# Patient Record
Sex: MALE | Race: BLACK OR AFRICAN AMERICAN | Employment: FULL TIME | URBAN - METROPOLITAN AREA
[De-identification: names, ages, dates, MRNs, and addresses within clinical notes are randomized per-mention and may not be internally consistent; named-entity substitution may affect disease eponyms.]

---

## 2018-06-22 ENCOUNTER — OFFICE VISIT (OUTPATIENT)
Dept: PRIMARY CARE CLINIC | Age: 63
End: 2018-06-22

## 2018-06-22 VITALS
SYSTOLIC BLOOD PRESSURE: 151 MMHG | HEIGHT: 69 IN | DIASTOLIC BLOOD PRESSURE: 79 MMHG | OXYGEN SATURATION: 99 % | RESPIRATION RATE: 18 BRPM | WEIGHT: 173 LBS | BODY MASS INDEX: 25.62 KG/M2 | TEMPERATURE: 98 F | HEART RATE: 65 BPM

## 2018-06-22 DIAGNOSIS — Z72.89 OTHER PROBLEMS RELATED TO LIFESTYLE: ICD-10-CM

## 2018-06-22 DIAGNOSIS — I10 ESSENTIAL HYPERTENSION: Primary | ICD-10-CM

## 2018-06-22 DIAGNOSIS — Z12.11 SCREENING FOR COLON CANCER: ICD-10-CM

## 2018-06-22 DIAGNOSIS — Z13.1 ENCOUNTER FOR SCREENING FOR DIABETES MELLITUS: ICD-10-CM

## 2018-06-22 DIAGNOSIS — F17.200 SMOKER: ICD-10-CM

## 2018-06-22 DIAGNOSIS — I10 ESSENTIAL HYPERTENSION: ICD-10-CM

## 2018-06-22 DIAGNOSIS — E78.49 OTHER HYPERLIPIDEMIA: ICD-10-CM

## 2018-06-22 LAB
A/G RATIO: 1.5 (ref 1.1–2.2)
ALBUMIN SERPL-MCNC: 4.3 G/DL (ref 3.4–5)
ALP BLD-CCNC: 85 U/L (ref 40–129)
ALT SERPL-CCNC: 21 U/L (ref 10–40)
ANION GAP SERPL CALCULATED.3IONS-SCNC: 11 MMOL/L (ref 3–16)
AST SERPL-CCNC: 21 U/L (ref 15–37)
BANDED NEUTROPHILS RELATIVE PERCENT: 6 % (ref 0–7)
BASOPHILS ABSOLUTE: 0.1 K/UL (ref 0–0.2)
BASOPHILS RELATIVE PERCENT: 1 %
BILIRUB SERPL-MCNC: <0.2 MG/DL (ref 0–1)
BILIRUBIN URINE: NEGATIVE
BLOOD, URINE: NEGATIVE
BUN BLDV-MCNC: 14 MG/DL (ref 7–20)
CALCIUM SERPL-MCNC: 9.8 MG/DL (ref 8.3–10.6)
CHLORIDE BLD-SCNC: 106 MMOL/L (ref 99–110)
CHOLESTEROL, TOTAL: 111 MG/DL (ref 0–199)
CLARITY: CLEAR
CO2: 26 MMOL/L (ref 21–32)
COLOR: ABNORMAL
CREAT SERPL-MCNC: 0.8 MG/DL (ref 0.8–1.3)
EOSINOPHILS ABSOLUTE: 0.1 K/UL (ref 0–0.6)
EOSINOPHILS RELATIVE PERCENT: 1 %
GFR AFRICAN AMERICAN: >60
GFR NON-AFRICAN AMERICAN: >60
GLOBULIN: 2.9 G/DL
GLUCOSE BLD-MCNC: 96 MG/DL (ref 70–99)
GLUCOSE URINE: NEGATIVE MG/DL
HCT VFR BLD CALC: 41.1 % (ref 40.5–52.5)
HDLC SERPL-MCNC: 45 MG/DL (ref 40–60)
HEMOGLOBIN: 14 G/DL (ref 13.5–17.5)
HEPATITIS C ANTIBODY INTERPRETATION: NORMAL
KETONES, URINE: ABNORMAL MG/DL
LDL CHOLESTEROL CALCULATED: 43 MG/DL
LEUKOCYTE ESTERASE, URINE: NEGATIVE
LYMPHOCYTES ABSOLUTE: 1.8 K/UL (ref 1–5.1)
LYMPHOCYTES RELATIVE PERCENT: 20 %
MCH RBC QN AUTO: 32.9 PG (ref 26–34)
MCHC RBC AUTO-ENTMCNC: 34 G/DL (ref 31–36)
MCV RBC AUTO: 96.7 FL (ref 80–100)
MICROSCOPIC EXAMINATION: ABNORMAL
MONOCYTES ABSOLUTE: 0.5 K/UL (ref 0–1.3)
MONOCYTES RELATIVE PERCENT: 6 %
NEUTROPHILS ABSOLUTE: 6.4 K/UL (ref 1.7–7.7)
NEUTROPHILS RELATIVE PERCENT: 66 %
NITRITE, URINE: NEGATIVE
PDW BLD-RTO: 14.2 % (ref 12.4–15.4)
PH UA: 6
PLATELET # BLD: 213 K/UL (ref 135–450)
PMV BLD AUTO: 8.8 FL (ref 5–10.5)
POTASSIUM SERPL-SCNC: 4.5 MMOL/L (ref 3.5–5.1)
PROTEIN UA: NEGATIVE MG/DL
RBC # BLD: 4.25 M/UL (ref 4.2–5.9)
RBC # BLD: NORMAL 10*6/UL
SODIUM BLD-SCNC: 143 MMOL/L (ref 136–145)
SPECIFIC GRAVITY UA: 1.03
TOTAL PROTEIN: 7.2 G/DL (ref 6.4–8.2)
TRIGL SERPL-MCNC: 117 MG/DL (ref 0–150)
TSH SERPL DL<=0.05 MIU/L-ACNC: 1.79 UIU/ML (ref 0.27–4.2)
URINE TYPE: ABNORMAL
UROBILINOGEN, URINE: 0.2 E.U./DL
VLDLC SERPL CALC-MCNC: 23 MG/DL
WBC # BLD: 8.9 K/UL (ref 4–11)

## 2018-06-22 PROCEDURE — 99203 OFFICE O/P NEW LOW 30 MIN: CPT | Performed by: INTERNAL MEDICINE

## 2018-06-22 RX ORDER — LISINOPRIL 20 MG/1
20 TABLET ORAL DAILY
COMMUNITY
End: 2018-06-22 | Stop reason: SDUPTHER

## 2018-06-22 RX ORDER — AMLODIPINE BESYLATE 10 MG/1
10 TABLET ORAL DAILY
COMMUNITY
End: 2018-06-22 | Stop reason: SDUPTHER

## 2018-06-22 RX ORDER — LISINOPRIL 20 MG/1
20 TABLET ORAL DAILY
Qty: 30 TABLET | Refills: 5 | Status: SHIPPED | OUTPATIENT
Start: 2018-06-22 | End: 2018-10-26 | Stop reason: SDUPTHER

## 2018-06-22 RX ORDER — AMLODIPINE BESYLATE 10 MG/1
10 TABLET ORAL DAILY
Qty: 30 TABLET | Refills: 5 | Status: SHIPPED | OUTPATIENT
Start: 2018-06-22 | End: 2018-10-26 | Stop reason: SDUPTHER

## 2018-06-22 RX ORDER — ATORVASTATIN CALCIUM 80 MG/1
80 TABLET, FILM COATED ORAL DAILY
COMMUNITY
End: 2018-10-26 | Stop reason: SDUPTHER

## 2018-06-22 ASSESSMENT — ENCOUNTER SYMPTOMS
BLURRED VISION: 0
SHORTNESS OF BREATH: 0
RESPIRATORY NEGATIVE: 1
EYE DISCHARGE: 0
EYES NEGATIVE: 1
ORTHOPNEA: 0

## 2018-06-22 ASSESSMENT — PATIENT HEALTH QUESTIONNAIRE - PHQ9
2. FEELING DOWN, DEPRESSED OR HOPELESS: 0
1. LITTLE INTEREST OR PLEASURE IN DOING THINGS: 0
SUM OF ALL RESPONSES TO PHQ9 QUESTIONS 1 & 2: 0
SUM OF ALL RESPONSES TO PHQ QUESTIONS 1-9: 0

## 2018-06-23 LAB
ESTIMATED AVERAGE GLUCOSE: 128.4 MG/DL
HBA1C MFR BLD: 6.1 %
HIV AG/AB: NORMAL
HIV ANTIGEN: NORMAL
HIV-1 ANTIBODY: NORMAL
HIV-2 AB: NORMAL

## 2018-07-18 ENCOUNTER — OFFICE VISIT (OUTPATIENT)
Dept: PRIMARY CARE CLINIC | Age: 63
End: 2018-07-18

## 2018-07-18 VITALS
SYSTOLIC BLOOD PRESSURE: 107 MMHG | HEIGHT: 69 IN | DIASTOLIC BLOOD PRESSURE: 63 MMHG | TEMPERATURE: 98.1 F | OXYGEN SATURATION: 97 % | BODY MASS INDEX: 25.62 KG/M2 | HEART RATE: 94 BPM | WEIGHT: 173 LBS | RESPIRATION RATE: 16 BRPM

## 2018-07-18 DIAGNOSIS — R21 RASH AND NONSPECIFIC SKIN ERUPTION: Primary | ICD-10-CM

## 2018-07-18 DIAGNOSIS — R21 RASH AND NONSPECIFIC SKIN ERUPTION: ICD-10-CM

## 2018-07-18 PROCEDURE — 4004F PT TOBACCO SCREEN RCVD TLK: CPT | Performed by: INTERNAL MEDICINE

## 2018-07-18 PROCEDURE — G8419 CALC BMI OUT NRM PARAM NOF/U: HCPCS | Performed by: INTERNAL MEDICINE

## 2018-07-18 PROCEDURE — 99213 OFFICE O/P EST LOW 20 MIN: CPT | Performed by: INTERNAL MEDICINE

## 2018-07-18 PROCEDURE — G8427 DOCREV CUR MEDS BY ELIG CLIN: HCPCS | Performed by: INTERNAL MEDICINE

## 2018-07-18 PROCEDURE — 3017F COLORECTAL CA SCREEN DOC REV: CPT | Performed by: INTERNAL MEDICINE

## 2018-07-18 RX ORDER — FLUOCINONIDE 0.5 MG/G
OINTMENT TOPICAL
Qty: 60 G | Refills: 1 | Status: SHIPPED | OUTPATIENT
Start: 2018-07-18 | End: 2018-07-25

## 2018-07-18 ASSESSMENT — ENCOUNTER SYMPTOMS
RESPIRATORY NEGATIVE: 1
EYE DISCHARGE: 0
NAIL CHANGES: 0
EYES NEGATIVE: 1

## 2018-07-18 NOTE — PROGRESS NOTES
nervous/anxious. All other systems reviewed and are negative. Vitals:    07/18/18 1351   BP: 107/63   Site: Left Arm   Position: Sitting   Pulse: 94   Resp: 16   Temp: 98.1 °F (36.7 °C)   TempSrc: Oral   SpO2: 97%   Weight: 173 lb (78.5 kg)   Height: 5' 9\" (1.753 m)     Body mass index is 25.55 kg/m². Wt Readings from Last 3 Encounters:   07/18/18 173 lb (78.5 kg)   06/22/18 173 lb (78.5 kg)     BP Readings from Last 3 Encounters:   07/18/18 107/63   06/22/18 (!) 151/79         Physical Exam   Constitutional: He is oriented to person, place, and time. He appears well-developed and well-nourished. HENT:   Head: Normocephalic and atraumatic. Eyes: Conjunctivae and EOM are normal. Pupils are equal, round, and reactive to light. Neck: Normal range of motion. Neck supple. Cardiovascular: Normal rate, regular rhythm and normal heart sounds. Pulmonary/Chest: Effort normal and breath sounds normal.   Musculoskeletal: Normal range of motion. Neurological: He is alert and oriented to person, place, and time. Skin: Skin is warm and dry. Rash noted. There is erythema. Dry  Rash on the antecubital fossa and elbows of both arms   Psychiatric: He has a normal mood and affect.  His behavior is normal. Thought content normal.       Lab Review   Orders Only on 06/22/2018   Component Date Value    Sodium 06/22/2018 143     Potassium 06/22/2018 4.5     Chloride 06/22/2018 106     CO2 06/22/2018 26     Anion Gap 06/22/2018 11     Glucose 06/22/2018 96     BUN 06/22/2018 14     CREATININE 06/22/2018 0.8     GFR Non- 06/22/2018 >60     GFR  06/22/2018 >60     Calcium 06/22/2018 9.8     Total Protein 06/22/2018 7.2     Alb 06/22/2018 4.3     Albumin/Globulin Ratio 06/22/2018 1.5     Total Bilirubin 06/22/2018 <0.2     Alkaline Phosphatase 06/22/2018 85     ALT 06/22/2018 21     AST 06/22/2018 21     Globulin 06/22/2018 2.9     WBC 06/22/2018 8.9     RBC 06/22/2018 4.25     Hemoglobin 06/22/2018 14.0     Hematocrit 06/22/2018 41.1     MCV 06/22/2018 96.7     MCH 06/22/2018 32.9     MCHC 06/22/2018 34.0     RDW 06/22/2018 14.2     Platelets 24/18/8254 213     MPV 06/22/2018 8.8     Neutrophils % 06/22/2018 66.0     Bands Relative 06/22/2018 6     Lymphocytes % 06/22/2018 20.0     Monocytes % 06/22/2018 6.0     Eosinophils % 06/22/2018 1.0     Basophils % 06/22/2018 1.0     RBC Morphology 06/22/2018 Normal     Neutrophils # 06/22/2018 6.4     Lymphocytes # 06/22/2018 1.8     Monocytes # 06/22/2018 0.5     Eosinophils # 06/22/2018 0.1     Basophils # 06/22/2018 0.1     Cholesterol, Total 06/22/2018 111     Triglycerides 06/22/2018 117     HDL 06/22/2018 45     LDL Calculated 06/22/2018 43     VLDL Cholesterol Calcula* 06/22/2018 23     TSH 06/22/2018 1.79     Color, UA 06/22/2018 DK YELLOW     Clarity, UA 06/22/2018 Clear     Glucose, Ur 06/22/2018 Negative     Bilirubin Urine 06/22/2018 Negative     Ketones, Urine 06/22/2018 TRACE*    Specific [place], UA 06/22/2018 1.028     Blood, Urine 06/22/2018 Negative     pH, UA 06/22/2018 6.0     Protein, UA 06/22/2018 Negative     Urobilinogen, Urine 06/22/2018 0.2     Nitrite, Urine 06/22/2018 Negative     Leukocyte Esterase, Urine 06/22/2018 Negative     Microscopic Examination 06/22/2018 Not Indicated     Urine Type 06/22/2018 Voided     HIV Ag/Ab 06/22/2018 Non-Reactive     HIV-1 Antibody 06/22/2018 Non-Reactive     HIV ANTIGEN 06/22/2018 Non-Reactive     HIV-2 Ab 06/22/2018 Non-Reactive     Hep C Ab Interp 06/22/2018 Non-reactive     Hemoglobin A1C 06/22/2018 6.1     eAG 06/22/2018 128.4          Health Maintenance   Topic Date Due    Pneumococcal med risk (1 of 1 - PPSV23) 10/13/1974    Colon cancer screen colonoscopy  10/13/2005    DTaP/Tdap/Td vaccine (1 - Tdap) 06/12/2019 (Originally 10/13/1974)    Shingles Vaccine (1 of 2 - 2 Dose Series) 06/13/2019 (Originally

## 2018-07-19 ENCOUNTER — TELEPHONE (OUTPATIENT)
Dept: INTERNAL MEDICINE CLINIC | Age: 63
End: 2018-07-19

## 2018-07-19 LAB
ANA INTERPRETATION: NORMAL
ANTI-NUCLEAR ANTIBODY (ANA): NEGATIVE

## 2018-07-19 NOTE — TELEPHONE ENCOUNTER
Referral from Dr Kris Lozano---pt wife called made NP appt for 8/1 at [facility] requested for rash and skin erruption---records should be in epic.

## 2018-08-01 ENCOUNTER — OFFICE VISIT (OUTPATIENT)
Dept: RHEUMATOLOGY | Age: 63
End: 2018-08-01

## 2018-08-01 VITALS
SYSTOLIC BLOOD PRESSURE: 118 MMHG | DIASTOLIC BLOOD PRESSURE: 58 MMHG | HEIGHT: 69 IN | WEIGHT: 174 LBS | HEART RATE: 68 BPM | BODY MASS INDEX: 25.77 KG/M2

## 2018-08-01 DIAGNOSIS — R21 RASH AND NONSPECIFIC SKIN ERUPTION: Primary | ICD-10-CM

## 2018-08-01 DIAGNOSIS — R21 RASH AND NONSPECIFIC SKIN ERUPTION: ICD-10-CM

## 2018-08-01 DIAGNOSIS — R76.8 POSITIVE ANA (ANTINUCLEAR ANTIBODY): Primary | ICD-10-CM

## 2018-08-01 LAB
C-REACTIVE PROTEIN: 1.9 MG/L (ref 0–5.1)
SEDIMENTATION RATE, ERYTHROCYTE: 11 MM/HR (ref 0–20)

## 2018-08-01 PROCEDURE — 3017F COLORECTAL CA SCREEN DOC REV: CPT | Performed by: INTERNAL MEDICINE

## 2018-08-01 PROCEDURE — 99244 OFF/OP CNSLTJ NEW/EST MOD 40: CPT | Performed by: INTERNAL MEDICINE

## 2018-08-01 PROCEDURE — G8427 DOCREV CUR MEDS BY ELIG CLIN: HCPCS | Performed by: INTERNAL MEDICINE

## 2018-08-01 PROCEDURE — G8419 CALC BMI OUT NRM PARAM NOF/U: HCPCS | Performed by: INTERNAL MEDICINE

## 2018-08-01 NOTE — PROGRESS NOTES
2018  Patient Name: Elizabeth Arteaga  : 1955  Medical Record: [id number]    MEDICATIONS  Current Outpatient Prescriptions   Medication Sig Dispense Refill    aspirin 81 MG tablet Take 81 mg by mouth daily      atorvastatin (LIPITOR) 80 MG tablet Take 80 mg by mouth daily      lisinopril (PRINIVIL;ZESTRIL) 20 MG tablet Take 1 tablet by mouth daily 30 tablet 5    amLODIPine (NORVASC) 10 MG tablet Take 1 tablet by mouth daily 30 tablet 5     No current facility-administered medications for this visit. ALLERGIES  No Known Allergies      Comments  No specialty comments available. REFERRING PHYSICIAN:Deep Morales MD    HISTORY OF PRESENT ILLNESS  Elizabeth Arteaga is a 58 y.o. male who is being seen for follow up evaluation of  Rash. Symptoms started several years ago. Rash is located in the forearms and chest and is present in the summer months only. It's associated with itching. He has tried several over-the-counter topicals without improvement. He was recently prescribed Lidex ointment by Dr. Chante Ho with improvement. He denies any family history of lupus. He denies oral or nasal ulcers, dry eyes or dry mouth, malar rash, alopecia, raynauds, joint symptoms, history of seizures, memory loss, blood clots or kidney diseases, pleurisy or pericarditis. HPI  ROS    REVIEW OF SYSTEMS:   Constitutional: No unanticipated weight loss or fevers. No fatigue and malaise. Integumentary: No  malar rash, livedo reticularis, alopecia and Raynaud's symptoms, sclerodactyly, skin tightening  Eyes: negative for dry eyes, visual disturbance and persistent redness, discharge from eyes   ENT: - No tinnitus, loss of hearing, vertigo, or recurrent ear infections.  - No history of nasal/oral ulcers. - No history of sicca symptoms. Cardiovascular: No history of pericarditis, chest pain or murmur or palpitations  Respiratory: No shortness of breath, cough or history of interstitial lung disease. No history of pleurisy. No history of tuberculosis or atypical infections. Gastrointestinal: No history of heart burn, dysphagia or esophageal dysmotility. No change in bowel habits or any inflammatory bowel disease. Genitourinary: No history renal disease, miscarriages. Hematologic/Lymphatic: No abnormal bruising or bleeding, blood clots or swollen lymph nodes. Musculoskeletal: Denies having any swollen joints, joint pains or stiffness   Neurological: No history seizure or focal weakness. No history of neuropathies, paresthesias or hyperesthesias, facial droop, diplopia  Psychiatric: No history of bipolar disease, anxiety, depression  Endocrine: Denies any thyroid / parathyroid disease and osteoporosis  Allergic/Immunologic: No nasal congestion or hives. I have reviewed patients Past medical History, Social History and Family History as mentioned in her chart and this remains unchanged from previous. Past Medical History:   Diagnosis Date    Hyperlipidemia     Hypertension      Past Surgical History:   Procedure Laterality Date    ARTERIAL CLOT SURGERY Right 2016     Social History     Social History    Marital status: Legally      Spouse name: N/A    Number of children: N/A    Years of education: N/A     Occupational History    Not on file. Social History Main Topics    Smoking status: Current Every Day Smoker     Packs/day: 1.00     Years: 20.00     Types: Cigarettes    Smokeless tobacco: Never Used    Alcohol use Yes    Drug use: No    Sexual activity: Yes     Partners: Female     Other Topics Concern    Not on file     Social History Narrative    No narrative on file     History reviewed. No pertinent family history.       PHYSICAL EXAM   Vitals:    08/01/18 1337   BP: (!) 118/58   Pulse: 68   Weight: 174 lb (78.9 kg)   Height: 5' 9\" (1.753 m)     Physical Exam  Constitutional:  Well developed, well nourished, no acute distress, non-toxic appearance   Musculoskeletal:    Ambulates without assistance, normal gait  Neck: Full ROM, no tenderness, supple   Upper Extremities        Shoulder: Full ROM, no crepitus        Elbow:  Full ROM, no synovitis, no deformity        Wrist: Full ROM, no synovitis, no deformity, no ulnar deviation        Fingers: No sclerodactly, no active raynaud's, no ulcers. MCPs: No synovitis, no deformity             PIPs:  No synovitis, no deformity             DIPs: No synovitis, no deformity             Nails: No pitting, no telangiectasias. Lower Extremities        Hip: Full ROM, no tenderness to palpation        Knee: Full ROM, no erythema/swelling/laxity/crepitus. Patella not ballotable. Ankle: Full ROM, no swelling or erythema        MTPs: No swelling or erythema  Back- no tenderness. Eyes:  PERRL, extra ocular movements intact, conjunctiva normal   HEENT:  Atraumatic, normocephalic, external ears normal, oropharynx moist, no pharyngeal exudates. Respiratory:  No respiratory distress  GI:  Soft, nondistended, normal bowel sounds, nontender, no organomegaly, no mass, no rebound, no guarding   :  No costovertebral angle tenderness   Integument:  Well hydrated,-hyperpigmented maculopapular rash on the chest, forearms  Lymphatic:  No lymphadenopathy noted   Neurologic:   Alert & oriented x 3, CN 2-12 normal, no focal deficits noted. Sensations Intact. Muscle strength 5/5 proximally and distally in upper and lower extremities.    Psychiatric:  Speech and behavior appropriate           LABS AND IMAGING  Outside data reviewed and in HPI    Lab Results   Component Value Date    WBC 8.9 06/22/2018    RBC 4.25 06/22/2018    HGB 14.0 06/22/2018    HCT 41.1 06/22/2018     06/22/2018    MCV 96.7 06/22/2018    MCH 32.9 06/22/2018    MCHC 34.0 06/22/2018    RDW 14.2 06/22/2018    BANDSPCT 6 06/22/2018    LYMPHOPCT 20.0 06/22/2018    MONOPCT 6.0 06/22/2018    BASOPCT 1.0 06/22/2018    MONOSABS 0.5 06/22/2018    LYMPHSABS 1.8 06/22/2018    EOSABS 0.1

## 2018-08-03 LAB — ANA BY IFA: ABNORMAL

## 2018-10-26 ENCOUNTER — OFFICE VISIT (OUTPATIENT)
Dept: PRIMARY CARE CLINIC | Age: 63
End: 2018-10-26
Payer: MEDICAID

## 2018-10-26 VITALS
HEART RATE: 70 BPM | DIASTOLIC BLOOD PRESSURE: 77 MMHG | WEIGHT: 176 LBS | BODY MASS INDEX: 26.07 KG/M2 | TEMPERATURE: 98.2 F | HEIGHT: 69 IN | OXYGEN SATURATION: 99 % | SYSTOLIC BLOOD PRESSURE: 140 MMHG

## 2018-10-26 DIAGNOSIS — E78.49 OTHER HYPERLIPIDEMIA: Primary | ICD-10-CM

## 2018-10-26 DIAGNOSIS — I10 ESSENTIAL HYPERTENSION: ICD-10-CM

## 2018-10-26 DIAGNOSIS — Z12.11 SCREENING FOR COLON CANCER: ICD-10-CM

## 2018-10-26 DIAGNOSIS — F17.200 SMOKER: ICD-10-CM

## 2018-10-26 PROCEDURE — 3017F COLORECTAL CA SCREEN DOC REV: CPT | Performed by: INTERNAL MEDICINE

## 2018-10-26 PROCEDURE — G8427 DOCREV CUR MEDS BY ELIG CLIN: HCPCS | Performed by: INTERNAL MEDICINE

## 2018-10-26 PROCEDURE — G8484 FLU IMMUNIZE NO ADMIN: HCPCS | Performed by: INTERNAL MEDICINE

## 2018-10-26 PROCEDURE — 4004F PT TOBACCO SCREEN RCVD TLK: CPT | Performed by: INTERNAL MEDICINE

## 2018-10-26 PROCEDURE — G8419 CALC BMI OUT NRM PARAM NOF/U: HCPCS | Performed by: INTERNAL MEDICINE

## 2018-10-26 PROCEDURE — 99214 OFFICE O/P EST MOD 30 MIN: CPT | Performed by: INTERNAL MEDICINE

## 2018-10-26 RX ORDER — LISINOPRIL 20 MG/1
20 TABLET ORAL DAILY
Qty: 30 TABLET | Refills: 5 | Status: SHIPPED | OUTPATIENT
Start: 2018-10-26 | End: 2019-07-16 | Stop reason: SDUPTHER

## 2018-10-26 RX ORDER — ATORVASTATIN CALCIUM 80 MG/1
80 TABLET, FILM COATED ORAL DAILY
Qty: 30 TABLET | Refills: 5 | Status: SHIPPED | OUTPATIENT
Start: 2018-10-26 | End: 2019-07-16 | Stop reason: SDUPTHER

## 2018-10-26 RX ORDER — AMLODIPINE BESYLATE 10 MG/1
10 TABLET ORAL DAILY
Qty: 30 TABLET | Refills: 5 | Status: SHIPPED | OUTPATIENT
Start: 2018-10-26 | End: 2019-08-06 | Stop reason: SDUPTHER

## 2018-10-26 ASSESSMENT — ENCOUNTER SYMPTOMS
EYES NEGATIVE: 1
EYE DISCHARGE: 0
RESPIRATORY NEGATIVE: 1

## 2018-10-26 NOTE — PATIENT INSTRUCTIONS
between the lower lip or cheek and the gum. · Chewing tobacco is sold as loose leaves, plugs, or twists. It is chewed or placed between the cheek and the gum or teeth. There are plenty of reasons to stop using smokeless tobacco. These products are harmful. They are not risk-free alternatives to smoking. Smokeless tobacco contains nicotine, which is addicting. Though using smokeless tobacco is less harmful than smoking cigarettes, it can cause serious health problems, such as:  · White patches or red sores in your mouth that can turn into mouth cancer involving the lip, tongue, or cheek. · Tooth loss and other dental problems. · Gum disease. Your gums may pull away from your teeth and not grow back. People who use smokeless tobacco crave the nicotine in it. Giving up smokeless tobacco is much harder than simply changing a habit. Your body has to stop craving the nicotine. It is hard to quit, but you can do it. Many tools are available for people who want to quit using smokeless tobacco. You may find that combining tools works best for you. There are several steps to quitting. First you get ready to quit. Then you get support to help you. After that, you learn new skills and behaviors to quit. For many people, a necessary step is getting and using medicine. Your doctor will help you set up the plan that best meets your needs. You may want to attend a tobacco cessation program. When you choose a program, look for one that has proven success. Ask your doctor for ideas. You will greatly increase your chances of success if you take medicine as well as get counseling or join a cessation program.  Some of the changes you feel when you first quit smokeless tobacco are uncomfortable. Your body will miss the nicotine at first, and you may feel short-tempered and grumpy. You may have trouble sleeping or concentrating. Medicine can help you deal with these symptoms. You may struggle with changing your habits and rituals. The last step is the tricky one: Be prepared for the urge to use smokeless tobacco to continue for a time. This is a lot to deal with, but keep at it. You will feel better. Follow-up care is a key part of your treatment and safety. Be sure to make and go to all appointments, and call your doctor if you are having problems. It's also a good idea to know your test results and keep a list of the medicines you take. How can you care for yourself at home? · Ask your family, friends, and coworkers for support. You have a better chance of quitting if you have help and support. · Join a support group for people who are trying to quit using smokeless tobacco.  · Set a quit date. Pick your date carefully so that it is not right in the middle of a big deadline or stressful time. After you quit, do not use smokeless tobacco even once. Get rid of all spit cups, cans, and pouches after your last use. Clean your house and your clothes so that they do not smell of tobacco.  · Learn how to be a non-user. Think about ways you can avoid those things that make you reach for tobacco.  ¨ Learn some ways to deal with cravings, like calling a friend or going for a walk. Cravings often pass. ¨ Avoid situations that put you at greatest risk for using smokeless tobacco. For some people, it is hard to spend time with friends without dipping or chewing. For others, they might skip a coffee break with coworkers who smoke or use smokeless tobacco.  ¨ Change your daily routine. Take a different route to work, or eat a meal in a different place. · Cut down on stress. Calm yourself or release tension by doing an activity you enjoy, such as reading a book, taking a hot bath, or gardening. · Talk to your doctor or pharmacist about nicotine replacement therapy. You still get nicotine, but you do not use tobacco. Nicotine replacement products help you slowly reduce the amount of nicotine you need.  Many of these products are available over the Medicine can help you deal with these symptoms. You may struggle with changing your smoking habits and rituals. The last step is the tricky one: Be prepared for the smoking urge to continue for a time. This is a lot to deal with, but keep at it. You will feel better. Follow-up care is a key part of your treatment and safety. Be sure to make and go to all appointments, and call your doctor if you are having problems. It's also a good idea to know your test results and keep a list of the medicines you take. How can you care for yourself at home? · Ask your family, friends, and coworkers for support. You have a better chance of quitting if you have help and support. · Join a support group, such as Nicotine Anonymous, for people who are trying to quit smoking. · Consider signing up for a smoking cessation program, such as the American Lung Association's Freedom from Smoking program.  · Get text messaging support. Go to the website at www.smokefree. gov to sign up for the [facility] program.  · Set a quit date. Pick your date carefully so that it is not right in the middle of a big deadline or stressful time. Once you quit, do not even take a puff. Get rid of all ashtrays and lighters after your last cigarette. Clean your house and your clothes so that they do not smell of smoke. · Learn how to be a nonsmoker. Think about ways you can avoid those things that make you reach for a cigarette. ¨ Avoid situations that put you at greatest risk for smoking. For some people, it is hard to have a drink with friends without smoking. For others, they might skip a coffee break with coworkers who smoke. ¨ Change your daily routine. Take a different route to work or eat a meal in a different place. · Cut down on stress. Calm yourself or release tension by doing an activity you enjoy, such as reading a book, taking a hot bath, or gardening.   · Talk to your doctor or pharmacist about nicotine replacement therapy, which

## 2018-10-26 NOTE — PROGRESS NOTES
Maximino Hurt  YOB: 1955    Date of Service:  10/26/2018    Chief Complaint   Patient presents with    Hypertension     Here for medication review and renewal. No new problems. No complaints. Hypertension   This is a chronic problem. The current episode started more than 1 year ago. The problem is unchanged. The problem is controlled. There are no associated agents to hypertension. Risk factors for coronary artery disease include male gender, obesity and sedentary lifestyle. Past treatments include calcium channel blockers and angiotensin blockers. The current treatment provides significant improvement. There are no compliance problems. Hyperlipidemia   This is a chronic problem. The problem is controlled. Recent lipid tests were reviewed and are normal. There are no known factors aggravating his hyperlipidemia. Current antihyperlipidemic treatment includes statins. The current treatment provides significant improvement of lipids. No Known Allergies  Outpatient Prescriptions Marked as Taking for the 10/26/18 encounter (Office Visit) with Lexie Henderson MD   Medication Sig Dispense Refill    lisinopril (PRINIVIL;ZESTRIL) 20 MG tablet Take 1 tablet by mouth daily 30 tablet 5    atorvastatin (LIPITOR) 80 MG tablet Take 1 tablet by mouth daily 30 tablet 5    aspirin 81 MG tablet Take 1 tablet by mouth daily 30 tablet 5    amLODIPine (NORVASC) 10 MG tablet Take 1 tablet by mouth daily 30 tablet 5         There is no immunization history on file for this patient. Past Medical History:   Diagnosis Date    Hyperlipidemia     Hypertension      Past Surgical History:   Procedure Laterality Date    ARTERIAL CLOT SURGERY Right 2016     No family history on file. Review of Systems:  Review of Systems   Constitutional: Negative for activity change and appetite change. HENT: Negative. Eyes: Negative. Negative for discharge. Respiratory: Negative.     Genitourinary: Non-Reactive     HIV-2 Ab 06/22/2018 Non-Reactive     Hep C Ab Interp 06/22/2018 Non-reactive     Hemoglobin A1C 06/22/2018 6.1     eAG 06/22/2018 557.4      notapplicable      Health Maintenance   Topic Date Due    Colon cancer screen colonoscopy  10/13/2005    Flu vaccine (1) 10/26/2018 (Originally 9/1/2018)    Pneumococcal med risk (1 of 1 - PPSV23) 10/26/2018 (Originally 10/13/1974)    DTaP/Tdap/Td vaccine (1 - Tdap) 06/12/2019 (Originally 10/13/1974)    Shingles Vaccine (1 of 2 - 2 Dose Series) 06/13/2019 (Originally 10/13/2005)    A1C test (Diabetic or Prediabetic)  06/22/2019    Potassium monitoring  06/22/2019    Creatinine monitoring  06/22/2019    Lipid screen  06/22/2023    Hepatitis C screen  Completed    HIV screen  Completed          Assessment/Plan:    Essential hypertension  Stable     Smoker  Still smokes . Not ready to quit yet. 1. Essential hypertension    - lisinopril (PRINIVIL;ZESTRIL) 20 MG tablet; Take 1 tablet by mouth daily  Dispense: 30 tablet; Refill: 5  - atorvastatin (LIPITOR) 80 MG tablet; Take 1 tablet by mouth daily  Dispense: 30 tablet; Refill: 5  - aspirin 81 MG tablet; Take 1 tablet by mouth daily  Dispense: 30 tablet; Refill: 5  - amLODIPine (NORVASC) 10 MG tablet; Take 1 tablet by mouth daily  Dispense: 30 tablet; Refill: 5    2. Other hyperlipidemia    - atorvastatin (LIPITOR) 80 MG tablet; Take 1 tablet by mouth daily  Dispense: 30 tablet; Refill: 5    3. Screening for colon cancer    - POCT FECAL IMMUNOCHEMICAL TEST (FIT); Future    4. Smoker         No Follow-up on file.

## 2019-01-24 ENCOUNTER — OFFICE VISIT (OUTPATIENT)
Dept: PRIMARY CARE CLINIC | Age: 64
End: 2019-01-24
Payer: MEDICAID

## 2019-01-24 VITALS
HEIGHT: 69 IN | HEART RATE: 73 BPM | DIASTOLIC BLOOD PRESSURE: 70 MMHG | BODY MASS INDEX: 26.66 KG/M2 | WEIGHT: 180 LBS | SYSTOLIC BLOOD PRESSURE: 129 MMHG

## 2019-01-24 DIAGNOSIS — Z12.11 SCREENING FOR COLON CANCER: Primary | ICD-10-CM

## 2019-01-24 PROCEDURE — G8427 DOCREV CUR MEDS BY ELIG CLIN: HCPCS | Performed by: INTERNAL MEDICINE

## 2019-01-24 PROCEDURE — 99213 OFFICE O/P EST LOW 20 MIN: CPT | Performed by: INTERNAL MEDICINE

## 2019-01-24 PROCEDURE — G8484 FLU IMMUNIZE NO ADMIN: HCPCS | Performed by: INTERNAL MEDICINE

## 2019-01-24 PROCEDURE — G8419 CALC BMI OUT NRM PARAM NOF/U: HCPCS | Performed by: INTERNAL MEDICINE

## 2019-01-24 PROCEDURE — 4004F PT TOBACCO SCREEN RCVD TLK: CPT | Performed by: INTERNAL MEDICINE

## 2019-01-24 PROCEDURE — 3017F COLORECTAL CA SCREEN DOC REV: CPT | Performed by: INTERNAL MEDICINE

## 2019-07-16 DIAGNOSIS — E78.49 OTHER HYPERLIPIDEMIA: ICD-10-CM

## 2019-07-16 DIAGNOSIS — I10 ESSENTIAL HYPERTENSION: ICD-10-CM

## 2019-07-16 RX ORDER — ATORVASTATIN CALCIUM 80 MG/1
80 TABLET, FILM COATED ORAL DAILY
Qty: 30 TABLET | Refills: 5 | Status: SHIPPED | OUTPATIENT
Start: 2019-07-16 | End: 2020-02-06 | Stop reason: SDUPTHER

## 2019-07-16 RX ORDER — LISINOPRIL 20 MG/1
20 TABLET ORAL DAILY
Qty: 30 TABLET | Refills: 5 | Status: SHIPPED | OUTPATIENT
Start: 2019-07-16 | End: 2020-02-06 | Stop reason: SDUPTHER

## 2019-07-24 ENCOUNTER — OFFICE VISIT (OUTPATIENT)
Dept: PRIMARY CARE CLINIC | Age: 64
End: 2019-07-24
Payer: MEDICAID

## 2019-07-24 VITALS
DIASTOLIC BLOOD PRESSURE: 81 MMHG | SYSTOLIC BLOOD PRESSURE: 131 MMHG | OXYGEN SATURATION: 98 % | TEMPERATURE: 97.9 F | BODY MASS INDEX: 25.77 KG/M2 | WEIGHT: 174 LBS | HEART RATE: 78 BPM | HEIGHT: 69 IN

## 2019-07-24 DIAGNOSIS — I10 ESSENTIAL HYPERTENSION: Primary | ICD-10-CM

## 2019-07-24 DIAGNOSIS — Z13.1 SCREENING FOR DIABETES MELLITUS (DM): ICD-10-CM

## 2019-07-24 DIAGNOSIS — E78.49 OTHER HYPERLIPIDEMIA: ICD-10-CM

## 2019-07-24 DIAGNOSIS — F17.200 SMOKER: ICD-10-CM

## 2019-07-24 DIAGNOSIS — I10 ESSENTIAL HYPERTENSION: ICD-10-CM

## 2019-07-24 PROCEDURE — 99213 OFFICE O/P EST LOW 20 MIN: CPT | Performed by: INTERNAL MEDICINE

## 2019-07-24 PROCEDURE — 4004F PT TOBACCO SCREEN RCVD TLK: CPT | Performed by: INTERNAL MEDICINE

## 2019-07-24 PROCEDURE — G8427 DOCREV CUR MEDS BY ELIG CLIN: HCPCS | Performed by: INTERNAL MEDICINE

## 2019-07-24 PROCEDURE — 3017F COLORECTAL CA SCREEN DOC REV: CPT | Performed by: INTERNAL MEDICINE

## 2019-07-24 PROCEDURE — G8419 CALC BMI OUT NRM PARAM NOF/U: HCPCS | Performed by: INTERNAL MEDICINE

## 2019-07-24 ASSESSMENT — ENCOUNTER SYMPTOMS
PHOTOPHOBIA: 0
GASTROINTESTINAL NEGATIVE: 1
RESPIRATORY NEGATIVE: 1
EYE PAIN: 0
EYE ITCHING: 0
EYE DISCHARGE: 0
EYE REDNESS: 0

## 2019-07-24 ASSESSMENT — PATIENT HEALTH QUESTIONNAIRE - PHQ9
2. FEELING DOWN, DEPRESSED OR HOPELESS: 0
SUM OF ALL RESPONSES TO PHQ9 QUESTIONS 1 & 2: 0
SUM OF ALL RESPONSES TO PHQ QUESTIONS 1-9: 0
1. LITTLE INTEREST OR PLEASURE IN DOING THINGS: 0
SUM OF ALL RESPONSES TO PHQ QUESTIONS 1-9: 0

## 2019-07-24 NOTE — PROGRESS NOTES
TempSrc: Oral   SpO2: 98%   Weight: 174 lb (78.9 kg)   Height: 5' 9\" (1.753 m)     Body mass index is 25.7 kg/m². Wt Readings from Last 3 Encounters:   07/24/19 174 lb (78.9 kg)   01/24/19 180 lb (81.6 kg)   10/26/18 176 lb (79.8 kg)     BP Readings from Last 3 Encounters:   07/24/19 131/81   01/24/19 129/70   10/26/18 (!) 140/77         Physical Exam   Constitutional: He is oriented to person, place, and time. He appears well-developed and well-nourished. HENT:   Head: Normocephalic and atraumatic. Eyes: Pupils are equal, round, and reactive to light. Conjunctivae and EOM are normal.   Neck: Normal range of motion. Neck supple. Cardiovascular: Normal rate, regular rhythm and normal heart sounds. Pulmonary/Chest: Effort normal and breath sounds normal.   Musculoskeletal: Normal range of motion. Neurological: He is alert and oriented to person, place, and time. Skin: Skin is warm and dry. Psychiatric: He has a normal mood and affect. His behavior is normal. Thought content normal.       Lab Review   No visits with results within 6 Month(s) from this visit.    Latest known visit with results is:   Orders Only on 08/01/2018   Component Date Value    YANELIS by IFA 08/01/2018 1:160*    Sed Rate 08/01/2018 11     CRP 48/84/1541 1.9      notapplicable      Health Maintenance   Topic Date Due    Pneumococcal 0-64 years Vaccine (1 of 1 - PPSV23) 10/13/1961    DTaP/Tdap/Td vaccine (1 - Tdap) 10/13/1974    Shingles Vaccine (1 of 2) 10/13/2005    Colon cancer screen colonoscopy  10/13/2005    A1C test (Diabetic or Prediabetic)  06/22/2019    Potassium monitoring  06/22/2019    Creatinine monitoring  06/22/2019    Flu vaccine (1) 01/15/2020 (Originally 9/1/2019)    Lipid screen  06/22/2023    Hepatitis C screen  Completed    HIV screen  Completed          Assessment/Plan:    Essential hypertension  Stable     Other hyperlipidemia  Lab review    Smoker  Encouraged to stop smoking but is not ready yet          There are no diagnoses linked to this encounter. No follow-ups on file.

## 2019-07-24 NOTE — PATIENT INSTRUCTIONS
Continue your current medications and see me again in 6 months. Please have your labs repeated before the next visit.

## 2019-07-25 LAB
A/G RATIO: 1.7 (ref 1.1–2.2)
ALBUMIN SERPL-MCNC: 4.3 G/DL (ref 3.4–5)
ALP BLD-CCNC: 71 U/L (ref 40–129)
ALT SERPL-CCNC: 20 U/L (ref 10–40)
ANION GAP SERPL CALCULATED.3IONS-SCNC: 13 MMOL/L (ref 3–16)
AST SERPL-CCNC: 22 U/L (ref 15–37)
BASOPHILS ABSOLUTE: 0 K/UL (ref 0–0.2)
BASOPHILS RELATIVE PERCENT: 0.6 %
BILIRUB SERPL-MCNC: <0.2 MG/DL (ref 0–1)
BUN BLDV-MCNC: 19 MG/DL (ref 7–20)
CALCIUM SERPL-MCNC: 9.6 MG/DL (ref 8.3–10.6)
CHLORIDE BLD-SCNC: 104 MMOL/L (ref 99–110)
CO2: 23 MMOL/L (ref 21–32)
CREAT SERPL-MCNC: 1 MG/DL (ref 0.8–1.3)
EOSINOPHILS ABSOLUTE: 0.3 K/UL (ref 0–0.6)
EOSINOPHILS RELATIVE PERCENT: 4.5 %
ESTIMATED AVERAGE GLUCOSE: 131.2 MG/DL
GFR AFRICAN AMERICAN: >60
GFR NON-AFRICAN AMERICAN: >60
GLOBULIN: 2.5 G/DL
GLUCOSE BLD-MCNC: 95 MG/DL (ref 70–99)
HBA1C MFR BLD: 6.2 %
HCT VFR BLD CALC: 40.5 % (ref 40.5–52.5)
HEMOGLOBIN: 13.4 G/DL (ref 13.5–17.5)
LYMPHOCYTES ABSOLUTE: 2.1 K/UL (ref 1–5.1)
LYMPHOCYTES RELATIVE PERCENT: 30.1 %
MCH RBC QN AUTO: 33.2 PG (ref 26–34)
MCHC RBC AUTO-ENTMCNC: 33.1 G/DL (ref 31–36)
MCV RBC AUTO: 100.2 FL (ref 80–100)
MONOCYTES ABSOLUTE: 0.6 K/UL (ref 0–1.3)
MONOCYTES RELATIVE PERCENT: 8.6 %
NEUTROPHILS ABSOLUTE: 4 K/UL (ref 1.7–7.7)
NEUTROPHILS RELATIVE PERCENT: 56.2 %
PDW BLD-RTO: 13.8 % (ref 12.4–15.4)
PLATELET # BLD: 204 K/UL (ref 135–450)
PMV BLD AUTO: 9.2 FL (ref 5–10.5)
POTASSIUM SERPL-SCNC: 4.1 MMOL/L (ref 3.5–5.1)
RBC # BLD: 4.04 M/UL (ref 4.2–5.9)
SODIUM BLD-SCNC: 140 MMOL/L (ref 136–145)
TOTAL PROTEIN: 6.8 G/DL (ref 6.4–8.2)
TSH SERPL DL<=0.05 MIU/L-ACNC: 2.96 UIU/ML (ref 0.27–4.2)
WBC # BLD: 7.1 K/UL (ref 4–11)

## 2019-08-06 DIAGNOSIS — I10 ESSENTIAL HYPERTENSION: ICD-10-CM

## 2019-08-06 RX ORDER — AMLODIPINE BESYLATE 10 MG/1
10 TABLET ORAL DAILY
Qty: 30 TABLET | Refills: 5 | Status: SHIPPED | OUTPATIENT
Start: 2019-08-06 | End: 2020-02-06 | Stop reason: SDUPTHER

## 2020-02-06 ENCOUNTER — OFFICE VISIT (OUTPATIENT)
Dept: PRIMARY CARE CLINIC | Age: 65
End: 2020-02-06
Payer: MEDICAID

## 2020-02-06 VITALS
WEIGHT: 175 LBS | HEIGHT: 69 IN | OXYGEN SATURATION: 98 % | SYSTOLIC BLOOD PRESSURE: 112 MMHG | TEMPERATURE: 97.8 F | HEART RATE: 70 BPM | BODY MASS INDEX: 25.92 KG/M2 | DIASTOLIC BLOOD PRESSURE: 74 MMHG

## 2020-02-06 PROCEDURE — 4004F PT TOBACCO SCREEN RCVD TLK: CPT | Performed by: INTERNAL MEDICINE

## 2020-02-06 PROCEDURE — G8427 DOCREV CUR MEDS BY ELIG CLIN: HCPCS | Performed by: INTERNAL MEDICINE

## 2020-02-06 PROCEDURE — 3017F COLORECTAL CA SCREEN DOC REV: CPT | Performed by: INTERNAL MEDICINE

## 2020-02-06 PROCEDURE — 99214 OFFICE O/P EST MOD 30 MIN: CPT | Performed by: INTERNAL MEDICINE

## 2020-02-06 PROCEDURE — G8484 FLU IMMUNIZE NO ADMIN: HCPCS | Performed by: INTERNAL MEDICINE

## 2020-02-06 PROCEDURE — G8419 CALC BMI OUT NRM PARAM NOF/U: HCPCS | Performed by: INTERNAL MEDICINE

## 2020-02-06 RX ORDER — ATORVASTATIN CALCIUM 80 MG/1
80 TABLET, FILM COATED ORAL DAILY
Qty: 90 TABLET | Refills: 2 | Status: SHIPPED | OUTPATIENT
Start: 2020-02-06 | End: 2021-02-17

## 2020-02-06 RX ORDER — AMLODIPINE BESYLATE 10 MG/1
10 TABLET ORAL DAILY
Qty: 90 TABLET | Refills: 2 | Status: SHIPPED | OUTPATIENT
Start: 2020-02-06 | End: 2021-02-17

## 2020-02-06 RX ORDER — LISINOPRIL 20 MG/1
20 TABLET ORAL DAILY
Qty: 90 TABLET | Refills: 2 | Status: SHIPPED | OUTPATIENT
Start: 2020-02-06 | End: 2021-02-17

## 2020-02-06 ASSESSMENT — ENCOUNTER SYMPTOMS
EYES NEGATIVE: 1
EYE DISCHARGE: 0
RESPIRATORY NEGATIVE: 1

## 2020-02-06 ASSESSMENT — PATIENT HEALTH QUESTIONNAIRE - PHQ9
SUM OF ALL RESPONSES TO PHQ9 QUESTIONS 1 & 2: 0
SUM OF ALL RESPONSES TO PHQ QUESTIONS 1-9: 0
1. LITTLE INTEREST OR PLEASURE IN DOING THINGS: 0
2. FEELING DOWN, DEPRESSED OR HOPELESS: 0
SUM OF ALL RESPONSES TO PHQ QUESTIONS 1-9: 0

## 2020-02-06 NOTE — PROGRESS NOTES
Kathy Lozada  YOB: 1955    Date of Service:  2/6/2020    Chief Complaint   Patient presents with    6 Month Follow-Up         Hypertension   This is a chronic problem. The current episode started more than 1 year ago. The problem is unchanged. The problem is controlled. There are no associated agents to hypertension. Risk factors for coronary artery disease include male gender and sedentary lifestyle. The current treatment provides significant improvement. There are no compliance problems. Hyperlipidemia   This is a chronic problem. The current episode started more than 1 year ago. The problem is controlled. Recent lipid tests were reviewed and are normal. There are no known factors aggravating his hyperlipidemia. Risk factors for coronary artery disease include hypertension and male sex. No Known Allergies  Outpatient Medications Marked as Taking for the 2/6/20 encounter (Office Visit) with Salma Cole MD   Medication Sig Dispense Refill    lisinopril (PRINIVIL;ZESTRIL) 20 MG tablet Take 1 tablet by mouth daily 90 tablet 2    atorvastatin (LIPITOR) 80 MG tablet Take 1 tablet by mouth daily 90 tablet 2    aspirin 81 MG tablet Take 1 tablet by mouth daily 90 tablet 2    amLODIPine (NORVASC) 10 MG tablet Take 1 tablet by mouth daily 90 tablet 2         There is no immunization history on file for this patient. Past Medical History:   Diagnosis Date    Hyperlipidemia     Hypertension      Past Surgical History:   Procedure Laterality Date    ARTERIAL CLOT SURGERY Right 2016     No family history on file. Review of Systems:  Review of Systems   Constitutional: Negative for activity change and appetite change. HENT: Negative. Eyes: Negative. Negative for discharge. Respiratory: Negative. Genitourinary: Negative for difficulty urinating. Musculoskeletal: Negative for arthralgias. Skin: Negative for rash. Neurological: Negative. 07/24/2019 56.2     Lymphocytes % 07/24/2019 30.1     Monocytes % 07/24/2019 8.6     Eosinophils % 07/24/2019 4.5     Basophils % 07/24/2019 0.6     Neutrophils Absolute 07/24/2019 4.0     Lymphocytes Absolute 07/24/2019 2.1     Monocytes Absolute 07/24/2019 0.6     Eosinophils Absolute 07/24/2019 0.3     Basophils Absolute 07/24/2019 0.0     Sodium 07/24/2019 140     Potassium 07/24/2019 4.1     Chloride 07/24/2019 104     CO2 07/24/2019 23     Anion Gap 07/24/2019 13     Glucose 07/24/2019 95     BUN 07/24/2019 19     CREATININE 07/24/2019 1.0     GFR Non- 07/24/2019 >60     GFR  07/24/2019 >60     Calcium 07/24/2019 9.6     Total Protein 07/24/2019 6.8     Alb 07/24/2019 4.3     Albumin/Globulin Ratio 07/24/2019 1.7     Total Bilirubin 07/24/2019 <0.2     Alkaline Phosphatase 07/24/2019 71     ALT 07/24/2019 20     AST 07/24/2019 22     Globulin 22/34/4946 2.5      notapplicable      Health Maintenance   Topic Date Due    Colon cancer screen colonoscopy  10/13/2005    Lipid screen  02/06/2020 (Originally 6/22/2019)    Pneumococcal 0-64 years Vaccine (1 of 1 - PPSV23) 07/23/2020 (Originally 10/13/1961)    DTaP/Tdap/Td vaccine (1 - Tdap) 07/24/2020 (Originally 10/13/1966)    Shingles Vaccine (1 of 2) 07/24/2020 (Originally 10/13/2005)    Flu vaccine (1) 02/06/2021 (Originally 9/1/2019)    A1C test (Diabetic or Prediabetic)  07/24/2020    Potassium monitoring  07/24/2020    Creatinine monitoring  07/24/2020    Hepatitis C screen  Completed    HIV screen  Completed    Hepatitis A vaccine  Aged Out    Hepatitis B vaccine  Aged Out    Hib vaccine  Aged Out    Meningococcal (ACWY) vaccine  Aged Out          Assessment/Plan:    Other hyperlipidemia  Stable     Essential hypertension  Stable     Smoker  Still not ready to quit      1. Essential hypertension    - lisinopril (PRINIVIL;ZESTRIL) 20 MG tablet;  Take 1 tablet by mouth daily  Dispense: 90 tablet; Refill: 2  - atorvastatin (LIPITOR) 80 MG tablet; Take 1 tablet by mouth daily  Dispense: 90 tablet; Refill: 2  - aspirin 81 MG tablet; Take 1 tablet by mouth daily  Dispense: 90 tablet; Refill: 2  - amLODIPine (NORVASC) 10 MG tablet; Take 1 tablet by mouth daily  Dispense: 90 tablet; Refill: 2  - CBC Auto Differential; Future  - Comprehensive Metabolic Panel; Future  - Lipid Panel; Future  - TSH without Reflex; Future  - Urinalysis; Future    2. Other hyperlipidemia    - atorvastatin (LIPITOR) 80 MG tablet; Take 1 tablet by mouth daily  Dispense: 90 tablet; Refill: 2    3. Screening for colon cancer    - Psa screening; Future    4. Smoker         Return in about 6 months (around 8/6/2020). c

## 2020-05-27 ENCOUNTER — VIRTUAL VISIT (OUTPATIENT)
Dept: PRIMARY CARE CLINIC | Age: 65
End: 2020-05-27
Payer: MEDICAID

## 2020-05-27 ENCOUNTER — TELEPHONE (OUTPATIENT)
Dept: PRIMARY CARE CLINIC | Age: 65
End: 2020-05-27

## 2020-05-27 ENCOUNTER — NURSE TRIAGE (OUTPATIENT)
Dept: OTHER | Facility: CLINIC | Age: 65
End: 2020-05-27

## 2020-05-27 PROBLEM — L03.032 CELLULITIS AND ABSCESS OF TOE OF LEFT FOOT: Status: ACTIVE | Noted: 2020-05-27

## 2020-05-27 PROBLEM — L02.612 CELLULITIS AND ABSCESS OF TOE OF LEFT FOOT: Status: ACTIVE | Noted: 2020-05-27

## 2020-05-27 PROCEDURE — 99442 PR PHYS/QHP TELEPHONE EVALUATION 11-20 MIN: CPT | Performed by: INTERNAL MEDICINE

## 2020-05-27 NOTE — PROGRESS NOTES
next 24 hours.     Patient identification was verified at the start of the visit: Yes    Total Time: minutes: 11-20 minutes    Note: not billable if this call serves to triage the patient into an appointment for the relevant concern      Juan F Castillo

## 2020-05-27 NOTE — TELEPHONE ENCOUNTER
Reason for Disposition   Swollen foot (EXCEPTIONs: localized bump from bunions, calluses, insect bite, sting)    Answer Assessment - Initial Assessment Questions  1. ONSET: \"When did the pain start? \"       2 weeks ago  2. LOCATION: \"Where is the pain located? \"       Left foot, started with 4th toe  3. PAIN: \"How bad is the pain? \"    (Scale 1-10; or mild, moderate, severe)    -  MILD (1-3): doesn't interfere with normal activities     -  MODERATE (4-7): interferes with normal activities (e.g., work or school) or awakens from sleep, limping     -  SEVERE (8-10): excruciating pain, unable to do any normal activities, unable to walk      8/10 throbbing  4. WORK OR EXERCISE: \"Has there been any recent work or exercise that involved this part of the body? \"       no  5. CAUSE: \"What do you think is causing the foot pain? \"      Infection, possibly reaction to antibiotic  6. OTHER SYMPTOMS: \"Do you have any other symptoms? \" (e.g., leg pain, rash, fever, numbness)      Swelling whole foot  7. PREGNANCY: \"Is there any chance you are pregnant? \" \"When was your last menstrual period? \"      n/a    Protocols used: FOOT PAIN-ADULT-[place]

## 2020-05-27 NOTE — PATIENT INSTRUCTIONS
Continue your current antibiotic. See the podiatrist as previously scheduled tomorrow. Stop smoking. Follow-up with me for referral to the wound care clinic if all else fails.

## 2020-08-06 ENCOUNTER — OFFICE VISIT (OUTPATIENT)
Dept: PRIMARY CARE CLINIC | Age: 65
End: 2020-08-06
Payer: MEDICAID

## 2020-08-06 VITALS
HEIGHT: 69 IN | WEIGHT: 170.6 LBS | OXYGEN SATURATION: 98 % | BODY MASS INDEX: 25.27 KG/M2 | TEMPERATURE: 97.4 F | DIASTOLIC BLOOD PRESSURE: 98 MMHG | SYSTOLIC BLOOD PRESSURE: 165 MMHG | HEART RATE: 79 BPM

## 2020-08-06 PROBLEM — I73.9 PERIPHERAL ARTERIAL DISEASE (HCC): Status: ACTIVE | Noted: 2020-08-06

## 2020-08-06 PROBLEM — I73.9 INTERMITTENT CLAUDICATION (HCC): Status: ACTIVE | Noted: 2020-08-06

## 2020-08-06 LAB — HBA1C MFR BLD: 5.9 %

## 2020-08-06 PROCEDURE — 3017F COLORECTAL CA SCREEN DOC REV: CPT | Performed by: INTERNAL MEDICINE

## 2020-08-06 PROCEDURE — 99214 OFFICE O/P EST MOD 30 MIN: CPT | Performed by: INTERNAL MEDICINE

## 2020-08-06 PROCEDURE — G8419 CALC BMI OUT NRM PARAM NOF/U: HCPCS | Performed by: INTERNAL MEDICINE

## 2020-08-06 PROCEDURE — 83036 HEMOGLOBIN GLYCOSYLATED A1C: CPT | Performed by: INTERNAL MEDICINE

## 2020-08-06 PROCEDURE — 4004F PT TOBACCO SCREEN RCVD TLK: CPT | Performed by: INTERNAL MEDICINE

## 2020-08-06 PROCEDURE — G8427 DOCREV CUR MEDS BY ELIG CLIN: HCPCS | Performed by: INTERNAL MEDICINE

## 2020-08-06 RX ORDER — VARENICLINE TARTRATE 1 MG/1
1 TABLET, FILM COATED ORAL 2 TIMES DAILY
Qty: 60 TABLET | Refills: 5 | Status: SHIPPED
Start: 2020-08-06 | End: 2020-09-09

## 2020-08-06 RX ORDER — VARENICLINE TARTRATE
KIT
Qty: 1 BOX | Refills: 0 | Status: SHIPPED
Start: 2020-08-06 | End: 2020-09-09

## 2020-08-06 ASSESSMENT — ENCOUNTER SYMPTOMS
EYE DISCHARGE: 0
EYES NEGATIVE: 1
RESPIRATORY NEGATIVE: 1

## 2020-08-06 NOTE — PROGRESS NOTES
Patricia Cox  YOB: 1955    Date of Service:  8/6/2020    Chief Complaint   Patient presents with    Leg Pain     History of leg and foot pain. Intermittent numbness in the left foot. I will wait on further work-up with arterial scans because the patient has refused to stop smoking in the past.  Started on Chantix to help him quit. Go back on current medications that were discontinued by the patient without asking me to 3 months ago. Leg Pain    The incident occurred more than 1 week ago. The incident occurred at home. There was no injury mechanism. The pain is present in the left leg and right leg. The pain is at a severity of 4/10. The pain is moderate. The pain has been intermittent since onset. Associated symptoms include numbness. The symptoms are aggravated by movement. He has tried nothing for the symptoms. The treatment provided no relief. Other   This is a chronic (Peripheral arterial disease exacerbated by smoking) problem. The current episode started more than 1 year ago. The problem occurs daily. The problem has been gradually worsening. Associated symptoms include numbness. Pertinent negatives include no arthralgias or rash. The symptoms are aggravated by smoking. He has tried nothing for the symptoms. The treatment provided no relief. No Known Allergies  Outpatient Medications Marked as Taking for the 8/6/20 encounter (Office Visit) with Jone Aldrich MD   Medication Sig Dispense Refill    varenicline (CHANTIX STARTING MONTH PAK) 0.5 MG X 11 & 1 MG X 42 tablet Take as directed on package. Begin the daily dose for at least 3 to 4 months after of a starter kit is completed.  1 box 0    varenicline (CHANTIX) 1 MG tablet Take 1 tablet by mouth 2 times daily 60 tablet 5    lisinopril (PRINIVIL;ZESTRIL) 20 MG tablet Take 1 tablet by mouth daily 90 tablet 2    atorvastatin (LIPITOR) 80 MG tablet Take 1 tablet by mouth daily 90 tablet 2    aspirin 81 MG tablet Take 1 tablet by mouth daily 90 tablet 2    amLODIPine (NORVASC) 10 MG tablet Take 1 tablet by mouth daily 90 tablet 2         There is no immunization history on file for this patient. Past Medical History:   Diagnosis Date    Hyperlipidemia     Hypertension      Past Surgical History:   Procedure Laterality Date    ARTERIAL CLOT SURGERY Right 2016     History reviewed. No pertinent family history. Review of Systems:  Review of Systems   Constitutional: Negative for activity change and appetite change. HENT: Negative. Eyes: Negative. Negative for discharge. Respiratory: Negative. Genitourinary: Negative for difficulty urinating. Musculoskeletal: Negative for arthralgias. Skin: Negative for rash. Neurological: Positive for numbness. Psychiatric/Behavioral: Negative. Negative for agitation and confusion. The patient is not nervous/anxious. All other systems reviewed and are negative. Vitals:    08/06/20 0942 08/06/20 0946   BP: (!) 185/105 (!) 165/98   Site: Left Lower Arm Left Upper Arm   Position: Sitting Sitting   Cuff Size: Large Adult Large Adult   Pulse: 79 79   Temp: 97.4 °F (36.3 °C)    TempSrc: Temporal    SpO2: 98%    Weight: 170 lb 9.6 oz (77.4 kg)    Height: 5' 9\" (1.753 m)      Body mass index is 25.19 kg/m². Wt Readings from Last 3 Encounters:   08/06/20 170 lb 9.6 oz (77.4 kg)   02/06/20 175 lb (79.4 kg)   07/24/19 174 lb (78.9 kg)     BP Readings from Last 3 Encounters:   08/06/20 (!) 165/98   02/06/20 112/74   07/24/19 131/81         Physical Exam  Constitutional:       Appearance: He is well-developed. HENT:      Head: Normocephalic and atraumatic. Eyes:      Conjunctiva/sclera: Conjunctivae normal.      Pupils: Pupils are equal, round, and reactive to light. Neck:      Musculoskeletal: Normal range of motion and neck supple. Cardiovascular:      Rate and Rhythm: Normal rate and regular rhythm. Heart sounds: Normal heart sounds.    Pulmonary: Effort: Pulmonary effort is normal.      Breath sounds: Normal breath sounds. Musculoskeletal: Normal range of motion. Skin:     General: Skin is warm and dry. Neurological:      Mental Status: He is alert and oriented to person, place, and time. Psychiatric:         Behavior: Behavior normal.         Thought Content: Thought content normal.         Lab Review   No visits with results within 6 Month(s) from this visit.    Latest known visit with results is:   Orders Only on 07/24/2019   Component Date Value    Hemoglobin A1C 07/24/2019 6.2     eAG 07/24/2019 131.2     TSH 07/24/2019 2.96     WBC 07/24/2019 7.1     RBC 07/24/2019 4.04*    Hemoglobin 07/24/2019 13.4*    Hematocrit 07/24/2019 40.5     MCV 07/24/2019 100.2*    MCH 07/24/2019 33.2     MCHC 07/24/2019 33.1     RDW 07/24/2019 13.8     Platelets 88/79/8571 204     MPV 07/24/2019 9.2     Neutrophils % 07/24/2019 56.2     Lymphocytes % 07/24/2019 30.1     Monocytes % 07/24/2019 8.6     Eosinophils % 07/24/2019 4.5     Basophils % 07/24/2019 0.6     Neutrophils Absolute 07/24/2019 4.0     Lymphocytes Absolute 07/24/2019 2.1     Monocytes Absolute 07/24/2019 0.6     Eosinophils Absolute 07/24/2019 0.3     Basophils Absolute 07/24/2019 0.0     Sodium 07/24/2019 140     Potassium 07/24/2019 4.1     Chloride 07/24/2019 104     CO2 07/24/2019 23     Anion Gap 07/24/2019 13     Glucose 07/24/2019 95     BUN 07/24/2019 19     CREATININE 07/24/2019 1.0     GFR Non- 07/24/2019 >60     GFR  07/24/2019 >60     Calcium 07/24/2019 9.6     Total Protein 07/24/2019 6.8     Alb 07/24/2019 4.3     Albumin/Globulin Ratio 07/24/2019 1.7     Total Bilirubin 07/24/2019 <0.2     Alkaline Phosphatase 07/24/2019 71     ALT 07/24/2019 20     AST 07/24/2019 22     Globulin 64/04/4556 2.5      notapplicable      Health Maintenance   Topic Date Due    Pneumococcal 0-64 years Vaccine (1 of 1 - PPSV23) 10/13/1961    DTaP/Tdap/Td vaccine (1 - Tdap) 10/13/1974    Shingles Vaccine (1 of 2) 10/13/2005    Colon cancer screen colonoscopy  10/13/2005    Lipid screen  06/22/2019    A1C test (Diabetic or Prediabetic)  07/24/2020    Potassium monitoring  07/24/2020    Creatinine monitoring  07/24/2020    Flu vaccine (1) 09/01/2020    Hepatitis C screen  Completed    HIV screen  Completed    Hepatitis A vaccine  Aged Out    Hepatitis B vaccine  Aged Out    Hib vaccine  Aged Out    Meningococcal (ACWY) vaccine  Aged Out          Assessment/Plan:    Smoker  Urged to quit and started on Chantix    Peripheral arterial disease ([facility] [address]  Quit smoking. Start back on current medications    Intermittent claudication (HCC)  Worsening. Start back on medication and see in one month. Stop smoking. Essential hypertension  Start back on medication    Cellulitis and abscess of toe of left foot  Healed but exacerbated PAD. 1. Prediabetes    - POCT glycosylated hemoglobin (Hb A1C)    2. Smoker    - varenicline (CHANTIX STARTING MONTH PAK) 0.5 MG X 11 & 1 MG X 42 tablet; Take as directed on package. Begin the daily dose for at least 3 to 4 months after of a starter kit is completed. Dispense: 1 box; Refill: 0  - varenicline (CHANTIX) 1 MG tablet; Take 1 tablet by mouth 2 times daily  Dispense: 60 tablet; Refill: 5  - XR CHEST (2 VW); Future    3. Peripheral arterial disease ([facility] [address]    - Lipid Panel; Future  - XR CHEST (2 VW); Future    4. Intermittent claudication (HCC)    - XR CHEST (2 VW); Future    5. Essential hypertension    - Comprehensive Metabolic Panel; Future  - CBC Auto Differential; Future  - TSH without Reflex; Future  - XR CHEST (2 VW); Future    6. Cellulitis and abscess of toe of left foot         Return in about 4 weeks (around 9/3/2020).

## 2020-08-06 NOTE — PATIENT INSTRUCTIONS
Started on Chantix as directed. You will get a starter pack which should be completed. Maintenance dosing has also been prescribed to start on after you finish the starter pack. Smoking cessation is crucial and must be done if you want to get better. He also need to cut back considerably on your marijuana smoking or discontinue it altogether. Chest x-ray, lab review as directed. Please go back on lisinopril, atorvastatin, aspirin and amlodipine to lower your blood pressure and control your cholesterol. See me in 1 month to monitor your progress.

## 2020-08-08 ENCOUNTER — HOSPITAL ENCOUNTER (OUTPATIENT)
Age: 65
Discharge: HOME | End: 2020-08-08
Payer: MEDICAID

## 2020-08-08 ENCOUNTER — HOSPITAL ENCOUNTER (OUTPATIENT)
Dept: GENERAL RADIOLOGY | Age: 65
Discharge: HOME | End: 2020-08-08
Payer: MEDICAID

## 2020-08-08 LAB
A/G RATIO: 1.2 (ref 1.1–2.2)
ALBUMIN SERPL-MCNC: 3.7 G/DL (ref 3.4–5)
ALP BLD-CCNC: 69 U/L (ref 40–129)
ALT SERPL-CCNC: 16 U/L (ref 10–40)
ANION GAP SERPL CALCULATED.3IONS-SCNC: 9 MMOL/L (ref 3–16)
AST SERPL-CCNC: 20 U/L (ref 15–37)
BASOPHILS ABSOLUTE: 0 K/UL (ref 0–0.2)
BASOPHILS RELATIVE PERCENT: 0.6 %
BILIRUB SERPL-MCNC: 0.3 MG/DL (ref 0–1)
BUN BLDV-MCNC: 16 MG/DL (ref 7–20)
CALCIUM SERPL-MCNC: 8.7 MG/DL (ref 8.3–10.6)
CHLORIDE BLD-SCNC: 107 MMOL/L (ref 99–110)
CHOLESTEROL, TOTAL: 126 MG/DL (ref 0–199)
CO2: 23 MMOL/L (ref 21–32)
CREAT SERPL-MCNC: 0.9 MG/DL (ref 0.8–1.3)
EOSINOPHILS ABSOLUTE: 0.4 K/UL (ref 0–0.6)
EOSINOPHILS RELATIVE PERCENT: 4.7 %
GFR AFRICAN AMERICAN: >60
GFR NON-AFRICAN AMERICAN: >60
GLOBULIN: 3 G/DL
GLUCOSE BLD-MCNC: 109 MG/DL (ref 70–99)
HCT VFR BLD CALC: 42.3 % (ref 40.5–52.5)
HDLC SERPL-MCNC: 43 MG/DL (ref 40–60)
HEMOGLOBIN: 13.9 G/DL (ref 13.5–17.5)
LDL CHOLESTEROL CALCULATED: 72 MG/DL
LYMPHOCYTES ABSOLUTE: 2 K/UL (ref 1–5.1)
LYMPHOCYTES RELATIVE PERCENT: 26.1 %
MCH RBC QN AUTO: 32.9 PG (ref 26–34)
MCHC RBC AUTO-ENTMCNC: 32.8 G/DL (ref 31–36)
MCV RBC AUTO: 100.3 FL (ref 80–100)
MONOCYTES ABSOLUTE: 0.7 K/UL (ref 0–1.3)
MONOCYTES RELATIVE PERCENT: 9.1 %
NEUTROPHILS ABSOLUTE: 4.5 K/UL (ref 1.7–7.7)
NEUTROPHILS RELATIVE PERCENT: 59.5 %
PDW BLD-RTO: 14.1 % (ref 12.4–15.4)
PLATELET # BLD: 179 K/UL (ref 135–450)
PMV BLD AUTO: 8.6 FL (ref 5–10.5)
POTASSIUM SERPL-SCNC: 4.9 MMOL/L (ref 3.5–5.1)
RBC # BLD: 4.22 M/UL (ref 4.2–5.9)
SODIUM BLD-SCNC: 139 MMOL/L (ref 136–145)
TOTAL PROTEIN: 6.7 G/DL (ref 6.4–8.2)
TRIGL SERPL-MCNC: 54 MG/DL (ref 0–150)
TSH SERPL DL<=0.05 MIU/L-ACNC: 0.37 UIU/ML (ref 0.27–4.2)
VLDLC SERPL CALC-MCNC: 11 MG/DL
WBC # BLD: 7.6 K/UL (ref 4–11)

## 2020-08-08 PROCEDURE — 36415 COLL VENOUS BLD VENIPUNCTURE: CPT

## 2020-08-08 PROCEDURE — 84443 ASSAY THYROID STIM HORMONE: CPT

## 2020-08-08 PROCEDURE — 85025 COMPLETE CBC W/AUTO DIFF WBC: CPT

## 2020-08-08 PROCEDURE — 71046 X-RAY EXAM CHEST 2 VIEWS: CPT

## 2020-08-08 PROCEDURE — 80061 LIPID PANEL: CPT

## 2020-08-08 PROCEDURE — 80053 COMPREHEN METABOLIC PANEL: CPT

## 2020-08-10 ENCOUNTER — TELEPHONE (OUTPATIENT)
Dept: PRIMARY CARE CLINIC | Age: 65
End: 2020-08-10

## 2020-08-10 NOTE — TELEPHONE ENCOUNTER
Pt is happy labs are ok and Xray was ok     But pt is still concerned with the leg pain in his calf and worried about his foot since it started there     Does he need a specialist to go to for this       Please advise

## 2020-08-11 ENCOUNTER — TELEPHONE (OUTPATIENT)
Dept: PRIMARY CARE CLINIC | Age: 65
End: 2020-08-11

## 2020-08-11 DIAGNOSIS — I73.9 INTERMITTENT CLAUDICATION (HCC): Primary | ICD-10-CM

## 2020-08-11 DIAGNOSIS — I73.9 PERIPHERAL ARTERIAL DISEASE (HCC): ICD-10-CM

## 2020-08-13 ENCOUNTER — TELEPHONE (OUTPATIENT)
Dept: PRIMARY CARE CLINIC | Age: 65
End: 2020-08-13

## 2020-08-13 NOTE — TELEPHONE ENCOUNTER
Referred to vascular surgery 2 days ago for PVD and intermittent claudication. Please call Dr. Jayesh Abraham office and get this patient seen ASAP. Thanks.

## 2020-08-13 NOTE — TELEPHONE ENCOUNTER
----- Message from Gabe Coleman sent at 8/13/2020 12:22 PM EDT -----  Subject: Message to Provider    QUESTIONS  Information for Provider? pt stated that he had was sent referral to be   seen for foot. He stated he needs to be seen for both his legs   calves. He is in pain when he walks. The problem is not his foot. He   states he doesn't need to see a foot Dr. Please call. Call either # for   pt.   ---------------------------------------------------------------------------  --------------  CALL BACK INFO  What is the best way for the office to contact you? OK to leave message on   voicemail  Preferred Call Back Phone Number? [phone]  ---------------------------------------------------------------------------  --------------  SCRIPT ANSWERS  Relationship to Patient?  Self

## 2020-09-09 ENCOUNTER — OFFICE VISIT (OUTPATIENT)
Dept: VASCULAR SURGERY | Age: 65
End: 2020-09-09
Payer: MEDICAID

## 2020-09-09 VITALS
BODY MASS INDEX: 25.33 KG/M2 | SYSTOLIC BLOOD PRESSURE: 110 MMHG | WEIGHT: 171 LBS | HEIGHT: 69 IN | DIASTOLIC BLOOD PRESSURE: 60 MMHG

## 2020-09-09 PROCEDURE — 3017F COLORECTAL CA SCREEN DOC REV: CPT | Performed by: SURGERY

## 2020-09-09 PROCEDURE — G8419 CALC BMI OUT NRM PARAM NOF/U: HCPCS | Performed by: SURGERY

## 2020-09-09 PROCEDURE — G8427 DOCREV CUR MEDS BY ELIG CLIN: HCPCS | Performed by: SURGERY

## 2020-09-09 PROCEDURE — 99205 OFFICE O/P NEW HI 60 MIN: CPT | Performed by: SURGERY

## 2020-09-09 PROCEDURE — 1036F TOBACCO NON-USER: CPT | Performed by: SURGERY

## 2020-09-09 NOTE — Clinical Note
Dr. Peña Late,    I saw Carmita Phillips in the office today for evaluation of his claudication and developing rest pain in the left foot. He clearly has significant ischemia and I have suggested that he undergo angiography and intervention as possible. If intervention is not possible then surgical revascularization would be the next step. He is agreeable to this and we will obtain a carotid duplex scan as well for a left carotid bruit and for surveillance of his previous endarterectomy. I will keep you appraised of our findings and plans. Thanks for asked me to see him and please let me know if I can help you with any of your other patients in the future.     Helena Huynh

## 2020-09-09 NOTE — PROGRESS NOTES
Packs/day: 1.00     Years: 20.00     Pack years: 20.00     Types: Cigarettes     Last attempt to quit: 8/10/2020     Years since quittin.0    Smokeless tobacco: Never Used   Substance and Sexual Activity    Alcohol use: Yes    Drug use: No    Sexual activity: Yes     Partners: Female   Lifestyle    Physical activity     Days per week: Not on file     Minutes per session: Not on file    Stress: Not on file   Relationships    Social connections     Talks on phone: Not on file     Gets together: Not on file     Attends Muslim service: Not on file     Active member of club or organization: Not on file     Attends meetings of clubs or organizations: Not on file     Relationship status: Not on file    Intimate partner violence     Fear of current or ex partner: Not on file     Emotionally abused: Not on file     Physically abused: Not on file     Forced sexual activity: Not on file   Other Topics Concern    Not on file   Social History Narrative    Not on file     History reviewed. No pertinent family history. Review of Systems   All other systems reviewed and are negative. 15 pt ROS confirmed personally by this MD.    Objective:   Physical Exam  Vitals signs and nursing note reviewed. Constitutional:       Appearance: Normal appearance. He is normal weight. HENT:      Head: Normocephalic and atraumatic. Right Ear: External ear normal.      Left Ear: External ear normal.      Nose: Nose normal.      Mouth/Throat:      Mouth: Mucous membranes are moist.      Pharynx: Oropharynx is clear. Eyes:      Extraocular Movements: Extraocular movements intact. Conjunctiva/sclera: Conjunctivae normal.   Neck:      Musculoskeletal: Normal range of motion and neck supple. Comments: Well healed R cervical surgical incision (CEA)  Cardiovascular:      Rate and Rhythm: Normal rate and regular rhythm. Heart sounds: Normal heart sounds.    Pulmonary:      Effort: Pulmonary effort is normal. Breath sounds: Normal breath sounds. Abdominal:      General: Abdomen is flat. Bowel sounds are normal. There is no distension. Palpations: Abdomen is soft. There is no mass. Musculoskeletal:      Right lower leg: No edema. Left lower leg: No edema. Skin:     General: Skin is dry. Capillary Refill: Capillary refill takes more than 3 seconds. Comments: Dependent rubor with elevation pallor L foot. No ulceration or gangrene B feet   Neurological:      General: No focal deficit present. Mental Status: He is alert and oriented to person, place, and time. Cranial Nerves: No cranial nerve deficit. Sensory: No sensory deficit. Motor: No weakness. Coordination: Coordination normal.      Gait: Gait normal.   Psychiatric:         Mood and Affect: Mood normal.         Behavior: Behavior normal.         Thought Content: Thought content normal.         Judgment: Judgment normal.       Pulses:   R bruit  L bruit   2   carotid 2 +   2   brachial 2    2   radial 2    1   femoral 1    0   popliteal 0    0   posterior tibial 0    1   dorsalis pedis 0    na   bypass graft na        R HEATHER L   1 DP 0.93   1 PT 0.82    JESS      Doppler monophasic L foot suggestive of noncompressible calcified vessels      Assessment:      1) Multilevel arterial occlusive L leg with claudication and limb threatening ischemic rest pain  2) Claudication R leg  3) S/P R CEA  4) L cervical bruit  5) HTN  6) Hyperlipidemia  7) H/O tobacco abuse      Plan:      Continue tobacco abstinance. Schedule for aortogram with runoff - possible intervention this coming week at [facility].  Carotid duplex for bruit and CEA surveillance on day of angiogram.  Pt agreeable to this approach for treatment.

## 2020-09-10 ENCOUNTER — OFFICE VISIT (OUTPATIENT)
Dept: PRIMARY CARE CLINIC | Age: 65
End: 2020-09-10
Payer: MEDICAID

## 2020-09-10 PROCEDURE — G8428 CUR MEDS NOT DOCUMENT: HCPCS | Performed by: NURSE PRACTITIONER

## 2020-09-10 PROCEDURE — G8419 CALC BMI OUT NRM PARAM NOF/U: HCPCS | Performed by: NURSE PRACTITIONER

## 2020-09-10 PROCEDURE — 99211 OFF/OP EST MAY X REQ PHY/QHP: CPT | Performed by: NURSE PRACTITIONER

## 2020-09-12 LAB — SARS-COV-2, NAA: NOT DETECTED

## 2020-09-15 ENCOUNTER — PROCEDURE VISIT (OUTPATIENT)
Dept: SURGERY | Age: 65
End: 2020-09-15
Payer: MEDICAID

## 2020-09-15 ENCOUNTER — HOSPITAL ENCOUNTER (OUTPATIENT)
Dept: GENERAL RADIOLOGY | Age: 65
Discharge: HOME | End: 2020-09-15
Payer: MEDICAID

## 2020-09-15 ENCOUNTER — HOSPITAL ENCOUNTER (OUTPATIENT)
Dept: INTERVENTIONAL RADIOLOGY/VASCULAR | Age: 65
Discharge: HOME | End: 2020-09-15
Payer: MEDICAID

## 2020-09-15 VITALS
SYSTOLIC BLOOD PRESSURE: 169 MMHG | DIASTOLIC BLOOD PRESSURE: 92 MMHG | RESPIRATION RATE: 18 BRPM | TEMPERATURE: 97.2 F | BODY MASS INDEX: 25.44 KG/M2 | OXYGEN SATURATION: 99 % | HEIGHT: 69 IN | HEART RATE: 69 BPM | WEIGHT: 171.74 LBS

## 2020-09-15 LAB
ANION GAP SERPL CALCULATED.3IONS-SCNC: 8 MMOL/L (ref 3–16)
APTT: 30.2 SEC (ref 24.2–36.2)
BUN BLDV-MCNC: 12 MG/DL (ref 7–20)
CALCIUM SERPL-MCNC: 9 MG/DL (ref 8.3–10.6)
CHLORIDE BLD-SCNC: 104 MMOL/L (ref 99–110)
CO2: 26 MMOL/L (ref 21–32)
CREAT SERPL-MCNC: 0.8 MG/DL (ref 0.8–1.3)
FIBRINOGEN: 266 MG/DL (ref 200–397)
GFR AFRICAN AMERICAN: >60
GFR NON-AFRICAN AMERICAN: >60
GLUCOSE BLD-MCNC: 105 MG/DL (ref 70–99)
HCT VFR BLD CALC: 41.6 % (ref 40.5–52.5)
HEMOGLOBIN: 14 G/DL (ref 13.5–17.5)
INR BLD: 1.06 (ref 0.86–1.14)
MCH RBC QN AUTO: 32.7 PG (ref 26–34)
MCHC RBC AUTO-ENTMCNC: 33.6 G/DL (ref 31–36)
MCV RBC AUTO: 97.2 FL (ref 80–100)
PDW BLD-RTO: 13.1 % (ref 12.4–15.4)
PLATELET # BLD: 173 K/UL (ref 135–450)
PMV BLD AUTO: 8.5 FL (ref 5–10.5)
POTASSIUM SERPL-SCNC: 3.6 MMOL/L (ref 3.5–5.1)
PROTHROMBIN TIME: 12.3 SEC (ref 10–13.2)
RBC # BLD: 4.28 M/UL (ref 4.2–5.9)
SODIUM BLD-SCNC: 138 MMOL/L (ref 136–145)
WBC # BLD: 7 K/UL (ref 4–11)

## 2020-09-15 PROCEDURE — 85027 COMPLETE CBC AUTOMATED: CPT

## 2020-09-15 PROCEDURE — 36246 INS CATH ABD/L-EXT ART 2ND: CPT | Performed by: SURGERY

## 2020-09-15 PROCEDURE — 36415 COLL VENOUS BLD VENIPUNCTURE: CPT

## 2020-09-15 PROCEDURE — 85384 FIBRINOGEN ACTIVITY: CPT

## 2020-09-15 PROCEDURE — 93005 ELECTROCARDIOGRAM TRACING: CPT | Performed by: SURGERY

## 2020-09-15 PROCEDURE — 85610 PROTHROMBIN TIME: CPT

## 2020-09-15 PROCEDURE — 2580000003 HC RX 258: Performed by: SURGERY

## 2020-09-15 PROCEDURE — 6360000002 HC RX W HCPCS: Performed by: SURGERY

## 2020-09-15 PROCEDURE — 71045 X-RAY EXAM CHEST 1 VIEW: CPT

## 2020-09-15 PROCEDURE — 6370000000 HC RX 637 (ALT 250 FOR IP): Performed by: SURGERY

## 2020-09-15 PROCEDURE — 75716 ARTERY X-RAYS ARMS/LEGS: CPT | Performed by: SURGERY

## 2020-09-15 PROCEDURE — 7100000010 HC PHASE II RECOVERY - FIRST 15 MIN

## 2020-09-15 PROCEDURE — 6360000004 HC RX CONTRAST MEDICATION: Performed by: SURGERY

## 2020-09-15 PROCEDURE — 93880 EXTRACRANIAL BILAT STUDY: CPT | Performed by: SURGERY

## 2020-09-15 PROCEDURE — 75625 CONTRAST EXAM ABDOMINL AORTA: CPT

## 2020-09-15 PROCEDURE — 99153 MOD SED SAME PHYS/QHP EA: CPT

## 2020-09-15 PROCEDURE — 80048 BASIC METABOLIC PNL TOTAL CA: CPT

## 2020-09-15 PROCEDURE — 75774 ARTERY X-RAY EACH VESSEL: CPT

## 2020-09-15 PROCEDURE — 75716 ARTERY X-RAYS ARMS/LEGS: CPT

## 2020-09-15 PROCEDURE — 99152 MOD SED SAME PHYS/QHP 5/>YRS: CPT

## 2020-09-15 PROCEDURE — 93971 EXTREMITY STUDY: CPT

## 2020-09-15 PROCEDURE — 75625 CONTRAST EXAM ABDOMINL AORTA: CPT | Performed by: SURGERY

## 2020-09-15 PROCEDURE — C1769 GUIDE WIRE: HCPCS

## 2020-09-15 PROCEDURE — 85730 THROMBOPLASTIN TIME PARTIAL: CPT

## 2020-09-15 PROCEDURE — 7100000011 HC PHASE II RECOVERY - ADDTL 15 MIN

## 2020-09-15 PROCEDURE — 75774 ARTERY X-RAY EACH VESSEL: CPT | Performed by: SURGERY

## 2020-09-15 PROCEDURE — 2500000003 HC RX 250 WO HCPCS: Performed by: SURGERY

## 2020-09-15 PROCEDURE — 36247 INS CATH ABD/L-EXT ART 3RD: CPT

## 2020-09-15 RX ORDER — ACETAMINOPHEN 325 MG/1
650 TABLET ORAL EVERY 4 HOURS PRN
Status: DISCONTINUED | OUTPATIENT
Start: 2020-09-15 | End: 2020-09-16 | Stop reason: HOSPADM

## 2020-09-15 RX ORDER — LABETALOL HYDROCHLORIDE 5 MG/ML
INJECTION, SOLUTION INTRAVENOUS DAILY PRN
Status: COMPLETED | OUTPATIENT
Start: 2020-09-15 | End: 2020-09-15

## 2020-09-15 RX ORDER — AMLODIPINE BESYLATE 10 MG/1
10 TABLET ORAL ONCE
Status: COMPLETED | OUTPATIENT
Start: 2020-09-15 | End: 2020-09-15

## 2020-09-15 RX ORDER — LISINOPRIL 20 MG/1
20 TABLET ORAL ONCE
Status: COMPLETED | OUTPATIENT
Start: 2020-09-15 | End: 2020-09-15

## 2020-09-15 RX ORDER — FENTANYL CITRATE 50 UG/ML
INJECTION, SOLUTION INTRAMUSCULAR; INTRAVENOUS DAILY PRN
Status: COMPLETED | OUTPATIENT
Start: 2020-09-15 | End: 2020-09-15

## 2020-09-15 RX ORDER — HYDROCODONE BITARTRATE AND ACETAMINOPHEN 5; 325 MG/1; MG/1
1 TABLET ORAL EVERY 4 HOURS PRN
Status: DISCONTINUED | OUTPATIENT
Start: 2020-09-15 | End: 2020-09-16 | Stop reason: HOSPADM

## 2020-09-15 RX ORDER — MIDAZOLAM HYDROCHLORIDE 1 MG/ML
INJECTION INTRAMUSCULAR; INTRAVENOUS DAILY PRN
Status: COMPLETED | OUTPATIENT
Start: 2020-09-15 | End: 2020-09-15

## 2020-09-15 RX ORDER — HYDROCODONE BITARTRATE AND ACETAMINOPHEN 5; 325 MG/1; MG/1
2 TABLET ORAL EVERY 4 HOURS PRN
Status: DISCONTINUED | OUTPATIENT
Start: 2020-09-15 | End: 2020-09-16 | Stop reason: HOSPADM

## 2020-09-15 RX ORDER — SODIUM CHLORIDE 9 MG/ML
INJECTION, SOLUTION INTRAVENOUS CONTINUOUS
Status: DISCONTINUED | OUTPATIENT
Start: 2020-09-15 | End: 2020-09-16 | Stop reason: HOSPADM

## 2020-09-15 RX ADMIN — IOPAMIDOL 235 ML: 612 INJECTION, SOLUTION INTRATHECAL at 15:01

## 2020-09-15 RX ADMIN — LISINOPRIL 20 MG: 20 TABLET ORAL at 16:39

## 2020-09-15 RX ADMIN — MIDAZOLAM 1 MG: 1 INJECTION INTRAMUSCULAR; INTRAVENOUS at 14:04

## 2020-09-15 RX ADMIN — LABETALOL HYDROCHLORIDE 10 MG: 5 INJECTION, SOLUTION INTRAVENOUS at 13:45

## 2020-09-15 RX ADMIN — MIDAZOLAM 1 MG: 1 INJECTION INTRAMUSCULAR; INTRAVENOUS at 13:45

## 2020-09-15 RX ADMIN — FENTANYL CITRATE 50 MCG: 50 INJECTION INTRAMUSCULAR; INTRAVENOUS at 13:45

## 2020-09-15 RX ADMIN — AMLODIPINE BESYLATE 10 MG: 10 TABLET ORAL at 16:39

## 2020-09-15 RX ADMIN — FENTANYL CITRATE 50 MCG: 50 INJECTION INTRAMUSCULAR; INTRAVENOUS at 14:04

## 2020-09-15 RX ADMIN — SODIUM CHLORIDE: 9 INJECTION, SOLUTION INTRAVENOUS at 12:46

## 2020-09-15 ASSESSMENT — PAIN SCALES - GENERAL
PAINLEVEL_OUTOF10: 0

## 2020-09-15 ASSESSMENT — PAIN - FUNCTIONAL ASSESSMENT: PAIN_FUNCTIONAL_ASSESSMENT: 0-10

## 2020-09-15 NOTE — H&P
Update History & Physical    The patient's History and Physical of September 9, 2020 was reviewed with the patient and I examined the patient. There was no change. The surgical site was confirmed by the patient and me. Plan: The risks, benefits, expected outcome, and alternative to the recommended procedure have been discussed with the patient. Patient understands and wants to proceed with the procedure.      Electronically signed by Helio Lezama MD on 9/15/2020 at 1:27 PM

## 2020-09-15 NOTE — PRE SEDATION
Complications:   none    ASA Classification:  Class 3 - A patient with severe systemic disease that limits activity but is not incapacitating    Sedation/ Anesthesia Plan:   intravenous sedation    Medications Planned:   midazolam (Versed) intravenously and fentanyl intravenously    Patient is an appropriate candidate for plan of sedation: yes    Electronically signed by Jazmyne Henning MD on 9/15/2020 at 1:30 PM

## 2020-09-15 NOTE — PROGRESS NOTES
Patient received from Interventional radiology awake, BP elevated-MD aware. Rt groin site with hematoma, IR  RN Terri Aguilar) held pressure, site soft with minimal bruising. Activity restrictions discussed with patient. Will continue to monitor.

## 2020-09-15 NOTE — PROGRESS NOTES
Received report from Travis Ramírez [facility]. Patient resting quietly. Respirations easy. Right groin site clear, soft with clear dressing intact. Pedal pulses positive with doppler. No complaints.

## 2020-09-15 NOTE — PROGRESS NOTES
Patient took his medication for Bp as prescribed. Right groin site WNL, no hematoma noted. Called Sonya Gilliland to  the patient at 1815, patient made aware of call. Will continue to monitor.

## 2020-09-15 NOTE — PROGRESS NOTES
Tolerating oral intake and sitting up in bed. Right groin site remains soft with dressing dry and intact. No bleeding, bruising or swelling noted. Vascular checks stable. Discharge instructions given to patient, verbal and written. Verbalize understanding. Stable for discharge.

## 2020-09-15 NOTE — BRIEF OP NOTE
Brief Postoperative Note      Patient: Valeriy Shah  YOB: 1955  MRN: [id number]    Date of Procedure: 9/15/2020    Pre-Op Diagnosis: L foot rest pain with claudication    Post-Op Diagnosis: Same       Procedure: Aortogram with B runoffd. Selective L leg angiogram.    Surgeon: Susu Heath    Anesthesia: Local with sedation    Estimated Blood Loss (mL): Minimal    Complications: None    Specimens:   * No specimens in log *    Implants:  * No implants in log *      Drains: * No LDAs found *    Findings: No significant renal or aortic disease. No ELISA stenoses but severe L IIA stenosis at origin of vessel. Severe L CFA stenosis with occlusion of SFA at origin for 10-15 cm. Distal SFA & popliteal reconsitution with 3 vessel runoff. Irregular R SFA with occlusion at aductor canal. Popliteal and tibial runoff intact.   Will plan L femoral endarterrectomy/replacement and L fem-pop    Electronically signed by Ladonna Diaz MD on 9/15/2020 at 2:44 PM

## 2020-09-16 ENCOUNTER — PREP FOR PROCEDURE (OUTPATIENT)
Dept: VASCULAR SURGERY | Age: 65
End: 2020-09-16

## 2020-09-16 LAB
EKG ATRIAL RATE: 58 BPM
EKG DIAGNOSIS: NORMAL
EKG P AXIS: 68 DEGREES
EKG P-R INTERVAL: 182 MS
EKG Q-T INTERVAL: 434 MS
EKG QRS DURATION: 94 MS
EKG QTC CALCULATION (BAZETT): 426 MS
EKG R AXIS: 21 DEGREES
EKG T AXIS: 26 DEGREES
EKG VENTRICULAR RATE: 58 BPM

## 2020-09-16 PROCEDURE — 93010 ELECTROCARDIOGRAM REPORT: CPT | Performed by: INTERNAL MEDICINE

## 2020-09-16 NOTE — PROGRESS NOTES
[address] [facility] time_____0600_______        Surgery time____________    Take the following medications with a sip of water: Follow your Doctor's pre procedure instructions regarding medications    Do not eat or drink anything after 12:00 midnight prior to your surgery. This includes water chewing gum, mints and ice chips. You may brush your teeth and gargle the morning of your surgery, but do not swallow the water     Please see your family doctor/pediatrician for a history and physical and/or concerning medications. Bring any test results/reports from your physicians office. If you are under the care of a heart doctor or specialist doctor, please be aware that you may be asked to them for clearance    You may be asked to stop blood thinners such as Coumadin, Plavix, Fragmin, Lovenox, etc., or any anti-inflammatories such as:  Aspirin, Ibuprofen, Advil, Naproxen prior to your surgery. We also ask that you stop any OTC medications such as fish oil, vitamin E, glucosamine, garlic, Multivitamins, COQ 10, etc.    We ask that you do not smoke 24 hours prior to surgery  We ask that you do not  drink any alcoholic beverages 24 hours prior to surgery     You must make arrangements for a responsible adult to take you home after your surgery. For your safety you will not be allowed to leave alone or drive yourself home. Your surgery will be cancelled if you do not have a ride home. Also for your safety, it is strongly suggested that someone stay with you the first 24 hours after your surgery. A parent or legal guardian must accompany a child scheduled for surgery and plan to stay at the hospital until the child is discharged. Please do not bring other children with you. For your comfort, please wear simple loose fitting clothing to the hospital.  Please do not bring valuables.     Do not wear any make-up or nail polish on your fingers or toes      For your safety, please do not wear any jewelry or body piercing's on the day of surgery. All jewelry must be removed. If you have dentures, they will be removed before going to operating room. For your convenience, we will provide you with a container. If you wear contact lenses or glasses, they will be removed, please bring a case for them. If you have a living will and a durable power of  for healthcare, please bring in a copy. As part of our patient safety program to minimize surgical site infections, we ask you to do the following:    · Please notify your surgeon if you develop any illness between         now and the  day of your surgery. · This includes a cough, cold, fever, sore throat, nausea,         or vomiting, and diarrhea, etc.  ·  Please notify your surgeon if you experience dizziness, shortness         of breath or blurred vision between now and the time of your surgery. Do not shave your operative site 96 hours prior to surgery. For face and neck surgery, men may use an electric razor 48 hours   prior to surgery. You may shower the night before surgery or the morning of   your surgery with an antibacterial soap. You will need to bring a photo ID and insurance card    [facility] has an onsite pharmacy, would you like to utilize our pharmacy     If you will be staying overnight and use a C-pap machine, please bring   your C-pap to hospital     Our goal is to provide you with excellent care, therefore, visitors will be limited to two(2) in the room at a time so that we may focus on providing this care for you. Please contact pre-admission testing if you have any further questions. [facility] phone number:  [phone] PAT fax number:  [phone]  Please note these are generalized instructions for all surgical cases, you may be provided with more specific instructions according to your surgery.   C-Difficile admission screening and

## 2020-09-16 NOTE — OP NOTE
[address] [place] [address] [facility]                                OPERATIVE REPORT    PATIENT NAME: Pedro Iqbal                     :        1955  MED REC NO:   [id number]                          ROOM:  ACCOUNT NO:   [de-identified]                           ADMIT DATE: 09/15/2020  PROVIDER:     Marcelo Sharma MD      DATE OF PROCEDURE:  09/15/2020    PREOPERATIVE DIAGNOSES:  Severe lifestyle-limiting left calf  claudication with left foot rest pain. POSTOPERATIVE DIAGNOSES:  Severe lifestyle-limiting left calf  claudication with left foot rest pain. OPERATION PERFORMED:  1. Right femoral catheterization. 2.  Aortogram via suprarenal catheter placement. 3.  Iliac angiogram via catheter positioned above the aortic bifurcation  with AP, right anterior oblique and left anterior oblique projections. 4.  Bilateral runoff series via catheter positioned above the aortic  bifurcation. 5.  Selective left leg angiogram via catheter positioned in the left  common femoral artery. SURGEON:  Marcelo Sharma MD    ANESTHESIA:  Lidocaine 1% with conscious sedation. ESTIMATED BLOOD LOSS:  Less than 50 mL. INDICATIONS:  The patient is a very functional 80-year-old gentleman who  presented to the office complaining of short distance claudication of  his left leg limiting his ability to perform his job effectively  associated with recent onset of nocturnal rest pain in the left foot. It was suggested he undergo angiography and intervention as possible,  and he agreed understanding the risks, benefits, and other options. OPERATIVE PROCEDURE:  The patient was brought to the Angiogram  Department and placed on the table in supine position. After adequate  induction of sedation, his right groin was prepped and draped in a  sterile fashion.   Lidocaine 1% was infiltrated over the right femoral  pulse and a needle was used to puncture the femoral artery on a single  pass. under fluoroscopic guidance, the Glidewire was advanced proximally  followed by 5-Bulgarian sheath. Over the wire, was then placed a pigtail  catheter, which was positioned in the suprarenal location. A flush  aortogram was performed. The catheter was then withdrawn to just above  the aortic bifurcation. An iliac angiogram was performed with AP, right  anterior oblique and left anterior oblique projections. Catheter was  left in this position and bilateral infrarenal runoff series was  obtained. In order to better visualize the left leg, it was decided to  perform a selective left leg angiogram.  The catheter was straightened  over a wire and removed. A RIM catheter was then inserted over the wire  and positioned in the distal aorta. It was used to engage the origin of  the left common iliac artery and stiff Glidewire was advanced distally. Over the wire was advanced the RIM catheter and it was positioned in the  left common femoral artery. A selective left leg angiogram was then performed. Following completion  of this, the catheter was straightened and wire removed. The sheath was  removed from the right groin and pressure was applied manually. The  patient tolerated the procedure well. FINDINGS:  1. No evidence of significant bilateral renal artery stenotic disease  with bilateral duplicate renal arteries. 2.  No evidence of aortic stenotic or aneurysmal disease. 3.  No evidence of significant bilateral common and external iliac  artery stenoses, however, the left internal carotid artery has a severe  nearly occlusive stenosis at its origin. 4.  Severe left common femoral artery stenotic disease with occlusion of  the left SFA at its origin for approximately 15 cm in length. The  distal left SFA and popliteal reconstitute with three vessel runoff to  the foot.   5.  Irregular right superficial femoral artery and common femoral artery  with occlusion of the superficial femoral artery at the adductor canal.   The right popliteal artery reconstitutes with three-vessel runoff to the  foot.         Otto Phelps MD    D: 09/15/2020 15:12:16       T: 09/15/2020 16:02:10     [id number]  Job#: [id number]     Doc#: [id number]    CC:

## 2020-09-17 ENCOUNTER — OFFICE VISIT (OUTPATIENT)
Dept: PRIMARY CARE CLINIC | Age: 65
End: 2020-09-17
Payer: MEDICAID

## 2020-09-17 PROCEDURE — G8428 CUR MEDS NOT DOCUMENT: HCPCS | Performed by: NURSE PRACTITIONER

## 2020-09-17 PROCEDURE — 99211 OFF/OP EST MAY X REQ PHY/QHP: CPT | Performed by: NURSE PRACTITIONER

## 2020-09-17 PROCEDURE — G8419 CALC BMI OUT NRM PARAM NOF/U: HCPCS | Performed by: NURSE PRACTITIONER

## 2020-09-17 RX ORDER — SODIUM CHLORIDE 0.9 % (FLUSH) 0.9 %
10 SYRINGE (ML) INJECTION PRN
Status: CANCELLED | OUTPATIENT
Start: 2020-09-17

## 2020-09-17 RX ORDER — SODIUM CHLORIDE 0.9 % (FLUSH) 0.9 %
10 SYRINGE (ML) INJECTION EVERY 12 HOURS SCHEDULED
Status: CANCELLED | OUTPATIENT
Start: 2020-09-17

## 2020-09-17 NOTE — PROGRESS NOTES
Patient presented to [facility] drive up clinic for preop testing. Patient was swabbed and given information advising them to remain isolated until procedure date.

## 2020-09-18 LAB — SARS-COV-2, NAA: NOT DETECTED

## 2020-09-21 ENCOUNTER — ANESTHESIA EVENT (OUTPATIENT)
Dept: OPERATING ROOM | Age: 65
DRG: 182 | End: 2020-09-21
Payer: MEDICAID

## 2020-09-22 ENCOUNTER — APPOINTMENT (OUTPATIENT)
Dept: GENERAL RADIOLOGY | Age: 65
DRG: 182 | End: 2020-09-22
Attending: SURGERY
Payer: MEDICAID

## 2020-09-22 ENCOUNTER — HOSPITAL ENCOUNTER (INPATIENT)
Age: 65
LOS: 2 days | Discharge: HOME | DRG: 182 | End: 2020-09-24
Attending: SURGERY | Admitting: SURGERY
Payer: MEDICAID

## 2020-09-22 ENCOUNTER — ANESTHESIA (OUTPATIENT)
Dept: OPERATING ROOM | Age: 65
DRG: 182 | End: 2020-09-22
Payer: MEDICAID

## 2020-09-22 VITALS
DIASTOLIC BLOOD PRESSURE: 67 MMHG | RESPIRATION RATE: 13 BRPM | SYSTOLIC BLOOD PRESSURE: 120 MMHG | OXYGEN SATURATION: 94 % | TEMPERATURE: 98.6 F

## 2020-09-22 PROBLEM — I73.9 PERIPHERAL VASCULAR DISEASE, UNSPECIFIED (HCC): Status: ACTIVE | Noted: 2020-09-22

## 2020-09-22 LAB
ABO/RH: NORMAL
ANTIBODY SCREEN: NORMAL

## 2020-09-22 PROCEDURE — 6360000002 HC RX W HCPCS: Performed by: SURGERY

## 2020-09-22 PROCEDURE — 6360000002 HC RX W HCPCS: Performed by: NURSE ANESTHETIST, CERTIFIED REGISTERED

## 2020-09-22 PROCEDURE — 3700000000 HC ANESTHESIA ATTENDED CARE: Performed by: SURGERY

## 2020-09-22 PROCEDURE — 3600000015 HC SURGERY LEVEL 5 ADDTL 15MIN: Performed by: SURGERY

## 2020-09-22 PROCEDURE — 2100000000 HC CCU R&B

## 2020-09-22 PROCEDURE — 3209999900 FLUORO FOR SURGICAL PROCEDURES

## 2020-09-22 PROCEDURE — 2500000003 HC RX 250 WO HCPCS: Performed by: NURSE ANESTHETIST, CERTIFIED REGISTERED

## 2020-09-22 PROCEDURE — 041L3JQ BYPASS LEFT FEMORAL ARTERY TO LOWER EXTREMITY ARTERY WITH SYNTHETIC SUBSTITUTE, PERCUTANEOUS APPROACH: ICD-10-PCS | Performed by: SURGERY

## 2020-09-22 PROCEDURE — 35665 BPG ILIOFEMORAL: CPT | Performed by: SURGERY

## 2020-09-22 PROCEDURE — 86901 BLOOD TYPING SEROLOGIC RH(D): CPT

## 2020-09-22 PROCEDURE — 3700000001 HC ADD 15 MINUTES (ANESTHESIA): Performed by: SURGERY

## 2020-09-22 PROCEDURE — 75716 ARTERY X-RAYS ARMS/LEGS: CPT

## 2020-09-22 PROCEDURE — 6360000004 HC RX CONTRAST MEDICATION: Performed by: SURGERY

## 2020-09-22 PROCEDURE — 86900 BLOOD TYPING SEROLOGIC ABO: CPT

## 2020-09-22 PROCEDURE — 35583 VEIN BYP GRFT FEM-POPLITEAL: CPT | Performed by: SURGERY

## 2020-09-22 PROCEDURE — 3600000005 HC SURGERY LEVEL 5 BASE: Performed by: SURGERY

## 2020-09-22 PROCEDURE — 6370000000 HC RX 637 (ALT 250 FOR IP): Performed by: SURGERY

## 2020-09-22 PROCEDURE — L8670 VASCULAR GRAFT, SYNTHETIC: HCPCS | Performed by: SURGERY

## 2020-09-22 PROCEDURE — 7100000000 HC PACU RECOVERY - FIRST 15 MIN: Performed by: SURGERY

## 2020-09-22 PROCEDURE — 2709999900 HC NON-CHARGEABLE SUPPLY: Performed by: SURGERY

## 2020-09-22 PROCEDURE — 2580000003 HC RX 258: Performed by: SURGERY

## 2020-09-22 PROCEDURE — C1726 CATH, BAL DIL, NON-VASCULAR: HCPCS | Performed by: SURGERY

## 2020-09-22 PROCEDURE — 04CL3ZZ EXTIRPATION OF MATTER FROM LEFT FEMORAL ARTERY, PERCUTANEOUS APPROACH: ICD-10-PCS | Performed by: SURGERY

## 2020-09-22 PROCEDURE — 2580000003 HC RX 258: Performed by: ANESTHESIOLOGY

## 2020-09-22 PROCEDURE — 86850 RBC ANTIBODY SCREEN: CPT

## 2020-09-22 PROCEDURE — 7100000001 HC PACU RECOVERY - ADDTL 15 MIN: Performed by: SURGERY

## 2020-09-22 PROCEDURE — 04UL3JZ SUPPLEMENT LEFT FEMORAL ARTERY WITH SYNTHETIC SUBSTITUTE, PERCUTANEOUS APPROACH: ICD-10-PCS | Performed by: SURGERY

## 2020-09-22 DEVICE — ALBOGRAFT POLYESTER VASCULAR GRAFT 40CMX8MM
Type: IMPLANTABLE DEVICE | Site: LEG | Status: FUNCTIONAL
Brand: ALBOGRAFT POLYESTER VASCULAR GRAFT

## 2020-09-22 RX ORDER — MIDAZOLAM HYDROCHLORIDE 1 MG/ML
INJECTION INTRAMUSCULAR; INTRAVENOUS PRN
Status: DISCONTINUED | OUTPATIENT
Start: 2020-09-22 | End: 2020-09-22 | Stop reason: SDUPTHER

## 2020-09-22 RX ORDER — SODIUM CHLORIDE 0.9 % (FLUSH) 0.9 %
10 SYRINGE (ML) INJECTION EVERY 12 HOURS SCHEDULED
Status: DISCONTINUED | OUTPATIENT
Start: 2020-09-22 | End: 2020-09-22

## 2020-09-22 RX ORDER — LABETALOL HYDROCHLORIDE 5 MG/ML
INJECTION, SOLUTION INTRAVENOUS PRN
Status: DISCONTINUED | OUTPATIENT
Start: 2020-09-22 | End: 2020-09-22 | Stop reason: SDUPTHER

## 2020-09-22 RX ORDER — FENTANYL CITRATE 50 UG/ML
INJECTION, SOLUTION INTRAMUSCULAR; INTRAVENOUS PRN
Status: DISCONTINUED | OUTPATIENT
Start: 2020-09-22 | End: 2020-09-22 | Stop reason: SDUPTHER

## 2020-09-22 RX ORDER — ONDANSETRON 2 MG/ML
4 INJECTION INTRAMUSCULAR; INTRAVENOUS
Status: DISCONTINUED | OUTPATIENT
Start: 2020-09-22 | End: 2020-09-22

## 2020-09-22 RX ORDER — ASPIRIN 81 MG/1
81 TABLET, CHEWABLE ORAL DAILY
Status: DISCONTINUED | OUTPATIENT
Start: 2020-09-22 | End: 2020-09-24 | Stop reason: HOSPADM

## 2020-09-22 RX ORDER — SODIUM CHLORIDE 0.9 % (FLUSH) 0.9 %
10 SYRINGE (ML) INJECTION PRN
Status: DISCONTINUED | OUTPATIENT
Start: 2020-09-22 | End: 2020-09-22 | Stop reason: SDUPTHER

## 2020-09-22 RX ORDER — PHENYLEPHRINE HCL IN 0.9% NACL 1 MG/10 ML
SYRINGE (ML) INTRAVENOUS PRN
Status: DISCONTINUED | OUTPATIENT
Start: 2020-09-22 | End: 2020-09-22 | Stop reason: SDUPTHER

## 2020-09-22 RX ORDER — PROMETHAZINE HYDROCHLORIDE 25 MG/1
12.5 TABLET ORAL EVERY 6 HOURS PRN
Status: DISCONTINUED | OUTPATIENT
Start: 2020-09-22 | End: 2020-09-24 | Stop reason: HOSPADM

## 2020-09-22 RX ORDER — OXYCODONE HYDROCHLORIDE AND ACETAMINOPHEN 5; 325 MG/1; MG/1
1 TABLET ORAL EVERY 4 HOURS PRN
Status: DISCONTINUED | OUTPATIENT
Start: 2020-09-22 | End: 2020-09-24 | Stop reason: HOSPADM

## 2020-09-22 RX ORDER — SUCCINYLCHOLINE/SOD CL,ISO/PF 200MG/10ML
SYRINGE (ML) INTRAVENOUS PRN
Status: DISCONTINUED | OUTPATIENT
Start: 2020-09-22 | End: 2020-09-22 | Stop reason: SDUPTHER

## 2020-09-22 RX ORDER — SODIUM CHLORIDE 0.9 % (FLUSH) 0.9 %
10 SYRINGE (ML) INJECTION PRN
Status: DISCONTINUED | OUTPATIENT
Start: 2020-09-22 | End: 2020-09-22

## 2020-09-22 RX ORDER — SODIUM CHLORIDE 9 MG/ML
INJECTION, SOLUTION INTRAVENOUS CONTINUOUS
Status: DISCONTINUED | OUTPATIENT
Start: 2020-09-22 | End: 2020-09-22

## 2020-09-22 RX ORDER — SODIUM CHLORIDE 9 MG/ML
INJECTION, SOLUTION INTRAVENOUS CONTINUOUS
Status: DISCONTINUED | OUTPATIENT
Start: 2020-09-22 | End: 2020-09-23

## 2020-09-22 RX ORDER — CEFAZOLIN SODIUM 1 G/3ML
INJECTION, POWDER, FOR SOLUTION INTRAMUSCULAR; INTRAVENOUS PRN
Status: DISCONTINUED | OUTPATIENT
Start: 2020-09-22 | End: 2020-09-22 | Stop reason: SDUPTHER

## 2020-09-22 RX ORDER — HEPARIN SODIUM 10000 [USP'U]/ML
INJECTION, SOLUTION INTRAVENOUS; SUBCUTANEOUS PRN
Status: DISCONTINUED | OUTPATIENT
Start: 2020-09-22 | End: 2020-09-22 | Stop reason: SDUPTHER

## 2020-09-22 RX ORDER — SODIUM CHLORIDE 0.9 % (FLUSH) 0.9 %
10 SYRINGE (ML) INJECTION PRN
Status: DISCONTINUED | OUTPATIENT
Start: 2020-09-22 | End: 2020-09-24 | Stop reason: HOSPADM

## 2020-09-22 RX ORDER — LISINOPRIL 10 MG/1
20 TABLET ORAL DAILY
Status: DISCONTINUED | OUTPATIENT
Start: 2020-09-23 | End: 2020-09-24 | Stop reason: HOSPADM

## 2020-09-22 RX ORDER — LIDOCAINE HYDROCHLORIDE 20 MG/ML
INJECTION, SOLUTION EPIDURAL; INFILTRATION; INTRACAUDAL; PERINEURAL PRN
Status: DISCONTINUED | OUTPATIENT
Start: 2020-09-22 | End: 2020-09-22 | Stop reason: SDUPTHER

## 2020-09-22 RX ORDER — MEPERIDINE HYDROCHLORIDE 25 MG/ML
12.5 INJECTION INTRAMUSCULAR; INTRAVENOUS; SUBCUTANEOUS
Status: DISCONTINUED | OUTPATIENT
Start: 2020-09-22 | End: 2020-09-22

## 2020-09-22 RX ORDER — ROCURONIUM BROMIDE 10 MG/ML
INJECTION, SOLUTION INTRAVENOUS PRN
Status: DISCONTINUED | OUTPATIENT
Start: 2020-09-22 | End: 2020-09-22 | Stop reason: SDUPTHER

## 2020-09-22 RX ORDER — HYDROCODONE BITARTRATE AND ACETAMINOPHEN 5; 325 MG/1; MG/1
2 TABLET ORAL PRN
Status: DISCONTINUED | OUTPATIENT
Start: 2020-09-22 | End: 2020-09-22

## 2020-09-22 RX ORDER — ACETAMINOPHEN 325 MG/1
650 TABLET ORAL EVERY 4 HOURS PRN
Status: DISCONTINUED | OUTPATIENT
Start: 2020-09-22 | End: 2020-09-24 | Stop reason: HOSPADM

## 2020-09-22 RX ORDER — MORPHINE SULFATE 4 MG/ML
4 INJECTION, SOLUTION INTRAMUSCULAR; INTRAVENOUS
Status: DISCONTINUED | OUTPATIENT
Start: 2020-09-22 | End: 2020-09-24 | Stop reason: HOSPADM

## 2020-09-22 RX ORDER — FENTANYL CITRATE 50 UG/ML
50 INJECTION, SOLUTION INTRAMUSCULAR; INTRAVENOUS EVERY 5 MIN PRN
Status: DISCONTINUED | OUTPATIENT
Start: 2020-09-22 | End: 2020-09-22

## 2020-09-22 RX ORDER — ATORVASTATIN CALCIUM 80 MG/1
80 TABLET, FILM COATED ORAL NIGHTLY
Status: DISCONTINUED | OUTPATIENT
Start: 2020-09-22 | End: 2020-09-24 | Stop reason: HOSPADM

## 2020-09-22 RX ORDER — FENTANYL CITRATE 50 UG/ML
25 INJECTION, SOLUTION INTRAMUSCULAR; INTRAVENOUS EVERY 5 MIN PRN
Status: DISCONTINUED | OUTPATIENT
Start: 2020-09-22 | End: 2020-09-22

## 2020-09-22 RX ORDER — SODIUM CHLORIDE 0.9 % (FLUSH) 0.9 %
10 SYRINGE (ML) INJECTION EVERY 12 HOURS SCHEDULED
Status: DISCONTINUED | OUTPATIENT
Start: 2020-09-22 | End: 2020-09-24 | Stop reason: HOSPADM

## 2020-09-22 RX ORDER — ONDANSETRON 2 MG/ML
4 INJECTION INTRAMUSCULAR; INTRAVENOUS EVERY 6 HOURS PRN
Status: DISCONTINUED | OUTPATIENT
Start: 2020-09-22 | End: 2020-09-24 | Stop reason: HOSPADM

## 2020-09-22 RX ORDER — AMLODIPINE BESYLATE 5 MG/1
10 TABLET ORAL DAILY
Status: DISCONTINUED | OUTPATIENT
Start: 2020-09-23 | End: 2020-09-24 | Stop reason: HOSPADM

## 2020-09-22 RX ORDER — PROPOFOL 10 MG/ML
INJECTION, EMULSION INTRAVENOUS PRN
Status: DISCONTINUED | OUTPATIENT
Start: 2020-09-22 | End: 2020-09-22 | Stop reason: SDUPTHER

## 2020-09-22 RX ORDER — HYDROCODONE BITARTRATE AND ACETAMINOPHEN 5; 325 MG/1; MG/1
1 TABLET ORAL PRN
Status: DISCONTINUED | OUTPATIENT
Start: 2020-09-22 | End: 2020-09-22

## 2020-09-22 RX ORDER — PROMETHAZINE HYDROCHLORIDE 25 MG/ML
6.25 INJECTION, SOLUTION INTRAMUSCULAR; INTRAVENOUS
Status: DISCONTINUED | OUTPATIENT
Start: 2020-09-22 | End: 2020-09-22

## 2020-09-22 RX ORDER — MORPHINE SULFATE 2 MG/ML
2 INJECTION, SOLUTION INTRAMUSCULAR; INTRAVENOUS
Status: DISCONTINUED | OUTPATIENT
Start: 2020-09-22 | End: 2020-09-24 | Stop reason: HOSPADM

## 2020-09-22 RX ORDER — KETAMINE HCL IN NACL, ISO-OSM 100MG/10ML
SYRINGE (ML) INJECTION PRN
Status: DISCONTINUED | OUTPATIENT
Start: 2020-09-22 | End: 2020-09-22 | Stop reason: SDUPTHER

## 2020-09-22 RX ORDER — OXYCODONE HYDROCHLORIDE AND ACETAMINOPHEN 5; 325 MG/1; MG/1
2 TABLET ORAL EVERY 4 HOURS PRN
Status: DISCONTINUED | OUTPATIENT
Start: 2020-09-22 | End: 2020-09-24 | Stop reason: HOSPADM

## 2020-09-22 RX ORDER — DEXAMETHASONE SODIUM PHOSPHATE 4 MG/ML
INJECTION, SOLUTION INTRA-ARTICULAR; INTRALESIONAL; INTRAMUSCULAR; INTRAVENOUS; SOFT TISSUE PRN
Status: DISCONTINUED | OUTPATIENT
Start: 2020-09-22 | End: 2020-09-22 | Stop reason: SDUPTHER

## 2020-09-22 RX ORDER — HYDRALAZINE HYDROCHLORIDE 20 MG/ML
5 INJECTION INTRAMUSCULAR; INTRAVENOUS EVERY 10 MIN PRN
Status: DISCONTINUED | OUTPATIENT
Start: 2020-09-22 | End: 2020-09-22

## 2020-09-22 RX ORDER — SODIUM CHLORIDE 0.9 % (FLUSH) 0.9 %
10 SYRINGE (ML) INJECTION EVERY 12 HOURS SCHEDULED
Status: DISCONTINUED | OUTPATIENT
Start: 2020-09-22 | End: 2020-09-22 | Stop reason: SDUPTHER

## 2020-09-22 RX ORDER — PAPAVERINE HYDROCHLORIDE 30 MG/ML
INJECTION INTRAMUSCULAR; INTRAVENOUS
Status: COMPLETED | OUTPATIENT
Start: 2020-09-22 | End: 2020-09-22

## 2020-09-22 RX ORDER — ONDANSETRON 2 MG/ML
INJECTION INTRAMUSCULAR; INTRAVENOUS PRN
Status: DISCONTINUED | OUTPATIENT
Start: 2020-09-22 | End: 2020-09-22 | Stop reason: SDUPTHER

## 2020-09-22 RX ADMIN — HYDROMORPHONE HYDROCHLORIDE 1 MG: 1 INJECTION, SOLUTION INTRAMUSCULAR; INTRAVENOUS; SUBCUTANEOUS at 12:34

## 2020-09-22 RX ADMIN — Medication 100 MCG: at 09:18

## 2020-09-22 RX ADMIN — HYDROMORPHONE HYDROCHLORIDE 1 MG: 1 INJECTION, SOLUTION INTRAMUSCULAR; INTRAVENOUS; SUBCUTANEOUS at 08:32

## 2020-09-22 RX ADMIN — Medication 10 MG: at 13:50

## 2020-09-22 RX ADMIN — SODIUM CHLORIDE: 9 INJECTION, SOLUTION INTRAVENOUS at 06:50

## 2020-09-22 RX ADMIN — Medication 200 MCG: at 13:35

## 2020-09-22 RX ADMIN — CEFAZOLIN SODIUM 2 G: 10 INJECTION, POWDER, FOR SOLUTION INTRAVENOUS at 23:24

## 2020-09-22 RX ADMIN — ONDANSETRON 4 MG: 2 INJECTION INTRAMUSCULAR; INTRAVENOUS at 17:20

## 2020-09-22 RX ADMIN — SODIUM CHLORIDE: 9 INJECTION, SOLUTION INTRAVENOUS at 15:56

## 2020-09-22 RX ADMIN — MIDAZOLAM 2 MG: 1 INJECTION INTRAMUSCULAR; INTRAVENOUS at 08:05

## 2020-09-22 RX ADMIN — CEFAZOLIN SODIUM 2 G: 10 INJECTION, POWDER, FOR SOLUTION INTRAVENOUS at 16:01

## 2020-09-22 RX ADMIN — FENTANYL CITRATE 50 MCG: 50 INJECTION INTRAMUSCULAR; INTRAVENOUS at 08:29

## 2020-09-22 RX ADMIN — OXYCODONE HYDROCHLORIDE AND ACETAMINOPHEN 1 TABLET: 5; 325 TABLET ORAL at 20:29

## 2020-09-22 RX ADMIN — Medication 100 MCG: at 12:39

## 2020-09-22 RX ADMIN — SODIUM CHLORIDE, PRESERVATIVE FREE 10 ML: 5 INJECTION INTRAVENOUS at 20:36

## 2020-09-22 RX ADMIN — ATORVASTATIN CALCIUM 80 MG: 80 TABLET, FILM COATED ORAL at 20:29

## 2020-09-22 RX ADMIN — ONDANSETRON 4 MG: 2 INJECTION INTRAMUSCULAR; INTRAVENOUS at 12:24

## 2020-09-22 RX ADMIN — LABETALOL HYDROCHLORIDE 5 MG: 5 INJECTION, SOLUTION INTRAVENOUS at 09:25

## 2020-09-22 RX ADMIN — LIDOCAINE HYDROCHLORIDE 100 MG: 20 INJECTION, SOLUTION EPIDURAL; INFILTRATION; INTRACAUDAL; PERINEURAL at 08:10

## 2020-09-22 RX ADMIN — HEPARIN SODIUM 1000 UNITS: 10000 INJECTION INTRAVENOUS; SUBCUTANEOUS at 10:46

## 2020-09-22 RX ADMIN — CEFAZOLIN SODIUM 2 G: 10 INJECTION, POWDER, FOR SOLUTION INTRAVENOUS at 08:05

## 2020-09-22 RX ADMIN — HEPARIN SODIUM 1000 UNITS: 10000 INJECTION INTRAVENOUS; SUBCUTANEOUS at 11:59

## 2020-09-22 RX ADMIN — ROCURONIUM BROMIDE 5 MG: 10 INJECTION INTRAVENOUS at 08:10

## 2020-09-22 RX ADMIN — Medication 200 MCG: at 12:32

## 2020-09-22 RX ADMIN — Medication 10 MG: at 13:43

## 2020-09-22 RX ADMIN — Medication 100 MG: at 08:10

## 2020-09-22 RX ADMIN — SODIUM CHLORIDE: 9 INJECTION, SOLUTION INTRAVENOUS at 11:52

## 2020-09-22 RX ADMIN — PROPOFOL 200 MG: 10 INJECTION, EMULSION INTRAVENOUS at 08:10

## 2020-09-22 RX ADMIN — ROCURONIUM BROMIDE 20 MG: 10 INJECTION INTRAVENOUS at 11:39

## 2020-09-22 RX ADMIN — ASPIRIN 81 MG: 81 TABLET, CHEWABLE ORAL at 17:13

## 2020-09-22 RX ADMIN — HEPARIN SODIUM 3000 UNITS: 10000 INJECTION INTRAVENOUS; SUBCUTANEOUS at 10:08

## 2020-09-22 RX ADMIN — HYDROMORPHONE HYDROCHLORIDE 1 MG: 1 INJECTION, SOLUTION INTRAMUSCULAR; INTRAVENOUS; SUBCUTANEOUS at 14:10

## 2020-09-22 RX ADMIN — LABETALOL HYDROCHLORIDE 5 MG: 5 INJECTION, SOLUTION INTRAVENOUS at 09:28

## 2020-09-22 RX ADMIN — ROCURONIUM BROMIDE 45 MG: 10 INJECTION INTRAVENOUS at 08:20

## 2020-09-22 RX ADMIN — CEFAZOLIN SODIUM 2000 MG: 1 INJECTION, POWDER, FOR SOLUTION INTRAMUSCULAR; INTRAVENOUS at 11:36

## 2020-09-22 RX ADMIN — Medication 10 MG: at 13:39

## 2020-09-22 RX ADMIN — FENTANYL CITRATE 50 MCG: 50 INJECTION INTRAMUSCULAR; INTRAVENOUS at 08:20

## 2020-09-22 RX ADMIN — SODIUM CHLORIDE: 9 INJECTION, SOLUTION INTRAVENOUS at 13:24

## 2020-09-22 RX ADMIN — HEPARIN SODIUM 1000 UNITS: 10000 INJECTION INTRAVENOUS; SUBCUTANEOUS at 12:49

## 2020-09-22 RX ADMIN — SUGAMMADEX 200 MG: 100 INJECTION, SOLUTION INTRAVENOUS at 13:57

## 2020-09-22 RX ADMIN — ROCURONIUM BROMIDE 20 MG: 10 INJECTION INTRAVENOUS at 09:16

## 2020-09-22 RX ADMIN — ROCURONIUM BROMIDE 10 MG: 10 INJECTION INTRAVENOUS at 10:42

## 2020-09-22 RX ADMIN — DEXAMETHASONE SODIUM PHOSPHATE 10 MG: 4 INJECTION, SOLUTION INTRAMUSCULAR; INTRAVENOUS at 08:10

## 2020-09-22 ASSESSMENT — PULMONARY FUNCTION TESTS
PIF_VALUE: 15
PIF_VALUE: 17
PIF_VALUE: 15
PIF_VALUE: 17
PIF_VALUE: 4
PIF_VALUE: 16
PIF_VALUE: 12
PIF_VALUE: 3
PIF_VALUE: 17
PIF_VALUE: 16
PIF_VALUE: 15
PIF_VALUE: 16
PIF_VALUE: 16
PIF_VALUE: 3
PIF_VALUE: 4
PIF_VALUE: 16
PIF_VALUE: 17
PIF_VALUE: 16
PIF_VALUE: 15
PIF_VALUE: 16
PIF_VALUE: 17
PIF_VALUE: 1
PIF_VALUE: 6
PIF_VALUE: 17
PIF_VALUE: 1
PIF_VALUE: 0
PIF_VALUE: 16
PIF_VALUE: 17
PIF_VALUE: 17
PIF_VALUE: 16
PIF_VALUE: 16
PIF_VALUE: 15
PIF_VALUE: 15
PIF_VALUE: 16
PIF_VALUE: 17
PIF_VALUE: 16
PIF_VALUE: 4
PIF_VALUE: 0
PIF_VALUE: 16
PIF_VALUE: 17
PIF_VALUE: 17
PIF_VALUE: 16
PIF_VALUE: 15
PIF_VALUE: 16
PIF_VALUE: 16
PIF_VALUE: 15
PIF_VALUE: 14
PIF_VALUE: 16
PIF_VALUE: 15
PIF_VALUE: 16
PIF_VALUE: 17
PIF_VALUE: 16
PIF_VALUE: 16
PIF_VALUE: 2
PIF_VALUE: 16
PIF_VALUE: 8
PIF_VALUE: 16
PIF_VALUE: 17
PIF_VALUE: 14
PIF_VALUE: 17
PIF_VALUE: 15
PIF_VALUE: 4
PIF_VALUE: 16
PIF_VALUE: 17
PIF_VALUE: 16
PIF_VALUE: 17
PIF_VALUE: 16
PIF_VALUE: 16
PIF_VALUE: 15
PIF_VALUE: 17
PIF_VALUE: 15
PIF_VALUE: 17
PIF_VALUE: 16
PIF_VALUE: 14
PIF_VALUE: 16
PIF_VALUE: 17
PIF_VALUE: 15
PIF_VALUE: 17
PIF_VALUE: 16
PIF_VALUE: 17
PIF_VALUE: 16
PIF_VALUE: 2
PIF_VALUE: 16
PIF_VALUE: 14
PIF_VALUE: 4
PIF_VALUE: 16
PIF_VALUE: 17
PIF_VALUE: 16
PIF_VALUE: 17
PIF_VALUE: 16
PIF_VALUE: 16
PIF_VALUE: 17
PIF_VALUE: 14
PIF_VALUE: 17
PIF_VALUE: 16
PIF_VALUE: 17
PIF_VALUE: 14
PIF_VALUE: 15
PIF_VALUE: 17
PIF_VALUE: 16
PIF_VALUE: 15
PIF_VALUE: 15
PIF_VALUE: 16
PIF_VALUE: 16
PIF_VALUE: 15
PIF_VALUE: 17
PIF_VALUE: 1
PIF_VALUE: 17
PIF_VALUE: 17
PIF_VALUE: 14
PIF_VALUE: 14
PIF_VALUE: 21
PIF_VALUE: 15
PIF_VALUE: 17
PIF_VALUE: 16
PIF_VALUE: 15
PIF_VALUE: 16
PIF_VALUE: 15
PIF_VALUE: 17
PIF_VALUE: 15
PIF_VALUE: 17
PIF_VALUE: 16
PIF_VALUE: 15
PIF_VALUE: 16
PIF_VALUE: 5
PIF_VALUE: 17
PIF_VALUE: 14
PIF_VALUE: 16
PIF_VALUE: 16
PIF_VALUE: 15
PIF_VALUE: 16
PIF_VALUE: 16
PIF_VALUE: 15
PIF_VALUE: 16
PIF_VALUE: 17
PIF_VALUE: 16
PIF_VALUE: 14
PIF_VALUE: 16
PIF_VALUE: 16
PIF_VALUE: 15
PIF_VALUE: 17
PIF_VALUE: 16
PIF_VALUE: 15
PIF_VALUE: 16
PIF_VALUE: 16
PIF_VALUE: 14
PIF_VALUE: 17
PIF_VALUE: 15
PIF_VALUE: 16
PIF_VALUE: 16
PIF_VALUE: 17
PIF_VALUE: 17
PIF_VALUE: 16
PIF_VALUE: 16
PIF_VALUE: 17
PIF_VALUE: 15
PIF_VALUE: 15
PIF_VALUE: 17
PIF_VALUE: 15
PIF_VALUE: 16
PIF_VALUE: 15
PIF_VALUE: 17
PIF_VALUE: 14
PIF_VALUE: 17
PIF_VALUE: 17
PIF_VALUE: 16
PIF_VALUE: 16
PIF_VALUE: 4
PIF_VALUE: 16
PIF_VALUE: 15
PIF_VALUE: 17
PIF_VALUE: 15
PIF_VALUE: 16
PIF_VALUE: 15
PIF_VALUE: 14
PIF_VALUE: 16
PIF_VALUE: 15
PIF_VALUE: 16
PIF_VALUE: 16
PIF_VALUE: 15
PIF_VALUE: 16
PIF_VALUE: 17
PIF_VALUE: 15
PIF_VALUE: 14
PIF_VALUE: 17
PIF_VALUE: 15
PIF_VALUE: 16
PIF_VALUE: 17
PIF_VALUE: 15
PIF_VALUE: 17
PIF_VALUE: 16
PIF_VALUE: 15
PIF_VALUE: 17
PIF_VALUE: 15
PIF_VALUE: 17
PIF_VALUE: 16
PIF_VALUE: 17
PIF_VALUE: 16
PIF_VALUE: 16
PIF_VALUE: 17
PIF_VALUE: 16
PIF_VALUE: 17
PIF_VALUE: 4
PIF_VALUE: 15
PIF_VALUE: 17
PIF_VALUE: 16
PIF_VALUE: 14
PIF_VALUE: 4
PIF_VALUE: 16
PIF_VALUE: 16
PIF_VALUE: 17
PIF_VALUE: 15
PIF_VALUE: 2
PIF_VALUE: 14
PIF_VALUE: 17
PIF_VALUE: 15
PIF_VALUE: 16
PIF_VALUE: 16
PIF_VALUE: 17
PIF_VALUE: 16
PIF_VALUE: 17
PIF_VALUE: 16
PIF_VALUE: 1
PIF_VALUE: 16
PIF_VALUE: 14
PIF_VALUE: 17
PIF_VALUE: 17
PIF_VALUE: 16
PIF_VALUE: 17
PIF_VALUE: 15
PIF_VALUE: 14
PIF_VALUE: 17
PIF_VALUE: 7
PIF_VALUE: 15
PIF_VALUE: 17
PIF_VALUE: 4
PIF_VALUE: 15
PIF_VALUE: 17
PIF_VALUE: 14
PIF_VALUE: 16
PIF_VALUE: 16
PIF_VALUE: 17
PIF_VALUE: 16
PIF_VALUE: 17
PIF_VALUE: 16
PIF_VALUE: 13
PIF_VALUE: 15
PIF_VALUE: 14
PIF_VALUE: 16
PIF_VALUE: 16
PIF_VALUE: 17
PIF_VALUE: 15
PIF_VALUE: 0
PIF_VALUE: 17
PIF_VALUE: 0
PIF_VALUE: 16
PIF_VALUE: 16
PIF_VALUE: 17
PIF_VALUE: 14
PIF_VALUE: 16
PIF_VALUE: 13
PIF_VALUE: 15
PIF_VALUE: 14
PIF_VALUE: 15
PIF_VALUE: 15
PIF_VALUE: 13
PIF_VALUE: 16
PIF_VALUE: 14
PIF_VALUE: 17
PIF_VALUE: 15
PIF_VALUE: 17
PIF_VALUE: 15
PIF_VALUE: 17
PIF_VALUE: 16
PIF_VALUE: 15
PIF_VALUE: 16
PIF_VALUE: 16
PIF_VALUE: 17
PIF_VALUE: 16
PIF_VALUE: 15
PIF_VALUE: 17
PIF_VALUE: 15
PIF_VALUE: 16
PIF_VALUE: 15
PIF_VALUE: 17
PIF_VALUE: 16
PIF_VALUE: 17
PIF_VALUE: 16
PIF_VALUE: 3
PIF_VALUE: 16
PIF_VALUE: 17
PIF_VALUE: 16
PIF_VALUE: 14
PIF_VALUE: 17
PIF_VALUE: 4
PIF_VALUE: 4
PIF_VALUE: 0
PIF_VALUE: 4
PIF_VALUE: 15
PIF_VALUE: 16
PIF_VALUE: 17
PIF_VALUE: 15
PIF_VALUE: 17
PIF_VALUE: 17
PIF_VALUE: 15
PIF_VALUE: 15
PIF_VALUE: 16
PIF_VALUE: 17
PIF_VALUE: 5
PIF_VALUE: 16
PIF_VALUE: 17
PIF_VALUE: 15
PIF_VALUE: 16
PIF_VALUE: 17
PIF_VALUE: 15
PIF_VALUE: 17
PIF_VALUE: 15
PIF_VALUE: 17
PIF_VALUE: 17
PIF_VALUE: 16
PIF_VALUE: 17
PIF_VALUE: 15
PIF_VALUE: 17
PIF_VALUE: 1
PIF_VALUE: 0
PIF_VALUE: 16
PIF_VALUE: 16
PIF_VALUE: 17
PIF_VALUE: 15
PIF_VALUE: 16
PIF_VALUE: 15
PIF_VALUE: 17
PIF_VALUE: 16
PIF_VALUE: 4
PIF_VALUE: 16
PIF_VALUE: 15
PIF_VALUE: 16
PIF_VALUE: 17
PIF_VALUE: 16
PIF_VALUE: 17
PIF_VALUE: 36
PIF_VALUE: 16
PIF_VALUE: 17
PIF_VALUE: 15

## 2020-09-22 ASSESSMENT — PAIN - FUNCTIONAL ASSESSMENT: PAIN_FUNCTIONAL_ASSESSMENT: 0-10

## 2020-09-22 ASSESSMENT — PAIN SCALES - GENERAL
PAINLEVEL_OUTOF10: 0
PAINLEVEL_OUTOF10: 3
PAINLEVEL_OUTOF10: 0
PAINLEVEL_OUTOF10: 5
PAINLEVEL_OUTOF10: 3

## 2020-09-22 ASSESSMENT — PAIN DESCRIPTION - LOCATION: LOCATION: LEG

## 2020-09-22 ASSESSMENT — PAIN DESCRIPTION - PAIN TYPE: TYPE: SURGICAL PAIN

## 2020-09-22 ASSESSMENT — PAIN DESCRIPTION - ORIENTATION: ORIENTATION: LEFT

## 2020-09-22 ASSESSMENT — LIFESTYLE VARIABLES: SMOKING_STATUS: 1

## 2020-09-22 NOTE — ANESTHESIA PRE PROCEDURE
[facility] Department of Anesthesiology  Pre-Anesthesia Evaluation/Consultation       Name:  Rosalio Santos  : 1955  Age:  59 y.o. MRN:  [id number]  Date: 2020           Surgeon: Surgeon(s):  Cookie Handy MD    Procedure: Procedure(s):  LEFT FEMORAL ENDARTERECTOMY, LEFT FEMORAL TO POPLITEAL BYPASS GRAFT     No Known Allergies  Patient Active Problem List   Diagnosis    Other hyperlipidemia    Essential hypertension    Smoker    Rash and nonspecific skin eruption    Cellulitis and abscess of toe of left foot    Peripheral arterial disease ([address]    Claudication ([address]    Atherosclerosis of native arteries of extremities with rest pain, left leg ([address]    Peripheral vascular disease, unspecified ([facility]     Past Medical History:   Diagnosis Date    Cerebral artery occlusion with cerebral infarction [facility])     Patient said he has no residual effects    Heart attack ([address]     3 yr ago    Hx of blood clots     right side of neck    Hyperlipidemia     Hypertension      Past Surgical History:   Procedure Laterality Date    ARTERIAL CLOT SURGERY Right      Social History     Tobacco Use    Smoking status: Former Smoker     Packs/day: 1.00     Years: 20.00     Pack years: 20.00     Types: Cigarettes     Last attempt to quit: 8/10/2020     Years since quittin.1    Smokeless tobacco: Never Used   Substance Use Topics    Alcohol use: Yes     Alcohol/week: 12.0 standard drinks     Types: 12 Cans of beer per week    Drug use: Yes     Types: Marijuana     Comment: yesterday     Medications  No current facility-administered medications on file prior to encounter.       Current Outpatient Medications on File Prior to Encounter   Medication Sig Dispense Refill    lisinopril (PRINIVIL;ZESTRIL) 20 MG tablet Take 1 tablet by mouth daily 90 tablet 2    atorvastatin (LIPITOR) 80 MG tablet Take 1 tablet by mouth daily 90 tablet 2    aspirin 81 MG tablet Take 1 tablet by mouth daily 90 tablet 2    amLODIPine (NORVASC) 10 MG tablet Take 1 tablet by mouth daily 90 tablet 2     Current Facility-Administered Medications   Medication Dose Route Frequency Provider Last Rate Last Dose    0.9 % sodium chloride infusion   Intravenous Continuous Ciara Bueno  mL/hr at 20 0650      sodium chloride flush 0.9 % injection 10 mL  10 mL Intravenous 2 times per day Ciara Bueno MD        sodium chloride flush 0.9 % injection 10 mL  10 mL Intravenous PRN Ciaar Bueno MD        ceFAZolin (ANCEF) 2 g in dextrose 5 % 100 mL IVPB  2 g Intravenous On Call to 838 Brigid Wong MD         Vital Signs (Current)   Vitals:    20 0615 20   BP:   (!) 169/69   Pulse:  68    Resp:  16    Temp:  97 °F (36.1 °C)    TempSrc:  Temporal    SpO2:  99%    Weight: 175 lb 3.2 oz (79.5 kg)     Height: 5' 9\" (1.753 m)                                            BP Readings from Last 3 Encounters:   20 (!) 169/69   09/15/20 (!) 169/92   20 110/60     Vital Signs Statistics (for past 48 hrs)     Temp  Av °F (36.1 °C)  Min: 97 °F (36.1 °C)   Min taken time: 20  Max: 97 °F (36.1 °C)   Max taken time: 20  Pulse  Av  Min: 76   Min taken time: 20  Max: 76   Max taken time: 20  Resp  Av  Min: 16   Min taken time: 20  Max: 12   Max taken time: 20  BP  Min: 169/69   Min taken time: 20  Max: 169/69   Max taken time: 20  SpO2  Av %  Min: 99 %   Min taken time: 20  Max: 99 %   Max taken time: 20  BP Readings from Last 3 Encounters:   20 (!) 169/69   09/15/20 (!) 169/92   20 110/60       BMI  Body mass index is 25.87 kg/m². Estimated body mass index is 25.87 kg/m² as calculated from the following:    Height as of this encounter: 5' 9\" (1.753 m). Weight as of this encounter: 175 lb 3.2 oz (79.5 kg).     CBC   Lab Results   Component Value Date    WBC 7.0 09/15/2020    RBC 4.28 09/15/2020    HGB 14.0 09/15/2020    HCT 41.6 09/15/2020    MCV 97.2 09/15/2020    RDW 13.1 09/15/2020     09/15/2020     CMP    Lab Results   Component Value Date     09/15/2020    K 3.6 09/15/2020     09/15/2020    CO2 26 09/15/2020    BUN 12 09/15/2020    CREATININE 0.8 09/15/2020    GFRAA >60 09/15/2020    AGRATIO 1.2 08/08/2020    LABGLOM >60 09/15/2020    GLUCOSE 105 09/15/2020    PROT 6.7 08/08/2020    CALCIUM 9.0 09/15/2020    BILITOT 0.3 08/08/2020    ALKPHOS 69 08/08/2020    AST 20 08/08/2020    ALT 16 08/08/2020     BMP    Lab Results   Component Value Date     09/15/2020    K 3.6 09/15/2020     09/15/2020    CO2 26 09/15/2020    BUN 12 09/15/2020    CREATININE 0.8 09/15/2020    CALCIUM 9.0 09/15/2020    GFRAA >60 09/15/2020    LABGLOM >60 09/15/2020    GLUCOSE 105 09/15/2020     POCGlucose  No results for input(s): GLUCOSE in the last 72 hours.    Coags    Lab Results   Component Value Date    PROTIME 12.3 09/15/2020    INR 1.06 09/15/2020    APTT 30.2 71/75/7070     HCG (If Applicable) No results found for: PREGTESTUR, PREGSERUM, HCG, HCGQUANT   ABGs No results found for: PHART, PO2ART, JAU7SNE, ALL0COO, BEART, Y7XTZKES   Type & Screen (If Applicable)  No results found for: LABABO, LABRH                         BMI: Wt Readings from Last 3 Encounters:       NPO Status:   Date of last liquid consumption: 09/21/20   Time of last liquid consumption: 2000   Date of last solid food consumption: 09/21/20      Time of last solid consumption: 2100       Anesthesia Evaluation  Patient summary reviewed  Airway: Mallampati: II  TM distance: >3 FB   Neck ROM: full  Mouth opening: > = 3 FB Dental: normal exam         Pulmonary: breath sounds clear to auscultation  (+) current smoker (THC)    (-) COPD and asthma                           Cardiovascular:  Exercise tolerance: good (>4 METS),   (+) hypertension:, past MI: > 6 months, hyperlipidemia    (-) CABG/stent and  angina      Rhythm: regular  Rate: normal                    Neuro/Psych:   (+) CVA: no interval change,    (-) seizures, psychiatric history and depression/anxiety            GI/Hepatic/Renal:        (-) GERD and no morbid obesity       Endo/Other:        (-) diabetes mellitus, hypothyroidism               Abdominal:           Vascular:   + PVD, aortic or cerebral, .  - DVT. Anesthesia Plan      general     ASA 3       Induction: intravenous. MIPS: Postoperative opioids intended and Prophylactic antiemetics administered. Anesthetic plan and risks discussed with patient and spouse. Use of blood products discussed with patient whom consented to blood products. Plan discussed with CRNA. This pre-anesthesia assessment may be used as a history and physical.    DOS STAFF ADDENDUM:    Pt seen and examined, chart reviewed (including anesthesia, drug and allergy history). No interval changes to history and physical examination. Anesthetic plan, risks, benefits, alternatives, and personnel involved discussed with patient. Patient verbalized an understanding and agrees to proceed.       Renzo Rodriguez MD  September 22, 2020  7:09 AM

## 2020-09-22 NOTE — ANESTHESIA POSTPROCEDURE EVALUATION
Department of Anesthesiology  Postprocedure Note    Patient: Pramod Tam  MRN: [id number]  [place]: 1955  Date of evaluation: 9/22/2020  Time:  4:15 PM     Procedure Summary     Date:  09/22/20 Room / Location:   [address]  / [facility]    Anesthesia Start:  0805 Anesthesia Stop:  3855    Procedure:  LEFT FEMORAL ENDARTERECTOMY, LEFT FEMORAL TO POPLITEAL BYPASS GRAFT (Left Leg Upper) Diagnosis:       Peripheral vascular disease ([address]      (PERIPHERAL VASCULAR DISEASE)    Surgeon:  Bea Alberto MD Responsible Provider:  Emerald Morales MD    Anesthesia Type:  general ASA Status:  3          Anesthesia Type: general    Ramon Phase I: Ramon Score: 9    Ramon Phase II:      Last vitals: Reviewed and per EMR flowsheets.        Anesthesia Post Evaluation    Patient location during evaluation: PACU  Level of consciousness: awake and alert  Airway patency: patent  Nausea & Vomiting: no nausea and no vomiting  Complications: no  Cardiovascular status: blood pressure returned to baseline  Respiratory status: acceptable  Hydration status: euvolemic  Comments: Postoperative Anesthesia Note    Name:    Pramod Tam  MRN:      [id number]    Patient Vitals in the past 12 hrs:  09/22/20 1505, BP:111/71, Pulse:75, Resp:16, SpO2:96 %  09/22/20 1501, Temp:98 °F (36.7 °C), Temp src:Oral  09/22/20 1500, BP:112/69, Pulse:76, Resp:18, SpO2:97 %  09/22/20 1455, BP:124/75, Pulse:79, Resp:20, SpO2:99 %  09/22/20 1450, BP:114/76, Pulse:78, Resp:18, SpO2:99 %  09/22/20 1445, BP:(!) 100/54, Pulse:78, Resp:20, SpO2:98 %  09/22/20 1443, BP:(!) 100/54, Temp:98.6 °F (37 °C), Temp src:Oral, Pulse:74, Resp:22, SpO2:100 %  09/22/20 0623, BP:(!) 169/69  09/22/20 0622, Temp:97 °F (36.1 °C), Temp src:Temporal, Pulse:68, Resp:16, SpO2:99 %  09/22/20 0615, Height:5' 9\" (1.753 m), Weight:175 lb 3.2 oz (79.5 kg)     LABS:    CBC  Lab Results       Component                Value               Date/Time                  WBC 7.0                 09/15/2020 04:03 PM        HGB                      14.0                09/15/2020 04:03 PM        HCT                      41.6                09/15/2020 04:03 PM        PLT                      173                 09/15/2020 04:03 PM   RENAL  Lab Results       Component                Value               Date/Time                  NA                       138                 09/15/2020 04:03 PM        K                        3.6                 09/15/2020 04:03 PM        CL                       104                 09/15/2020 04:03 PM        CO2                      26                  09/15/2020 04:03 PM        BUN                      12                  09/15/2020 04:03 PM        CREATININE               0.8                 09/15/2020 04:03 PM        GLUCOSE                  105 (H)             09/15/2020 04:03 PM   COAGS  Lab Results       Component                Value               Date/Time                  PROTIME                  12.3                09/15/2020 04:03 PM        INR                      1.06                09/15/2020 04:03 PM        APTT                     30.2                09/15/2020 04:03 PM     Intake & Output:  @13QKRG@    Nausea & Vomiting:  No    Level of Consciousness:  Awake    Pain Assessment:  Adequate analgesia    Anesthesia Complications:  No apparent anesthetic complications    SUMMARY      Vital signs stable  OK to discharge from Stage I post anesthesia care.   Care transferred from Anesthesiology department on discharge from perioperative area

## 2020-09-23 LAB
ANION GAP SERPL CALCULATED.3IONS-SCNC: 12 MMOL/L (ref 3–16)
BUN BLDV-MCNC: 13 MG/DL (ref 7–20)
CALCIUM SERPL-MCNC: 8.3 MG/DL (ref 8.3–10.6)
CHLORIDE BLD-SCNC: 109 MMOL/L (ref 99–110)
CO2: 17 MMOL/L (ref 21–32)
CREAT SERPL-MCNC: 0.7 MG/DL (ref 0.8–1.3)
GFR AFRICAN AMERICAN: >60
GFR NON-AFRICAN AMERICAN: >60
GLUCOSE BLD-MCNC: 128 MG/DL (ref 70–99)
HCT VFR BLD CALC: 33.8 % (ref 40.5–52.5)
HEMOGLOBIN: 11.2 G/DL (ref 13.5–17.5)
MCH RBC QN AUTO: 32.6 PG (ref 26–34)
MCHC RBC AUTO-ENTMCNC: 33.1 G/DL (ref 31–36)
MCV RBC AUTO: 98.4 FL (ref 80–100)
PDW BLD-RTO: 13.7 % (ref 12.4–15.4)
PLATELET # BLD: 169 K/UL (ref 135–450)
PMV BLD AUTO: 8.5 FL (ref 5–10.5)
POTASSIUM SERPL-SCNC: 4.1 MMOL/L (ref 3.5–5.1)
RBC # BLD: 3.43 M/UL (ref 4.2–5.9)
SODIUM BLD-SCNC: 138 MMOL/L (ref 136–145)
WBC # BLD: 12.6 K/UL (ref 4–11)

## 2020-09-23 PROCEDURE — 80048 BASIC METABOLIC PNL TOTAL CA: CPT

## 2020-09-23 PROCEDURE — 97116 GAIT TRAINING THERAPY: CPT

## 2020-09-23 PROCEDURE — 97166 OT EVAL MOD COMPLEX 45 MIN: CPT

## 2020-09-23 PROCEDURE — 97162 PT EVAL MOD COMPLEX 30 MIN: CPT

## 2020-09-23 PROCEDURE — 6370000000 HC RX 637 (ALT 250 FOR IP): Performed by: SURGERY

## 2020-09-23 PROCEDURE — 2580000003 HC RX 258: Performed by: SURGERY

## 2020-09-23 PROCEDURE — 99024 POSTOP FOLLOW-UP VISIT: CPT | Performed by: SURGERY

## 2020-09-23 PROCEDURE — 94761 N-INVAS EAR/PLS OXIMETRY MLT: CPT

## 2020-09-23 PROCEDURE — 36415 COLL VENOUS BLD VENIPUNCTURE: CPT

## 2020-09-23 PROCEDURE — 1200000000 HC SEMI PRIVATE

## 2020-09-23 PROCEDURE — 97535 SELF CARE MNGMENT TRAINING: CPT

## 2020-09-23 PROCEDURE — 97530 THERAPEUTIC ACTIVITIES: CPT

## 2020-09-23 PROCEDURE — 85027 COMPLETE CBC AUTOMATED: CPT

## 2020-09-23 RX ADMIN — ASPIRIN 81 MG: 81 TABLET, CHEWABLE ORAL at 08:21

## 2020-09-23 RX ADMIN — SODIUM CHLORIDE, PRESERVATIVE FREE 10 ML: 5 INJECTION INTRAVENOUS at 20:25

## 2020-09-23 RX ADMIN — OXYCODONE HYDROCHLORIDE AND ACETAMINOPHEN 1 TABLET: 5; 325 TABLET ORAL at 19:01

## 2020-09-23 RX ADMIN — OXYCODONE HYDROCHLORIDE AND ACETAMINOPHEN 1 TABLET: 5; 325 TABLET ORAL at 02:52

## 2020-09-23 RX ADMIN — SODIUM CHLORIDE: 9 INJECTION, SOLUTION INTRAVENOUS at 00:34

## 2020-09-23 RX ADMIN — LISINOPRIL 20 MG: 10 TABLET ORAL at 08:21

## 2020-09-23 RX ADMIN — AMLODIPINE BESYLATE 10 MG: 5 TABLET ORAL at 08:21

## 2020-09-23 RX ADMIN — SODIUM CHLORIDE, PRESERVATIVE FREE 10 ML: 5 INJECTION INTRAVENOUS at 08:22

## 2020-09-23 RX ADMIN — ATORVASTATIN CALCIUM 80 MG: 80 TABLET, FILM COATED ORAL at 20:22

## 2020-09-23 ASSESSMENT — PAIN DESCRIPTION - PAIN TYPE: TYPE: SURGICAL PAIN

## 2020-09-23 ASSESSMENT — PAIN SCALES - GENERAL
PAINLEVEL_OUTOF10: 3
PAINLEVEL_OUTOF10: 6
PAINLEVEL_OUTOF10: 0
PAINLEVEL_OUTOF10: 3
PAINLEVEL_OUTOF10: 6
PAINLEVEL_OUTOF10: 0

## 2020-09-23 ASSESSMENT — PAIN DESCRIPTION - PROGRESSION
CLINICAL_PROGRESSION: GRADUALLY IMPROVING

## 2020-09-23 ASSESSMENT — PAIN DESCRIPTION - FREQUENCY: FREQUENCY: CONTINUOUS

## 2020-09-23 ASSESSMENT — PAIN - FUNCTIONAL ASSESSMENT: PAIN_FUNCTIONAL_ASSESSMENT: PREVENTS OR INTERFERES SOME ACTIVE ACTIVITIES AND ADLS

## 2020-09-23 ASSESSMENT — PAIN DESCRIPTION - DESCRIPTORS: DESCRIPTORS: SHOOTING

## 2020-09-23 ASSESSMENT — PAIN DESCRIPTION - LOCATION: LOCATION: LEG

## 2020-09-23 ASSESSMENT — PAIN DESCRIPTION - ORIENTATION: ORIENTATION: LEFT

## 2020-09-23 ASSESSMENT — PAIN DESCRIPTION - ONSET: ONSET: ON-GOING

## 2020-09-23 NOTE — PROGRESS NOTES
VASCULAR  POD#1    Feels well. L foot greatly improved - warm without pain. VSS afeb  Excellent UO  Lungs clear  LLE wrapped - no palpable hemaomas  Palpable graft pulse with excellent L PT/DP doppler signals; faint L DP pulse palpable    Labs reviewed    A/P: S/P L iliofem + L fem-pop bypass. Patent with excellent reperfusion. DC king and IVFs. Up in chair and ambulate. PT/OT. May be ready for DC tomorrow.      Maricruz Ponce

## 2020-09-23 NOTE — OP NOTE
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 POSTOPERATIVE DIAGNOSES:  Multilevel arterial occlusive disease, left  leg with claudication and rest pain. OPERATION PERFORMED:  1. Left femoral replacement with iliofemoral graft (8-mm Dacron). 2.  Left femoral to above knee in-situ bypass graft. SURGEON:  Debi Sellers MD    ANESTHESIA:  General endotracheal.    ESTIMATED BLOOD LOSS:  200 mL. INDICATIONS:  The patient is a 69-year-old active gentleman who  presented to the office complaining of severe claudication of his left  leg, preventing him from working as well as nocturnal rest pain. It was  recommended that he undergo angiography, which demonstrated multilevel  occlusive disease beginning with near occlusion of the common femoral  and proximal deep femoral arteries in association with left SFA  occlusion and reconstitution of the above-knee popliteal with  three-vessel runoff. It was recommend that he undergo treatment of all  disease to facilitate resolution of his limb-threatening ischemia and  elimination of his claudication. He agreed understanding the risks,  benefits and other options. OPERATIVE PROCEDURE:  The patient was brought to the operating room and  placed on the operating table in supine position. After adequate  induction of anesthesia, his left groin and leg were prepped and draped  in a sterile fashion.     An oblique incision was made in the left groin crease overlying the  femoral triangle. Subcutaneous tissue was divided using electrocautery  and crossing veins were divided between ligatures and clips. The  femoral triangle was then opened longitudinally over the severely  diseased common femoral artery, which was hard. Dissection was  continued sharply proximally up to the inguinal ligament, which was  mobilized. Above the inguinal ligament, in the most distal external  iliac artery, there became a roughly soft artery with a good pulse. The  artery was carefully dissected for a distance of 3 cm and controlled  with a vessel loop. The inferior epigastric and lateral iliac circumflex arteries were  controlled with doubly passed vessel loops and eventually ligated and  divided to facilitate replacement of the artery. Dissection was  continued sharply distally onto the superficial femoral artery, which  was occluded. It was controlled with a vessel loop and retracted  medially. Deep to this was identified the deep femoral artery, which  was carefully dissected. A large lateral femoral circumflex branch was  controlled and approximately 1 cm deep to its origin, the artery became  soft. The main trunk was dissected and controlled with a vessel loop as  was a large posterior collateral.    The greater saphenous vein was then identified in this incision and  carefully dissected up to the saphenofemoral junction. The junction was  mobilized dividing side branches between 2-0 silks and clips and the  greater saphenous vein was mobilized as far as possible inferiorly  underneath the inferior flap dividing several branches between 2-0 silks  and clips. Attention was then directed distally to the above-knee popliteal artery. A longitudinal incision was made medial on the distal thigh in the  groove posterior to the anterior sartorius muscle. The greater  saphenous vein was identified in this wound and carefully protected.    Electrocautery was used to further deepen through the fascia and into  the popliteal fossa. The popliteal artery was then identified, as it  exited. The adductor canal was found to be very diseased and hard. It  was noted to be patent on angiography, but irregularly diseased. Further dissection distally into the distal portion of the popliteal  fossa identified the popliteal artery to be relatively soft with some  posterior plaquing. The artery was controlled at this point by  carefully dissecting proximally and distally. Attention was then directed to mobilizing the greater saphenous vein. The knee incision was mobilized throughout its course dividing several  side branches and it was further mobilized in the inferior flap. Further mobilization proximally underneath the superior flap was  performed, and a 3-cm counterincision was made between the two other  incisions to expose the greater saphenous vein and control multiple side  branches. The vein was left in that location. At this point, the patient was given a total of 5000 units of heparin. Clamps were applied to the external iliac artery and the deep femoral  artery with its branches as well. A longitudinal arteriotomy was made  in the common femoral artery and extended up into the external iliac  artery for approximately 1.5 cm. The artery was transected completely  as were the previously ligated inferior epigastric and lateral  circumflex arteries. The arteriotomy was then continued distally into  the deep femoral artery with some difficulty identifying the lumen. The  superficial femoral artery was amputated and suture ligated with a 2-0  silk. The common femoral artery was then amputated off the origin of  the deep femoral artery and limited endarterectomy was performed at the  origin of the deep femoral artery down to approximately the level of the  large femoral circumflex artery. There was good tapering endpoint  requiring no tacking sutures.     An 8-mm Dacron graft was brought on the field and spatulated. A  standard end-to-end spatulated anastomosis was created using two  separate 6-0 Prolenes without difficulty. There was good backbleeding  from the deep femoral artery and was flushed with heparinized saline and  then clamped. The graft was then stretched to appropriate length and  cut in a spatulated fashion. An end-to-end spatulated anastomosis was  then made between the external iliac artery and the graft using a single  running 5-0 Prolene. Prior to completion of this, the arteries were  forward and backbled and flushed with heparinized saline. Anastomosis  was completed and flow was restored. There was noted to be a good pulse  distally and hemostasis was obtained with Surgicel. Attention was then directed to creating the femoral popliteal bypass. The greater saphenous was harvested by dividing the saphenofemoral  junction and oversewing approximately with a running 5-0 Prolene. The  vein was mobilized adequately for to reach the distal portion of the  Dacron graft. At this point, additional 1000 units of heparin was  given. Clamps were applied to the outflow deep femoral artery and its  branches, and a track clamp was applied to the Dacron graft. An ellipse  of Dacron graft was then removed on the medial aspect of the graft. The greater saphenous vein was spatulated and a standard end-to-side  anastomosis was created using a single running 5-0 Prolene. After  completion of this, flow was restored. There was noted to be a good  pulse distally to first competent valve cusp. The greater saphenous  vein was then divided and ligated distally. It was distended with  heparin-papaverine solution and noted to measure at least 3 mm in size. The Alphonso-Incise valvulotome with 3-mm head was used to perform  valvulolysis by inserting it from distal and advancing up to the  anastomosis.   Under pulsatile flow, it was withdrawn several times with  initiation of excellent pulsatile bleeding. The graft was then  heparinized. Doppler was used to assess for the presence of fistulas and there did  not appear to be any fistulas. At this point, additional 1000 units of  heparin were given. The popliteal artery was exposed by placing  retractors. The artery was then clamped proximally and distally, and a  longitudinal arteriotomy measuring 1 cm length was made. This was made  carefully entering the lumen avoiding a large plaque. The graft was  then cut to appropriate length and spatulated, and a standard  end-to-side anastomosis was created using a single running 6-0 Prolene. Prior to completion of this, the graft and arteries were forward and  backbled and flushed with heparinized saline. Anastomosis was completed  and flow was restored. There was noted to be an excellent pulse  distally with good hemostasis. The proximal graft was then punctured with a 21-gauge butterfly and an  angiogram was performed, which demonstrated no fistulas and good runoff  with no technical abnormalities. The needle was removed and the site  was repaired with a 6-0 Prolene. All wounds were then closed in  multiple levels using 3-0 Vicryls and subcuticular stitch of 4-0 Vicryl  with Dermabond and a DRE suction device for the groin. The patient was  then extubated and transferred to the recovery room.         Peyman Santos MD    D: 09/22/2020 15:06:20       T: 09/22/2020 16:25:57     [id number]  Job#: [id number]     Doc#: [id number]    CC:

## 2020-09-23 NOTE — PROGRESS NOTES
Pertinent Hx: 58 y/o male admit 9/22/2020 with PVD.  9/22/2020 S/P L Femoral Endarterectomy, L Fem-Pop Bypass. PMH as noted including CVA, MI, PVD. Family / Caregiver Present: No  Referring Practitioner: Dr. Juwan Arroyo  Diagnosis: PVD S/P L Femoral Endarterectomy, L Fem-Pop Bypass. Follows Commands: Within Functional Limits  Subjective  Subjective: Pt agreeable to PT Eval/Rx. Pain Screening  Patient Currently in Pain: Yes          Orientation  Orientation  Overall Orientation Status: Within Functional Limits  Social/Functional History  Social/Functional History  Lives With: Significant other  Type of Home: House  Home Layout: Two level, Able to Live on Main level with bedroom/bathroom, Laundry in basement(Sign other takes care of laundry.)  Home Access: Stairs to enter with rails(3+2 steps to enter.)  Bathroom Shower/Tub: Tub/Shower unit  Bathroom Toilet: Standard  Bathroom Equipment: (No Bathroom DME.)  Bathroom Accessibility: Accessible  Home Equipment: (Has access to a cane.)  ADL Assistance: Independent  Homemaking Assistance: (Shared with sign other.)  Ambulation Assistance: Independent(Without assist device pta although becoming more painful (LE).)  Transfer Assistance: Independent  Active : Yes  Type of occupation: Works : Remodeling. Additional Comments: Pt reports sign other able to provide assist/support. Cognition   Cognition  Overall Cognitive Status: WFL    Objective     Observation/Palpation  Observation: DRE Drain intact L Groin. R Foot Cool to touch. L  Foot warm, toes slightly darkened. AROM RLE (degrees)  RLE AROM: WFL  AROM LLE (degrees)  LLE AROM : WFL  AROM RUE (degrees)  RUE AROM : WFL  AROM LUE (degrees)  LUE AROM : WFL  Strength RLE  Strength RLE: WFL  Strength LLE  Comment: At least 3+/5. Strength RUE  Strength RUE: WFL  Strength LUE  Strength LUE: WFL        Bed mobility  Supine to Sit: Stand by assistance  Transfers  Sit to Stand: Supervision(With Walker.   Initial cues for

## 2020-09-23 NOTE — PROGRESS NOTES
1900: report & handoff complete w/ Ame Like. Pt resting in bed, calm, A&O X4 & able to follow commands. NSR, unlabored breathing & on RA, SP02 98. Tilley in place w/ adequate yellow UO. Left fem puncture site assessed, no drainage noted & dressing intact. DRE pump /  negative pressure wound therapy is patent w/ green flashing light, will continue to assess. NS infusing @125 via PIV.   2000: shift assessment complete, see flow sheets. Pt denies any  numbness or tingling in extremities. Bilat pedal pulses audible w/ doppler. All extremities warm to touch. L leg is elevated on two pillows w/ ace wrap in place per orders. Pt coached on IS, able to achieve 2500.   2029: PRN percocet given, pt states pain 5/10.  2130: pt states pain is tolerable @ 3/10, will continue to monitor painlevel. 0000: assessment complete, see flow sheets. Scant amount of sanguinous  drainage noted on L fem dressing, no other changes at this time. CHG bath complete, pt able to assist.   0400: assessment complete, no changes. UO remains adequate. Bilat tibial and pedal pulses + via doppler, pt states no tingling or numbness, all extremities warm. 0709:Dre still flashing green light, and dressing status remains unchanged. Report and hand off complete w/ Ame Like.    Electronically signed by Tia Leon RN on 9/23/2020 at 7:10 AM

## 2020-09-23 NOTE — PROGRESS NOTES
Pt admitted to 4133 from CVU. Ambulatory to bed from stretcher with stand by assistance. Dressing on left femoral area clean, dry and intact. DRE pump in place. Alert and oriented X4. Bed alarm on. Pt agreeable to use call light.    Electronically signed by Merna Wilkins RN on 9/23/2020 at 4:09 PM

## 2020-09-23 NOTE — PROGRESS NOTES
Patient called the RN into the room to request social work to be contacted for him. He wanted to know the process and paperwork involved for his job and the time off he will need to recover. Message left for social work on spectra link # [id number].

## 2020-09-23 NOTE — PROGRESS NOTES
Occupational Therapy   Occupational Therapy Initial Assessment  Date: 2020   Patient Name: George Freed  MRN: [id number]     : 1955    Date of Service: 2020    Discharge Recommendations:  Home with assist PRN  OT Equipment Recommendations  Equipment Needed: No  George Freed scored a 21/24 on the AM-[facility] ADL Inpatient form. At this time, no further OT is recommended upon discharge due to pt nearing baseline function and has adequate support at home. Recommend patient returns to prior setting with prior services. Assessment   Performance deficits / Impairments: Decreased functional mobility ; Decreased ADL status  Assessment: Pt is a 58 yo M admitted with PVD, s/p L femoral endarterectomy, L femoral bypass. PMHx includes: CVA, MI, PVD. PTA, pt lives with multiple family members and S/O, reports he was IND with all self-care, fxl mobility without device, working FT. Pt is functioning slightly below his baseline secondary to the above deficits. Pt completed fxl transfers and mobility with supervision in the room, with min cues for safety. Pt completed toileting & grooming with supervision, UB dressing supervision, LB dressing min A. Pt reports he has adequate support from family at home and has no concerns for d/c. Will cont to see pt 1-2 more times on acute to ensure safe transition home. Do not anticipate need for continued skilled therapy at d/c. Prognosis: Good  Decision Making: Medium Complexity  History: see above  Exam: fxl transfers, bed mobility, fxl mobility, ADLs  Assistance / Modification: Supervision/SBA fxl transfers/mobility, supervision for most ADLs, min A for LB dressing  OT Education: OT Role;Plan of Care  REQUIRES OT FOLLOW UP: Yes  Activity Tolerance  Activity Tolerance: Patient Tolerated treatment well  Safety Devices  Safety Devices in place: Yes  Type of devices: Call light within reach; Chair alarm in place; Left in chair;Patient at risk for falls;Gait belt;Nurse Within functional limits    Orientation  Overall Orientation Status: Within Functional Limits  Observation/Palpation  Observation: DRE Drain intact L Groin. R Foot Cool to touch. L  Foot warm, toes slightly darkened. Balance  Sitting Balance: Independent  Standing Balance: Supervision  Functional Mobility  Functional - Mobility Device: No device  Activity: To/from bathroom  Assist Level: Supervision  Functional Mobility Comments: pt completed fxl mobility from bed > commode > sink > bed > recliner without device, min cues for safety  ADL  Grooming: Supervision(washed hands standing at sink)  UE Dressing: Other (Comment); Supervision(OT assisted to doff gown, pt donned tshirt)  LE Dressing: Minimal assistance(assist to doff pants off L LE d/t ace wrap, pt able to don boxers and pants with supervision for balance)  Toileting: Supervision(pt stood over commode to void)  Additional Comments: anticipate independent with feeding, supervision for bathing based on ROM, strength, endurance this session  Tone RUE  RUE Tone: Normotonic  Tone LUE  LUE Tone: Normotonic  Coordination  Movements Are Fluid And Coordinated: Yes     Bed mobility  Supine to Sit: Supervision  Transfers  Sit to stand: Supervision  Stand to sit: Supervision     Cognition  Overall Cognitive Status: WFL                 LUE AROM (degrees)  LUE AROM : WFL  Left Hand AROM (degrees)  Left Hand AROM: WFL  RUE AROM (degrees)  RUE AROM : WFL  Right Hand AROM (degrees)  Right Hand AROM: WFL  LUE Strength  Gross LUE Strength: WFL  RUE Strength  Gross RUE Strength: WFL                   Plan   Plan  Times per week: 3-5  Times per day: Daily  Current Treatment Recommendations: Self-Care / ADL, Balance Training, Functional Mobility Training, Safety Education & Training, Endurance Training, Strengthening, Patient/Caregiver Education & Training      AM-PAC Score        AM-[facility] Inpatient Daily Activity Raw Score: 21 (09/23/20 3184)  AM-PAC Inpatient ADL T-Scale Score Olena Moss  44.27 (09/23/20 1545)  ADL Inpatient CMS 0-100% Score: 32.79 (09/23/20 1545)  ADL Inpatient CMS G-Code Modifier : Orren Mohs (09/23/20 1545)    Goals  Short term goals  Time Frame for Short term goals: 1-2 more times  Short term goal 1: Pt will complete fxl transfers and mobility independently  Short term goal 2: Pt will toilet independently  Short term goal 3: Pt will groom in stance at the sink independently  Short term goal 4: Pt will bathe/dress independently  Long term goals  Time Frame for Long term goals : LTG=STG  Patient Goals   Patient goals : \"to go home\"       Therapy Time   Individual Concurrent Group Co-treatment   Time In 1501         Time Out 1535         Minutes [address], OTR/L [address]

## 2020-09-23 NOTE — PROGRESS NOTES
4 Eyes Skin Assessment     The patient is being assess for  Transfer to New Unit    I agree that 2 RN's have performed a thorough Head to Toe Skin Assessment on the patient. ALL assessment sites listed below have been assessed. Areas assessed by both nurses: yes  [x]   Head, Face, and Ears   [x]   Shoulders, Back, and Chest  [x]   Arms, Elbows, and Hands   [x]   Coccyx, Sacrum, and IschIum  [x]   Legs, Feet, and Heels        Does the Patient have Skin Breakdown?   No         Thomas Prevention initiated:  No   Wound Care Orders initiated:  No      [facility] nurse consulted for Pressure Injury (Stage 3,4, Unstageable, DTI, NWPT, and Complex wounds), New and Established Ostomies:  No      Nurse 1 eSignature: Electronically signed by Nate Clark RN on 9/23/20 at 4:04 PM EDT    **SHARE this note so that the co-signing nurse is able to place an eSignature**    Nurse 2 eSignature: Electronically signed by Ade Bowden RN on 9/23/20 at 4:05 PM EDT

## 2020-09-23 NOTE — PLAN OF CARE
Problem: Pain:  Goal: Pain level will decrease  Description: Pain level will decrease  Outcome: Ongoing     Problem: Gas Exchange - Impaired:  Goal: Levels of oxygenation will improve  Description: Levels of oxygenation will improve  Outcome: Ongoing  Instruct use of IS to prevent post op pulmonary complications and monitor sp02 levels.       Electronically signed by Annika Salazar RN on 9/23/2020 at 5:58 AM

## 2020-09-24 VITALS
SYSTOLIC BLOOD PRESSURE: 147 MMHG | RESPIRATION RATE: 16 BRPM | HEART RATE: 89 BPM | BODY MASS INDEX: 25.37 KG/M2 | DIASTOLIC BLOOD PRESSURE: 68 MMHG | WEIGHT: 171.3 LBS | OXYGEN SATURATION: 98 % | TEMPERATURE: 98.1 F | HEIGHT: 69 IN

## 2020-09-24 PROCEDURE — 99024 POSTOP FOLLOW-UP VISIT: CPT | Performed by: STUDENT IN AN ORGANIZED HEALTH CARE EDUCATION/TRAINING PROGRAM

## 2020-09-24 PROCEDURE — 6370000000 HC RX 637 (ALT 250 FOR IP): Performed by: SURGERY

## 2020-09-24 PROCEDURE — 2580000003 HC RX 258: Performed by: SURGERY

## 2020-09-24 RX ORDER — OXYCODONE HYDROCHLORIDE AND ACETAMINOPHEN 5; 325 MG/1; MG/1
1 TABLET ORAL EVERY 4 HOURS PRN
Qty: 20 TABLET | Refills: 0 | Status: SHIPPED | OUTPATIENT
Start: 2020-09-24 | End: 2020-09-30 | Stop reason: SDUPTHER

## 2020-09-24 RX ADMIN — LISINOPRIL 20 MG: 10 TABLET ORAL at 07:59

## 2020-09-24 RX ADMIN — OXYCODONE HYDROCHLORIDE AND ACETAMINOPHEN 2 TABLET: 5; 325 TABLET ORAL at 07:59

## 2020-09-24 RX ADMIN — ASPIRIN 81 MG: 81 TABLET, CHEWABLE ORAL at 07:59

## 2020-09-24 RX ADMIN — SODIUM CHLORIDE, PRESERVATIVE FREE 10 ML: 5 INJECTION INTRAVENOUS at 08:00

## 2020-09-24 RX ADMIN — AMLODIPINE BESYLATE 10 MG: 5 TABLET ORAL at 07:59

## 2020-09-24 ASSESSMENT — PAIN DESCRIPTION - FREQUENCY: FREQUENCY: CONTINUOUS

## 2020-09-24 ASSESSMENT — PAIN DESCRIPTION - PROGRESSION
CLINICAL_PROGRESSION: GRADUALLY IMPROVING
CLINICAL_PROGRESSION: GRADUALLY IMPROVING

## 2020-09-24 ASSESSMENT — ENCOUNTER SYMPTOMS
NAUSEA: 0
VOMITING: 0

## 2020-09-24 ASSESSMENT — PAIN DESCRIPTION - ORIENTATION: ORIENTATION: LEFT

## 2020-09-24 ASSESSMENT — PAIN DESCRIPTION - PAIN TYPE: TYPE: SURGICAL PAIN

## 2020-09-24 ASSESSMENT — PAIN SCALES - GENERAL
PAINLEVEL_OUTOF10: 7
PAINLEVEL_OUTOF10: 0

## 2020-09-24 ASSESSMENT — PAIN SCALES - WONG BAKER: WONGBAKER_NUMERICALRESPONSE: 0

## 2020-09-24 ASSESSMENT — PAIN DESCRIPTION - LOCATION: LOCATION: LEG

## 2020-09-24 ASSESSMENT — PAIN DESCRIPTION - DESCRIPTORS: DESCRIPTORS: ACHING

## 2020-09-24 NOTE — PROGRESS NOTES
[facility] ([facility]) Vascular Surgery Progress Note      2 Days Post-Op: left femoral replacement, fem-pop bypass with GSV, nonreversed    Bakarifrancisco Beebe is a 59 y.o. male patient. Subjective:   Diet: Adequate intake. Patient reports no nausea or vomiting. Activity level: Returning to normal.    Pain control: Well controlled. 1. Pre-op testing    2.  Peripheral vascular disease ([address]      Past Medical History:   Diagnosis Date    Cerebral artery occlusion with cerebral infarction [facility])     Patient said he has no residual effects    Heart attack ([address]     3 yr ago    Hx of blood clots     right side of neck    Hyperlipidemia     Hypertension      Past Surgical History Pertinent Negatives:   Procedure Date Noted    APPENDECTOMY 06/22/2018   Olinda Langton BRAIN SURGERY 06/22/2018    CARDIAC SURGERY 06/22/2018    CARDIAC VALVE REPLACEMENT 06/22/2018    CHOLECYSTECTOMY 06/22/2018    CORONARY ARTERY BYPASS GRAFT 06/22/2018    COSMETIC SURGERY 06/22/2018    EYE SURGERY 06/22/2018    FRACTURE SURGERY 06/22/2018    HERNIA REPAIR 06/22/2018    JOINT REPLACEMENT 06/22/2018    KNEE ARTHROPLASTY 06/22/2018    PROSTATE SURGERY 06/22/2018    PTCA 06/22/2018    SMALL INTESTINE SURGERY 06/22/2018    SUBTOTAL COLECTOMY 06/22/2018    TOTAL COLECTOMY 06/22/2018    TOTAL HIP ARTHROPLASTY 06/22/2018    UPPER GASTROINTESTINAL ENDOSCOPY 06/22/2018     Scheduled Meds:   amLODIPine  10 mg Oral Daily    aspirin  81 mg Oral Daily    atorvastatin  80 mg Oral Nightly    lisinopril  20 mg Oral Daily    sodium chloride flush  10 mL Intravenous 2 times per day     Continuous Infusions:  PRN Meds:sodium chloride flush, acetaminophen, promethazine **OR** ondansetron, oxyCODONE-acetaminophen **OR** oxyCODONE-acetaminophen, morphine **OR** morphine    Active Problems:    Peripheral vascular disease, unspecified (HCC)    Atherosclerosis of artery of extremity with rest pain ([address]  Resolved Problems:    * No resolved hospital problems. *    Blood pressure 128/73, pulse 82, temperature 98.4 °F (36.9 °C), temperature source Oral, resp. rate 18, height 5' 9\" (1.753 m), weight 171 lb 4.8 oz (77.7 kg), SpO2 98 %. Objective:  Vital signs (most recent): Blood pressure 128/73, pulse 82, temperature 98.4 °F (36.9 °C), temperature source Oral, resp. rate 18, height 5' 9\" (1.753 m), weight 171 lb 4.8 oz (77.7 kg), SpO2 98 %. General appearance: Comfortable and in no acute distress. Lungs:  Normal respiratory rate and normal effort. Heart: Normal rate. Abdomen: Abdomen is soft. Wound:  Clean. There is no drainage. Extremities: There is normal range of motion. Neurological: The patient is alert. Pupils are equal, round, and reactive to light.       Pulse exam: dopp DP/PT/graft    Assessment & Plan    Doing well  Has been able to ambulate without assistance  Cleared from therapy to go home  Will discharge later this morning and follow up with Dr. Marika Lockwood next week      Hazel Rodriguez DO, [address] [facility] Vascular and Endovascular Surgery

## 2020-09-24 NOTE — PROGRESS NOTES
Discharge instruction went over with pt, all questions answered. IVs flushed and discontinued, no complications. New medications and side effects went over with pt, stated understanding. Pt waiting on transportation. Friend to transport home. Pt agreeable to follow up with Dr Ham Lopez in one week.  Electronically signed by Clayton Obando RN on 9/24/2020 at 9:57 AM

## 2020-09-24 NOTE — DISCHARGE SUMMARY
VASCULAR DISCHARGE SUMMARY            Patient ID:  Sandra Arredondo  [id number] 59 y.o. 1955      Admission Date:  9/22/2020  5:40 AM  Discharge Date:      Principle Diagnosis:  <principal problem not specified>    Secondary Diagnosis:  Active Problems:    Peripheral vascular disease, unspecified ([facility]    Atherosclerosis of artery of extremity with rest pain ([facility]  Resolved Problems:    * No resolved hospital problems. *      Consults:  None    Procedure:  Procedure(s):  LEFT FEMORAL ENDARTERECTOMY, LEFT FEMORAL TO POPLITEAL BYPASS GRAFT [unfilled]    History:  The patient is a 59 y.o. male    Hospital Course: The patient underwent Procedure(s):  LEFT FEMORAL ENDARTERECTOMY, LEFT FEMORAL TO POPLITEAL BYPASS GRAFT. The procedure was uncomplicated. The patient has done remarkably well. He has ambulated independently and has worked with PT/OT who have deemed the patient safe to be discharged to home. He was instructed to keep the DRE dressing on until follow up. He was instructed to follow up with Dr. Marika Lockwood next week. He had no questions. Pain medication was sent to his pharmacy if needed. Disposition:  Home    Condition: good    Patient Instructions:   Lu[place] Limb   Home Medication Instructions KATI:[id number]    Printed on:09/24/20 0908   Medication Information                      amLODIPine (NORVASC) 10 MG tablet  Take 1 tablet by mouth daily             aspirin 81 MG tablet  Take 1 tablet by mouth daily             atorvastatin (LIPITOR) 80 MG tablet  Take 1 tablet by mouth daily             lisinopril (PRINIVIL;ZESTRIL) 20 MG tablet  Take 1 tablet by mouth daily             oxyCODONE-acetaminophen (PERCOCET) 5-325 MG per tablet  Take 1 tablet by mouth every 4 hours as needed for Pain for up to 5 days.                Activity:  activity as tolerated, no driving for 2 weeks and no heavy lifting for 1 weeks  Diet:  regular diet  Wound Care:  keep wound clean and dry, reinforce dressing PRN and keep dressing over left groin    Follow up with Dr. Corrine Serrano in 1 week. Please call the office to make an appointment.       Signed:  Patrick Levin DO, [facility], 9/24/2020, 9:08 AM

## 2020-09-28 ENCOUNTER — TELEPHONE (OUTPATIENT)
Dept: PRIMARY CARE CLINIC | Age: 65
End: 2020-09-28

## 2020-09-30 ENCOUNTER — OFFICE VISIT (OUTPATIENT)
Dept: VASCULAR SURGERY | Age: 65
End: 2020-09-30
Payer: MEDICAID

## 2020-09-30 VITALS
WEIGHT: 177 LBS | HEIGHT: 69 IN | SYSTOLIC BLOOD PRESSURE: 100 MMHG | DIASTOLIC BLOOD PRESSURE: 58 MMHG | BODY MASS INDEX: 26.22 KG/M2

## 2020-09-30 PROCEDURE — 29580 STRAPPING UNNA BOOT: CPT | Performed by: SURGERY

## 2020-09-30 PROCEDURE — 99024 POSTOP FOLLOW-UP VISIT: CPT | Performed by: SURGERY

## 2020-09-30 RX ORDER — DOXYCYCLINE HYCLATE 100 MG/1
100 CAPSULE ORAL 2 TIMES DAILY
Qty: 20 CAPSULE | Refills: 0 | Status: SHIPPED | OUTPATIENT
Start: 2020-09-30 | End: 2020-10-10

## 2020-09-30 RX ORDER — OXYCODONE HYDROCHLORIDE AND ACETAMINOPHEN 5; 325 MG/1; MG/1
2 TABLET ORAL EVERY 6 HOURS PRN
Qty: 30 TABLET | Refills: 0 | Status: SHIPPED | OUTPATIENT
Start: 2020-09-30 | End: 2020-10-14

## 2020-09-30 NOTE — PROGRESS NOTES
First OV S/P L femoral replacement with L fem-AKpop bypass for rest pain and claudication on 9/22/2020. Foot feels better but over last 2 days has developed severe swelling as he has increased his activity. No fever or chills. No wound drainage. Remains off work. EXAM  L leg with pitting edema from groin to toes    Incisions intact with groin firmness only    Palpable graft pulse - unable to feel pedal pulse but excellent doppler signals   L HEATHER 1+    A/P: S/P L fem replacement + fem-pop. Patent. Severe postop reperfusion edema   Will prophylaxis for possible cellulitis - doxycycline 100 BID   Unna boot and ace wrap. F/U one week. Call for any incisional problems.

## 2020-10-07 ENCOUNTER — OFFICE VISIT (OUTPATIENT)
Dept: VASCULAR SURGERY | Age: 65
End: 2020-10-07

## 2020-10-07 VITALS — TEMPERATURE: 97.2 F | WEIGHT: 177 LBS | BODY MASS INDEX: 26.22 KG/M2 | HEIGHT: 69 IN

## 2020-10-07 PROCEDURE — 99024 POSTOP FOLLOW-UP VISIT: CPT | Performed by: SURGERY

## 2020-10-07 NOTE — PROGRESS NOTES
Second OV S/P L femoral replacement with L fem-AKpop bypass for rest pain and claudication on 9/22/2020. Foot feels better but profound swelling 10 days after surgery. Tolerated Unna boot this past week but failed to ace wrap onto thigh. Overall leg feels better and foot feels great. No fever or chills. No wound drainage. Remains off work. EXAM  L leg with pitting edema from groin to toes - calf 1+, thigh 2-3+ and doughy    Incisions intact with groin firmness only    Palpable graft pulse - unable to feel pedal pulse but excellent doppler signals    A/P: S/P L fem replacement + fem-pop. Patent. Severe postop reperfusion edema - likely related to extensive groin and popliteal dissections. Complete doxycycline 100 BID   Stocking today 20/30 [address] with thigh ace wrap. F/U one week. Will check venous duplex to r/o DVT however unlikely.

## 2020-10-14 ENCOUNTER — PROCEDURE VISIT (OUTPATIENT)
Dept: SURGERY | Age: 65
End: 2020-10-14
Payer: MEDICARE

## 2020-10-14 ENCOUNTER — OFFICE VISIT (OUTPATIENT)
Dept: VASCULAR SURGERY | Age: 65
End: 2020-10-14

## 2020-10-14 VITALS
BODY MASS INDEX: 26.22 KG/M2 | WEIGHT: 177 LBS | SYSTOLIC BLOOD PRESSURE: 110 MMHG | DIASTOLIC BLOOD PRESSURE: 62 MMHG | HEIGHT: 69 IN

## 2020-10-14 DIAGNOSIS — R60.0 LEG EDEMA, LEFT: Primary | ICD-10-CM

## 2020-10-14 PROCEDURE — 93971 EXTREMITY STUDY: CPT | Performed by: SURGERY

## 2020-10-14 PROCEDURE — 99024 POSTOP FOLLOW-UP VISIT: CPT | Performed by: SURGERY

## 2020-10-14 RX ORDER — AMOXICILLIN AND CLAVULANATE POTASSIUM 875; 125 MG/1; MG/1
1 TABLET, FILM COATED ORAL 2 TIMES DAILY
Qty: 20 TABLET | Refills: 0 | Status: SHIPPED | OUTPATIENT
Start: 2020-10-14 | End: 2020-10-24

## 2020-10-14 NOTE — PROGRESS NOTES
Third OV S/P L femoral replacement with L fem-AKpop bypass for rest pain and claudication on 9/22/2020. Foot feels better - no rest pain but feels colder than R foot. Wearing KH stocking with thigh ace wrap overlapping. Reports tender red area on upper thigh without groin involvement at GSV harvest site. No fever or chills. No wound drainage. Remains off work. EXAM  L leg with decreased pitting edema from groin to toes - calf/foot 1+; thigh minimal    Incisions intact with groin firmness only    Red and tender mid thigh without expressed drainage of pus. Palpable graft pulse - unable to feel pedal pulse but excellent doppler signals     Negative DVT. Graft patent    A/P: S/P L fem replacement + fem-pop. Patent. Severe postop reperfusion edema - resolving   Change to Augmentin po. Continue stocking 20/30 KH with thigh ace wrap. F/U one week.

## 2020-10-21 ENCOUNTER — OFFICE VISIT (OUTPATIENT)
Dept: VASCULAR SURGERY | Age: 65
End: 2020-10-21

## 2020-10-21 VITALS
HEIGHT: 69 IN | SYSTOLIC BLOOD PRESSURE: 122 MMHG | BODY MASS INDEX: 26.36 KG/M2 | DIASTOLIC BLOOD PRESSURE: 62 MMHG | WEIGHT: 178 LBS | TEMPERATURE: 96.6 F

## 2020-10-21 PROCEDURE — 99024 POSTOP FOLLOW-UP VISIT: CPT | Performed by: SURGERY

## 2020-10-21 NOTE — PROGRESS NOTES
Fourth OV S/P L femoral replacement with L fem-AKpop bypass for rest pain and claudication on 9/22/2020. Foot feels better this week than last.  Wearing KH stocking with thigh ace wrap overlapping. Reports that tender red area on upper thigh drained on its own - clear liquid: changing dressing 1-2 times a day. No fever or chills. Remains off work. EXAM  L leg with decreased pitting edema from groin to toes - calf/foot 1+; thigh minimal. Stocking in place    Incisions intact with groin firmness only    Pinpoint opening L inner thigh - probed to minimal depth without pus - some serous drainage    Palpable graft pulse - unable to feel pedal pulse but excellent doppler signals    A/P: S/P L fem replacement + fem-pop. Patent. Severe postop reperfusion edema - resolving   Finish Augmentin po. Continue stocking 20/30 KH with thigh ace wrap. Dry gauze over draining wound - change daily and prn - expect it to heal.   May shower - no tub bathes. F/U one week.

## 2020-11-04 ENCOUNTER — OFFICE VISIT (OUTPATIENT)
Dept: VASCULAR SURGERY | Age: 65
End: 2020-11-04

## 2020-11-04 VITALS — HEIGHT: 69 IN | BODY MASS INDEX: 27.11 KG/M2 | TEMPERATURE: 97.7 F | WEIGHT: 183 LBS

## 2020-11-04 PROCEDURE — 99024 POSTOP FOLLOW-UP VISIT: CPT | Performed by: SURGERY

## 2020-11-04 NOTE — PROGRESS NOTES
[facility])   Vascular Surgery Followup    Referring Provider:  Krishna Perez MD     Chief Complaint   Patient presents with    Post-Op Check        History of Present Illness:  49-year-old male here today for a postoperative visit following left femoral replacement. He has struggled with postoperative swelling. He states that he is compliant with compression stockings. He has been back to work and states that he is wearing his knee-high support stocking and then Ace wrapping his leg above the knee to his mid thigh. He states when he gets home. He removes the stocking. Lizbet Foot states the discomfort in his leg. It is completely resolved. He is able to work without pain. Past Medical History:   has a past medical history of Cerebral artery occlusion with cerebral infarction ([address] Heart attack ([address] Hx of blood clots, Hyperlipidemia, and Hypertension. Surgical History:   has a past surgical history that includes Arterial clot surgery (Right, 2016) and femoral bypass (Left, 9/22/2020). Social History:   reports that he quit smoking about 2 months ago. His smoking use included cigarettes. He has a 20.00 pack-year smoking history. He has never used smokeless tobacco. He reports current alcohol use of about 12.0 standard drinks of alcohol per week. He reports current drug use. Drug: Marijuana. Family History:  family history includes Diabetes in his mother; Heart Attack in his mother; Heart Disease in his mother. Home Medications:  Current Outpatient Medications   Medication Sig Dispense Refill    lisinopril (PRINIVIL;ZESTRIL) 20 MG tablet Take 1 tablet by mouth daily 90 tablet 2    atorvastatin (LIPITOR) 80 MG tablet Take 1 tablet by mouth daily 90 tablet 2    aspirin 81 MG tablet Take 1 tablet by mouth daily 90 tablet 2    amLODIPine (NORVASC) 10 MG tablet Take 1 tablet by mouth daily 90 tablet 2     No current facility-administered medications for this visit. Allergies:  Patient has no known allergies. Review of Systems:   · Constitutional: there has been no unanticipated weight loss. There's been no change in energy level, sleep pattern, or activity level. · Eyes: No visual changes or diplopia. No scleral icterus. · ENT: No Headaches, hearing loss or vertigo. No mouth sores or sore throat. · Cardiovascular: Reviewed in HPI  · Respiratory: No cough or wheezing, no sputum production. No hematemesis. · Gastrointestinal: No abdominal pain, appetite loss, blood in stools. No change in bowel or bladder habits. · Genitourinary: No dysuria, trouble voiding, or hematuria. · Musculoskeletal:  No gait disturbance, weakness or joint complaints. · Integumentary: No rash or pruritis. · Neurological: No headache, diplopia, change in muscle strength, numbness or tingling. No change in gait, balance, coordination, mood, affect, memory, mentation, behavior. · Psychiatric: No anxiety, no depression. · Endocrine: No malaise, fatigue or temperature intolerance. No excessive thirst, fluid intake, or urination. No tremor. · Hematologic/Lymphatic: No abnormal bruising or bleeding, blood clots or swollen lymph nodes. · Allergic/Immunologic: No nasal congestion or hives. Physical Examination:    Vitals:    11/04/20 0828   Temp: 97.7 °F (36.5 °C)          General appearance: alert, appears stated age, cooperative and no distress  Is a very small superficial opening on the left inner thigh. No drainage noted. 2+ edema is noted. Left groin incision well approximated. Palpable distal pulse.     Assessment:     Patient Active Problem List   Diagnosis    Other hyperlipidemia    Essential hypertension    Smoker    Rash and nonspecific skin eruption    Cellulitis and abscess of toe of left foot    Peripheral arterial disease ([address]    Claudication (HCC)    Atherosclerosis of native arteries of extremities with rest pain, left leg ([address]    Peripheral vascular disease, unspecified ([facility]    Atherosclerosis of artery of extremity with rest pain ([facility]       Plan:  Stressed importance of compliance with compression. Gave him a prescription for thigh-high compression stocking today. Stressed that he should wear this all waking hours. Leg elevation as able. Follow-up in 2 weeks    Thank you for allowing me to participate in the care of this individual.  Please do not hesitate to contact me with any questions. Rudi Gray M.D., FACS.   11/4/2020  8:31 AM

## 2020-11-18 ENCOUNTER — OFFICE VISIT (OUTPATIENT)
Dept: VASCULAR SURGERY | Age: 65
End: 2020-11-18

## 2020-11-18 VITALS — WEIGHT: 183 LBS | BODY MASS INDEX: 27.11 KG/M2 | HEIGHT: 69 IN

## 2020-11-18 PROCEDURE — 99024 POSTOP FOLLOW-UP VISIT: CPT | Performed by: SURGERY

## 2020-11-18 NOTE — PROGRESS NOTES
OV S/P L femoral replacement with L fem-AKpop bypass for rest pain and claudication on 9/22/2020. Foot feels good. Working full time again. Edema reduced in AM when he wakes up. Wearing KH stocking with thigh ace wrap overlapping. EXAM  L leg with decreased pitting edema from groin to toes - calf/foot 1+; thigh minimal.     Incisions intact with groin firmness only    Thigh healed    Palpable graft pulse - unable to feel pedal pulse but excellent doppler signals    Foot well perfused    A/P: S/P L fem replacement + fem-pop. Patent. Severe postop reperfusion edema - resolving   Begin TH 20/30 compression stocking daily. Fitting as early in the AM as possible. .    F/U 4 weeks with first GSS.

## 2020-12-22 DIAGNOSIS — I70.229 ATHEROSCLEROSIS OF ARTERY OF EXTREMITY WITH REST PAIN (HCC): Primary | ICD-10-CM

## 2021-01-13 ENCOUNTER — OFFICE VISIT (OUTPATIENT)
Dept: VASCULAR SURGERY | Age: 66
End: 2021-01-13
Payer: MEDICARE

## 2021-01-13 ENCOUNTER — PROCEDURE VISIT (OUTPATIENT)
Dept: SURGERY | Age: 66
End: 2021-01-13
Payer: MEDICARE

## 2021-01-13 VITALS
SYSTOLIC BLOOD PRESSURE: 142 MMHG | DIASTOLIC BLOOD PRESSURE: 60 MMHG | BODY MASS INDEX: 26.48 KG/M2 | HEIGHT: 69 IN | WEIGHT: 178.8 LBS

## 2021-01-13 DIAGNOSIS — I73.9 PVD (PERIPHERAL VASCULAR DISEASE) WITH CLAUDICATION (HCC): Primary | ICD-10-CM

## 2021-01-13 DIAGNOSIS — Z48.812 ENCOUNTER FOR POSTOPERATIVE SURVEILLANCE OF VASCULAR BYPASS SURGERY: Primary | ICD-10-CM

## 2021-01-13 PROCEDURE — G8484 FLU IMMUNIZE NO ADMIN: HCPCS | Performed by: SURGERY

## 2021-01-13 PROCEDURE — G8427 DOCREV CUR MEDS BY ELIG CLIN: HCPCS | Performed by: SURGERY

## 2021-01-13 PROCEDURE — 4040F PNEUMOC VAC/ADMIN/RCVD: CPT | Performed by: SURGERY

## 2021-01-13 PROCEDURE — 99212 OFFICE O/P EST SF 10 MIN: CPT | Performed by: SURGERY

## 2021-01-13 PROCEDURE — 1036F TOBACCO NON-USER: CPT | Performed by: SURGERY

## 2021-01-13 PROCEDURE — 93926 LOWER EXTREMITY STUDY: CPT | Performed by: SURGERY

## 2021-01-13 PROCEDURE — 1123F ACP DISCUSS/DSCN MKR DOCD: CPT | Performed by: SURGERY

## 2021-01-13 PROCEDURE — 3017F COLORECTAL CA SCREEN DOC REV: CPT | Performed by: SURGERY

## 2021-01-13 PROCEDURE — G8417 CALC BMI ABV UP PARAM F/U: HCPCS | Performed by: SURGERY

## 2021-01-13 NOTE — Clinical Note
Jamilah Ríos saw Mr. Annabella Arias again the office today 4 months since he underwent left leg bypass for revascularization and salvage. Is doing quite well with a widely patent bypass clinically and on surveillance scan. We will continue to see him long-term as part of our surveillance program and I will keep you appraised of our long-term results. If you have any questions please feel free to contact me. Also me nothing help with any other patients in the future.     Kirk Hart

## 2021-02-11 ENCOUNTER — OFFICE VISIT (OUTPATIENT)
Dept: PRIMARY CARE CLINIC | Age: 66
End: 2021-02-11
Payer: MEDICARE

## 2021-02-11 ENCOUNTER — NURSE TRIAGE (OUTPATIENT)
Dept: OTHER | Facility: CLINIC | Age: 66
End: 2021-02-11

## 2021-02-11 VITALS
DIASTOLIC BLOOD PRESSURE: 69 MMHG | SYSTOLIC BLOOD PRESSURE: 119 MMHG | BODY MASS INDEX: 27.11 KG/M2 | OXYGEN SATURATION: 100 % | TEMPERATURE: 98.4 F | HEART RATE: 88 BPM | HEIGHT: 69 IN | WEIGHT: 183 LBS

## 2021-02-11 DIAGNOSIS — L30.2 ID REACTION: ICD-10-CM

## 2021-02-11 DIAGNOSIS — T78.3XXD ANGIOEDEMA, SUBSEQUENT ENCOUNTER: ICD-10-CM

## 2021-02-11 DIAGNOSIS — L28.0 LICHEN SIMPLEX CHRONICUS: Primary | ICD-10-CM

## 2021-02-11 PROCEDURE — 99214 OFFICE O/P EST MOD 30 MIN: CPT | Performed by: INTERNAL MEDICINE

## 2021-02-11 PROCEDURE — G8427 DOCREV CUR MEDS BY ELIG CLIN: HCPCS | Performed by: INTERNAL MEDICINE

## 2021-02-11 PROCEDURE — G8484 FLU IMMUNIZE NO ADMIN: HCPCS | Performed by: INTERNAL MEDICINE

## 2021-02-11 PROCEDURE — 1036F TOBACCO NON-USER: CPT | Performed by: INTERNAL MEDICINE

## 2021-02-11 PROCEDURE — 3017F COLORECTAL CA SCREEN DOC REV: CPT | Performed by: INTERNAL MEDICINE

## 2021-02-11 PROCEDURE — 4040F PNEUMOC VAC/ADMIN/RCVD: CPT | Performed by: INTERNAL MEDICINE

## 2021-02-11 PROCEDURE — 1123F ACP DISCUSS/DSCN MKR DOCD: CPT | Performed by: INTERNAL MEDICINE

## 2021-02-11 PROCEDURE — G8417 CALC BMI ABV UP PARAM F/U: HCPCS | Performed by: INTERNAL MEDICINE

## 2021-02-11 RX ORDER — HYDROXYZINE 50 MG/1
TABLET, FILM COATED ORAL
Qty: 30 TABLET | Refills: 1 | Status: SHIPPED
Start: 2021-02-11 | End: 2021-06-21 | Stop reason: DRUGHIGH

## 2021-02-11 RX ORDER — FLUOCINONIDE 0.5 MG/G
OINTMENT TOPICAL
Qty: 60 G | Refills: 1 | Status: SHIPPED | OUTPATIENT
Start: 2021-02-11 | End: 2021-02-18

## 2021-02-11 RX ORDER — METHYLPREDNISOLONE 4 MG/1
4 TABLET ORAL SEE ADMIN INSTRUCTIONS
COMMUNITY
End: 2021-06-21

## 2021-02-11 RX ORDER — FAMOTIDINE 20 MG/1
20 TABLET, FILM COATED ORAL 2 TIMES DAILY
COMMUNITY
End: 2021-06-21

## 2021-02-11 RX ORDER — DIPHENHYDRAMINE HCL 25 MG
25 TABLET ORAL EVERY 6 HOURS PRN
COMMUNITY
End: 2021-03-01

## 2021-02-11 ASSESSMENT — PATIENT HEALTH QUESTIONNAIRE - PHQ9: SUM OF ALL RESPONSES TO PHQ QUESTIONS 1-9: 0

## 2021-02-11 NOTE — PATIENT INSTRUCTIONS
HOLD lisinopril  Referral dermatology  Dove unscented, showers not too hot, dry gently. Cerave moisturizing cream applied to chest arms not face, mixed w lidex cream. Cover bordering normal skin as well. Switch benadryl to hydroxyzine at night to help sleep  BRING home bp cuff in so we can show you how to use it.  Nurse only visit, BP check too at that time

## 2021-02-11 NOTE — TELEPHONE ENCOUNTER
Patient called [facility])  with red flag complaint. Brief description of triage: No triage - pt was seen in ER yesterday d/t facial swelling/rash. States has improved and needs ER follow-up appt. Care advice provided, patient verbalizes understanding; denies any other questions or concerns; instructed to call back for any new or worsening symptoms. Writer provided warm transfer to [facility] for appointment scheduling. Attention Provider: Thank you for allowing me to participate in the care of your patient. The patient was connected to triage in response to information provided to the ECC. Please do not respond through this encounter as the response is not directed to a shared pool. Reason for Disposition  Ld Re Caller has already spoken with another triager or PCP (or office), and has further questions and triager able to answer questions.     Protocols used: NO CONTACT OR DUPLICATE CONTACT CALL-ADULT-[place]

## 2021-02-11 NOTE — PROGRESS NOTES
2021     Arlen Leong (:  1955) is a 72 y.o. male, here for evaluation of the following medical concerns:    Chief Complaint   Patient presents with    Follow-Up from [address] [place] [address]   ED follow-up    HPI  80-year-old male withGERD, peripheral arterial disease status post left fem AKA pop bypass 2020, stroke status post right CEA , ongoing nicotine and marijuana, but not diabetes presented to the ER on February 10 for stated 2-day history of right arm hives followed by bilateral periorbital edema, refractory to outpatient Benadryl, found have bilateral periorbital edema without erythema, otherwise unremarkable exam.  The rash on the right arm had resolved according to the ED note (but see below), other than some secondary excoriation at the flexor crease. He was given 125 mg Solu-Medrol, 50 mg diphenhydramine, 20 mg famotidine and dismissed home on 50 mg diphenhydramine every 6 hours for 3 days, Pepcid 20 mg twice daily, Medrol Dosepak. Says that the right greater than left periorbital swelling has improved considerably, no stridor difficulty with speech or swallowing or dyspnea. Historically the patient is prescribed lisinopril atorvastatin aspirin and amlodipine. He has been on the lisinopril, atorvastatin, aspirin and amlodipine since at least . He appears to have been on Pletal, but is not currently on this medication. The left lower extremity incisions and reperfusion edema have been improving since surgery in September according to the vascular surgeon notes. Seen by rheumatology in 2018 for itchy rash in the forearms for several months, noted to have a hyperpigmented maculopapular rash on the forearms. Though YANELIS mildly elevated at 160, which was felt to be unlikely and patient referred to dermatology (Dr. Liban Cazares), though patient never went.     Review of Systems hydrOXYzine (ATARAX) 50 MG tablet Take instead of benadryl at bedtime. Yes Albin Acevedo MD   lisinopril (PRINIVIL;ZESTRIL) 20 MG tablet Take 1 tablet by mouth daily Yes Octavia Gregorio MD   atorvastatin (LIPITOR) 80 MG tablet Take 1 tablet by mouth daily Yes Octavia Gregorio MD   aspirin 81 MG tablet Take 1 tablet by mouth daily Yes Octavia Gregorio MD   amLODIPine (NORVASC) 10 MG tablet Take 1 tablet by mouth daily  Octavia Gregorio MD        Social History     Tobacco Use    Smoking status: Former Smoker     Packs/day: 1.00     Years: 20.00     Pack years: 20.00     Types: Cigarettes     Quit date: 8/10/2020     Years since quittin.5    Smokeless tobacco: Never Used   Substance Use Topics    Alcohol use: Yes     Alcohol/week: 12.0 standard drinks     Types: 12 Cans of beer per week        Vitals:    21 1443   BP: 119/69   Pulse: 88   Temp: 98.4 °F (36.9 °C)   TempSrc: Oral   SpO2: 100%   Weight: 183 lb (83 kg)   Height: 5' 9\" (1.753 m)     Estimated body mass index is 27.02 kg/m² as calculated from the following:    Height as of this encounter: 5' 9\" (1.753 m). Weight as of this encounter: 183 lb (83 kg). PHYSICAL EXAM  GENERAL:  Pleasant  male who looks his stated age, awake alert and oriented x3, no acute distress. HEENT:Right > Left periorbital edema. Normocephalic atraumatic. Pupils equal round reactive light and accommodation, extra ocular muscles are intact. Oropharynx is clear moist without injection or exudate. Tongue and palate move normally. Turbinates appear normal.  Tympanic membranes appear normal.  NECK:  Supple nontender. No carotid bruits. Brisk carotid upstrokes, no JVD. No thyromegaly. LYMPH:  No supraclavicular cervical axillary or  inguinal lymphadenopathy. LUNGS:  Clear to auscultation bilaterally. Good air entry. No inspiratory crackles or expiratory wheezes. HEART:  Regular rate and rhythm without pathologic murmur rub gallop S3 or S4. ABDOMEN:  Soft, nontender. Normal bowel sounds. No guarding. No masses. SKIN: Diffuse erythematous to violaceous papules, occasionally confluent, on the chest sparing the back and lower extremities measuring 3 to 6 mm in greatest dimension. Chronic right upper extremity flexural  violaceous plaque with some mild skin breakdown without fluctuance or erythema to. PSYCH:  No psychomotor retardation or agitation. Good eye contact. Unrestricted affect range. Mood congruent with affect. Linear thought. ASSESSMENT/PLAN:  1. Lichen simplex chronicus  Differential includes atopic dermatitis with id reaction, urticarial versus chronic dermatitis versus prurigo variant bullous pemphigoid, hematologic malignancy. HCV HIV -2016. No intra-abdominal imaging other than vascular  Liver and kidney disease CBC unremarkable within the past few months. No findings for scabies. We will restart Lidex, have him switch to Anthony Schwab and mix the Lidex with CeraVe moisturizing cream twice daily avoiding the flexural creases, face, hands, groin. Would ideally like to do wet wraps, but that is a limited availability service. We will provide hydroxyzine for itch at night, EKG without QTC prolongation few months ago. - Hina Teixeira MD, Dermatology, [place]  - fluocinonide (LIDEX) 0.05 % ointment; Apply topically 2 times daily for 2 weeks, then taper as directed. DO NOT APPLY to face, neck, groin folds  Dispense: 60 g; Refill: 1  - hydrOXYzine (ATARAX) 50 MG tablet; Take instead of benadryl at bedtime. Dispense: 30 tablet; Refill: 1    2. Angioedema, subsequent encounter  Medication effect possible, amlodipine versus lisinopril culprits. We will have him hold his lisinopril, bring his blood pressure cuff in so he can show him how to use it measure his blood pressure off of lisinopril in a week or so. - C1 ESTERASE INHIBITOR, FUNCTIONAL; Future  - TRYPTASE; Future  - C4 COMPLEMENT; Future  - CBC Auto Differential; Future    3. Id reaction    - Rebecca Qureshi MD, Dermatology, [facility]      It was a pleasure to visit with Mr. Jenn Daley today. Answered all questions as best I could. No follow-ups on file.     Albin Acevedo MD   Time 31 minutes

## 2021-02-12 ENCOUNTER — TELEPHONE (OUTPATIENT)
Dept: PRIMARY CARE CLINIC | Age: 66
End: 2021-02-12

## 2021-02-12 DIAGNOSIS — L28.0 LICHEN SIMPLEX CHRONICUS: Primary | ICD-10-CM

## 2021-02-12 LAB
BASOPHILS ABSOLUTE: 0 K/UL (ref 0–0.2)
BASOPHILS RELATIVE PERCENT: 0.3 %
C4 COMPLEMENT: 27.9 MG/DL (ref 10–40)
EOSINOPHILS ABSOLUTE: 0.5 K/UL (ref 0–0.6)
EOSINOPHILS RELATIVE PERCENT: 3.7 %
HCT VFR BLD CALC: 40.3 % (ref 40.5–52.5)
HEMOGLOBIN: 13.3 G/DL (ref 13.5–17.5)
LYMPHOCYTES ABSOLUTE: 0.7 K/UL (ref 1–5.1)
LYMPHOCYTES RELATIVE PERCENT: 5.1 %
MCH RBC QN AUTO: 31.9 PG (ref 26–34)
MCHC RBC AUTO-ENTMCNC: 33 G/DL (ref 31–36)
MCV RBC AUTO: 96.5 FL (ref 80–100)
MONOCYTES ABSOLUTE: 0.6 K/UL (ref 0–1.3)
MONOCYTES RELATIVE PERCENT: 4.3 %
NEUTROPHILS ABSOLUTE: 11.8 K/UL (ref 1.7–7.7)
NEUTROPHILS RELATIVE PERCENT: 86.6 %
PDW BLD-RTO: 13.5 % (ref 12.4–15.4)
PLATELET # BLD: 211 K/UL (ref 135–450)
PMV BLD AUTO: 10 FL (ref 5–10.5)
RBC # BLD: 4.18 M/UL (ref 4.2–5.9)
WBC # BLD: 13.6 K/UL (ref 4–11)

## 2021-02-12 NOTE — TELEPHONE ENCOUNTER
The dermatologist can't see him till July. Who else can he see.  Please call [facility]win [phone]

## 2021-02-17 DIAGNOSIS — E78.49 OTHER HYPERLIPIDEMIA: ICD-10-CM

## 2021-02-17 DIAGNOSIS — I10 ESSENTIAL HYPERTENSION: ICD-10-CM

## 2021-02-17 LAB — C1 ESTERASE INHIBITOR FUNCTIONAL ASSAY: 103 %

## 2021-02-17 RX ORDER — AMLODIPINE BESYLATE 10 MG/1
TABLET ORAL
Qty: 30 TABLET | Refills: 1 | Status: SHIPPED | OUTPATIENT
Start: 2021-02-17 | End: 2021-04-08

## 2021-02-17 RX ORDER — ATORVASTATIN CALCIUM 80 MG/1
TABLET, FILM COATED ORAL
Qty: 30 TABLET | Refills: 1 | Status: SHIPPED | OUTPATIENT
Start: 2021-02-17 | End: 2021-05-07

## 2021-02-17 RX ORDER — LISINOPRIL 20 MG/1
TABLET ORAL
Qty: 30 TABLET | Refills: 1 | Status: SHIPPED | OUTPATIENT
Start: 2021-02-17 | End: 2021-05-07

## 2021-02-17 NOTE — TELEPHONE ENCOUNTER
Medication:   Requested Prescriptions     Pending Prescriptions Disp Refills    atorvastatin (LIPITOR) 80 MG tablet [Pharmacy Med Name: ATORVASTATIN 80 MG TABLET] 30 tablet 1     Sig: TAKE ONE TABLET BY MOUTH DAILY    amLODIPine (NORVASC) 10 MG tablet [Pharmacy Med Name: amLODIPine BESYLATE 10 MG TAB] 30 tablet 1     Sig: TAKE ONE TABLET BY MOUTH DAILY    lisinopril (PRINIVIL;ZESTRIL) 20 MG tablet [Pharmacy Med Name: LISINOPRIL 20 MG TABLET] 30 tablet 1     Sig: TAKE ONE TABLET BY MOUTH DAILY        Last Filled:      Patient Phone Number: [phone] (home)     Last appt: 2/11/2021   Next appt: Visit date not found    Last OARRS: No flowsheet data found.

## 2021-02-18 LAB — TRYPTASE: 5.5 UG/L

## 2021-03-01 ENCOUNTER — HOSPITAL ENCOUNTER (EMERGENCY)
Age: 66
Discharge: HOME | End: 2021-03-01
Attending: EMERGENCY MEDICINE
Payer: MEDICARE

## 2021-03-01 VITALS
BODY MASS INDEX: 28.77 KG/M2 | WEIGHT: 194.22 LBS | HEART RATE: 79 BPM | RESPIRATION RATE: 17 BRPM | DIASTOLIC BLOOD PRESSURE: 63 MMHG | SYSTOLIC BLOOD PRESSURE: 110 MMHG | TEMPERATURE: 98 F | HEIGHT: 69 IN | OXYGEN SATURATION: 99 %

## 2021-03-01 DIAGNOSIS — L30.9 DERMATITIS: ICD-10-CM

## 2021-03-01 DIAGNOSIS — L03.116 CELLULITIS OF LEFT LEG: Primary | ICD-10-CM

## 2021-03-01 DIAGNOSIS — L30.4 INTERTRIGO: ICD-10-CM

## 2021-03-01 LAB
A/G RATIO: 0.8 (ref 1.1–2.2)
ALBUMIN SERPL-MCNC: 2.9 G/DL (ref 3.4–5)
ALP BLD-CCNC: 70 U/L (ref 40–129)
ALT SERPL-CCNC: 75 U/L (ref 10–40)
ANION GAP SERPL CALCULATED.3IONS-SCNC: 10 MMOL/L (ref 3–16)
AST SERPL-CCNC: 85 U/L (ref 15–37)
BASOPHILS ABSOLUTE: 0 K/UL (ref 0–0.2)
BASOPHILS RELATIVE PERCENT: 0.1 %
BILIRUB SERPL-MCNC: 0.4 MG/DL (ref 0–1)
BUN BLDV-MCNC: 14 MG/DL (ref 7–20)
CALCIUM SERPL-MCNC: 8.6 MG/DL (ref 8.3–10.6)
CHLORIDE BLD-SCNC: 95 MMOL/L (ref 99–110)
CO2: 25 MMOL/L (ref 21–32)
CREAT SERPL-MCNC: 1 MG/DL (ref 0.8–1.3)
EOSINOPHILS ABSOLUTE: 0.5 K/UL (ref 0–0.6)
EOSINOPHILS RELATIVE PERCENT: 5.1 %
GFR AFRICAN AMERICAN: >60
GFR NON-AFRICAN AMERICAN: >60
GLOBULIN: 3.6 G/DL
GLUCOSE BLD-MCNC: 126 MG/DL (ref 70–99)
HCT VFR BLD CALC: 35 % (ref 40.5–52.5)
HEMOGLOBIN: 11.7 G/DL (ref 13.5–17.5)
LACTIC ACID, SEPSIS: 1.6 MMOL/L (ref 0.4–1.9)
LYMPHOCYTES ABSOLUTE: 0.4 K/UL (ref 1–5.1)
LYMPHOCYTES RELATIVE PERCENT: 3.8 %
MCH RBC QN AUTO: 32.3 PG (ref 26–34)
MCHC RBC AUTO-ENTMCNC: 33.5 G/DL (ref 31–36)
MCV RBC AUTO: 96.4 FL (ref 80–100)
MONOCYTES ABSOLUTE: 0.8 K/UL (ref 0–1.3)
MONOCYTES RELATIVE PERCENT: 8.4 %
NEUTROPHILS ABSOLUTE: 8.1 K/UL (ref 1.7–7.7)
NEUTROPHILS RELATIVE PERCENT: 82.6 %
PDW BLD-RTO: 13.5 % (ref 12.4–15.4)
PLATELET # BLD: 181 K/UL (ref 135–450)
PMV BLD AUTO: 9 FL (ref 5–10.5)
POTASSIUM REFLEX MAGNESIUM: 3.6 MMOL/L (ref 3.5–5.1)
PROCALCITONIN: 3.38 NG/ML (ref 0–0.15)
RBC # BLD: 3.64 M/UL (ref 4.2–5.9)
SODIUM BLD-SCNC: 130 MMOL/L (ref 136–145)
TOTAL PROTEIN: 6.5 G/DL (ref 6.4–8.2)
WBC # BLD: 9.8 K/UL (ref 4–11)

## 2021-03-01 PROCEDURE — 6360000002 HC RX W HCPCS: Performed by: PHYSICIAN ASSISTANT

## 2021-03-01 PROCEDURE — 80053 COMPREHEN METABOLIC PANEL: CPT

## 2021-03-01 PROCEDURE — 6360000002 HC RX W HCPCS: Performed by: EMERGENCY MEDICINE

## 2021-03-01 PROCEDURE — 84145 PROCALCITONIN (PCT): CPT

## 2021-03-01 PROCEDURE — 85025 COMPLETE CBC W/AUTO DIFF WBC: CPT

## 2021-03-01 PROCEDURE — 96374 THER/PROPH/DIAG INJ IV PUSH: CPT

## 2021-03-01 PROCEDURE — 99285 EMERGENCY DEPT VISIT HI MDM: CPT

## 2021-03-01 PROCEDURE — 83605 ASSAY OF LACTIC ACID: CPT

## 2021-03-01 PROCEDURE — 36415 COLL VENOUS BLD VENIPUNCTURE: CPT

## 2021-03-01 PROCEDURE — 96375 TX/PRO/DX INJ NEW DRUG ADDON: CPT

## 2021-03-01 RX ORDER — SULFAMETHOXAZOLE AND TRIMETHOPRIM 400; 80 MG/1; MG/1
2 TABLET ORAL 2 TIMES DAILY
Qty: 40 TABLET | Refills: 0 | Status: SHIPPED | OUTPATIENT
Start: 2021-03-01 | End: 2021-03-11

## 2021-03-01 RX ORDER — CEPHALEXIN 500 MG/1
500 CAPSULE ORAL 4 TIMES DAILY
Qty: 40 CAPSULE | Refills: 0 | Status: SHIPPED | OUTPATIENT
Start: 2021-03-01 | End: 2021-06-21

## 2021-03-01 RX ORDER — DIPHENHYDRAMINE HYDROCHLORIDE 50 MG/ML
12.5 INJECTION INTRAMUSCULAR; INTRAVENOUS ONCE
Status: COMPLETED | OUTPATIENT
Start: 2021-03-01 | End: 2021-03-01

## 2021-03-01 RX ORDER — DIPHENHYDRAMINE HCL 25 MG
25 CAPSULE ORAL EVERY 6 HOURS
Qty: 20 CAPSULE | Refills: 0 | Status: SHIPPED | OUTPATIENT
Start: 2021-03-01 | End: 2021-03-06

## 2021-03-01 RX ORDER — METHYLPREDNISOLONE SODIUM SUCCINATE 125 MG/2ML
125 INJECTION, POWDER, LYOPHILIZED, FOR SOLUTION INTRAMUSCULAR; INTRAVENOUS ONCE
Status: COMPLETED | OUTPATIENT
Start: 2021-03-01 | End: 2021-03-01

## 2021-03-01 RX ORDER — PREDNISONE 10 MG/1
40 TABLET ORAL DAILY
Qty: 20 TABLET | Refills: 0 | Status: SHIPPED | OUTPATIENT
Start: 2021-03-01 | End: 2021-03-06

## 2021-03-01 RX ADMIN — METHYLPREDNISOLONE SODIUM SUCCINATE 125 MG: 125 INJECTION, POWDER, FOR SOLUTION INTRAMUSCULAR; INTRAVENOUS at 06:58

## 2021-03-01 RX ADMIN — DIPHENHYDRAMINE HYDROCHLORIDE 12.5 MG: 50 INJECTION, SOLUTION INTRAMUSCULAR; INTRAVENOUS at 06:25

## 2021-03-01 ASSESSMENT — ENCOUNTER SYMPTOMS
SORE THROAT: 0
ABDOMINAL PAIN: 0
NAUSEA: 0
SHORTNESS OF BREATH: 0
VOMITING: 0
BACK PAIN: 0
COLOR CHANGE: 1

## 2021-03-01 NOTE — ED PROVIDER NOTES
[facility] [place] [address] [place], [address] ~ [phone]                Discharge Summary   4/8/2017    Jessica Sanford           Admission Information        Provider Department    4/8/2017 Verta Bumpers, MD  Pre-Op / A Bring a paper prescription for each of these medications    - HYDROcodone-acetaminophen 5-325 MG Tabs              Patient Instructions       Gaitan to gravity  Bacitracin to incision BID  Fluffs  He has a penrose drain-change dressing prn  [place] systems was reviewed and negative. PAST MEDICALHISTORY         Diagnosis Date    Cerebral artery occlusion with cerebral infarction [facility])     Patient said he has no residual effects    Heart attack ([address]     3 yr ago    Hx of blood clots     right side of neck    Hyperlipidemia     Hypertension        SURGICAL HISTORY           Procedure Laterality Date    ARTERIAL CLOT SURGERY Right 2016    FEMORAL BYPASS Left 2020    LEFT FEMORAL ENDARTERECTOMY, LEFT FEMORAL TO POPLITEAL BYPASS GRAFT performed by Emir Yin MD at [address] [facility]       Discharge Medication List as of 3/1/2021  9:21 AM      CONTINUE these medications which have NOT CHANGED    Details   atorvastatin (LIPITOR) 80 MG tablet TAKE ONE TABLET BY MOUTH DAILY, Disp-30 tablet, R-1Normal      amLODIPine (NORVASC) 10 MG tablet TAKE ONE TABLET BY MOUTH DAILY, Disp-30 tablet, R-1Normal      lisinopril (PRINIVIL;ZESTRIL) 20 MG tablet TAKE ONE TABLET BY MOUTH DAILY, Disp-30 tablet, R-1Normal      famotidine (PEPCID) 20 MG tablet Take 20 mg by mouth 2 times dailyHistorical Med      hydrOXYzine (ATARAX) 50 MG tablet Take instead of benadryl at bedtime. , Disp-30 tablet, R-1Normal      aspirin 81 MG tablet Take 1 tablet by mouth daily, Disp-90 tablet, R-2Normal      methylPREDNISolone (MEDROL DOSEPACK) 4 MG tablet Take 4 mg by mouth See Admin Instructions Take by mouth. Historical Med             ALLERGIES     Patient has no known allergies. FAMILY HISTORY           Problem Relation Age of Onset    Diabetes Mother     Heart Attack Mother     Heart Disease Mother      Family Status   Relation Name Status    Mother      Father          SOCIAL HISTORY    reports that he quit smoking about 6 months ago. His smoking use included cigarettes. He has a 20.00 pack-year smoking history. He has never used smokeless tobacco. He reports current alcohol use of about 12.0 standard drinks of alcohol per week.  He reports Cleaning the drainage bag: Rinse bags with warm water and soap every day or two, depending on how dirty and how much odor is present. One teaspoon of vinegar may be used in the rinse water to reduce the odor.     Emptying bags: Hold any bag over the toilet current drug use. Drug: Marijuana. PHYSICAL EXAM    (up to 7 for level 4, 8 or more for level 5)     ED Triage Vitals [03/01/21 0517]   BP Temp Temp Source Pulse Resp SpO2 Height Weight   125/69 98.7 °F (37.1 °C) Oral 98 18 98 % 5' 9\" (1.753 m) 177 lb 4 oz (80.4 kg)       Physical Exam  Vitals signs and nursing note reviewed. Constitutional:       General: He is not in acute distress. Appearance: He is well-developed. HENT:      Head: Normocephalic and atraumatic. Neck:      Musculoskeletal: Neck supple. Cardiovascular:      Rate and Rhythm: Normal rate and regular rhythm. Pulses: Normal pulses. Pulmonary:      Effort: Pulmonary effort is normal. No respiratory distress. Breath sounds: Normal breath sounds. Genitourinary:     Comments: Intertriginous erythema  Musculoskeletal: Normal range of motion. Left lower leg: Edema (2+ with shiny erythema starting at the ankle and involving the lower leg, tenderness of the medial ankle no calf tenderness) present. Skin:     General: Skin is warm and dry. Findings: Rash (diffuse excoriated erythematous maculopapular starting at the flexural surfaces of the elbows bilaterally but also noted diffusely to the trunk) present. Neurological:      Mental Status: He is alert and oriented to person, place, and time. Psychiatric:         Behavior: Behavior normal.            DIAGNOSTIC RESULTS     RADIOLOGY:   Non-plain film images such as CT, Ultrasound and MRI are read by the radiologist.Plain radiographic images are visualized and preliminarily interpreted by Swati Burton PA-C with the below findings:        Interpretation per the Radiologist below, if available at the time of this note:    VL Extremity Venous Left           Tech Comments       Left   No evidence of deep vein or superficial vein thrombosis involving the left   lower extremity and the right common femoral vein.    The left greater saphenous vein was used for arterial bypass. Calf and ankle edema noted. Incidental finding on the left: multiple enlarged lymph nodes with the largest   measuring 2.3 cm by 1.2 cm. LABS:  Labs Reviewed   CBC WITH AUTO DIFFERENTIAL - Abnormal; Notable for the following components:       Result Value    RBC 3.64 (*)     Hemoglobin 11.7 (*)     Hematocrit 35.0 (*)     Neutrophils Absolute 8.1 (*)     Lymphocytes Absolute 0.4 (*)     All other components within normal limits    Narrative:     Performed at:  [address] [facility] [place] [address]   Phone [phone]   COMPREHENSIVE METABOLIC PANEL W/ REFLEX TO MG FOR LOW K - Abnormal; Notable for the following components:    Sodium 130 (*)     Chloride 95 (*)     Glucose 126 (*)     Albumin 2.9 (*)     Albumin/Globulin Ratio 0.8 (*)     ALT 75 (*)     AST 85 (*)     All other components within normal limits    Narrative:     Performed at:  [address] [facility] [place] [address]   Phone [phone]   PROCALCITONIN - Abnormal; Notable for the following components:    Procalcitonin 3.38 (*)     All other components within normal limits    Narrative:     Performed at:  [address] [place] [address]   Phone [phone]   LACTATE, SEPSIS    Narrative:     Performed at:  [address] [place] [address]   Phone [phone]   LACTATE, SEPSIS       All other labs were within normal range or not returned as of this dictation.     EMERGENCY DEPARTMENT COURSE and DIFFERENTIAL DIAGNOSIS/MDM:   Vitals:    Vitals:    03/01/21 0637 03/01/21 0700 03/01/21 0800 03/01/21 0905   BP: 123/64 (!) 112/58 109/60 110/63   Pulse: 78 91 84 79   Resp: 16 15 19 17   Temp: 97.9 °F (36.6 °C) 98 °F (36.7 °C)     TempSrc: Oral      SpO2: 97% 100% 91% 99%   Weight: 194 lb 3.6 oz (88.1 kg)      Height: · If you don't drink water; you could get dehydrated    Alcoholic beverages should be avoided for:  · 24 hours after Anesthesia/Sedation    Call the doctor for:  · Elevated Temperature  · Bleeding  · Nausea/Vomiting  · Pain not relieved with pain medicatio HgbA1C Glucose BUN Creatinine Calcium Alkaline Phosph AST    -- (03/08/17)  121 (H) (03/08/17)  8 (03/08/17)  0.86 (03/08/17)  9.5 -- --      Metabolic Lab Results  (Last result in the past 90 days)    ALT Bilirubin,Total Total Protein Albumin Sodium Pota I saw this patient with Dr. Erica Bautista who spent face-to-face time with the patient and agreed with my assessment and plan. The patient was stable and nontoxic appearing. He is afebrile. Vitals remained stable. His diffuse rash is consistent with eczema exacerbation, he says he has had similar exacerbations over the years. I have recommended petroleum jelly frequently and will prescribe Benadryl and steroids. He got a dose of IV Solumedrol and Benadryl while here. Left lower extremity has swelling, erythema warmth. Intact distal pulses. Differential includes cellulitis vs DVT. Lactic was normal.  Procal is elevated at 3.38 making cellulitis more likely. DVT study was negative for DVT, did show lymphadenopathy, again suggesting cellulitis. He has some mild electrolyte abnormalities. Normal renal function. Normal WBC count. Given reassuring work-up, we feel he is stable for discharge home with further outpatient management. I recommended close follow-up with his PCP within 48 hours. He was prescribed Keflex and Bactrim for cellulitis and then the Benadryl and prednisone for the eczema. Discussed results, diagnosis and plan with patient and/or family. Questions addressed. Dispositionand follow-up agreed upon. Specific discharge instructions explained. The patient and/or family and I have discussed the diagnosis and risks, and we agree with discharging home to follow-up with their primary care,specialist or referral doctor. We also discussed returning to the Emergency Department immediately if new or worsening symptoms occur. We have discussed the symptoms which are most concerning that necessitate immediatereturn. PROCEDURES:  None    FINAL IMPRESSION      1. Cellulitis of left leg    2. Intertrigo    3.  Dermatitis          DISPOSITION/PLAN   DISPOSITION Decision To Discharge 03/01/2021 09:18:25 AM      PATIENT REFERRED TO:  Darrian Min MD  [address] [address]  [phone]    Schedule an appointment as soon as possible for a visit in 2 days      [facility] Emergency Department  [address] [facility]  [phone]    If symptoms worsen      MEDICATIONS:  Discharge Medication List as of 3/1/2021  9:21 AM      START taking these medications    Details   predniSONE (DELTASONE) 10 MG tablet Take 4 tablets by mouth daily for 5 days, Disp-20 tablet, R-0Normal      diphenhydrAMINE (BENADRYL) 25 MG capsule Take 1 capsule by mouth every 6 hours for 20 doses, Disp-20 capsule, R-0Normal      cephALEXin (KEFLEX) 500 MG capsule Take 1 capsule by mouth 4 times daily, Disp-40 capsule, R-0Normal      sulfamethoxazole-trimethoprim (BACTRIM;SEPTRA) 400-80 MG per tablet Take 2 tablets by mouth 2 times daily for 10 days, Disp-40 tablet, R-0Normal             (Please note that portions of this note were completed with a voice recognition program.  Efforts were made toedit the dictations but occasionally words are mis-transcribed.)    ARAVIND Mullins PA-C  03/01/21 1038

## 2021-03-01 NOTE — ED PROVIDER NOTES
[address] [facility]      Pt Name: Scarlet Hutchins  MRN: [id number]  [place] 1955  Date of evaluation: 3/1/2021  Provider: Rigoberto Castro MD    CHIEF COMPLAINT       Chief Complaint   Patient presents with    Allergic Reaction     pt. c/o allergic reaction to unknown substance x 1 week. states skin is itchy anf flakey. HISTORY OF PRESENT ILLNESS   (Location/Symptom, Timing/Onset,Context/Setting, Quality, Duration, Modifying Factors, Severity)  Note limiting factors. Scarlet Hutchins is a 72 y.o. male who presents to the emergency department for evaluation of rash. Patient reports he is had a similar rash in the past, but describes itchy patches to the bilateral upper extremities, which is now spread to his chest and abdomen. He does report groin itching irritation as well. He states that some of the patches on his trunk have become more tender to palpation. He was seen by his primary care provider for this on 2/11/2021 and prescribed hydroxyzine as well as some topical ointments, and was referred to dermatology. PCP thought rash was likely either lichen simplex versus atopic dermatitis. Patient is also noted to have an erythematous, swollen, and tender left lower extremity. He underwent a left femoral-popliteal bypass in September 2020, and states he has had some swelling since then, but the erythema and tenderness is new. He denies any fevers. Denies chest pain or shortness of breath at any point. NursingNotes were reviewed. REVIEW OF SYSTEMS    (2-9 systems for level 4, 10 or more for level 5)       Constitutional: No fever or chills. Respiratory: No cough, No shortness of breath, No sputum production. Cardiovascular: No chest pain. No palpitations. Gastrointestinal: No abdominal pain. No nausea or vomiting  Hematology/Lymphatics: No bleeding or bruising tendency. Immunologic: No malaise.  No swollen glands. Musculoskeletal: No back pain. No joint pain. Left lower extremity pain and swelling. Integumentary: Patchy and itching rash to bilateral upper extremities, chest and abdomen, inguinal area involved as well. . No abrasions. Neurologic: No headache. No focal numbness or weakness. PAST MEDICAL HISTORY     Past Medical History:   Diagnosis Date    Cerebral artery occlusion with cerebral infarction [facility])     Patient said he has no residual effects    Heart attack ([address]     3 yr ago    Hx of blood clots     right side of neck    Hyperlipidemia     Hypertension          SURGICALHISTORY       Past Surgical History:   Procedure Laterality Date    ARTERIAL CLOT SURGERY Right 2016    FEMORAL BYPASS Left 9/22/2020    LEFT FEMORAL ENDARTERECTOMY, LEFT FEMORAL TO POPLITEAL BYPASS GRAFT performed by Jonah Reece MD at . [address]       Previous Medications    AMLODIPINE (NORVASC) 10 MG TABLET    TAKE ONE TABLET BY MOUTH DAILY    ASPIRIN 81 MG TABLET    Take 1 tablet by mouth daily    ATORVASTATIN (LIPITOR) 80 MG TABLET    TAKE ONE TABLET BY MOUTH DAILY    DIPHENHYDRAMINE (BANOPHEN) 25 MG TABLET    Take 25 mg by mouth every 6 hours as needed for Itching    FAMOTIDINE (PEPCID) 20 MG TABLET    Take 20 mg by mouth 2 times daily    HYDROXYZINE (ATARAX) 50 MG TABLET    Take instead of benadryl at bedtime. LISINOPRIL (PRINIVIL;ZESTRIL) 20 MG TABLET    TAKE ONE TABLET BY MOUTH DAILY    METHYLPREDNISOLONE (MEDROL DOSEPACK) 4 MG TABLET    Take 4 mg by mouth See Admin Instructions Take by mouth. ALLERGIES     Patient has no known allergies.     FAMILY HISTORY       Family History   Problem Relation Age of Onset    Diabetes Mother     Heart Attack Mother     Heart Disease Mother           SOCIAL HISTORY       Social History     Socioeconomic History    Marital status: Legally      Spouse name: None    Number of children: None    Years of education: None    Highest education level: None   Occupational History    None   Social Needs    Financial resource strain: None    Food insecurity     Worry: None     Inability: None    Transportation needs     Medical: None     Non-medical: None   Tobacco Use    Smoking status: Former Smoker     Packs/day: 1.00     Years: 20.00     Pack years: 20.00     Types: Cigarettes     Quit date: 8/10/2020     Years since quittin.5    Smokeless tobacco: Never Used   Substance and Sexual Activity    Alcohol use: Yes     Alcohol/week: 12.0 standard drinks     Types: 12 Cans of beer per week    Drug use: Yes     Types: Marijuana     Comment: yesterday    Sexual activity: Yes     Partners: Female   Lifestyle    Physical activity     Days per week: None     Minutes per session: None    Stress: None   Relationships    Social connections     Talks on phone: None     Gets together: None     Attends Sikh service: None     Active member of club or organization: None     Attends meetings of clubs or organizations: None     Relationship status: None    Intimate partner violence     Fear of current or ex partner: None     Emotionally abused: None     Physically abused: None     Forced sexual activity: None   Other Topics Concern    None   Social History Narrative    None       SCREENINGS             PHYSICAL EXAM    (up to 7 for level 4, 8 or more for level 5)     ED Triage Vitals [21 0517]   BP Temp Temp Source Pulse Resp SpO2 Height Weight   125/69 98.7 °F (37.1 °C) Oral 98 18 98 % 5' 9\" (1.753 m) 177 lb 4 oz (80.4 kg)       General: Alert and oriented, No acute distress. Eye: Normal conjunctiva. Pupils equal and reactive. HENT: Oral mucosa is moist.  Respiratory: Lungs are clear to auscultation, Respirations are non-labored. Cardiovascular: Normal rate, Regular rhythm. Gastrointestinal: Soft, Non-tender, Non-distended.   Musculoskeletal: The left lower extremity is erythematous, warm to the touch, diffusely tender from about the knee to the ankle. The foot appears normal, DP pulses normal.  Sensation and motor function intact in the foot. Integumentary: Warm, Dry. Patchy scaling rash to the bilateral upper extremities, several patches on the chest and abdomen as well. There are some areas on the chest with surrounding erythema beyond these patches. Inguinal region appears to have inflammation with some overlying white discoloration on erythematous base. Neurologic: Alert, Oriented, No focal defects. Psychiatric: Cooperative.     DIAGNOSTIC RESULTS     EKG: All EKG's are interpreted by the Emergency Department Physician who either signs or Co-signsthis chart in the absence of a cardiologist.      RADIOLOGY:   Gilberto Iraqi such as CT, Ultrasound and MRI are read by the radiologist. Avani Gonzales radiographic images are visualized and preliminarily interpreted by the emergency physician with the below findings:      Interpretation per the Radiologist below, if available at the time ofthis note:    VL Extremity Venous Left    (Results Pending)         ED BEDSIDE ULTRASOUND:   Performed by ED Physician - none    LABS:  Labs Reviewed   CBC WITH AUTO DIFFERENTIAL - Abnormal; Notable for the following components:       Result Value    RBC 3.64 (*)     Hemoglobin 11.7 (*)     Hematocrit 35.0 (*)     Neutrophils Absolute 8.1 (*)     Lymphocytes Absolute 0.4 (*)     All other components within normal limits    Narrative:     Performed at:  [place] [address]   Phone [phone]   COMPREHENSIVE METABOLIC PANEL W/ REFLEX TO MG FOR LOW K - Abnormal; Notable for the following components:    Sodium 130 (*)     Chloride 95 (*)     Glucose 126 (*)     Albumin 2.9 (*)     Albumin/Globulin Ratio 0.8 (*)     ALT 75 (*)     AST 85 (*)     All other components within normal limits    Narrative:     Performed at:  [facility]  [address] [place] [place] [address]   Phone [phone]   PROCALCITONIN - Abnormal; Notable for the following components:    Procalcitonin 3.38 (*)     All other components within normal limits    Narrative:     Performed at:  [facility]  [address] [place] [address]   Phone [phone]   LACTATE, SEPSIS    Narrative:     Performed at:  [facility]  [address] [place] [address]   Phone [phone]   LACTATE, SEPSIS       All other labs were within normal range or not returned as of this dictation. EMERGENCY DEPARTMENT COURSE and DIFFERENTIAL DIAGNOSIS/MDM:   Vitals:    Vitals:    03/01/21 0620 03/01/21 0634 03/01/21 0637 03/01/21 0700   BP: (!) 83/51 123/64 123/64 (!) 112/58   Pulse:   78 91   Resp:   16 15   Temp:   97.9 °F (36.6 °C)    TempSrc:   Oral    SpO2:  98% 97% 100%   Weight:   194 lb 3.6 oz (88.1 kg)    Height:             Medical decision making: This is a 71-year-old male who presents for evaluation of diffuse itching rash for about the past month to bilateral upper extremities, chest and abdomen, with some inguinal involvement as well. On exam, patient is noted to have a significantly swollen and tender left lower extremity with overlying erythema and warmth. Otherwise he is afebrile, well-appearing, with normal vital signs. Given concern for possible cellulitis surrounding the patchy rash to his trunk, as well as to the left lower extremity, laboratory studies were obtained. CBC shows no evidence of leukocytosis. CMP shows mild hyponatremia, and transaminitis. Lactate is normal.  Procalcitonin was elevated at 3.38. Given lower extremity edema and warmth, will obtain Doppler ultrasound as well. Patient was seen in conjunction with the [facility] who will follow up on Doppler results.   Pending Doppler ultrasound results, I am feel the patient will likely be able to be discharged with prescriptions for steroids, antibiotics, and close outpatient follow-up with both primary care, vascular surgery, and dermatology. Please refer to [facility] documentation for ultrasound results and final disposition. CRITICAL CARE TIME   Total Critical Care time was 0 minutes, excluding separately reportable procedures. There was a high probability of clinically significant/life threatening deterioration in the patient's condition which required my urgent intervention. CONSULTS:  None    PROCEDURES:  Unless otherwise noted below, none         FINAL IMPRESSION      1. Intertrigo    2. Dermatitis    3. Localized swelling of left lower extremity          DISPOSITION/PLAN   DISPOSITION        PATIENT REFERRED TO:  No follow-up provider specified.     DISCHARGE MEDICATIONS:  New Prescriptions    No medications on file          (Please note that portions of this note were completed with a voice recognition program.Efforts were made to edit the dictations but occasionally words are mis-transcribed.)    Sharon Sosa MD (electronically signed)  Attending Emergency Physician        Sharon Sosa MD  03/01/21 0818

## 2021-03-01 NOTE — ED NOTES
Pt discharged at this time. Discharge instructions and medications reviewed,  Questions were answered. PT verbalized understanding. VSS, Afebrile. Follow up appointments were discussed.          Dash Roth RN  03/01/21 4520

## 2021-03-01 NOTE — ED NOTES
Patient returned from [address]. Resting comfortably. Call light with in reach. No further questions at this time. Will continue to monitor.      Darío Tapia RN  03/01/21 7537

## 2021-03-01 NOTE — ED NOTES
[facility]  03/01/21 0777

## 2021-03-15 ENCOUNTER — NURSE TRIAGE (OUTPATIENT)
Dept: OTHER | Facility: CLINIC | Age: 66
End: 2021-03-15

## 2021-03-15 NOTE — TELEPHONE ENCOUNTER
No triage, Caller not with patient. Caller instructed to call back when with patient, have patient call PCP office, or go to [facility] for treatment. Answer Assessment - Initial Assessment Questions  1. REASON FOR CALL or QUESTION: \"What is your reason for calling today? \" or \"How can I best  help you? \" or \"What question do you have that I can help answer? \"      No triage caller not with patient    2. CALLER: Document the source of call. (e.g., laboratory, patient).       spouse    Protocols used: PCP CALL - NO TRIAGE-ADULT-

## 2021-04-14 ENCOUNTER — OFFICE VISIT (OUTPATIENT)
Dept: VASCULAR SURGERY | Age: 66
End: 2021-04-14
Payer: MEDICARE

## 2021-04-14 ENCOUNTER — PROCEDURE VISIT (OUTPATIENT)
Dept: SURGERY | Age: 66
End: 2021-04-14
Payer: MEDICARE

## 2021-04-14 VITALS — BODY MASS INDEX: 26.36 KG/M2 | HEIGHT: 69 IN | WEIGHT: 178 LBS

## 2021-04-14 DIAGNOSIS — T82.858A BYPASS GRAFT STENOSIS, INITIAL ENCOUNTER (HCC): Primary | ICD-10-CM

## 2021-04-14 DIAGNOSIS — I70.229 ATHEROSCLEROSIS OF ARTERY OF EXTREMITY WITH REST PAIN (HCC): ICD-10-CM

## 2021-04-14 PROCEDURE — G8417 CALC BMI ABV UP PARAM F/U: HCPCS | Performed by: SURGERY

## 2021-04-14 PROCEDURE — 4040F PNEUMOC VAC/ADMIN/RCVD: CPT | Performed by: SURGERY

## 2021-04-14 PROCEDURE — 3017F COLORECTAL CA SCREEN DOC REV: CPT | Performed by: SURGERY

## 2021-04-14 PROCEDURE — 1123F ACP DISCUSS/DSCN MKR DOCD: CPT | Performed by: SURGERY

## 2021-04-14 PROCEDURE — 93926 LOWER EXTREMITY STUDY: CPT | Performed by: SURGERY

## 2021-04-14 PROCEDURE — G8427 DOCREV CUR MEDS BY ELIG CLIN: HCPCS | Performed by: SURGERY

## 2021-04-14 PROCEDURE — 1036F TOBACCO NON-USER: CPT | Performed by: SURGERY

## 2021-04-14 PROCEDURE — 99213 OFFICE O/P EST LOW 20 MIN: CPT | Performed by: SURGERY

## 2021-04-16 DIAGNOSIS — I73.9 PERIPHERAL VASCULAR DISEASE (HCC): Primary | ICD-10-CM

## 2021-04-16 DIAGNOSIS — T82.392A OTHER MECHANICAL COMPLICATION OF FEMORAL ARTERIAL GRAFT (BYPASS), INITIAL ENCOUNTER (HCC): ICD-10-CM

## 2021-05-07 DIAGNOSIS — E78.49 OTHER HYPERLIPIDEMIA: ICD-10-CM

## 2021-05-07 DIAGNOSIS — I10 ESSENTIAL HYPERTENSION: ICD-10-CM

## 2021-05-07 RX ORDER — LISINOPRIL 20 MG/1
TABLET ORAL
Qty: 30 TABLET | Refills: 0 | Status: SHIPPED | OUTPATIENT
Start: 2021-05-07 | Stop reason: SDUPTHER

## 2021-05-07 RX ORDER — ATORVASTATIN CALCIUM 80 MG/1
TABLET, FILM COATED ORAL
Qty: 30 TABLET | Refills: 0 | Status: SHIPPED | OUTPATIENT
Start: 2021-05-07 | Stop reason: SDUPTHER

## 2021-05-07 NOTE — TELEPHONE ENCOUNTER
Medication:   Requested Prescriptions     Pending Prescriptions Disp Refills    atorvastatin (LIPITOR) 80 MG tablet [Pharmacy Med Name: ATORVASTATIN 80 MG TABLET] 30 tablet 0     Sig: TAKE ONE TABLET BY MOUTH DAILY    lisinopril (PRINIVIL;ZESTRIL) 20 MG tablet [Pharmacy Med Name: LISINOPRIL 20 MG TABLET] 30 tablet 0     Sig: TAKE ONE TABLET BY MOUTH DAILY        Last Filled:      Patient Phone Number: [phone] (home)     Last appt: 2/11/2021   Next appt: Visit date not found    Last OARRS: No flowsheet data found.

## 2021-06-07 DIAGNOSIS — E78.49 OTHER HYPERLIPIDEMIA: ICD-10-CM

## 2021-06-07 DIAGNOSIS — I10 ESSENTIAL HYPERTENSION: ICD-10-CM

## 2021-06-07 RX ORDER — ATORVASTATIN CALCIUM 80 MG/1
TABLET, FILM COATED ORAL
Qty: 30 TABLET | Refills: 0 | Status: SHIPPED | OUTPATIENT
Start: 2021-06-07 | Stop reason: SDUPTHER

## 2021-06-07 RX ORDER — LISINOPRIL 20 MG/1
TABLET ORAL
Qty: 30 TABLET | Refills: 0 | Status: SHIPPED | OUTPATIENT
Start: 2021-06-07 | Stop reason: SDUPTHER

## 2021-06-07 NOTE — TELEPHONE ENCOUNTER
Medication:   Requested Prescriptions     Pending Prescriptions Disp Refills    atorvastatin (LIPITOR) 80 MG tablet [Pharmacy Med Name: ATORVASTATIN 80 MG TABLET] 30 tablet 0     Sig: TAKE ONE TABLET BY MOUTH DAILY    lisinopril (PRINIVIL;ZESTRIL) 20 MG tablet [Pharmacy Med Name: LISINOPRIL 20 MG TABLET] 30 tablet 0     Sig: TAKE ONE TABLET BY MOUTH DAILY        Last Filled:      Patient Phone Number: [phone] (home)     Last appt: 2/11/2021   Next appt: Visit date not found    Last OARRS: No flowsheet data found.

## 2021-06-21 ENCOUNTER — OFFICE VISIT (OUTPATIENT)
Dept: PRIMARY CARE CLINIC | Age: 66
End: 2021-06-21
Payer: MEDICARE

## 2021-06-21 VITALS
SYSTOLIC BLOOD PRESSURE: 116 MMHG | HEART RATE: 82 BPM | TEMPERATURE: 97 F | WEIGHT: 174 LBS | OXYGEN SATURATION: 95 % | DIASTOLIC BLOOD PRESSURE: 67 MMHG | HEIGHT: 69 IN | BODY MASS INDEX: 25.77 KG/M2

## 2021-06-21 DIAGNOSIS — L28.0 LICHEN SIMPLEX CHRONICUS: ICD-10-CM

## 2021-06-21 PROCEDURE — 1036F TOBACCO NON-USER: CPT | Performed by: NURSE PRACTITIONER

## 2021-06-21 PROCEDURE — G8417 CALC BMI ABV UP PARAM F/U: HCPCS | Performed by: NURSE PRACTITIONER

## 2021-06-21 PROCEDURE — G8427 DOCREV CUR MEDS BY ELIG CLIN: HCPCS | Performed by: NURSE PRACTITIONER

## 2021-06-21 PROCEDURE — 1123F ACP DISCUSS/DSCN MKR DOCD: CPT | Performed by: NURSE PRACTITIONER

## 2021-06-21 PROCEDURE — 3017F COLORECTAL CA SCREEN DOC REV: CPT | Performed by: NURSE PRACTITIONER

## 2021-06-21 PROCEDURE — 4040F PNEUMOC VAC/ADMIN/RCVD: CPT | Performed by: NURSE PRACTITIONER

## 2021-06-21 PROCEDURE — 99213 OFFICE O/P EST LOW 20 MIN: CPT | Performed by: NURSE PRACTITIONER

## 2021-06-21 RX ORDER — HYDROXYZINE HYDROCHLORIDE 25 MG/1
25 TABLET, FILM COATED ORAL EVERY 8 HOURS PRN
Qty: 30 TABLET | Refills: 0 | Status: SHIPPED | OUTPATIENT
Start: 2021-06-21 | End: 2021-07-21

## 2021-06-21 RX ORDER — BETAMETHASONE DIPROPIONATE 0.5 MG/G
OINTMENT TOPICAL
Qty: 50 G | Refills: 1 | Status: SHIPPED | OUTPATIENT
Start: 2021-06-21 | Stop reason: SDUPTHER

## 2021-06-21 RX ORDER — PREDNISONE 10 MG/1
TABLET ORAL
Qty: 26 TABLET | Refills: 0 | Status: SHIPPED | OUTPATIENT
Start: 2021-06-21 | End: 2022-01-18

## 2021-06-21 SDOH — ECONOMIC STABILITY: FOOD INSECURITY: WITHIN THE PAST 12 MONTHS, YOU WORRIED THAT YOUR FOOD WOULD RUN OUT BEFORE YOU GOT MONEY TO BUY MORE.: NEVER TRUE

## 2021-06-21 SDOH — ECONOMIC STABILITY: FOOD INSECURITY: WITHIN THE PAST 12 MONTHS, THE FOOD YOU BOUGHT JUST DIDN'T LAST AND YOU DIDN'T HAVE MONEY TO GET MORE.: NEVER TRUE

## 2021-06-21 ASSESSMENT — ENCOUNTER SYMPTOMS
NAIL CHANGES: 0
SORE THROAT: 0
RHINORRHEA: 0
COUGH: 0

## 2021-06-21 ASSESSMENT — SOCIAL DETERMINANTS OF HEALTH (SDOH): HOW HARD IS IT FOR YOU TO PAY FOR THE VERY BASICS LIKE FOOD, HOUSING, MEDICAL CARE, AND HEATING?: NOT VERY HARD

## 2021-06-21 NOTE — PATIENT INSTRUCTIONS
Take 25 mg of hydroxyzine for breakfast and lunch  Take 50 mg of Hydroxyzine for bed time  Make appointment with Dr. Rosalina Tamayo   [facility]   [address] [place] [address] [address]  Hours:   Phone: [phone]

## 2021-06-21 NOTE — PROGRESS NOTES
[address] [facility] PRIMARY CARE  [address] [id number]  Dept: [phone]    2021     Marco Hannon (:  1955)is a 72 y.o. male, here for evaluation of the following medical concerns:    Rash  This is a new problem. The current episode started more than 1 month ago. The rash is diffuse. The rash is characterized by blistering, dryness, itchiness, pain and redness. He was exposed to nothing. Pertinent negatives include no cough, fatigue, fever, joint pain, nail changes, rhinorrhea or sore throat. Past treatments include topical steroids and anti-itch cream. The treatment provided mild relief. His past medical history is significant for eczema. history of eczema and thinks he may have come in contact with poison ivy. He is constantly scratching areas until bleeding. He has been to an Urgent Care twice, provided cream, unsure of name. He has bee given an injection and medrol dose pack, with some mild relief. 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 He covers arms and legs while working. He scratches throughout the night and has difficulty sleeping. Has tried calamine lotion, mild relief.     Lab Results   Component Value Date    CHOL 126 2020    CHOL 111 2018     Lab Results   Component Value Date    TRIG 54 2020    TRIG 117 2018     Lab Results   Component Value Date    HDL 43 2020    HDL 45 2018     Lab Results   Component Value Date    LDLCALC 72 2020    LDLCALC 43 2018     Lab Results   Component Value Date    LABVLDL 11 2020    LABVLDL 23 2018     No results found for: [facility]  Lab Results   Component Value Date    WBC 9.8 2021    HGB 11.7 (L) 2021    HCT 35.0 (L) 2021    MCV 96.4 2021     2021     Lab Results   Component Value Date     (L) 2021    K 3.6 2021    CL 95 (L) 2021    CO2 25 2021    BUN 14 2021    CREATININE 1.0 2021    GLUCOSE 126 (H) 2021    CALCIUM 8.6 2021    PROT 6.5 2021    LABALBU 2.9 (L) 2021    BILITOT 0.4 2021    ALKPHOS 70 2021    AST 85 (H) 2021    ALT 75 (H) 2021    LABGLOM >60 2021    GFRAA >60 2021    AGRATIO 0.8 (L) 2021    GLOB 3.6 2021       Review of Systems   Constitutional: Negative for fatigue and fever. HENT: Negative for rhinorrhea and sore throat. Respiratory: Negative for cough. Musculoskeletal: Negative for joint pain. Skin: Positive for rash. Negative for nail changes. Prior to Visit Medications    Medication Sig Taking? Authorizing Provider   predniSONE (DELTASONE) 10 MG tablet 6 tabs daily x 3 day, 5 tabs daily x 3 day, 4 tabs daily x 3 day, 3 tabs 3 X daily 2 tabs X 3 days 1 tab X 3 days Yes IRVING Lo - CNP   augmented betamethasone dipropionate (DIPROLENE-AF) 0.05 % ointment Apply topically 2 times daily.  Yes IRVING Lo CNP   hydrOXYzine (ATARAX) 25 MG tablet Take 1 tablet by mouth every 8 hours as needed for Itching Yes IRVING Lo - CNP   atorvastatin (LIPITOR) 80 MG tablet TAKE ONE TABLET BY MOUTH DAILY Yes Elian Nova MD   lisinopril (PRINIVIL;ZESTRIL) 20 MG tablet TAKE ONE TABLET BY MOUTH DAILY Yes Elian Nova MD   amLODIPine (NORVASC) 10 MG tablet TAKE ONE TABLET BY MOUTH DAILY Yes Elian Nova MD   aspirin 81 MG tablet Take 1 tablet by mouth daily Yes Elian Nova MD   atorvastatin (LIPITOR) 80 MG tablet TAKE ONE TABLET BY MOUTH DAILY  Elian Nova MD   lisinopril (PRINIVIL;ZESTRIL) 20 MG tablet TAKE ONE TABLET BY MOUTH DAILY  Elian Nova MD        Social History     Tobacco Use    Smoking status: Former Smoker     Packs/day: 1.00     Years: 20.00     Pack years: 20.00     Types: Cigarettes     Quit date: 8/10/2020     Years since quittin.8    Smokeless tobacco: Never Used   Substance

## 2021-06-23 ENCOUNTER — TELEPHONE (OUTPATIENT)
Dept: PRIMARY CARE CLINIC | Age: 66
End: 2021-06-23

## 2021-06-23 DIAGNOSIS — L28.0 LICHEN SIMPLEX CHRONICUS: ICD-10-CM

## 2021-06-23 NOTE — TELEPHONE ENCOUNTER
Spoke to patient to make sure he has enough prednisone. He is unsure. Will send additional prescription to the pharmacy if needed, continue to regimen discussed in the office. Do not start all over with the new prescription.  Left a message for spouse

## 2021-06-24 ENCOUNTER — TELEPHONE (OUTPATIENT)
Dept: PRIMARY CARE CLINIC | Age: 66
End: 2021-06-24

## 2021-06-24 NOTE — TELEPHONE ENCOUNTER
----- Message from Ahmet Lomeli sent at 6/23/2021 11:20 AM EDT -----  Subject: Medication Problem    QUESTIONS  Name of Medication? predniSONE (DELTASONE) 10 MG tablet  Patient-reported dosage and instructions? 10 mg tab. What question or problem do you have with the medication? ow often is the   pt supposed to take medication   Preferred Pharmacy? [id number] [place] [address] [phone]  Pharmacy phone number (if available)? [phone]  Additional Information for Provider? got a call from  and wants to   confirm the dosage and amount pt needs to be taking per day. Pt took 6   pills this morning 06/23. Pt requesting a call back. ---------------------------------------------------------------------------  --------------  Honey Tanyas Jewelrye INFO  What is the best way for the office to contact you? OK to leave message on   voicemail  Preferred Call Back Phone Number? [phone]  ---------------------------------------------------------------------------  --------------  SCRIPT ANSWERS  Relationship to Patient? Third Party  Representative Name?  Grisel Witt

## 2021-07-12 DIAGNOSIS — E78.49 OTHER HYPERLIPIDEMIA: ICD-10-CM

## 2021-07-12 DIAGNOSIS — I10 ESSENTIAL HYPERTENSION: ICD-10-CM

## 2021-07-12 RX ORDER — LISINOPRIL 20 MG/1
TABLET ORAL
Qty: 90 TABLET | Refills: 3 | Status: SHIPPED | OUTPATIENT
Start: 2021-07-12 | End: 2022-01-18

## 2021-07-12 RX ORDER — ATORVASTATIN CALCIUM 80 MG/1
TABLET, FILM COATED ORAL
Qty: 90 TABLET | Refills: 3 | Status: SHIPPED | OUTPATIENT
Start: 2021-07-12 | End: 2022-01-18

## 2021-07-12 NOTE — TELEPHONE ENCOUNTER
Medication:   Requested Prescriptions     Pending Prescriptions Disp Refills    atorvastatin (LIPITOR) 80 MG tablet [Pharmacy Med Name: ATORVASTATIN 80 MG TABLET] 30 tablet 0     Sig: TAKE ONE TABLET BY MOUTH DAILY    lisinopril (PRINIVIL;ZESTRIL) 20 MG tablet [Pharmacy Med Name: LISINOPRIL 20 MG TABLET] 30 tablet 0     Sig: TAKE ONE TABLET BY MOUTH DAILY        Last Filled:      Patient Phone Number: [phone] (home)     Last appt: 6/21/2021   Next appt: 7/20/2021    Last OARRS: No flowsheet data found.

## 2021-08-20 DIAGNOSIS — L28.0 LICHEN SIMPLEX CHRONICUS: ICD-10-CM

## 2021-08-25 RX ORDER — BETAMETHASONE DIPROPIONATE 0.5 MG/G
OINTMENT TOPICAL
Qty: 60 G | Refills: 1 | Status: SHIPPED | OUTPATIENT
Start: 2021-08-25

## 2022-01-18 ENCOUNTER — OFFICE VISIT (OUTPATIENT)
Dept: PRIMARY CARE CLINIC | Age: 67
End: 2022-01-18
Payer: MEDICARE

## 2022-01-18 VITALS
DIASTOLIC BLOOD PRESSURE: 68 MMHG | SYSTOLIC BLOOD PRESSURE: 137 MMHG | HEIGHT: 69 IN | HEART RATE: 69 BPM | TEMPERATURE: 97.7 F | BODY MASS INDEX: 25.92 KG/M2 | WEIGHT: 175 LBS

## 2022-01-18 DIAGNOSIS — I70.229 ATHEROSCLEROSIS OF ARTERY OF EXTREMITY WITH REST PAIN (HCC): ICD-10-CM

## 2022-01-18 DIAGNOSIS — R21 RASH AND NONSPECIFIC SKIN ERUPTION: Primary | ICD-10-CM

## 2022-01-18 PROCEDURE — 1123F ACP DISCUSS/DSCN MKR DOCD: CPT | Performed by: INTERNAL MEDICINE

## 2022-01-18 PROCEDURE — G8417 CALC BMI ABV UP PARAM F/U: HCPCS | Performed by: INTERNAL MEDICINE

## 2022-01-18 PROCEDURE — 3017F COLORECTAL CA SCREEN DOC REV: CPT | Performed by: INTERNAL MEDICINE

## 2022-01-18 PROCEDURE — 4040F PNEUMOC VAC/ADMIN/RCVD: CPT | Performed by: INTERNAL MEDICINE

## 2022-01-18 PROCEDURE — G8484 FLU IMMUNIZE NO ADMIN: HCPCS | Performed by: INTERNAL MEDICINE

## 2022-01-18 PROCEDURE — 1036F TOBACCO NON-USER: CPT | Performed by: INTERNAL MEDICINE

## 2022-01-18 PROCEDURE — G8427 DOCREV CUR MEDS BY ELIG CLIN: HCPCS | Performed by: INTERNAL MEDICINE

## 2022-01-18 PROCEDURE — 99214 OFFICE O/P EST MOD 30 MIN: CPT | Performed by: INTERNAL MEDICINE

## 2022-01-18 RX ORDER — PREDNISONE 10 MG/1
TABLET ORAL
Qty: 53 TABLET | Refills: 0 | Status: SHIPPED | OUTPATIENT
Start: 2022-01-18 | End: 2022-01-28

## 2022-01-18 RX ORDER — HYDROXYZINE HYDROCHLORIDE 25 MG/1
25 TABLET, FILM COATED ORAL NIGHTLY
Qty: 30 TABLET | Refills: 1 | Status: SHIPPED | OUTPATIENT
Start: 2022-01-18 | End: 2022-02-17

## 2022-01-18 RX ORDER — HYDROCORTISONE 25 MG/ML
LOTION TOPICAL
Qty: 118 ML | Refills: 1 | Status: SHIPPED | OUTPATIENT
Start: 2022-01-18 | End: 2022-03-10 | Stop reason: SDUPTHER

## 2022-01-19 NOTE — PROGRESS NOTES
Silva Toledo (:  1955) is a 77 y.o. male,Established patient, here for evaluation of the following chief complaint(s):  Rash (possible eczema on arms,neck chest and shoulders)    Recurrence of generalized eczema affecting the chest, neck, arms and legs. Repeat previous therapy which helped before. ASSESSMENT/PLAN:  1. Rash and nonspecific skin eruption  -     predniSONE (DELTASONE) 10 MG tablet; Take 6 tablets by mouth daily x3 days, 5 tablets x3 days, 4 tablets x2 days, 3 tablets x2 days, 2 tablets x2 days, then 1 tablet x2 days. , Disp-53 tablet, R-0Normal  -     hydrocortisone (HYTONE) 2.5 % lotion; Apply topically 2 times daily. , Disp-118 mL, R-1, Normal  -     hydrOXYzine (ATARAX) 25 MG tablet; Take 1 tablet by mouth nightly, Disp-30 tablet, R-1Normal  2. Atherosclerosis of artery of extremity with rest pain ([address]  Assessment & Plan:   Monitored by specialist- no acute findings meriting change in the plan  Orders:  -     Hemoglobin A1C; Future  -     Lipid Panel; Future      Return in about 6 weeks (around 3/1/2022). Subjective   SUBJECTIVE/OBJECTIVE:  HPI    Review of Systems       Objective   Physical Exam       Atherosclerosis of artery of extremity with rest pain ([address]   Monitored by specialist- no acute findings meriting change in the plan       On this date 2022 I have spent 35 minutes reviewing previous notes, test results and face to face with the patient discussing the diagnosis and importance of compliance with the treatment plan as well as documenting on the day of the visit. An electronic signature was used to authenticate this note.     --Ruth Marley MD

## 2022-03-10 DIAGNOSIS — R21 RASH AND NONSPECIFIC SKIN ERUPTION: ICD-10-CM

## 2022-03-10 RX ORDER — HYDROCORTISONE 25 MG/ML
LOTION TOPICAL
Qty: 118 ML | Refills: 1 | Status: SHIPPED | OUTPATIENT
Start: 2022-03-10

## 2022-03-10 NOTE — TELEPHONE ENCOUNTER
Medication:   Requested Prescriptions      No prescriptions requested or ordered in this encounter        Last Filled:      Patient Phone Number: [phone] (home)     Last appt: 1/18/2022   Next appt: Visit date not found    Last OARRS: No flowsheet data found.

## 2022-03-10 NOTE — TELEPHONE ENCOUNTER
----- Message from Jaclyn Zayas sent at 3/10/2022 12:57 PM EST -----  Subject: Refill Request    QUESTIONS  Name of Medication? hydrocortisone (HYTONE) 2.5 % lotion  Patient-reported dosage and instructions? as directed  How many days do you have left? 0  Preferred Pharmacy? [facility] phone number (if available)? [phone]  ---------------------------------------------------------------------------  --------------  Analisa MENA  What is the best way for the office to contact you? OK to leave message on   voicemail  Preferred Call Back Phone Number?  [phone]

## 2022-04-26 DIAGNOSIS — I10 ESSENTIAL HYPERTENSION: ICD-10-CM

## 2022-04-26 RX ORDER — AMLODIPINE BESYLATE 10 MG/1
TABLET ORAL
Qty: 30 TABLET | Refills: 5 | Status: SHIPPED | OUTPATIENT
Start: 2022-04-26

## 2022-04-26 NOTE — TELEPHONE ENCOUNTER
Medication:   Requested Prescriptions     Pending Prescriptions Disp Refills    amLODIPine (NORVASC) 10 MG tablet [Pharmacy Med Name: amLODIPine BESYLATE 10 MG TAB] 30 tablet      Sig: TAKE ONE TABLET BY MOUTH DAILY        Last Filled:      Patient Phone Number: [phone] (home)     Last appt: 1/18/2022   Next appt: Visit date not found    Last OARRS: No flowsheet data found.

## 2022-11-18 ENCOUNTER — TELEPHONE (OUTPATIENT)
Dept: PRIMARY CARE CLINIC | Age: 67
End: 2022-11-18

## 2023-11-09 ENCOUNTER — OFFICE VISIT (OUTPATIENT)
Dept: PRIMARY CARE CLINIC | Age: 68
End: 2023-11-09

## 2023-11-09 VITALS
BODY MASS INDEX: 23.33 KG/M2 | HEART RATE: 60 BPM | OXYGEN SATURATION: 99 % | WEIGHT: 158 LBS | SYSTOLIC BLOOD PRESSURE: 90 MMHG | DIASTOLIC BLOOD PRESSURE: 60 MMHG

## 2023-11-09 DIAGNOSIS — R79.89 OTHER SPECIFIED ABNORMAL FINDINGS OF BLOOD CHEMISTRY: ICD-10-CM

## 2023-11-09 DIAGNOSIS — I70.229 ATHEROSCLEROSIS OF ARTERY OF EXTREMITY WITH REST PAIN (HCC): ICD-10-CM

## 2023-11-09 DIAGNOSIS — E74.89 OTHER SPECIFIED DISORDERS OF CARBOHYDRATE METABOLISM (HCC): ICD-10-CM

## 2023-11-09 DIAGNOSIS — K29.90 GASTRITIS AND GASTRODUODENITIS: ICD-10-CM

## 2023-11-09 DIAGNOSIS — I10 ESSENTIAL HYPERTENSION: ICD-10-CM

## 2023-11-09 DIAGNOSIS — R63.4 WEIGHT LOSS: ICD-10-CM

## 2023-11-09 DIAGNOSIS — K29.70 GASTRITIS AND GASTRODUODENITIS: ICD-10-CM

## 2023-11-09 DIAGNOSIS — E43 UNSPECIFIED SEVERE PROTEIN-CALORIE MALNUTRITION (HCC): ICD-10-CM

## 2023-11-09 DIAGNOSIS — E78.49 OTHER HYPERLIPIDEMIA: ICD-10-CM

## 2023-11-09 DIAGNOSIS — Z12.11 SCREENING FOR COLON CANCER: ICD-10-CM

## 2023-11-09 DIAGNOSIS — F17.200 SMOKER: Primary | ICD-10-CM

## 2023-11-09 DIAGNOSIS — Z87.891 PERSONAL HISTORY OF TOBACCO USE: ICD-10-CM

## 2023-11-09 LAB
25(OH)D3 SERPL-MCNC: 14 NG/ML
ALBUMIN SERPL-MCNC: 4.2 G/DL (ref 3.4–5)
ALBUMIN/GLOB SERPL: 1.7 {RATIO} (ref 1.1–2.2)
ALP SERPL-CCNC: 86 U/L (ref 40–129)
ALT SERPL-CCNC: 8 U/L (ref 10–40)
ANION GAP SERPL CALCULATED.3IONS-SCNC: 8 MMOL/L (ref 3–16)
AST SERPL-CCNC: 13 U/L (ref 15–37)
BASOPHILS # BLD: 0 K/UL (ref 0–0.2)
BASOPHILS NFR BLD: 0.7 %
BILIRUB SERPL-MCNC: 0.3 MG/DL (ref 0–1)
BILIRUB UR QL STRIP.AUTO: NEGATIVE
BUN SERPL-MCNC: 6 MG/DL (ref 7–20)
CALCIUM SERPL-MCNC: 9.7 MG/DL (ref 8.3–10.6)
CHLORIDE SERPL-SCNC: 105 MMOL/L (ref 99–110)
CHOLEST SERPL-MCNC: 136 MG/DL (ref 0–199)
CLARITY UR: CLEAR
CO2 SERPL-SCNC: 27 MMOL/L (ref 21–32)
COLOR UR: ABNORMAL
CREAT SERPL-MCNC: 0.8 MG/DL (ref 0.8–1.3)
DEPRECATED RDW RBC AUTO: 12.8 % (ref 12.4–15.4)
EOSINOPHIL # BLD: 0.6 K/UL (ref 0–0.6)
EOSINOPHIL NFR BLD: 9.1 %
GFR SERPLBLD CREATININE-BSD FMLA CKD-EPI: >60 ML/MIN/{1.73_M2}
GLUCOSE SERPL-MCNC: 97 MG/DL (ref 70–99)
GLUCOSE UR STRIP.AUTO-MCNC: NEGATIVE MG/DL
HCT VFR BLD AUTO: 43.2 % (ref 40.5–52.5)
HDLC SERPL-MCNC: 36 MG/DL (ref 40–60)
HGB BLD-MCNC: 14.6 G/DL (ref 13.5–17.5)
HGB UR QL STRIP.AUTO: NEGATIVE
KETONES UR STRIP.AUTO-MCNC: ABNORMAL MG/DL
LDL CHOLESTEROL CALCULATED: 78 MG/DL
LEUKOCYTE ESTERASE UR QL STRIP.AUTO: NEGATIVE
LYMPHOCYTES # BLD: 1.9 K/UL (ref 1–5.1)
LYMPHOCYTES NFR BLD: 29.4 %
MCH RBC QN AUTO: 32.9 PG (ref 26–34)
MCHC RBC AUTO-ENTMCNC: 33.7 G/DL (ref 31–36)
MCV RBC AUTO: 97.6 FL (ref 80–100)
MONOCYTES # BLD: 0.6 K/UL (ref 0–1.3)
MONOCYTES NFR BLD: 9.2 %
NEUTROPHILS # BLD: 3.4 K/UL (ref 1.7–7.7)
NEUTROPHILS NFR BLD: 51.6 %
NITRITE UR QL STRIP.AUTO: NEGATIVE
PH UR STRIP.AUTO: 5.5 [PH] (ref 5–8)
PLATELET # BLD AUTO: 179 K/UL (ref 135–450)
PMV BLD AUTO: 9.5 FL (ref 5–10.5)
POTASSIUM SERPL-SCNC: 4.3 MMOL/L (ref 3.5–5.1)
PROT SERPL-MCNC: 6.7 G/DL (ref 6.4–8.2)
PROT UR STRIP.AUTO-MCNC: NEGATIVE MG/DL
RBC # BLD AUTO: 4.43 M/UL (ref 4.2–5.9)
SODIUM SERPL-SCNC: 140 MMOL/L (ref 136–145)
SP GR UR STRIP.AUTO: 1.02 (ref 1–1.03)
TRIGL SERPL-MCNC: 111 MG/DL (ref 0–150)
TSH SERPL DL<=0.005 MIU/L-ACNC: 1.29 UIU/ML (ref 0.27–4.2)
UA DIPSTICK W REFLEX MICRO PNL UR: ABNORMAL
URN SPEC COLLECT METH UR: ABNORMAL
UROBILINOGEN UR STRIP-ACNC: 1 E.U./DL
VLDLC SERPL CALC-MCNC: 22 MG/DL
WBC # BLD AUTO: 6.6 K/UL (ref 4–11)

## 2023-11-09 RX ORDER — PANTOPRAZOLE SODIUM 20 MG/1
20 TABLET, DELAYED RELEASE ORAL
Qty: 30 TABLET | Refills: 5 | Status: SHIPPED | OUTPATIENT
Start: 2023-11-09

## 2023-11-09 ASSESSMENT — PATIENT HEALTH QUESTIONNAIRE - PHQ9
SUM OF ALL RESPONSES TO PHQ QUESTIONS 1-9: 2
2. FEELING DOWN, DEPRESSED OR HOPELESS: 1
SUM OF ALL RESPONSES TO PHQ9 QUESTIONS 1 & 2: 2
SUM OF ALL RESPONSES TO PHQ QUESTIONS 1-9: 2
SUM OF ALL RESPONSES TO PHQ QUESTIONS 1-9: 2
1. LITTLE INTEREST OR PLEASURE IN DOING THINGS: 1
SUM OF ALL RESPONSES TO PHQ QUESTIONS 1-9: 2

## 2023-11-09 ASSESSMENT — ENCOUNTER SYMPTOMS
ABDOMINAL PAIN: 0
PHOTOPHOBIA: 0
RESPIRATORY NEGATIVE: 1
EYE DISCHARGE: 0
SWOLLEN GLANDS: 0
EYE ITCHING: 0
EYE PAIN: 0
NAUSEA: 1
EYE REDNESS: 0

## 2023-11-09 NOTE — PROGRESS NOTES
Luella Eisenmenger (:  1955) is a 76 y.o. male,Established patient, here for evaluation of the following chief complaint(s):  Weight Loss    The patient describes frequent regurgitation of his food after the food is subtle in his stomach. This is unpredictable and he denies any abdominal pain. See the lab orders for the investigation of his weight loss and poor appetite. He has no trouble getting his food down so a stricture is less likely than something causing irritation of the stomach. I will investigate for possible H. pylori ulcer with a breath test and send him to the gastroenterologist.  Lab review as directed. Chest x-ray because of his weight loss as well as the annual CT scan of his lungs because of his smoking history. ASSESSMENT/PLAN:  1. Smoker  -     CT Lung Screen (Initial/Annual/Baseline); Future  2. Atherosclerosis of artery of extremity with rest pain [facility])  Assessment & Plan:   Monitored by specialist- no acute findings meriting change in the plan  3. Personal history of tobacco use  -     CT Lung Screen (Initial/Annual/Baseline); Future  -     OK VISIT TO DISCUSS LUNG CA SCREEN W LDCT  4. Screening for colon cancer  -     Ambulatory referral to Gastroenterology  5. Weight loss  -     Hemoglobin A1C; Future  -     XR CHEST (2 VW); Future  -     Vitamin D 25 Hydroxy; Future  -     Hemoglobin A1C; Future  6. Other specified abnormal findings of blood chemistry  -     Hemoglobin A1C; Future  7. Gastritis and gastroduodenitis  -     H. Pylori Breath Test; Future  -     pantoprazole (PROTONIX) 20 MG tablet; Take 1 tablet by mouth every morning (before breakfast), Disp-30 tablet, R-5Normal  8. Essential hypertension  -     CBC with Auto Differential; Future  -     Comprehensive Metabolic Panel; Future  -     Urinalysis; Future  -     TSH with Reflex to FT4; Future  9. Other hyperlipidemia  -     Lipid, Fasting; Future  10.  Other specified disorders of carbohydrate metabolism ([address])  -

## 2023-11-09 NOTE — PATIENT INSTRUCTIONS
Please follow-up with the assigned gastroenterologist for an upper endoscopy. And colonoscopy to try to find out why you are losing weight and having difficulty keeping food in your stomach. As I said in the interview today you may have a stomach ulcer and that will also be investigated with testing. Lab review as indicated. See me again in 2 weeks to review your results and progress. Continue any medications that you are currently taking. Learning About Lung Cancer Screening  What is screening for lung cancer? Lung cancer screening is a way to find some lung cancers early, before a person has any symptoms of the cancer. Lung cancer screening may help those who have the highest risk for lung cancer--people age 48 and older who are or were heavy smokers. For most people, who aren't at increased risk, screening for lung cancer probably isn't helpful. Screening won't prevent cancer. And it may not find all lung cancers. Lung cancer screening may lower the risk of dying from lung cancer in a small number of people. How is it done? Lung cancer screening is done with a low-dose CT (computed tomography) scan. A CT scan uses X-rays, or radiation, to make detailed pictures of your body. Experts recommend that screening be done in medical centers that focus on finding and treating lung cancer. Who is screening recommended for? Lung cancer screening is recommended for people age 48 and older who are or were heavy smokers. That means people with a smoking history of at least 20 pack years. A pack year is a way to measure how heavy a smoker you are or were. To figure out your pack years, multiply how many packs a day on average (assuming 20 cigarettes per pack) you have smoked by how many years you have smoked. For example: If you smoked 1 pack a day for 20 years, that's 1 times 20. So you have a smoking history of 20 pack years. If you smoked 2 packs a day for 10 years, that's 2 times 10.  So you have a

## 2023-11-10 LAB
EST. AVERAGE GLUCOSE BLD GHB EST-MCNC: 116.9 MG/DL
HBA1C MFR BLD: 5.7 %

## 2023-11-28 ENCOUNTER — HOSPITAL ENCOUNTER (OUTPATIENT)
Dept: CT IMAGING | Age: 68
Discharge: HOME | End: 2023-11-28
Attending: INTERNAL MEDICINE

## 2023-11-28 ENCOUNTER — TELEPHONE (OUTPATIENT)
Dept: PRIMARY CARE CLINIC | Age: 68
End: 2023-11-28

## 2023-11-28 DIAGNOSIS — Z87.891 PERSONAL HISTORY OF TOBACCO USE: ICD-10-CM

## 2023-11-28 DIAGNOSIS — G56.92 NEUROPATHY OF LEFT HAND: Primary | ICD-10-CM

## 2023-11-28 DIAGNOSIS — F17.200 SMOKER: ICD-10-CM

## 2023-11-28 NOTE — CONSULTS
Pt arrived at [facility] for a CT lung screen exam. Pt was under the impression he was being seen for his complaints of weight loss and not being able to eat. He did not want the CT of his lungs today.

## 2023-12-12 ENCOUNTER — HOSPITAL ENCOUNTER (OUTPATIENT)
Age: 68
Discharge: HOME | End: 2023-12-12
Attending: INTERNAL MEDICINE
Payer: MEDICARE

## 2023-12-12 ENCOUNTER — HOSPITAL ENCOUNTER (OUTPATIENT)
Dept: CT IMAGING | Age: 68
Discharge: HOME | End: 2023-12-12
Attending: INTERNAL MEDICINE
Payer: MEDICARE

## 2023-12-12 ENCOUNTER — HOSPITAL ENCOUNTER (OUTPATIENT)
Dept: GENERAL RADIOLOGY | Age: 68
Discharge: HOME | End: 2023-12-12
Attending: INTERNAL MEDICINE
Payer: MEDICARE

## 2023-12-12 DIAGNOSIS — K29.90 GASTRITIS AND GASTRODUODENITIS: ICD-10-CM

## 2023-12-12 DIAGNOSIS — K29.70 GASTRITIS AND GASTRODUODENITIS: ICD-10-CM

## 2023-12-12 DIAGNOSIS — R63.4 WEIGHT LOSS: ICD-10-CM

## 2023-12-12 PROCEDURE — 71046 X-RAY EXAM CHEST 2 VIEWS: CPT

## 2023-12-12 PROCEDURE — 71271 CT THORAX LUNG CANCER SCR C-: CPT

## 2023-12-14 LAB — H PYLORI BREATH TEST: POSITIVE

## 2023-12-22 ENCOUNTER — TELEPHONE (OUTPATIENT)
Dept: PRIMARY CARE CLINIC | Age: 68
End: 2023-12-22

## 2023-12-26 ENCOUNTER — HOSPITAL ENCOUNTER (OUTPATIENT)
Dept: PET IMAGING | Age: 68
Discharge: HOME | End: 2023-12-26
Attending: INTERNAL MEDICINE
Payer: MEDICARE

## 2023-12-26 ENCOUNTER — TELEPHONE (OUTPATIENT)
Dept: PRIMARY CARE CLINIC | Age: 68
End: 2023-12-26

## 2023-12-26 DIAGNOSIS — R91.1 INCIDENTAL LUNG NODULE, GREATER THAN OR EQUAL TO 8MM: ICD-10-CM

## 2023-12-26 DIAGNOSIS — R63.4 WEIGHT LOSS: ICD-10-CM

## 2023-12-26 DIAGNOSIS — F17.200 SMOKER: ICD-10-CM

## 2023-12-26 PROCEDURE — A9552 F18 FDG: HCPCS | Performed by: INTERNAL MEDICINE

## 2023-12-26 PROCEDURE — 3430000000 HC RX DIAGNOSTIC RADIOPHARMACEUTICAL: Performed by: INTERNAL MEDICINE

## 2023-12-26 PROCEDURE — 78815 PET IMAGE W/CT SKULL-THIGH: CPT

## 2023-12-26 RX ORDER — FLUDEOXYGLUCOSE F 18 200 MCI/ML
16.53 INJECTION, SOLUTION INTRAVENOUS
Status: COMPLETED | OUTPATIENT
Start: 2023-12-26 | End: 2023-12-26

## 2023-12-26 RX ADMIN — FLUDEOXYGLUCOSE F 18 16.53 MILLICURIE: 200 INJECTION, SOLUTION INTRAVENOUS at 09:45

## 2023-12-26 NOTE — TELEPHONE ENCOUNTER
The patient was called and given the results of the PET CT scan of his skull to his thighs and urged to follow-up with the pulmonologist, Dr. David Gillette in January. The referral was made on December 16 when I saw him in the office. He was urged to finish the prescription for the positive H. pylori which she says has helped improve his appetite. He is not eating like he used to. The 2-week follow-up that was scheduled when he was here 9 days ago can be canceled unless he needs to talk to me. Follow-up with Dr. Karlos Knight was urged to be completed for possible bronchoscopy.

## 2024-01-03 ENCOUNTER — TELEPHONE (OUTPATIENT)
Dept: CASE MANAGEMENT | Age: 69
End: 2024-01-03

## 2024-01-19 ENCOUNTER — OFFICE VISIT (OUTPATIENT)
Dept: ORTHOPEDIC SURGERY | Age: 69
End: 2024-01-19

## 2024-01-19 VITALS — HEIGHT: 69 IN | RESPIRATION RATE: 16 BRPM | BODY MASS INDEX: 21.77 KG/M2 | WEIGHT: 147 LBS

## 2024-01-19 DIAGNOSIS — M65.30 TRIGGER FINGER, ACQUIRED: Primary | ICD-10-CM

## 2024-01-20 NOTE — PATIENT INSTRUCTIONS
Hand Range of Motion Instructions      Dr. Aram Schuler    Be cautions in resuming fulll activities and use of the hand for next 2 - 4 weeks.  Perform the following exercises VIGOROUSLY at least four times a day.   Exercises should be performed in the seated position with elbow on tabletop or other firm surface.  If you cannot make these motions on your own, you may use other hand to assist in making these motions.  Fully straighten fingers until hand is flat. Fully bend fingers until hand is in a full fist.   Bend wrist forward and backward (grasp hand around knuckles with other hand to do so).  Rotate forearm so that your palm faces towards your face. Rotate forearm so that your palm faces away from your face (grasp hand around wrist with other hand to do so).  Fully straighten elbow. Fully bend elbow.  Continue light use of the hand progressing to more normal us as it feels comfortable to do so.   In 2 - 4 weeks you may discontinue using the brace (if you were using one) and resume normal use of the hand and wrist if you have regained full and painless motion and function.  If you are unable to achieve  full and painless motion and function over 4 weeks, please call the office at [phone] to schedule a follow-up appointment with Dr. Schuler.      Thank you for choosing [facility] for your Hand and Upper Extremity needs.  If we can be of any further assistance to you, please do not hesitate to contact us.    Office Phone Number:  [phone]  or  [phone]

## 2024-01-20 NOTE — PROGRESS NOTES
clearly explained to him that the above outlined treatment plan should not be expected to 'cure' his  stenosing tenosynovitis, but we are rather treating the symptoms with which he presents.  He has understood that in order to achieve more durable relief of his symptoms and to prevent future worsening or further damage, that definitive surgical treatment would be required. Mr. Darren Duncan  voiced an appropriate understanding of our discussion, the options available to him, and of the expectations of his selected  treatment.    I have also discussed with Mr. Darren Duncan  the other treatment options available to him  for this condition.  We have today selected to proceed with conservative management.  He and I have agreed that if our current course of conservative treatment does not prove to be effective over the short term future, that he will schedule a follow-up appointment to discuss and select an alternate course of therapy including possibly injection or surgical treatment.       I have asked Mr. Darren Duncan  to feel free to contact me or schedule a follow-up appointment at any time that he feels the need for any further evaluation or treatment for his upper extremitiy condition.  If he feels that he continues to be feeling and functioning well, he may choose not to seek any further follow-up or treatment at his discretion.  I will remain available to continue his care at any time in the future.

## 2024-02-01 ENCOUNTER — OFFICE VISIT (OUTPATIENT)
Dept: PULMONOLOGY | Age: 69
End: 2024-02-01
Payer: MEDICARE

## 2024-02-01 ENCOUNTER — TELEPHONE (OUTPATIENT)
Dept: PRIMARY CARE CLINIC | Age: 69
End: 2024-02-01

## 2024-02-01 VITALS
SYSTOLIC BLOOD PRESSURE: 132 MMHG | TEMPERATURE: 98.5 F | WEIGHT: 140 LBS | RESPIRATION RATE: 18 BRPM | OXYGEN SATURATION: 99 % | HEART RATE: 77 BPM | BODY MASS INDEX: 20.73 KG/M2 | DIASTOLIC BLOOD PRESSURE: 81 MMHG | HEIGHT: 69 IN

## 2024-02-01 DIAGNOSIS — R11.0 NAUSEA: ICD-10-CM

## 2024-02-01 DIAGNOSIS — Z87.891 HISTORY OF TOBACCO ABUSE: ICD-10-CM

## 2024-02-01 DIAGNOSIS — F12.90 MARIJUANA USE: ICD-10-CM

## 2024-02-01 DIAGNOSIS — J43.8 PARASEPTAL EMPHYSEMA (HCC): ICD-10-CM

## 2024-02-01 DIAGNOSIS — R91.1 LUNG NODULE: Primary | ICD-10-CM

## 2024-02-01 DIAGNOSIS — R63.4 WEIGHT LOSS: Primary | ICD-10-CM

## 2024-02-01 DIAGNOSIS — R63.4 WEIGHT LOSS: ICD-10-CM

## 2024-02-01 PROCEDURE — 3017F COLORECTAL CA SCREEN DOC REV: CPT | Performed by: INTERNAL MEDICINE

## 2024-02-01 PROCEDURE — 1036F TOBACCO NON-USER: CPT | Performed by: INTERNAL MEDICINE

## 2024-02-01 PROCEDURE — 3023F SPIROM DOC REV: CPT | Performed by: INTERNAL MEDICINE

## 2024-02-01 PROCEDURE — G8420 CALC BMI NORM PARAMETERS: HCPCS | Performed by: INTERNAL MEDICINE

## 2024-02-01 PROCEDURE — 3079F DIAST BP 80-89 MM HG: CPT | Performed by: INTERNAL MEDICINE

## 2024-02-01 PROCEDURE — 3075F SYST BP GE 130 - 139MM HG: CPT | Performed by: INTERNAL MEDICINE

## 2024-02-01 PROCEDURE — 99204 OFFICE O/P NEW MOD 45 MIN: CPT | Performed by: INTERNAL MEDICINE

## 2024-02-01 PROCEDURE — G8484 FLU IMMUNIZE NO ADMIN: HCPCS | Performed by: INTERNAL MEDICINE

## 2024-02-01 PROCEDURE — G8428 CUR MEDS NOT DOCUMENT: HCPCS | Performed by: INTERNAL MEDICINE

## 2024-02-01 PROCEDURE — 1123F ACP DISCUSS/DSCN MKR DOCD: CPT | Performed by: INTERNAL MEDICINE

## 2024-02-01 NOTE — TELEPHONE ENCOUNTER
----- Message from Antonette Tate sent at 2/1/2024 10:22 AM EST -----  Subject: Referral Request    Reason for referral request? stomach specialist   Provider patient wants to be referred to(if known):     Provider Phone Number(if known):    Additional Information for Provider? Patient would like a referral for a   stomach specialist since patient can't keep anything down. Patient states   it has been going on since Saturday but a long time issue over a year .  ---------------------------------------------------------------------------  --------------  CALL BACK INFO    [phone]; OK to leave message on voicemail  ---------------------------------------------------------------------------  --------------

## 2024-02-01 NOTE — PROGRESS NOTES
PATIENT IS SEEN AT THE REQUEST OF DR. Lozano for lung nodule (I am guessing)    Chief complaint  This is a 68 y.o. year old male  who comes to see me with a chief complaint of   Chief Complaint   Patient presents with    New Patient     Lung nodule       HPI  Here with a cc of stomach issues    He says he is here for stomach issues.  He cannot eat and keep food down and has lost a lot of weight.  When I reviewed the chart and let him know that I am not a stomach doctor he then was confused about why he was here.  I noted he had a nodule in his lung with recent PET scan.  He did remember that but was not sure what was going on.  He denies any history of lung disease.  He used to smoking but quit about 4-5 years ago.  He smokes a lot of marijuana however.  Never has been SOB with activity and has never really had complaints.  His partner is present     Occupation:  Remodeling     Pets: None    Hobbies/Exposures: None    TB Exposure:  None     Lung Procedures:  None      Past Medical History:   Diagnosis Date    Cerebral artery occlusion with cerebral infarction (HCC)     Patient said he has no residual effects    Heart attack (HCC)     3 yr ago    Hx of blood clots     right side of neck    Hyperlipidemia     Hypertension     Peripheral vascular disease, unspecified (HCC) 09/22/2020       Past Surgical History:   Procedure Laterality Date    ARTERIAL CLOT SURGERY Right 2016    FEMORAL BYPASS Left 9/22/2020    LEFT FEMORAL ENDARTERECTOMY, LEFT FEMORAL TO POPLITEAL BYPASS GRAFT performed by Armando Grigsby MD at [facility] OR       Social History     Socioeconomic History    Marital status: Legally      Spouse name: Not on file    Number of children: Not on file    Years of education: Not on file    Highest education level: Not on file   Occupational History    Not on file   Tobacco Use    Smoking status: Former     Current packs/day: 0.00     Average packs/day: 1 pack/day for 20.0 years (20.0 ttl pk-yrs)

## 2024-02-06 ENCOUNTER — TELEPHONE (OUTPATIENT)
Dept: PRIMARY CARE CLINIC | Age: 69
End: 2024-02-06

## 2024-02-06 NOTE — TELEPHONE ENCOUNTER
----- Message from Brandy Sahu sent at 2/5/2024  3:15 PM EST -----  Subject: Referral Request    Reason for referral request? pt needs a referral for a Gastroenterologist   asap pt is having weight loss ,pt states its getting worse   Provider patient wants to be referred to(if known):     Provider Phone Number(if known):    Additional Information for Provider?   ---------------------------------------------------------------------------  --------------  CALL BACK INFO    [phone]; OK to leave message on voicemail  ---------------------------------------------------------------------------  --------------

## 2024-02-07 ENCOUNTER — TELEPHONE (OUTPATIENT)
Dept: PRIMARY CARE CLINIC | Age: 69
End: 2024-02-07

## 2024-02-07 NOTE — TELEPHONE ENCOUNTER
Called number provided and it said person is not able to be reached at this time.. Could not leave a VM message

## 2024-02-07 NOTE — TELEPHONE ENCOUNTER
----- Message from Jacek Epstein sent at 2/7/2024 10:47 AM EST -----  Subject: Message to Provider    QUESTIONS  Information for Provider? Patient wife is calling to speak to the Nurse of   the PCP Marta. She wants to speak to the nurse about the patient not   being able to eat.   ---------------------------------------------------------------------------  --------------  CALL BACK INFO  [phone]; Do not leave any message, patient will call back for answer  ---------------------------------------------------------------------------  --------------  SCRIPT ANSWERS  Relationship to Patient? Spouse/Partner  Representative Name? Janet  Is the representative on the Communication Release of Information (DEVI)   form in Epic? Yes

## 2024-02-18 ENCOUNTER — TELEPHONE (OUTPATIENT)
Dept: CASE MANAGEMENT | Age: 69
End: 2024-02-18

## 2024-02-27 ENCOUNTER — OFFICE VISIT (OUTPATIENT)
Dept: PRIMARY CARE CLINIC | Age: 69
End: 2024-02-27

## 2024-02-27 VITALS
BODY MASS INDEX: 19.85 KG/M2 | HEIGHT: 69 IN | TEMPERATURE: 97.9 F | RESPIRATION RATE: 18 BRPM | WEIGHT: 134 LBS | OXYGEN SATURATION: 98 % | DIASTOLIC BLOOD PRESSURE: 76 MMHG | SYSTOLIC BLOOD PRESSURE: 145 MMHG | HEART RATE: 77 BPM

## 2024-02-27 DIAGNOSIS — A04.8 H. PYLORI INFECTION: ICD-10-CM

## 2024-02-27 DIAGNOSIS — K29.90 GASTRITIS AND GASTRODUODENITIS: ICD-10-CM

## 2024-02-27 DIAGNOSIS — E74.89 OTHER SPECIFIED DISORDERS OF CARBOHYDRATE METABOLISM (HCC): ICD-10-CM

## 2024-02-27 DIAGNOSIS — E43 UNSPECIFIED SEVERE PROTEIN-CALORIE MALNUTRITION (HCC): Primary | ICD-10-CM

## 2024-02-27 DIAGNOSIS — K29.70 GASTRITIS AND GASTRODUODENITIS: ICD-10-CM

## 2024-02-27 DIAGNOSIS — E43 UNSPECIFIED SEVERE PROTEIN-CALORIE MALNUTRITION (HCC): ICD-10-CM

## 2024-02-27 DIAGNOSIS — I70.229 ATHEROSCLEROSIS OF ARTERY OF EXTREMITY WITH REST PAIN (HCC): ICD-10-CM

## 2024-02-27 RX ORDER — CLARITHROMYCIN 500 MG/1
500 TABLET, COATED ORAL 2 TIMES DAILY
Qty: 28 TABLET | Refills: 0 | Status: SHIPPED | OUTPATIENT
Start: 2024-02-27 | End: 2024-03-12

## 2024-02-27 RX ORDER — AMOXICILLIN 500 MG/1
1000 CAPSULE ORAL 2 TIMES DAILY
Qty: 56 CAPSULE | Refills: 0 | Status: SHIPPED | OUTPATIENT
Start: 2024-02-27 | End: 2024-03-12

## 2024-02-27 RX ORDER — PANTOPRAZOLE SODIUM 20 MG/1
20 TABLET, DELAYED RELEASE ORAL
Qty: 30 TABLET | Refills: 5 | Status: SHIPPED | OUTPATIENT
Start: 2024-02-27

## 2024-02-27 ASSESSMENT — PATIENT HEALTH QUESTIONNAIRE - PHQ9
SUM OF ALL RESPONSES TO PHQ9 QUESTIONS 1 & 2: 0
SUM OF ALL RESPONSES TO PHQ QUESTIONS 1-9: 0
2. FEELING DOWN, DEPRESSED OR HOPELESS: 0
1. LITTLE INTEREST OR PLEASURE IN DOING THINGS: 0

## 2024-02-27 NOTE — PATIENT INSTRUCTIONS
Please follow-up with the gastroenterologist.  I have reordered the H. pylori breath test as well as the antibiotics that I treated you with previously that helped your symptoms.

## 2024-02-29 LAB — H PYLORI BREATH TEST: NEGATIVE

## 2024-02-29 NOTE — PROGRESS NOTES
Darren Duncan (:  1955) is a 68 y.o. male,Established patient, here for evaluation of the following chief complaint(s):  Weight Loss (Can't keep food down and weight keeps going down )         ASSESSMENT/PLAN:  1. Unspecified severe protein-calorie malnutrition (HCC)  -     H. Pylori Breath Test; Pancho Murray MD, Gastroenterology (ERCP & EUS), [facility]  2. H. pylori infection  -     amoxicillin (AMOXIL) 500 MG capsule; Take 2 capsules by mouth 2 times daily for 14 days, Disp-56 capsule, R-0Normal  -     clarithromycin (BIAXIN) 500 MG tablet; Take 1 tablet by mouth 2 times daily for 14 days, Disp-28 tablet, R-0Normal  -     pantoprazole (PROTONIX) 20 MG tablet; Take 1 tablet by mouth every morning (before breakfast), Disp-30 tablet, R-5Normal  -     H. Pylori Breath Test; Pancho Murray MD, Gastroenterology (ERCP & EUS), [facility]  3. Other specified disorders of carbohydrate metabolism (HCC)  4. Atherosclerosis of artery of extremity with rest pain (HCC)  5. Gastritis and gastroduodenitis  -     pantoprazole (PROTONIX) 20 MG tablet; Take 1 tablet by mouth every morning (before breakfast), Disp-30 tablet, R-5Normal  -     H. Pylori Breath Test; Pancho Murray MD, Gastroenterology (ERCP & EUS), [facility]      No follow-ups on file.         Subjective   SUBJECTIVE/OBJECTIVE:  HPI    Review of Systems       Objective   Physical Exam       No problem-specific Assessment & Plan notes found for this encounter.       On this date 2024 I have spent 30 minutes reviewing previous notes, test results and face to face with the patient discussing the diagnosis and importance of compliance with the treatment plan as well as documenting on the day of the visit.      An electronic signature was used to authenticate this note.    --MANDIE GIRALDO MD

## 2024-03-05 ENCOUNTER — TELEPHONE (OUTPATIENT)
Dept: CASE MANAGEMENT | Age: 69
End: 2024-03-05

## 2024-03-05 NOTE — TELEPHONE ENCOUNTER
Second Lung Cancer Screening Recommendation Reminder letter mailed to patient. First lung cancer screening reminder letter sent to patient via My Chart.

## 2024-05-05 ENCOUNTER — TELEPHONE (OUTPATIENT)
Dept: CASE MANAGEMENT | Age: 69
End: 2024-05-05

## 2024-05-05 NOTE — TELEPHONE ENCOUNTER
Lung Cancer Screening Radiology Recommendation reminder letter mailed to patient, this is patients 3rd & final reminder letter. This letter has been mailed via CERTIFIED mail.  Patients ordering provider, Dr. Brunson, notified via EMR direct message, verification received.

## 2024-05-16 ENCOUNTER — APPOINTMENT (OUTPATIENT)
Dept: ULTRASOUND IMAGING | Age: 69
End: 2024-05-16
Payer: MEDICARE

## 2024-05-16 ENCOUNTER — APPOINTMENT (OUTPATIENT)
Dept: CT IMAGING | Age: 69
End: 2024-05-16
Payer: MEDICARE

## 2024-05-16 ENCOUNTER — HOSPITAL ENCOUNTER (INPATIENT)
Age: 69
LOS: 15 days | End: 2024-05-31
Attending: EMERGENCY MEDICINE | Admitting: HOSPITALIST
Payer: MEDICARE

## 2024-05-16 DIAGNOSIS — R10.9 ABDOMINAL PAIN, UNSPECIFIED ABDOMINAL LOCATION: Primary | ICD-10-CM

## 2024-05-16 DIAGNOSIS — K55.1 MESENTERIC ARTERY STENOSIS (HCC): ICD-10-CM

## 2024-05-16 DIAGNOSIS — Z01.810 PRE-OPERATIVE CARDIOVASCULAR EXAMINATION: ICD-10-CM

## 2024-05-16 DIAGNOSIS — K56.7 ILEUS (HCC): ICD-10-CM

## 2024-05-16 DIAGNOSIS — I42.9 CARDIOMYOPATHY, UNSPECIFIED TYPE (HCC): ICD-10-CM

## 2024-05-16 DIAGNOSIS — R62.7 FAILURE TO THRIVE IN ADULT: ICD-10-CM

## 2024-05-16 DIAGNOSIS — K55.9 MESENTERIC ISCHEMIA (HCC): ICD-10-CM

## 2024-05-16 LAB
ALBUMIN SERPL-MCNC: 3.6 G/DL (ref 3.4–5)
ALBUMIN/GLOB SERPL: 1.2 {RATIO} (ref 1.1–2.2)
ALP SERPL-CCNC: 84 U/L (ref 40–129)
ALT SERPL-CCNC: 24 U/L (ref 10–40)
ANION GAP SERPL CALCULATED.3IONS-SCNC: 9 MMOL/L (ref 3–16)
AST SERPL-CCNC: 17 U/L (ref 15–37)
BACTERIA URNS QL MICRO: ABNORMAL /HPF
BASOPHILS # BLD: 0.1 K/UL (ref 0–0.2)
BASOPHILS NFR BLD: 0.5 %
BILIRUB SERPL-MCNC: 0.4 MG/DL (ref 0–1)
BILIRUB UR QL STRIP.AUTO: NEGATIVE
BUN SERPL-MCNC: 12 MG/DL (ref 7–20)
CALCIUM SERPL-MCNC: 9.3 MG/DL (ref 8.3–10.6)
CEA SERPL-MCNC: 3.5 NG/ML (ref 0–5)
CHLORIDE SERPL-SCNC: 97 MMOL/L (ref 99–110)
CLARITY UR: CLEAR
CO2 SERPL-SCNC: 30 MMOL/L (ref 21–32)
COLOR UR: YELLOW
CREAT SERPL-MCNC: <0.5 MG/DL (ref 0.8–1.3)
DEPRECATED RDW RBC AUTO: 13.5 % (ref 12.4–15.4)
EOSINOPHIL # BLD: 0.1 K/UL (ref 0–0.6)
EOSINOPHIL NFR BLD: 0.8 %
EPI CELLS #/AREA URNS AUTO: 1 /HPF (ref 0–5)
GFR SERPLBLD CREATININE-BSD FMLA CKD-EPI: >90 ML/MIN/{1.73_M2}
GLUCOSE SERPL-MCNC: 111 MG/DL (ref 70–99)
GLUCOSE UR STRIP.AUTO-MCNC: NEGATIVE MG/DL
HCT VFR BLD AUTO: 36.4 % (ref 40.5–52.5)
HGB BLD-MCNC: 12.4 G/DL (ref 13.5–17.5)
HGB UR QL STRIP.AUTO: NEGATIVE
HYALINE CASTS #/AREA URNS AUTO: 0 /LPF (ref 0–8)
KETONES UR STRIP.AUTO-MCNC: ABNORMAL MG/DL
LEUKOCYTE ESTERASE UR QL STRIP.AUTO: NEGATIVE
LIPASE SERPL-CCNC: 15 U/L (ref 13–60)
LYMPHOCYTES # BLD: 0.9 K/UL (ref 1–5.1)
LYMPHOCYTES NFR BLD: 8.8 %
MCH RBC QN AUTO: 32.6 PG (ref 26–34)
MCHC RBC AUTO-ENTMCNC: 34.1 G/DL (ref 31–36)
MCV RBC AUTO: 95.6 FL (ref 80–100)
MONOCYTES # BLD: 0.9 K/UL (ref 0–1.3)
MONOCYTES NFR BLD: 8.3 %
NEUTROPHILS # BLD: 8.4 K/UL (ref 1.7–7.7)
NEUTROPHILS NFR BLD: 81.6 %
NITRITE UR QL STRIP.AUTO: NEGATIVE
PH UR STRIP.AUTO: 5.5 [PH] (ref 5–8)
PLATELET # BLD AUTO: 201 K/UL (ref 135–450)
PMV BLD AUTO: 8.1 FL (ref 5–10.5)
POTASSIUM SERPL-SCNC: 3.9 MMOL/L (ref 3.5–5.1)
PROT SERPL-MCNC: 6.6 G/DL (ref 6.4–8.2)
PROT UR STRIP.AUTO-MCNC: ABNORMAL MG/DL
RBC # BLD AUTO: 3.81 M/UL (ref 4.2–5.9)
RBC CLUMPS #/AREA URNS AUTO: 3 /HPF (ref 0–4)
SODIUM SERPL-SCNC: 136 MMOL/L (ref 136–145)
SP GR UR STRIP.AUTO: 1.09 (ref 1–1.03)
TSH SERPL DL<=0.005 MIU/L-ACNC: 2.75 UIU/ML (ref 0.27–4.2)
UA COMPLETE W REFLEX CULTURE PNL UR: ABNORMAL
UA DIPSTICK W REFLEX MICRO PNL UR: YES
URN SPEC COLLECT METH UR: ABNORMAL
UROBILINOGEN UR STRIP-ACNC: 1 E.U./DL
WBC # BLD AUTO: 10.3 K/UL (ref 4–11)
WBC #/AREA URNS AUTO: 1 /HPF (ref 0–5)
YEAST URNS QL MICRO: PRESENT /HPF

## 2024-05-16 PROCEDURE — A4216 STERILE WATER/SALINE, 10 ML: HCPCS | Performed by: EMERGENCY MEDICINE

## 2024-05-16 PROCEDURE — 82378 CARCINOEMBRYONIC ANTIGEN: CPT

## 2024-05-16 PROCEDURE — 80053 COMPREHEN METABOLIC PANEL: CPT

## 2024-05-16 PROCEDURE — 6370000000 HC RX 637 (ALT 250 FOR IP): Performed by: HOSPITALIST

## 2024-05-16 PROCEDURE — 84443 ASSAY THYROID STIM HORMONE: CPT

## 2024-05-16 PROCEDURE — 96375 TX/PRO/DX INJ NEW DRUG ADDON: CPT

## 2024-05-16 PROCEDURE — 1200000000 HC SEMI PRIVATE

## 2024-05-16 PROCEDURE — 6360000004 HC RX CONTRAST MEDICATION: Performed by: EMERGENCY MEDICINE

## 2024-05-16 PROCEDURE — 83690 ASSAY OF LIPASE: CPT

## 2024-05-16 PROCEDURE — 2580000003 HC RX 258: Performed by: HOSPITALIST

## 2024-05-16 PROCEDURE — 82105 ALPHA-FETOPROTEIN SERUM: CPT

## 2024-05-16 PROCEDURE — 6360000002 HC RX W HCPCS: Performed by: HOSPITALIST

## 2024-05-16 PROCEDURE — 6360000002 HC RX W HCPCS: Performed by: EMERGENCY MEDICINE

## 2024-05-16 PROCEDURE — 2580000003 HC RX 258: Performed by: EMERGENCY MEDICINE

## 2024-05-16 PROCEDURE — 6370000000 HC RX 637 (ALT 250 FOR IP): Performed by: EMERGENCY MEDICINE

## 2024-05-16 PROCEDURE — 86301 IMMUNOASSAY TUMOR CA 19-9: CPT

## 2024-05-16 PROCEDURE — 96361 HYDRATE IV INFUSION ADD-ON: CPT

## 2024-05-16 PROCEDURE — 99285 EMERGENCY DEPT VISIT HI MDM: CPT

## 2024-05-16 PROCEDURE — 2500000003 HC RX 250 WO HCPCS: Performed by: EMERGENCY MEDICINE

## 2024-05-16 PROCEDURE — 74177 CT ABD & PELVIS W/CONTRAST: CPT

## 2024-05-16 PROCEDURE — 85025 COMPLETE CBC W/AUTO DIFF WBC: CPT

## 2024-05-16 PROCEDURE — 81001 URINALYSIS AUTO W/SCOPE: CPT

## 2024-05-16 PROCEDURE — 96374 THER/PROPH/DIAG INJ IV PUSH: CPT

## 2024-05-16 RX ORDER — ACETAMINOPHEN 325 MG/1
650 TABLET ORAL EVERY 6 HOURS PRN
Status: DISCONTINUED | OUTPATIENT
Start: 2024-05-16 | End: 2024-05-22

## 2024-05-16 RX ORDER — POTASSIUM CHLORIDE 20 MEQ/1
40 TABLET, EXTENDED RELEASE ORAL PRN
Status: DISCONTINUED | OUTPATIENT
Start: 2024-05-16 | End: 2024-05-22

## 2024-05-16 RX ORDER — SODIUM CHLORIDE 9 MG/ML
INJECTION, SOLUTION INTRAVENOUS CONTINUOUS
Status: DISCONTINUED | OUTPATIENT
Start: 2024-05-16 | End: 2024-05-22 | Stop reason: SDUPTHER

## 2024-05-16 RX ORDER — ACETAMINOPHEN 650 MG/1
650 SUPPOSITORY RECTAL EVERY 6 HOURS PRN
Status: DISCONTINUED | OUTPATIENT
Start: 2024-05-16 | End: 2024-05-22

## 2024-05-16 RX ORDER — SODIUM CHLORIDE 9 MG/ML
INJECTION, SOLUTION INTRAVENOUS CONTINUOUS
Status: DISCONTINUED | OUTPATIENT
Start: 2024-05-16 | End: 2024-05-16 | Stop reason: ALTCHOICE

## 2024-05-16 RX ORDER — ONDANSETRON 4 MG/1
4 TABLET, ORALLY DISINTEGRATING ORAL EVERY 8 HOURS PRN
Status: DISCONTINUED | OUTPATIENT
Start: 2024-05-16 | End: 2024-05-22

## 2024-05-16 RX ORDER — SODIUM CHLORIDE 0.9 % (FLUSH) 0.9 %
10 SYRINGE (ML) INJECTION PRN
Status: DISCONTINUED | OUTPATIENT
Start: 2024-05-16 | End: 2024-05-22

## 2024-05-16 RX ORDER — POTASSIUM CHLORIDE 7.45 MG/ML
10 INJECTION INTRAVENOUS PRN
Status: DISCONTINUED | OUTPATIENT
Start: 2024-05-16 | End: 2024-05-22

## 2024-05-16 RX ORDER — SODIUM CHLORIDE 0.9 % (FLUSH) 0.9 %
5-40 SYRINGE (ML) INJECTION EVERY 12 HOURS SCHEDULED
Status: DISCONTINUED | OUTPATIENT
Start: 2024-05-16 | End: 2024-05-22

## 2024-05-16 RX ORDER — ONDANSETRON 2 MG/ML
4 INJECTION INTRAMUSCULAR; INTRAVENOUS ONCE
Status: COMPLETED | OUTPATIENT
Start: 2024-05-16 | End: 2024-05-16

## 2024-05-16 RX ORDER — ONDANSETRON 2 MG/ML
4 INJECTION INTRAMUSCULAR; INTRAVENOUS EVERY 6 HOURS PRN
Status: DISCONTINUED | OUTPATIENT
Start: 2024-05-16 | End: 2024-05-22

## 2024-05-16 RX ORDER — LISINOPRIL 20 MG/1
20 TABLET ORAL DAILY
Status: DISCONTINUED | OUTPATIENT
Start: 2024-05-16 | End: 2024-06-01 | Stop reason: HOSPADM

## 2024-05-16 RX ORDER — PANTOPRAZOLE SODIUM 40 MG/1
40 TABLET, DELAYED RELEASE ORAL 2 TIMES DAILY
Status: DISCONTINUED | OUTPATIENT
Start: 2024-05-16 | End: 2024-06-01 | Stop reason: HOSPADM

## 2024-05-16 RX ORDER — FLUCONAZOLE 100 MG/1
200 TABLET ORAL DAILY
Status: DISCONTINUED | OUTPATIENT
Start: 2024-05-16 | End: 2024-05-20

## 2024-05-16 RX ORDER — SODIUM CHLORIDE 9 MG/ML
INJECTION, SOLUTION INTRAVENOUS PRN
Status: DISCONTINUED | OUTPATIENT
Start: 2024-05-16 | End: 2024-05-22

## 2024-05-16 RX ORDER — MORPHINE SULFATE 4 MG/ML
4 INJECTION, SOLUTION INTRAMUSCULAR; INTRAVENOUS ONCE
Status: COMPLETED | OUTPATIENT
Start: 2024-05-16 | End: 2024-05-16

## 2024-05-16 RX ORDER — SUCRALFATE 1 G
1 TABLET ORAL 4 TIMES DAILY
COMMUNITY
Start: 2024-05-02

## 2024-05-16 RX ORDER — AMLODIPINE BESYLATE 10 MG/1
10 TABLET ORAL DAILY
Status: DISCONTINUED | OUTPATIENT
Start: 2024-05-16 | End: 2024-06-01 | Stop reason: HOSPADM

## 2024-05-16 RX ORDER — PANTOPRAZOLE SODIUM 40 MG/1
40 TABLET, DELAYED RELEASE ORAL 2 TIMES DAILY
COMMUNITY
Start: 2024-04-22

## 2024-05-16 RX ORDER — ATORVASTATIN CALCIUM 80 MG/1
80 TABLET, FILM COATED ORAL DAILY
Status: DISCONTINUED | OUTPATIENT
Start: 2024-05-16 | End: 2024-06-01 | Stop reason: HOSPADM

## 2024-05-16 RX ORDER — ENOXAPARIN SODIUM 100 MG/ML
30 INJECTION SUBCUTANEOUS DAILY
Status: DISCONTINUED | OUTPATIENT
Start: 2024-05-16 | End: 2024-05-22

## 2024-05-16 RX ORDER — ASPIRIN 81 MG/1
81 TABLET, CHEWABLE ORAL DAILY
Status: DISCONTINUED | OUTPATIENT
Start: 2024-05-16 | End: 2024-06-01 | Stop reason: HOSPADM

## 2024-05-16 RX ORDER — POLYETHYLENE GLYCOL 3350 17 G/17G
17 POWDER, FOR SOLUTION ORAL DAILY PRN
Status: DISCONTINUED | OUTPATIENT
Start: 2024-05-16 | End: 2024-05-22

## 2024-05-16 RX ADMIN — HYDROMORPHONE HYDROCHLORIDE 1 MG: 1 INJECTION, SOLUTION INTRAMUSCULAR; INTRAVENOUS; SUBCUTANEOUS at 16:32

## 2024-05-16 RX ADMIN — FAMOTIDINE 20 MG: 10 INJECTION, SOLUTION INTRAVENOUS at 07:48

## 2024-05-16 RX ADMIN — IOPAMIDOL 75 ML: 755 INJECTION, SOLUTION INTRAVENOUS at 08:58

## 2024-05-16 RX ADMIN — FLUCONAZOLE 200 MG: 100 TABLET ORAL at 14:44

## 2024-05-16 RX ADMIN — PANTOPRAZOLE SODIUM 40 MG: 40 TABLET, DELAYED RELEASE ORAL at 20:14

## 2024-05-16 RX ADMIN — ATORVASTATIN CALCIUM 80 MG: 80 TABLET, FILM COATED ORAL at 14:44

## 2024-05-16 RX ADMIN — HYDROMORPHONE HYDROCHLORIDE 1 MG: 1 INJECTION, SOLUTION INTRAMUSCULAR; INTRAVENOUS; SUBCUTANEOUS at 20:32

## 2024-05-16 RX ADMIN — PANTOPRAZOLE SODIUM 40 MG: 40 TABLET, DELAYED RELEASE ORAL at 14:45

## 2024-05-16 RX ADMIN — AMLODIPINE BESYLATE 10 MG: 10 TABLET ORAL at 14:45

## 2024-05-16 RX ADMIN — ENOXAPARIN SODIUM 30 MG: 100 INJECTION SUBCUTANEOUS at 14:45

## 2024-05-16 RX ADMIN — Medication: at 07:49

## 2024-05-16 RX ADMIN — ONDANSETRON 4 MG: 2 INJECTION INTRAMUSCULAR; INTRAVENOUS at 11:22

## 2024-05-16 RX ADMIN — LISINOPRIL 20 MG: 20 TABLET ORAL at 14:45

## 2024-05-16 RX ADMIN — SODIUM CHLORIDE: 9 INJECTION, SOLUTION INTRAVENOUS at 14:04

## 2024-05-16 RX ADMIN — SODIUM CHLORIDE: 9 INJECTION, SOLUTION INTRAVENOUS at 07:58

## 2024-05-16 RX ADMIN — ASPIRIN 81 MG: 81 TABLET, CHEWABLE ORAL at 14:48

## 2024-05-16 RX ADMIN — MORPHINE SULFATE 4 MG: 4 INJECTION, SOLUTION INTRAMUSCULAR; INTRAVENOUS at 11:22

## 2024-05-16 ASSESSMENT — PAIN SCALES - GENERAL
PAINLEVEL_OUTOF10: 8
PAINLEVEL_OUTOF10: 6
PAINLEVEL_OUTOF10: 8
PAINLEVEL_OUTOF10: 7
PAINLEVEL_OUTOF10: 8
PAINLEVEL_OUTOF10: 5
PAINLEVEL_OUTOF10: 3
PAINLEVEL_OUTOF10: 5

## 2024-05-16 ASSESSMENT — PAIN DESCRIPTION - LOCATION
LOCATION: ABDOMEN
LOCATION: ABDOMEN;BACK
LOCATION: ABDOMEN
LOCATION: ABDOMEN;BACK

## 2024-05-16 ASSESSMENT — PAIN DESCRIPTION - PAIN TYPE: TYPE: ACUTE PAIN

## 2024-05-16 ASSESSMENT — LIFESTYLE VARIABLES
HOW OFTEN DO YOU HAVE A DRINK CONTAINING ALCOHOL: NEVER
HOW MANY STANDARD DRINKS CONTAINING ALCOHOL DO YOU HAVE ON A TYPICAL DAY: PATIENT DOES NOT DRINK

## 2024-05-16 ASSESSMENT — PAIN DESCRIPTION - DESCRIPTORS
DESCRIPTORS: PATIENT UNABLE TO DESCRIBE
DESCRIPTORS: ACHING

## 2024-05-16 ASSESSMENT — PAIN DESCRIPTION - FREQUENCY: FREQUENCY: CONTINUOUS

## 2024-05-16 ASSESSMENT — PAIN - FUNCTIONAL ASSESSMENT
PAIN_FUNCTIONAL_ASSESSMENT: 0-10
PAIN_FUNCTIONAL_ASSESSMENT: ACTIVITIES ARE NOT PREVENTED
PAIN_FUNCTIONAL_ASSESSMENT: 0-10

## 2024-05-16 ASSESSMENT — PAIN DESCRIPTION - ONSET: ONSET: AWAKENED FROM SLEEP

## 2024-05-16 NOTE — CONSULTS
GASTROENTEROLOGY INPATIENT CONSULTATION        IDENTIFYING DATA/REASON FOR CONSULTATION   PATIENT:  aDrren Duncan  MRN:  [id number]  ADMIT DATE: 5/16/2024  TIME OF EVALUATION: 5/16/2024 2:26 PM  HOSPITAL STAY:   LOS: 0 days     REASON FOR CONSULTATION:  Abdominal pain    HISTORY OF PRESENT ILLNESS   Darren Duncan is a 68 y.o. male with a PMH of CVA, HTN, HLD, PVD who presented on 5/16/2024 with abdominal pain, poor appetite, weight loss.  He reports the pain has been occurring for the past 2 years.  Pain located mid abdomen above and below his umbilicus.  He states the pain is constant, worse with eating and worse at night when he is trying to fall asleep.  He has had some nausea.  He has had some loose stools.  Denies seeing blood in his stools or black stools. His appetite had been very poor and he has lost at least 80 lbs. He has been evaluated by Dr. Zaidi with [facility] as an outpatient.   He has a history of H. pylori gastritis treated with triple therapy in the past.  Patient denies history of heavy alcohol use or prior diagnosis of acute/chronic pancreatitis.  He smokes marijuana and Black and Mild.  He underwent a recent EGD and colonoscopy on 4/22/2024.  EGD showed normal esophagus, gastritis with 2 antral ulcers, duodenitis.  Colonoscopy showed a descending colon polyp, mild colitis with erosions in the cecum suspected to be NSAID induced and internal hemorrhoids.  Gastric  biopsies were negative for H. pylori.  Cecal biopsies showed no pathological changes.  Descending colon polyp showed adenoma with no high-grade dysplasia.  He had a recent PET scan that showed lung nodules however has followed up with Dr. Brunson with low concern for adenocarcinoma.  Plan is for repeat CT scan in 3 months.  He has hx of PVD s/p left femoral endarterectomy and fem-pop bypass graft.      CT A/P with IV contrast showed dilated fluid filled small bowel, decompressed stomach, advanced atherosclerotic changes of the  aorta.      PAST MEDICAL, SURGICAL, FAMILY, and SOCIAL HISTORY     Past Medical History:   Diagnosis Date    Cerebral artery occlusion with cerebral infarction (HCC)     Patient said he has no residual effects    Heart attack (HCC)     3 yr ago    Hx of blood clots     right side of neck    Hyperlipidemia     Hypertension     Peripheral vascular disease, unspecified (HCC) 09/22/2020     Past Surgical History:   Procedure Laterality Date    ARTERIAL CLOT SURGERY Right 2016    FEMORAL BYPASS Left 9/22/2020    LEFT FEMORAL ENDARTERECTOMY, LEFT FEMORAL TO POPLITEAL BYPASS GRAFT performed by Armando Grigsby MD at [facility] OR     Family History   Problem Relation Age of Onset    Diabetes Mother     Heart Attack Mother     Heart Disease Mother      Social History     Socioeconomic History    Marital status: Legally      Spouse name: None    Number of children: None    Years of education: None    Highest education level: None   Tobacco Use    Smoking status: Former     Current packs/day: 0.00     Average packs/day: 1 pack/day for 20.0 years (20.0 ttl pk-yrs)     Types: Cigarettes     Start date: 8/10/2000     Quit date: 8/10/2020     Years since quitting: 3.7    Smokeless tobacco: Never   Vaping Use    Vaping Use: Never used   Substance and Sexual Activity    Alcohol use: Yes     Alcohol/week: 12.0 standard drinks of alcohol     Types: 12 Cans of beer per week    Drug use: Yes     Types: Marijuana (Weed)     Comment: yesterday    Sexual activity: Yes     Partners: Female     Social Determinants of Health     Financial Resource Strain: Low Risk  (12/20/2023)    Overall Financial Resource Strain (CARDIA)     Difficulty of Paying Living Expenses: Not hard at all   Food Insecurity: No Food Insecurity (12/20/2023)    Hunger Vital Sign     Worried About Running Out of Food in the Last Year: Never true     Ran Out of Food in the Last Year: Never true   Transportation Needs: Unknown (12/20/2023)    PRAPARE - Transportation      Lack of Transportation (Non-Medical): No   Physical Activity: Inactive (12/20/2023)    Exercise Vital Sign     Days of Exercise per Week: 0 days     Minutes of Exercise per Session: 0 min   Housing Stability: Unknown (12/20/2023)    Housing Stability Vital Sign     Unstable Housing in the Last Year: No       MEDICATIONS   SCHEDULED:  amLODIPine, 10 mg, Daily  aspirin, 81 mg, Daily  atorvastatin, 80 mg, Daily  lisinopril, 20 mg, Daily  pantoprazole, 40 mg, BID  sodium chloride flush, 5-40 mL, 2 times per day  enoxaparin, 30 mg, Daily  fluconazole, 200 mg, Daily      FLUIDS/DRIPS:     sodium chloride 100 mL/hr at 05/16/24 1404    sodium chloride       PRNs: sodium chloride flush, 10 mL, PRN  sodium chloride, , PRN  potassium chloride, 40 mEq, PRN   Or  potassium alternative oral replacement, 40 mEq, PRN   Or  potassium chloride, 10 mEq, PRN  ondansetron, 4 mg, Q8H PRN   Or  ondansetron, 4 mg, Q6H PRN  polyethylene glycol, 17 g, Daily PRN  acetaminophen, 650 mg, Q6H PRN   Or  acetaminophen, 650 mg, Q6H PRN      ALLERGIES:  He   Allergies   Allergen Reactions    Tree Nut [Macadamia Nut Oil]      cashews       REVIEW OF SYSTEMS   Pertinent ROS noted in HPI    PHYSICAL EXAM     Vitals:    05/16/24 1245 05/16/24 1300 05/16/24 1315 05/16/24 1334   BP: (!) 184/76 (!) 164/82 (!) 167/78 (!) 180/87   Pulse: 88 (!) 116 82 86   Resp: 18 17 18    Temp:    98.1 °F (36.7 °C)   TempSrc:    Oral   SpO2: 99% 99% 99% 96%   Weight:       Height:           No intake/output data recorded.      Physical Exam:  General appearance: alert, cooperative, thin  Eyes: Anicteric  Head: Normocephalic, without obvious abnormality  Lungs: clear to auscultation bilaterally, Normal Effort  Heart: regular rate and rhythm, normal S1 and S2, no murmurs or rubs  Abdomen: soft, non-distended, non-tender. Bowel sounds normal. No masses,  no organomegaly.   Extremities: atraumatic, no cyanosis or edema  Skin: warm and dry, no jaundice  Neuro: Grossly intact,

## 2024-05-16 NOTE — ED NOTES
Pharmacy Medication Reconciliation Note     List of medications patient is currently taking is complete.    Source of information:   EMR  patient    Notes regarding home medications:   Reports he has only been taking pantoprazole and carafate  Has been holding off on his other medications to \"allow the new ones to work better\" and then he planned on restarting them after he finished.  His medications from his PCP were left on and marked as \"not taking\" for review with his doctor. Lisinopril is , so he may have been out of this one for a while.     Jordan Bruce, PharmD  2024  11:46 AM

## 2024-05-16 NOTE — PROGRESS NOTES
4 Eyes Skin Assessment     NAME:  Darren Duncan  YOB: 1955  MEDICAL RECORD NUMBER:  [id number]    The patient is being assessed for  Admission    I agree that at least one RN has performed a thorough Head to Toe Skin Assessment on the patient. ALL assessment sites listed below have been assessed.      Areas assessed by both nurses:    Head, Face, Ears, Shoulders, Back, Chest, Arms, Elbows, Hands, Sacrum. Buttock, Coccyx, Ischium, Legs. Feet and Heels, and Under Medical Devices         Does the Patient have a Wound? No noted wound(s)       Thomas Prevention initiated by RN: No  Wound Care Orders initiated by RN: No    Pressure Injury (Stage 3,4, Unstageable, DTI, NWPT, and Complex wounds) if present, place Wound referral order by RN under : No    New Ostomies, if present place, Ostomy referral order under : No     Nurse 1 eSignature: Electronically signed by Brianne Parham RN on 5/16/24 at 2:57 PM EDT    **SHARE this note so that the co-signing nurse can place an eSignature**    Nurse 2 eSignature: Electronically signed by Julita Quesada RN on 5/16/24 at 5:49 PM EDT

## 2024-05-16 NOTE — ED PROVIDER NOTES
[facility] EMERGENCY DEPARTMENT    CHIEF COMPLAINT  Abdominal Pain (Pt c/o abdominal pain. Pt  states he was told he has stomach ulcers. Pt says he takes his medication as prescribe and gets no relief. Pt AAOX4 at triage  )       HISTORY OF PRESENT ILLNESS  Darren Duncan is a 68 y.o. male who presents to the ED with abdominal pain.  States he has had intermittent abdominal pain and unintentional weight loss that has been ongoing for the past year or 2.  However, this pain has worsened significantly and been progressing in the past month.  States he is taking medication that was prescribed by GI (Carafate) as prescribed.  (He shows me his medication bag and he does not have pantoprazole in this and states he is only taking what he has in that bag).  He has had an 80 pound weight loss in the past 2 years.  States it is painful to eat and that he has no appetite.  Abdominal pain has gotten to the point that even movement and coughing is making him feel quite miserable.  Denies melena, hematochezia, emesis, fever, diarrhea or constipation.  Denies dysuria or hematuria.  Denies scrotal pain.     States he had endoscopy and was told he had stomach ulcers    I have reviewed the following from the nursing documentation:    Past Medical History:   Diagnosis Date    Cerebral artery occlusion with cerebral infarction (HCC)     Patient said he has no residual effects    Heart attack (HCC)     3 yr ago    Hx of blood clots     right side of neck    Hyperlipidemia     Hypertension     Peripheral vascular disease, unspecified (HCC) 09/22/2020     Past Surgical History:   Procedure Laterality Date    ARTERIAL CLOT SURGERY Right 2016    FEMORAL BYPASS Left 9/22/2020    LEFT FEMORAL ENDARTERECTOMY, LEFT FEMORAL TO POPLITEAL BYPASS GRAFT performed by Armando Grigsby MD at [facility] OR     Family History   Problem Relation Age of Onset    Diabetes Mother     Heart Attack Mother     Heart Disease Mother      Social History  full sentences.   Abdomen: Soft.  Tenderness to palpation in epigastric region, left upper quadrant with some voluntary guarding. Non-distended. No rebound tenderness  Musculoskeletal: No extremity edema. No deformity.   Skin: Warm and dry. No acute rashes.   Neurological: Alert and oriented. CN II-XII intact. Strength 5/5 bilateral upper and lower extremities. Sensation intact to light touch. Gait normal.      LABS  I have reviewed all labs for this visit.   Results for orders placed or performed during the hospital encounter of 05/16/24   CBC with Auto Differential   Result Value Ref Range    WBC 10.3 4.0 - 11.0 K/uL    RBC 3.81 (L) 4.20 - 5.90 M/uL    Hemoglobin 12.4 (L) 13.5 - 17.5 g/dL    Hematocrit 36.4 (L) 40.5 - 52.5 %    MCV 95.6 80.0 - 100.0 fL    MCH 32.6 26.0 - 34.0 pg    MCHC 34.1 31.0 - 36.0 g/dL    RDW 13.5 12.4 - 15.4 %    Platelets 201 135 - 450 K/uL    MPV 8.1 5.0 - 10.5 fL    Neutrophils % 81.6 %    Lymphocytes % 8.8 %    Monocytes % 8.3 %    Eosinophils % 0.8 %    Basophils % 0.5 %    Neutrophils Absolute 8.4 (H) 1.7 - 7.7 K/uL    Lymphocytes Absolute 0.9 (L) 1.0 - 5.1 K/uL    Monocytes Absolute 0.9 0.0 - 1.3 K/uL    Eosinophils Absolute 0.1 0.0 - 0.6 K/uL    Basophils Absolute 0.1 0.0 - 0.2 K/uL   CMP w/ Reflex to MG   Result Value Ref Range    Sodium 136 136 - 145 mmol/L    Potassium reflex Magnesium 3.9 3.5 - 5.1 mmol/L    Chloride 97 (L) 99 - 110 mmol/L    CO2 30 21 - 32 mmol/L    Anion Gap 9 3 - 16    Glucose 111 (H) 70 - 99 mg/dL    BUN 12 7 - 20 mg/dL    Creatinine <0.5 (L) 0.8 - 1.3 mg/dL    Est, Glom Filt Rate >90 >60    Calcium 9.3 8.3 - 10.6 mg/dL    Total Protein 6.6 6.4 - 8.2 g/dL    Albumin 3.6 3.4 - 5.0 g/dL    Albumin/Globulin Ratio 1.2 1.1 - 2.2    Total Bilirubin 0.4 0.0 - 1.0 mg/dL    Alkaline Phosphatase 84 40 - 129 U/L    ALT 24 10 - 40 U/L    AST 17 15 - 37 U/L   Lipase   Result Value Ref Range    Lipase 15.0 13.0 - 60.0 U/L   Urinalysis with Reflex to Culture    Specimen:  due to limitations of this technology and occasionally words are not transcribed correctly.        Risa Rodrigez MD  05/16/24 1059       Risa Rodrigez MD  05/16/24 1100

## 2024-05-16 NOTE — CARE COORDINATION
Case Management Assessment  Initial Evaluation    Date/Time of Evaluation: 5/16/2024 3:11 PM  Assessment Completed by: YAN Lee    If patient is discharged prior to next notation, then this note serves as note for discharge by case management.    Patient Name: Darren Duncan                   YOB: 1955  Diagnosis: Ileus (HCC) [K56.7]  Abdominal pain [R10.9]  Failure to thrive in adult [R62.7]  Abdominal pain, unspecified abdominal location [R10.9]                   Date / Time: 5/16/2024  6:56 AM    Patient Admission Status: Inpatient   Readmission Risk (Low < 19, Mod (19-27), High > 27): No data recorded  Current PCP: Deep Lozano MD  PCP verified by CM? (P) Yes    Chart Reviewed: Yes      History Provided by: Patient  Patient Orientation: Alert and Oriented, Person, Place, Situation, Self    Patient Cognition: Alert    Hospitalization in the last 30 days (Readmission):  No    If yes, Readmission Assessment in  Navigator will be completed.    Advance Directives:      Code Status: Full Code   Patient's Primary Decision Maker is: Patient Declined (Legal Next of Kin Remains as Decision Maker) (Pt has requested that his significant other, Janet be names as his medical POA.  Referral to Spiritual Care.)      Discharge Planning:    Patient lives with: (P) Spouse/Significant Other Type of Home: (P) House (2 family home with significant otherJanet.  5+18 DMITRIY)  Primary Care Giver: Self  Patient Support Systems include: Spouse/Significant Other, Family Members   Current Financial resources: (P) Medicare  Current community resources: (P) None  Current services prior to admission: (P) None            Current DME:              Type of Home Care services:  (P) None    ADLS  Prior functional level: (P) Independent in ADLs/IADLs  Current functional level: (P) Independent in ADLs/IADLs    PT AM-PAC:   /24  OT AM-PAC:   /24    Family can provide assistance at DC: (P) No  Would you  like Case Management to discuss the discharge plan with any other family members/significant others, and if so, who? (P) Yes (Can talk with significant other, Janet if needed)  Plans to Return to Present Housing: (P) Yes  Other Identified Issues/Barriers to RETURNING to current housing: None noted  Potential Assistance needed at discharge: (P) N/A            Potential DME:  No  Patient expects to discharge to: (P) House  Plan for transportation at discharge: (P) Family (Step dgtr will transport pt home at d/c.)    Financial    Payor: [employer]IK[employer] MEDICARE / Plan: ERIN MELÉNDEZ MEDICARE REPLACEMENT / Product Type: *No Product type* /     Does insurance require precert for SNF: Yes    Potential assistance Purchasing Medications: (P) No  Meds-to-Beds request:        [facility] [id number] [facility] [address] [phone] - F [phone]  [address] [address]  Phone: [phone] Fax: [phone]    [facility] #[id number] [place], [place] - [address] - P [phone] - F [phone]  [address] [address]  Phone: [phone] Fax: [phone]      Notes:    Factors facilitating achievement of predicted outcomes: Family support, Friend support, Motivated, Cooperative, Pleasant, and Good insight into deficits    Barriers to discharge: None noted    Additional Case Management Notes: Pt is from home with his significant other.  Pt reported being independent with all self care PTA and denied the need for assistance upon return home.  Pt would like his significant other to be named his medical POA.  Spiritual care consult placed.    Pts step dgtr will transport pt home at d/c.    PLAN: From home with significant other.  Independent PTA.  Pt denied the need for assistance upon d/c and stated his step dgtr will transport him home.  Spiritual Care consult placed for ACP.    The Plan for Transition of Care is related to the following treatment goals of Ileus (HCC)  [K56.7]  Abdominal pain [R10.9]  Failure to thrive in adult [R62.7]  Abdominal pain, unspecified abdominal location [R10.9]        YAN Lee  Case Management Department  Ph: [phone] Fax: [phone]  Electronically signed by YAN Lee on 5/16/2024 at 3:13 PM       05/16/24 1508   Service Assessment   Patient Orientation Alert and Oriented;Person;Place;Situation;Self   Cognition Alert   History Provided By Patient   Primary Caregiver Self   Accompanied By/Relationship Grand Daughters   Support Systems Spouse/Significant Other;Family Members   Patient's Healthcare Decision Maker is: Patient Declined (Legal Next of Kin Remains as Decision Maker)  (Pt has requested that his significant other, Janet be names as his medical POA.  Referral to Spiritual Care.)   PCP Verified by CM Yes   Last Visit to PCP Within last 3 months   Prior Functional Level Independent in ADLs/IADLs   Current Functional Level Independent in ADLs/IADLs   Can patient return to prior living arrangement Yes   Ability to make needs known: Good   Family able to assist with home care needs: No   Would you like for me to discuss the discharge plan with any other family members/significant others, and if so, who? Yes  (Can talk with significant other, Janet if needed)   Financial Resources Medicare   Community Resources None   CM/SW Referral Other (see comment)  (D/c planning needs)   Discharge Planning   Type of Residence House  (2 family home with significant otherJanet.  5+18 DMITRIY)   Living Arrangements Spouse/Significant Other   Current Services Prior To Admission None   Potential Assistance Needed N/A   DME Ordered? No   Potential Assistance Purchasing Medications No   Type of Home Care Services None   Patient expects to be discharged to: House   One/Two Story Residence Two story   Lift Chair Available No   History of falls? 0   Services At/After Discharge   Transition of Care Consult (CM Consult) N/A   Services

## 2024-05-16 NOTE — PROGRESS NOTES
Patient admitted to room 4113 from ED.  Patient is alert and oriented X4, on room air, VSS.  Patient has no c/o nausea only pain.  Pain rated 8/10 in abdomen.  Patient given PRN 1mg IV dilaudid.  Call light and belongings are within reach, will continue to monitor.

## 2024-05-16 NOTE — ED TRIAGE NOTES
Pt c/o abdominal pain. Pt  states he was told he has stomach ulcers. Pt says he takes his medication as prescribe and gets no relief. Pt AAOX4 at triage

## 2024-05-16 NOTE — H&P
Hospitalist  History and Physical    Patient:  Darren Duncan  MRN: [id number]  PCP: Deep Lozano MD    CHIEF COMPLAINT:  Abdominal Pain       HISTORY OF PRESENT ILLNESS:   The patient Darren Duncan is a 68 y.o.male with medical history significant for hypertension peripheral arterial disease cerebral artery occlusion and cerebral infarct    Patient presented to the emergency room with abdominal pain.  Patient reports that pain has been chronic and recently got worse.  Patient reports unintentional significant weight loss over the last 2 years.  Patient has been following GI as an outpatient and has been taking the prescribed medication.  Patient reports very poor appetite and complains of for abdominal pain after eating.  Patient denies any hematemesis or melena.  No recent history of fever chills no history of diarrhea or constipation.      Past Medical History:        Diagnosis Date    Cerebral artery occlusion with cerebral infarction (HCC)     Patient said he has no residual effects    Heart attack (HCC)     3 yr ago    Hx of blood clots     right side of neck    Hyperlipidemia     Hypertension     Peripheral vascular disease, unspecified (HCC) 09/22/2020       Past Surgical History:        Procedure Laterality Date    ARTERIAL CLOT SURGERY Right 2016    FEMORAL BYPASS Left 9/22/2020    LEFT FEMORAL ENDARTERECTOMY, LEFT FEMORAL TO POPLITEAL BYPASS GRAFT performed by Armando Grigsby MD at [facility] OR       Medications Prior to Admission:    Prior to Admission medications    Medication Sig Start Date End Date Taking? Authorizing Provider   CARAFATE 1 g tablet Take 1 tablet by mouth 4 times daily 5/2/24  Yes ProviderAsad MD   pantoprazole (PROTONIX) 40 MG tablet Take 1 tablet by mouth in the morning and at bedtime X 8 weeks then reduce to once daily 4/22/24  Yes ProviderAsad MD   amLODIPine (NORVASC) 10 MG tablet TAKE ONE TABLET BY MOUTH DAILY  Patient not taking: Reported on 2/27/2024

## 2024-05-17 ENCOUNTER — APPOINTMENT (OUTPATIENT)
Dept: ULTRASOUND IMAGING | Age: 69
End: 2024-05-17
Payer: MEDICARE

## 2024-05-17 LAB
AFP-TM SERPL-MCNC: 3 UG/L
BASOPHILS # BLD: 0 K/UL (ref 0–0.2)
BASOPHILS NFR BLD: 0.2 %
CANCER AG19-9 SERPL IA-ACNC: 18 U/ML (ref 0–35)
DEPRECATED RDW RBC AUTO: 13.6 % (ref 12.4–15.4)
EOSINOPHIL # BLD: 0.2 K/UL (ref 0–0.6)
EOSINOPHIL NFR BLD: 1.6 %
HCT VFR BLD AUTO: 30.9 % (ref 40.5–52.5)
HGB BLD-MCNC: 10.6 G/DL (ref 13.5–17.5)
LYMPHOCYTES # BLD: 0.7 K/UL (ref 1–5.1)
LYMPHOCYTES NFR BLD: 7.1 %
MCH RBC QN AUTO: 32.6 PG (ref 26–34)
MCHC RBC AUTO-ENTMCNC: 34.3 G/DL (ref 31–36)
MCV RBC AUTO: 94.9 FL (ref 80–100)
MONOCYTES # BLD: 0.9 K/UL (ref 0–1.3)
MONOCYTES NFR BLD: 8.8 %
NEUTROPHILS # BLD: 8.3 K/UL (ref 1.7–7.7)
NEUTROPHILS NFR BLD: 82.3 %
PLATELET # BLD AUTO: 169 K/UL (ref 135–450)
PMV BLD AUTO: 8.9 FL (ref 5–10.5)
RBC # BLD AUTO: 3.26 M/UL (ref 4.2–5.9)
WBC # BLD AUTO: 10.1 K/UL (ref 4–11)

## 2024-05-17 PROCEDURE — 97530 THERAPEUTIC ACTIVITIES: CPT

## 2024-05-17 PROCEDURE — 6360000002 HC RX W HCPCS: Performed by: HOSPITALIST

## 2024-05-17 PROCEDURE — 2580000003 HC RX 258: Performed by: HOSPITALIST

## 2024-05-17 PROCEDURE — 76770 US EXAM ABDO BACK WALL COMP: CPT

## 2024-05-17 PROCEDURE — 94760 N-INVAS EAR/PLS OXIMETRY 1: CPT

## 2024-05-17 PROCEDURE — 1200000000 HC SEMI PRIVATE

## 2024-05-17 PROCEDURE — 36415 COLL VENOUS BLD VENIPUNCTURE: CPT

## 2024-05-17 PROCEDURE — 6370000000 HC RX 637 (ALT 250 FOR IP): Performed by: HOSPITALIST

## 2024-05-17 PROCEDURE — 85025 COMPLETE CBC W/AUTO DIFF WBC: CPT

## 2024-05-17 PROCEDURE — 97116 GAIT TRAINING THERAPY: CPT

## 2024-05-17 PROCEDURE — 97165 OT EVAL LOW COMPLEX 30 MIN: CPT

## 2024-05-17 PROCEDURE — 97161 PT EVAL LOW COMPLEX 20 MIN: CPT

## 2024-05-17 RX ADMIN — ONDANSETRON 4 MG: 2 INJECTION INTRAMUSCULAR; INTRAVENOUS at 08:51

## 2024-05-17 RX ADMIN — ATORVASTATIN CALCIUM 80 MG: 80 TABLET, FILM COATED ORAL at 08:17

## 2024-05-17 RX ADMIN — HYDROMORPHONE HYDROCHLORIDE 1 MG: 1 INJECTION, SOLUTION INTRAMUSCULAR; INTRAVENOUS; SUBCUTANEOUS at 03:34

## 2024-05-17 RX ADMIN — AMLODIPINE BESYLATE 10 MG: 10 TABLET ORAL at 08:17

## 2024-05-17 RX ADMIN — PANTOPRAZOLE SODIUM 40 MG: 40 TABLET, DELAYED RELEASE ORAL at 08:16

## 2024-05-17 RX ADMIN — LISINOPRIL 20 MG: 20 TABLET ORAL at 08:17

## 2024-05-17 RX ADMIN — SODIUM CHLORIDE: 9 INJECTION, SOLUTION INTRAVENOUS at 20:18

## 2024-05-17 RX ADMIN — ASPIRIN 81 MG: 81 TABLET, CHEWABLE ORAL at 08:17

## 2024-05-17 RX ADMIN — HYDROMORPHONE HYDROCHLORIDE 1 MG: 1 INJECTION, SOLUTION INTRAMUSCULAR; INTRAVENOUS; SUBCUTANEOUS at 16:32

## 2024-05-17 RX ADMIN — SODIUM CHLORIDE: 9 INJECTION, SOLUTION INTRAVENOUS at 08:54

## 2024-05-17 RX ADMIN — HYDROMORPHONE HYDROCHLORIDE 1 MG: 1 INJECTION, SOLUTION INTRAMUSCULAR; INTRAVENOUS; SUBCUTANEOUS at 12:09

## 2024-05-17 RX ADMIN — ENOXAPARIN SODIUM 30 MG: 100 INJECTION SUBCUTANEOUS at 08:20

## 2024-05-17 RX ADMIN — PANTOPRAZOLE SODIUM 40 MG: 40 TABLET, DELAYED RELEASE ORAL at 20:13

## 2024-05-17 RX ADMIN — SODIUM CHLORIDE: 9 INJECTION, SOLUTION INTRAVENOUS at 00:01

## 2024-05-17 RX ADMIN — FLUCONAZOLE 200 MG: 100 TABLET ORAL at 08:17

## 2024-05-17 RX ADMIN — HYDROMORPHONE HYDROCHLORIDE 1 MG: 1 INJECTION, SOLUTION INTRAMUSCULAR; INTRAVENOUS; SUBCUTANEOUS at 21:38

## 2024-05-17 ASSESSMENT — PAIN DESCRIPTION - LOCATION
LOCATION: ABDOMEN;BACK
LOCATION: ABDOMEN

## 2024-05-17 ASSESSMENT — PAIN SCALES - GENERAL
PAINLEVEL_OUTOF10: 6
PAINLEVEL_OUTOF10: 0
PAINLEVEL_OUTOF10: 8
PAINLEVEL_OUTOF10: 6
PAINLEVEL_OUTOF10: 7
PAINLEVEL_OUTOF10: 8
PAINLEVEL_OUTOF10: 5

## 2024-05-17 ASSESSMENT — PAIN DESCRIPTION - DESCRIPTORS
DESCRIPTORS: DISCOMFORT
DESCRIPTORS: ACHING;DISCOMFORT;CRAMPING
DESCRIPTORS: ACHING

## 2024-05-17 ASSESSMENT — PAIN DESCRIPTION - ORIENTATION: ORIENTATION: MID;LOWER

## 2024-05-17 NOTE — PROGRESS NOTES
0900- Call received from [facility]. Patient just had 10 beats of Vtach. He is back in sinus rhythm. Vital signs are WNL. Notified MD/Reji. No new orders noted. Morning assessment complete. Patient given scheduled medications as prescribed, including PRN nausea medication per request. He reports that his pain is improving.    1100- PRN pain medication given per patient request. Medication effective in decreasing pain.    1415- New order noted for vascular test of aorta per GI. Call received from vascular lab informing that patient has to be NPO prior to test and MD would have to place NPO orders this Sunday for the test on Monday. Tiburcio/PA notified via secure message.     1515- Call received from [facility] to report that patient just had 6 beats of Vtach. His vital signs are stable at this time. Notified MD/Reji via secure message. No new order noted.

## 2024-05-17 NOTE — PROGRESS NOTES
Hospitalist Progress Note  5/17/2024 7:40 AM    PCP: Deep Lozano MD    [phone]     Date of Admission: 5/16/2024                                                                                                                     HOSPITAL COURSE    Patient demographics:  The patient  Darren Duncan is a 68 y.o. male     Significant past medical history:   Patient Active Problem List   Diagnosis    Other hyperlipidemia    Essential hypertension    Smoker    Rash and nonspecific skin eruption    Cellulitis and abscess of toe of left foot    Peripheral arterial disease (HCC)    Claudication (HCC)    Atherosclerosis of native arteries of extremities with rest pain, left leg (HCC)    Peripheral vascular disease, unspecified (HCC)    Atherosclerosis of artery of extremity with rest pain (HCC)    Unspecified severe protein-calorie malnutrition (HCC)    Other specified disorders of carbohydrate metabolism (HCC)    Abdominal pain         Presenting symptoms:      Diagnostic workup:      CONSULTS DURING ADMISSION :   IP CONSULT TO HOSPITALIST  IP CONSULT TO GI  IP CONSULT TO SPIRITUAL SERVICES      Patient was diagnosed with:        Treatment while inpatient:                                                                                         ----------------------------------------------------------      SUBJECTIVE COMPLAINTS-     Diet: ADULT DIET; Clear Liquid      OBJECTIVE:   Patient Active Problem List   Diagnosis    Other hyperlipidemia    Essential hypertension    Smoker    Rash and nonspecific skin eruption    Cellulitis and abscess of toe of left foot    Peripheral arterial disease (HCC)    Claudication (HCC)    Atherosclerosis of native arteries of extremities with rest pain, left leg (HCC)    Peripheral vascular disease, unspecified (HCC)    Atherosclerosis of artery of extremity with rest pain (HCC)    Unspecified severe protein-calorie malnutrition (HCC)    Other specified disorders of

## 2024-05-17 NOTE — PLAN OF CARE
Problem: Safety - Adult  Goal: Free from fall injury  5/17/2024 0920 by Lars Cavazos, RN  Outcome: Progressing  5/16/2024 2025 by Jannette Ge, RN  Outcome: Progressing

## 2024-05-17 NOTE — PROGRESS NOTES
Physical Therapy  Facility/Department: [address] MED SURG  Physical Therapy Initial Assessment  If pt. is D/C'd prior to next visit please refer back to last daily progress note for D/C status.  Name: Darren Duncan  : 1955  MRN: [id number]  Date of Service: 2024    Discharge Recommendations:  Home with assist PRN, No therapy recommended at discharge   Darren Duncan scored a 23/24 on the AM-PAC short mobility form.  At this time, no further PT is recommended upon discharge due to no needs.  Recommend patient returns to prior setting with prior services.    PT Equipment Recommendations  Equipment Needed: No      Patient Diagnosis(es): The primary encounter diagnosis was Abdominal pain, unspecified abdominal location. Diagnoses of Failure to thrive in adult and Ileus (HCC) were also pertinent to this visit.  Past Medical History:  has a past medical history of Cerebral artery occlusion with cerebral infarction (HCC), Heart attack (HCC), Hx of blood clots, Hyperlipidemia, Hypertension, and Peripheral vascular disease, unspecified (HCC).  Past Surgical History:  has a past surgical history that includes Arterial clot surgery (Right, ) and femoral bypass (Left, 2020).    Assessment   Assessment: The pt is a 69 yo male who presented to the ED with abd pain, poor appetite and weight loss x 2 years. Most recent EGD and colonoscopy in . Weight loss + 80#. PMHx: CVA, HTN, HLD, PVD, gatritis, duedenal ulcers, colitis, lung nodules, left femoral endarterectomy and fem-pop bypass graft  The pt reports he lives with his sig other on the 2nd floor of a 2 family. He has 5+18 steps to his floor of the house. PTA, he was not using a device for ambulation and he was indep in self-care and mobility, denies recent falls. Today, the pt demonstrated that he is most likely functioning at his baseline but does appear generally weak and is reporting dizziness when upright. Anticipate that the pt will be safe

## 2024-05-17 NOTE — PROGRESS NOTES
INPATIENT PROGRESS NOTE        IDENTIFYING DATA/REASON FOR CONSULTATION   PATIENT:  Darren Duncan  MRN:  [id number]  ADMIT DATE: 2024  TIME OF EVALUATION: 2024 12:44 PM  HOSPITAL STAY:   LOS: 1 day   CONSULTING PHYSICIAN: Rayne Mendoza MD   REASON FOR CONSULTATION: abd pain, wt loss    Subjective:    Patient seen in follow up.  No new complaints    MEDICATIONS   SCHEDULED:  amLODIPine, 10 mg, Daily  aspirin, 81 mg, Daily  atorvastatin, 80 mg, Daily  lisinopril, 20 mg, Daily  pantoprazole, 40 mg, BID  sodium chloride flush, 5-40 mL, 2 times per day  enoxaparin, 30 mg, Daily  fluconazole, 200 mg, Daily      FLUIDS/DRIPS:     sodium chloride 100 mL/hr at 24 0854    sodium chloride       PRNs: sodium chloride flush, 10 mL, PRN  sodium chloride, , PRN  potassium chloride, 40 mEq, PRN   Or  potassium alternative oral replacement, 40 mEq, PRN   Or  potassium chloride, 10 mEq, PRN  ondansetron, 4 mg, Q8H PRN   Or  ondansetron, 4 mg, Q6H PRN  polyethylene glycol, 17 g, Daily PRN  acetaminophen, 650 mg, Q6H PRN   Or  acetaminophen, 650 mg, Q6H PRN  HYDROmorphone, 1 mg, Q4H PRN      ALLERGIES:    Allergies   Allergen Reactions    Tree Nut [Macadamia Nut Oil]      cashews         PHYSICAL EXAM   VITALS:  BP (!) 148/72   Pulse 81   Temp 97.5 °F (36.4 °C) (Oral)   Resp 16   Ht 1.753 m (5' 9\")   Wt 50 kg (110 lb 4.8 oz)   SpO2 93%   BMI 16.29 kg/m²   TEMPERATURE:  Current - Temp: 97.5 °F (36.4 °C); Max - Temp  Av.3 °F (36.3 °C)  Min: 94.5 °F (34.7 °C)  Max: 98.2 °F (36.8 °C)    Physical Exam:  General appearance: alert, cooperative, thin  Eyes: Anicteric  Head: Normocephalic, without obvious abnormality  Lungs: clear to auscultation bilaterally, Normal Effort  Heart: regular rate and rhythm, normal S1 and S2, no murmurs or rubs  Abdomen: soft, non-distended, non-tender. Bowel sounds normal. No masses,  no organomegaly.   Extremities: atraumatic, no cyanosis or edema  Skin: warm and dry, no  addendum:\    I have personally seen and examined the patient, reviewed the patient's medical record and pertinent labs and clinical imaging.  I have personally staffed the case with Jg LI.  I agree with her consultation note, exam findings, assessment and plans  as written above.  I have made appropriate modifications and edited her assessment and plan where needed to reflect my impression and plans for this patient.       Darren Duncan is a 67 YO male with a PMH of CVA, HTN, HLD, PVD who presented on 5/16/2024 with abdominal pain, poor appetite, weight loss. Chronic abdominal pain/significant weight loss. Recent endoscopic evaluation as outlined above. PET scan as outlined above. CT today with dilated fluid filled small bowel. No other acute pathology. No evidence of chronic pancreatitis. Hx Vascular disease. Checking mesenteric doppler. Symptoms may be functional. Advance diet as tolerated.     Thank you for allowing me to participate in this patient's care.  If there are any questions or concerns regarding this patient, or the plan we have set in place, please feel free to contact me at [phone].     Jet Carranza MD

## 2024-05-17 NOTE — PLAN OF CARE
Problem: Discharge Planning  Goal: Discharge to home or other facility with appropriate resources  Outcome: Progressing     Problem: Pain  Goal: Verbalizes/displays adequate comfort level or baseline comfort level  Outcome: Progressing     Problem: Safety - Adult  Goal: Free from fall injury  Outcome: Progressing     Problem: Gastrointestinal - Adult  Goal: Minimal or absence of nausea and vomiting  Outcome: Progressing  Goal: Maintains or returns to baseline bowel function  Outcome: Progressing  Goal: Maintains adequate nutritional intake  Outcome: Progressing

## 2024-05-17 NOTE — ACP (ADVANCE CARE PLANNING)
Advance Care Planning     Advance Care Planning Inpatient Note  [facility] Department    Today's Date: 5/17/2024  Unit: [facility] MED SURG    Received request from IDT Member.  Upon review of chart and communication with care team, patient's decision making abilities are not in question.. Patient was/were present in the room during visit.    Goals of ACP Conversation:  Discuss advance care planning documents    Health Care Decision Makers:       Primary Decision Maker: Janet Tan - Domestic Partner - [phone]    Secondary Decision Maker: Fatmata Salter - Brother/Sister - [phone]  Summary:  Completed New Documents  Updated Healthcare Decision Maker    Advance Care Planning Documents (Patient Wishes):  Healthcare Power of /Advance Directive Appointment of Health Care Agent     Assessment:  Pt participated in ACP education and understood documents    Interventions:  Provided education on documents for clarity and greater understanding  Assisted in the completion of documents according to patient's wishes at this time    Care Preferences Communicated:   No    Outcomes/Plan:  ACP Discussion: Completed  New advance directive completed.    Electronically signed by Chaplain Andie on 5/17/2024 at 11:56 AM

## 2024-05-17 NOTE — PROGRESS NOTES
Occupational Therapy  Facility/Department: [address] MED SURG  Occupational Therapy Initial Assessment and Discharge    Name: Darren Duncan  : 1955  MRN: [id number]  Date of Service: 2024    Discharge Recommendations:  Home with assist PRN  OT Equipment Recommendations  Equipment Needed: No       Darren Duncan scored a 24/ on the AM-PAC ADL Inpatient form.  At this time, no further OT is recommended upon discharge due to pt functioning at/near baseline.  Recommend patient returns to prior setting with prior services.       Patient Diagnosis(es): The primary encounter diagnosis was Abdominal pain, unspecified abdominal location. Diagnoses of Failure to thrive in adult and Ileus (HCC) were also pertinent to this visit.  Past Medical History:  has a past medical history of Cerebral artery occlusion with cerebral infarction (HCC), Heart attack (HCC), Hx of blood clots, Hyperlipidemia, Hypertension, and Peripheral vascular disease, unspecified (HCC).  Past Surgical History:  has a past surgical history that includes Arterial clot surgery (Right, ) and femoral bypass (Left, 2020).           Assessment   Assessment: Pt presents to be functioning at/near baseline, and does not require any additional acute OT. Pt UAL in room completing ADLs independently, and completed fxl mobility community distance in hallway with no AD independently. Anticipate pt will be safe to return home with significant other's assist prn. No acute OT goals identified, will discharge.  Prognosis: Good  Decision Making: Medium Complexity  History: see above  No Skilled OT: Independent with functional mobility;Independent with ADL's;At baseline function;Safe to return home;No OT goals identified  REQUIRES OT FOLLOW-UP: No  Activity Tolerance  Activity Tolerance: Patient Tolerated treatment well        Plan   Occupational Therapy Plan  Times Per Week: d/c acute OT  Devices  Type of Devices: Call light within reach;Left in bed (pt UAL)    AROM: Generally decreased, functional (L hand digit flexion lacks end range/full fist closure, otherwise WFL)  Strength: Generally decreased, functional (B UE's)  Coordination: Generally decreased, functional  Sensation: Intact (pt denies N/T in bud hands)    ADL  LE Dressing: Independent  LE Dressing Skilled Clinical Factors: to don/doff socks  Functional Mobility: Independent  Functional Mobility Skilled Clinical Factors: community distance in hallway to/from visitor's lounge (seated rest break in lounge), to/from BR with no AD independently.  Additional Comments: pt declined completing ADLs, reports he plans on showering later. Pt reports he has been UAL in room completing ADLs independently.    Activity Tolerance  Activity Tolerance: Patient tolerated evaluation without incident    Bed mobility  Supine to Sit: Independent  Sit to Supine: Independent  Scooting: Independent    Transfers  Sit to stand: Independent  Stand to sit: Independent    Vision  Vision: Impaired  Vision Exceptions: Wears glasses for reading  Hearing  Hearing: Within functional limits    Cognition  Overall Cognitive Status: WFL  Orientation  Overall Orientation Status: Within Functional Limits  Orientation Level: Oriented to person;Oriented to time;Oriented to place;Oriented to situation      Static Sitting Balance : independent  Dynamic Sitting Balance : independent  Static Standing Balance : independent  Dynamic Standing Balance: independent    Education Given To: Patient  Education Provided: Role of Therapy  Education Method: Verbal  Barriers to Learning: None  Education Outcome: Verbalized understanding                        AM-PAC - ADL  AM-PAC Daily Activity - Inpatient   How much help is needed for putting on and taking off regular lower body clothing?: None  How much help is needed for bathing (which includes washing, rinsing, drying)?: None  How much help is  needed for toileting (which includes using toilet, bedpan, or urinal)?: None  How much help is needed for putting on and taking off regular upper body clothing?: None  How much help is needed for taking care of personal grooming?: None  How much help for eating meals?: None  AM-[facility] Inpatient Daily Activity Raw Score: 24  AM-PAC Inpatient ADL T-Scale Score : 57.54  ADL Inpatient CMS 0-100% Score: 0  ADL Inpatient CMS G-Code Modifier : CH      Goals  Short Term Goals  Time Frame for Short Term Goals: no acute OT goals identified.       Therapy Time   Individual Concurrent Group Co-treatment   Time In 0910         Time Out 0945         Minutes 35         Timed Code Treatment Minutes: 20 Minutes       Celena Miranda, OTR/L [id number]

## 2024-05-17 NOTE — PROGRESS NOTES
05/17/24 1222   Encounter Summary   Encounter Overview/Reason Advance Care Planning   Service Provided For Patient   Referral/Consult From Nurse   Support System Significant other   Last Encounter  05/17/24  ( assisted pt to complete HCPOA)   Complexity of Encounter Low   Begin Time 1145   End Time  1222   Total Time Calculated 37 min   Advance Care Planning   Type ACP conversation;Completed AD/ACP document(s)   Assessment/Intervention/Outcome   Assessment Calm   Intervention Active listening   Outcome Expressed Gratitude

## 2024-05-18 LAB
ALBUMIN SERPL-MCNC: 2.9 G/DL (ref 3.4–5)
ANION GAP SERPL CALCULATED.3IONS-SCNC: 9 MMOL/L (ref 3–16)
BUN SERPL-MCNC: 4 MG/DL (ref 7–20)
CALCIUM SERPL-MCNC: 8.5 MG/DL (ref 8.3–10.6)
CHLORIDE SERPL-SCNC: 98 MMOL/L (ref 99–110)
CO2 SERPL-SCNC: 26 MMOL/L (ref 21–32)
CREAT SERPL-MCNC: <0.5 MG/DL (ref 0.8–1.3)
GFR SERPLBLD CREATININE-BSD FMLA CKD-EPI: >90 ML/MIN/{1.73_M2}
GLUCOSE SERPL-MCNC: 91 MG/DL (ref 70–99)
PHOSPHATE SERPL-MCNC: 2 MG/DL (ref 2.5–4.9)
POTASSIUM SERPL-SCNC: 3 MMOL/L (ref 3.5–5.1)
SODIUM SERPL-SCNC: 133 MMOL/L (ref 136–145)
TSH SERPL DL<=0.005 MIU/L-ACNC: 1.98 UIU/ML (ref 0.27–4.2)

## 2024-05-18 PROCEDURE — 36415 COLL VENOUS BLD VENIPUNCTURE: CPT

## 2024-05-18 PROCEDURE — 6370000000 HC RX 637 (ALT 250 FOR IP): Performed by: HOSPITALIST

## 2024-05-18 PROCEDURE — 87506 IADNA-DNA/RNA PROBE TQ 6-11: CPT

## 2024-05-18 PROCEDURE — 84132 ASSAY OF SERUM POTASSIUM: CPT

## 2024-05-18 PROCEDURE — 84443 ASSAY THYROID STIM HORMONE: CPT

## 2024-05-18 PROCEDURE — 1200000000 HC SEMI PRIVATE

## 2024-05-18 PROCEDURE — 6360000002 HC RX W HCPCS: Performed by: HOSPITALIST

## 2024-05-18 PROCEDURE — 94760 N-INVAS EAR/PLS OXIMETRY 1: CPT

## 2024-05-18 PROCEDURE — 80069 RENAL FUNCTION PANEL: CPT

## 2024-05-18 PROCEDURE — 2580000003 HC RX 258: Performed by: HOSPITALIST

## 2024-05-18 RX ORDER — PAROXETINE 10 MG/1
20 TABLET, FILM COATED ORAL NIGHTLY
Status: DISCONTINUED | OUTPATIENT
Start: 2024-05-18 | End: 2024-05-24

## 2024-05-18 RX ADMIN — SODIUM CHLORIDE: 9 INJECTION, SOLUTION INTRAVENOUS at 04:50

## 2024-05-18 RX ADMIN — PAROXETINE HYDROCHLORIDE 20 MG: 20 TABLET, FILM COATED ORAL at 20:19

## 2024-05-18 RX ADMIN — SODIUM CHLORIDE, PRESERVATIVE FREE 10 ML: 5 INJECTION INTRAVENOUS at 20:19

## 2024-05-18 RX ADMIN — ASPIRIN 81 MG: 81 TABLET, CHEWABLE ORAL at 07:51

## 2024-05-18 RX ADMIN — HYDROMORPHONE HYDROCHLORIDE 1 MG: 1 INJECTION, SOLUTION INTRAMUSCULAR; INTRAVENOUS; SUBCUTANEOUS at 04:54

## 2024-05-18 RX ADMIN — SODIUM CHLORIDE: 9 INJECTION, SOLUTION INTRAVENOUS at 16:51

## 2024-05-18 RX ADMIN — POTASSIUM CHLORIDE 10 MEQ: 7.46 INJECTION, SOLUTION INTRAVENOUS at 15:52

## 2024-05-18 RX ADMIN — PANTOPRAZOLE SODIUM 40 MG: 40 TABLET, DELAYED RELEASE ORAL at 20:19

## 2024-05-18 RX ADMIN — HYDROMORPHONE HYDROCHLORIDE 1 MG: 1 INJECTION, SOLUTION INTRAMUSCULAR; INTRAVENOUS; SUBCUTANEOUS at 16:56

## 2024-05-18 RX ADMIN — FLUCONAZOLE 200 MG: 100 TABLET ORAL at 08:32

## 2024-05-18 RX ADMIN — LISINOPRIL 20 MG: 20 TABLET ORAL at 07:51

## 2024-05-18 RX ADMIN — AMLODIPINE BESYLATE 10 MG: 10 TABLET ORAL at 07:51

## 2024-05-18 RX ADMIN — ENOXAPARIN SODIUM 30 MG: 100 INJECTION SUBCUTANEOUS at 08:32

## 2024-05-18 RX ADMIN — PANTOPRAZOLE SODIUM 40 MG: 40 TABLET, DELAYED RELEASE ORAL at 07:51

## 2024-05-18 RX ADMIN — ATORVASTATIN CALCIUM 80 MG: 80 TABLET, FILM COATED ORAL at 07:51

## 2024-05-18 RX ADMIN — POTASSIUM CHLORIDE 10 MEQ: 7.46 INJECTION, SOLUTION INTRAVENOUS at 19:18

## 2024-05-18 RX ADMIN — POTASSIUM CHLORIDE 10 MEQ: 7.46 INJECTION, SOLUTION INTRAVENOUS at 18:06

## 2024-05-18 RX ADMIN — POTASSIUM CHLORIDE 10 MEQ: 7.46 INJECTION, SOLUTION INTRAVENOUS at 17:02

## 2024-05-18 RX ADMIN — HYDROMORPHONE HYDROCHLORIDE 1 MG: 1 INJECTION, SOLUTION INTRAMUSCULAR; INTRAVENOUS; SUBCUTANEOUS at 11:32

## 2024-05-18 RX ADMIN — POTASSIUM CHLORIDE 10 MEQ: 7.46 INJECTION, SOLUTION INTRAVENOUS at 20:22

## 2024-05-18 RX ADMIN — SODIUM CHLORIDE, PRESERVATIVE FREE 10 ML: 5 INJECTION INTRAVENOUS at 08:35

## 2024-05-18 RX ADMIN — POTASSIUM CHLORIDE 10 MEQ: 7.46 INJECTION, SOLUTION INTRAVENOUS at 21:23

## 2024-05-18 ASSESSMENT — PAIN SCALES - GENERAL
PAINLEVEL_OUTOF10: 2
PAINLEVEL_OUTOF10: 8
PAINLEVEL_OUTOF10: 8
PAINLEVEL_OUTOF10: 4
PAINLEVEL_OUTOF10: 0
PAINLEVEL_OUTOF10: 8
PAINLEVEL_OUTOF10: 0
PAINLEVEL_OUTOF10: 0

## 2024-05-18 ASSESSMENT — PAIN DESCRIPTION - ORIENTATION
ORIENTATION: MID

## 2024-05-18 ASSESSMENT — PAIN DESCRIPTION - LOCATION
LOCATION: ABDOMEN

## 2024-05-18 ASSESSMENT — PAIN - FUNCTIONAL ASSESSMENT: PAIN_FUNCTIONAL_ASSESSMENT: ACTIVITIES ARE NOT PREVENTED

## 2024-05-18 ASSESSMENT — PAIN DESCRIPTION - DESCRIPTORS
DESCRIPTORS: ACHING;DISCOMFORT
DESCRIPTORS: ACHING
DESCRIPTORS: THROBBING;SHARP

## 2024-05-18 ASSESSMENT — PAIN SCALES - WONG BAKER: WONGBAKER_NUMERICALRESPONSE: NO HURT

## 2024-05-18 NOTE — PROGRESS NOTES
Hospitalist Progress Note  5/18/2024 7:54 AM    PCP: Deep Lozano MD    [phone]     Date of Admission: 5/16/2024                                                                                                                     HOSPITAL COURSE    Patient demographics:  The patient  Darren Duncan is a 68 y.o. male     Significant past medical history:   Patient Active Problem List   Diagnosis    Other hyperlipidemia    Essential hypertension    Smoker    Rash and nonspecific skin eruption    Cellulitis and abscess of toe of left foot    Peripheral arterial disease (HCC)    Claudication (HCC)    Atherosclerosis of native arteries of extremities with rest pain, left leg (HCC)    Peripheral vascular disease, unspecified (HCC)    Atherosclerosis of artery of extremity with rest pain (HCC)    Unspecified severe protein-calorie malnutrition (HCC)    Other specified disorders of carbohydrate metabolism (HCC)    Abdominal pain         Presenting symptoms:      Diagnostic workup:      CONSULTS DURING ADMISSION :   IP CONSULT TO HOSPITALIST  IP CONSULT TO GI  IP CONSULT TO SPIRITUAL SERVICES      Patient was diagnosed with:        Treatment while inpatient:                                                                                         ----------------------------------------------------------      SUBJECTIVE COMPLAINTS-     Diet: ADULT DIET; Clear Liquid      OBJECTIVE:   Patient Active Problem List   Diagnosis    Other hyperlipidemia    Essential hypertension    Smoker    Rash and nonspecific skin eruption    Cellulitis and abscess of toe of left foot    Peripheral arterial disease (HCC)    Claudication (HCC)    Atherosclerosis of native arteries of extremities with rest pain, left leg (HCC)    Peripheral vascular disease, unspecified (HCC)    Atherosclerosis of artery of extremity with rest pain (HCC)    Unspecified severe protein-calorie malnutrition (HCC)    Other specified disorders of  carbohydrate metabolism (HCC)    Abdominal pain       Allergies  Tree nut [macadamia nut oil]    Medications    Scheduled Meds:   amLODIPine  10 mg Oral Daily    aspirin  81 mg Oral Daily    atorvastatin  80 mg Oral Daily    lisinopril  20 mg Oral Daily    pantoprazole  40 mg Oral BID    sodium chloride flush  5-40 mL IntraVENous 2 times per day    enoxaparin  30 mg SubCUTAneous Daily    fluconazole  200 mg Oral Daily     Continuous Infusions:   sodium chloride 100 mL/hr at 05/18/24 0450    sodium chloride       PRN Meds:  sodium chloride flush, sodium chloride, potassium chloride **OR** potassium alternative oral replacement **OR** potassium chloride, ondansetron **OR** ondansetron, polyethylene glycol, acetaminophen **OR** acetaminophen, HYDROmorphone    Vitals   Vitals /wt Patient Vitals for the past 8 hrs:   BP Temp Temp src Pulse Resp SpO2 Weight   05/18/24 0728 (!) 168/68 98.4 °F (36.9 °C) Oral 77 16 99 % --   05/18/24 0524 -- -- -- -- 16 -- --   05/18/24 0427 -- -- -- -- -- -- 52.1 kg (114 lb 13.8 oz)   05/18/24 0402 130/73 98.2 °F (36.8 °C) Oral 86 16 99 % --   05/18/24 0012 116/66 98.4 °F (36.9 °C) Oral 79 16 97 % --        72HR INTAKE/OUTPUT:    Intake/Output Summary (Last 24 hours) at 5/18/2024 0754  Last data filed at 5/18/2024 0730  Gross per 24 hour   Intake 480 ml   Output 300 ml   Net 180 ml       Exam:    Gen:   Alert and oriented ×3    Eyes: PERRL. No sclera icterus. No conjunctival injection.   ENT: No discharge. Pharynx clear. External appearance of ears and nose normal.  Neck: Trachea midline. No obvious mass.    Resp: No accessory muscle use. No crackles. No wheezes. No rhonchi.  CV: Regular rate. Regular rhythm. No murmur or rub. No edema.   GI: Non-tender. Non-distended. No hernia.   Skin: Warm, dry, normal texture and turgor.   Lymph: No cervical LAD. No supraclavicular LAD.   M/S: / Ext. No cyanosis. No clubbing. No joint deformity.    Neuro: CN 2-12 are intact,  no neurologic deficits

## 2024-05-18 NOTE — PROGRESS NOTES
INPATIENT PROGRESS NOTE        IDENTIFYING DATA/REASON FOR CONSULTATION   PATIENT:  Darren Duncan  MRN:  [id number]  ADMIT DATE: 2024  TIME OF EVALUATION: 2024 12:44 PM  HOSPITAL STAY:   LOS: 1 day   CONSULTING PHYSICIAN: Rayne Mendoza MD   REASON FOR CONSULTATION: abd pain, wt loss    Subjective:    Patient seen in follow up.  No new complaints    MEDICATIONS   SCHEDULED:  amLODIPine, 10 mg, Daily  aspirin, 81 mg, Daily  atorvastatin, 80 mg, Daily  lisinopril, 20 mg, Daily  pantoprazole, 40 mg, BID  sodium chloride flush, 5-40 mL, 2 times per day  enoxaparin, 30 mg, Daily  fluconazole, 200 mg, Daily      FLUIDS/DRIPS:     sodium chloride 100 mL/hr at 24 0854    sodium chloride       PRNs: sodium chloride flush, 10 mL, PRN  sodium chloride, , PRN  potassium chloride, 40 mEq, PRN   Or  potassium alternative oral replacement, 40 mEq, PRN   Or  potassium chloride, 10 mEq, PRN  ondansetron, 4 mg, Q8H PRN   Or  ondansetron, 4 mg, Q6H PRN  polyethylene glycol, 17 g, Daily PRN  acetaminophen, 650 mg, Q6H PRN   Or  acetaminophen, 650 mg, Q6H PRN  HYDROmorphone, 1 mg, Q4H PRN      ALLERGIES:    Allergies   Allergen Reactions    Tree Nut [Macadamia Nut Oil]      cashews         PHYSICAL EXAM   VITALS:  BP (!) 148/72   Pulse 81   Temp 97.5 °F (36.4 °C) (Oral)   Resp 16   Ht 1.753 m (5' 9\")   Wt 50 kg (110 lb 4.8 oz)   SpO2 93%   BMI 16.29 kg/m²   TEMPERATURE:  Current - Temp: 97.5 °F (36.4 °C); Max - Temp  Av.3 °F (36.3 °C)  Min: 94.5 °F (34.7 °C)  Max: 98.2 °F (36.8 °C)    Physical Exam:  General appearance: alert, cooperative, thin  Eyes: Anicteric  Head: Normocephalic, without obvious abnormality  Lungs: clear to auscultation bilaterally, Normal Effort  Heart: regular rate and rhythm, normal S1 and S2, no murmurs or rubs  Abdomen: soft, non-distended, non-tender. Bowel sounds normal. No masses,  no organomegaly.   Extremities: atraumatic, no cyanosis or edema  Skin: warm and dry, no  to participate in this patient's care.  If there are any questions or concerns regarding this patient, or the plan we have set in place, please feel free to contact me at [phone].     Jet Carranza MD

## 2024-05-18 NOTE — PLAN OF CARE
Problem: Discharge Planning  Goal: Discharge to home or other facility with appropriate resources  5/18/2024 1046 by Hanny Carrillo RN  Outcome: Progressing     Problem: Pain  Goal: Verbalizes/displays adequate comfort level or baseline comfort level  5/18/2024 1046 by Hanny Carrillo RN  Outcome: Progressing     Problem: Safety - Adult  Goal: Free from fall injury  5/18/2024 1046 by Hanny Carrillo, RN  Outcome: Progressing     Problem: Gastrointestinal - Adult  Goal: Minimal or absence of nausea and vomiting  5/18/2024 1046 by Hanny Carrillo, RN  Outcome: Progressing     Problem: Gastrointestinal - Adult  Goal: Maintains or returns to baseline bowel function  5/18/2024 1046 by Hanny Carrillo, RN  Outcome: Progressing     Problem: Gastrointestinal - Adult  Goal: Maintains adequate nutritional intake  5/18/2024 1046 by Hanny Carrillo, RN  Outcome: Progressing

## 2024-05-18 NOTE — PROGRESS NOTES
Assume care of pt, obtained bedside report from Hanny LIANG. Pt in bed resting, A+O x4. Breaths even and unlabored, No c/o pain, SOB, or dizziness. Refused bed alarm,  education given on safety, states understanding, used call light appropriately. Call light and side table in reach, plan of care ongoing. Electronically signed by Domingo Martino RN on 5/18/2024 at 4:29 PM

## 2024-05-19 LAB — POTASSIUM SERPL-SCNC: 3.8 MMOL/L (ref 3.5–5.1)

## 2024-05-19 PROCEDURE — 6360000002 HC RX W HCPCS: Performed by: HOSPITALIST

## 2024-05-19 PROCEDURE — 6370000000 HC RX 637 (ALT 250 FOR IP): Performed by: HOSPITALIST

## 2024-05-19 PROCEDURE — 2580000003 HC RX 258: Performed by: HOSPITALIST

## 2024-05-19 PROCEDURE — 94760 N-INVAS EAR/PLS OXIMETRY 1: CPT

## 2024-05-19 PROCEDURE — 1200000000 HC SEMI PRIVATE

## 2024-05-19 RX ADMIN — ATORVASTATIN CALCIUM 80 MG: 80 TABLET, FILM COATED ORAL at 08:01

## 2024-05-19 RX ADMIN — ENOXAPARIN SODIUM 30 MG: 100 INJECTION SUBCUTANEOUS at 09:27

## 2024-05-19 RX ADMIN — PANTOPRAZOLE SODIUM 40 MG: 40 TABLET, DELAYED RELEASE ORAL at 20:59

## 2024-05-19 RX ADMIN — AMLODIPINE BESYLATE 10 MG: 10 TABLET ORAL at 08:01

## 2024-05-19 RX ADMIN — HYDROMORPHONE HYDROCHLORIDE 1 MG: 1 INJECTION, SOLUTION INTRAMUSCULAR; INTRAVENOUS; SUBCUTANEOUS at 10:32

## 2024-05-19 RX ADMIN — SODIUM CHLORIDE, PRESERVATIVE FREE 10 ML: 5 INJECTION INTRAVENOUS at 20:59

## 2024-05-19 RX ADMIN — ASPIRIN 81 MG: 81 TABLET, CHEWABLE ORAL at 08:01

## 2024-05-19 RX ADMIN — SODIUM CHLORIDE, PRESERVATIVE FREE 10 ML: 5 INJECTION INTRAVENOUS at 08:01

## 2024-05-19 RX ADMIN — LISINOPRIL 20 MG: 20 TABLET ORAL at 08:01

## 2024-05-19 RX ADMIN — HYDROMORPHONE HYDROCHLORIDE 1 MG: 1 INJECTION, SOLUTION INTRAMUSCULAR; INTRAVENOUS; SUBCUTANEOUS at 01:54

## 2024-05-19 RX ADMIN — FLUCONAZOLE 200 MG: 100 TABLET ORAL at 09:27

## 2024-05-19 RX ADMIN — SODIUM CHLORIDE: 9 INJECTION, SOLUTION INTRAVENOUS at 21:00

## 2024-05-19 RX ADMIN — SODIUM CHLORIDE: 9 INJECTION, SOLUTION INTRAVENOUS at 06:17

## 2024-05-19 RX ADMIN — HYDROMORPHONE HYDROCHLORIDE 1 MG: 1 INJECTION, SOLUTION INTRAMUSCULAR; INTRAVENOUS; SUBCUTANEOUS at 19:34

## 2024-05-19 RX ADMIN — PAROXETINE HYDROCHLORIDE 20 MG: 20 TABLET, FILM COATED ORAL at 20:59

## 2024-05-19 RX ADMIN — PANTOPRAZOLE SODIUM 40 MG: 40 TABLET, DELAYED RELEASE ORAL at 08:01

## 2024-05-19 ASSESSMENT — PAIN SCALES - GENERAL
PAINLEVEL_OUTOF10: 7
PAINLEVEL_OUTOF10: 8
PAINLEVEL_OUTOF10: 8
PAINLEVEL_OUTOF10: 0

## 2024-05-19 ASSESSMENT — PAIN DESCRIPTION - LOCATION
LOCATION: ABDOMEN
LOCATION: ABDOMEN;BACK
LOCATION: ABDOMEN

## 2024-05-19 ASSESSMENT — PAIN DESCRIPTION - DESCRIPTORS
DESCRIPTORS: ACHING
DESCRIPTORS: ACHING
DESCRIPTORS: ACHING;DISCOMFORT

## 2024-05-19 ASSESSMENT — PAIN DESCRIPTION - ORIENTATION
ORIENTATION: MID
ORIENTATION: MID

## 2024-05-19 NOTE — PROGRESS NOTES
INPATIENT PROGRESS NOTE        IDENTIFYING DATA/REASON FOR CONSULTATION   PATIENT:  Darren Duncan  MRN:  [id number]  ADMIT DATE: 2024  TIME OF EVALUATION: 2024 9:37 AM  HOSPITAL STAY:   LOS: 3 days   CONSULTING PHYSICIAN: Rayne Mendoza MD   REASON FOR CONSULTATION: abd pain, wt loss    Subjective:    Patient seen in follow up.  No new complaints. Tolerating diet this morning.     MEDICATIONS   SCHEDULED:  PARoxetine, 20 mg, Nightly  amLODIPine, 10 mg, Daily  aspirin, 81 mg, Daily  atorvastatin, 80 mg, Daily  lisinopril, 20 mg, Daily  pantoprazole, 40 mg, BID  sodium chloride flush, 5-40 mL, 2 times per day  enoxaparin, 30 mg, Daily  fluconazole, 200 mg, Daily      FLUIDS/DRIPS:     sodium chloride 75 mL/hr at 24 0617    sodium chloride       PRNs: sodium chloride flush, 10 mL, PRN  sodium chloride, , PRN  potassium chloride, 40 mEq, PRN   Or  potassium alternative oral replacement, 40 mEq, PRN   Or  potassium chloride, 10 mEq, PRN  ondansetron, 4 mg, Q8H PRN   Or  ondansetron, 4 mg, Q6H PRN  polyethylene glycol, 17 g, Daily PRN  acetaminophen, 650 mg, Q6H PRN   Or  acetaminophen, 650 mg, Q6H PRN  HYDROmorphone, 1 mg, Q4H PRN      ALLERGIES:    Allergies   Allergen Reactions    Tree Nut [Macadamia Nut Oil]      rena         PHYSICAL EXAM   VITALS:  BP (!) 163/72   Pulse 76   Temp 98.1 °F (36.7 °C) (Oral)   Resp 16   Ht 1.753 m (5' 9\")   Wt 54.5 kg (120 lb 2.4 oz)   SpO2 99%   BMI 17.74 kg/m²   TEMPERATURE:  Current - Temp: 98.1 °F (36.7 °C); Max - Temp  Av.4 °F (36.9 °C)  Min: 98 °F (36.7 °C)  Max: 98.9 °F (37.2 °C)    Physical Exam:  General appearance: alert, cooperative, thin  Eyes: Anicteric  Head: Normocephalic, without obvious abnormality  Lungs: clear to auscultation bilaterally, Normal Effort  Heart: regular rate and rhythm, normal S1 and S2, no murmurs or rubs  Abdomen: soft, non-distended, mild epigastric pain. Bowel sounds normal. No masses,  no organomegaly.

## 2024-05-19 NOTE — PROGRESS NOTES
Hospitalist Progress Note  5/19/2024 9:10 AM    PCP: Deep Lozano MD    [phone]     Date of Admission: 5/16/2024                                                                                                                     HOSPITAL COURSE    Patient demographics:  The patient  Darren Duncan is a 68 y.o. male     Significant past medical history:   Patient Active Problem List   Diagnosis    Other hyperlipidemia    Essential hypertension    Smoker    Rash and nonspecific skin eruption    Cellulitis and abscess of toe of left foot    Peripheral arterial disease (HCC)    Claudication (HCC)    Atherosclerosis of native arteries of extremities with rest pain, left leg (HCC)    Peripheral vascular disease, unspecified (HCC)    Atherosclerosis of artery of extremity with rest pain (HCC)    Unspecified severe protein-calorie malnutrition (HCC)    Other specified disorders of carbohydrate metabolism (HCC)    Abdominal pain         Presenting symptoms:      Diagnostic workup:      CONSULTS DURING ADMISSION :   IP CONSULT TO HOSPITALIST  IP CONSULT TO GI  IP CONSULT TO SPIRITUAL SERVICES      Patient was diagnosed with:        Treatment while inpatient:                                                                                         ----------------------------------------------------------      SUBJECTIVE COMPLAINTS-     Diet: ADULT DIET; Full Liquid      OBJECTIVE:   Patient Active Problem List   Diagnosis    Other hyperlipidemia    Essential hypertension    Smoker    Rash and nonspecific skin eruption    Cellulitis and abscess of toe of left foot    Peripheral arterial disease (HCC)    Claudication (HCC)    Atherosclerosis of native arteries of extremities with rest pain, left leg (HCC)    Peripheral vascular disease, unspecified (HCC)    Atherosclerosis of artery of extremity with rest pain (HCC)    Unspecified severe protein-calorie malnutrition (HCC)    Other specified disorders of  deficits noted.    PT/INR: No results for input(s): \"PROTIME\", \"INR\" in the last 72 hours.  APTT: No results for input(s): \"APTT\" in the last 72 hours.    CBC:   Recent Labs     05/17/24  0443   WBC 10.1   HGB 10.6*   HCT 30.9*   MCV 94.9          BMP:   Recent Labs     05/18/24  1254 05/18/24  2329   *  --    K 3.0* 3.8   CL 98*  --    CO2 26  --    PHOS 2.0*  --    BUN 4*  --    CREATININE <0.5*  --        LIVER PROFILE:   No results for input(s): \"ALKPHOS\", \"AST\", \"ALT\", \"BILIDIR\", \"BILITOT\" in the last 72 hours.    Invalid input(s): \"ALB\"    No results for input(s): \"AMYLASE\" in the last 72 hours.    No results for input(s): \"LIPASE\" in the last 72 hours.      UA:  No results for input(s): \"NITRITE\", \"LABCAST\", \"WBCUA\", \"RBCUA\", \"MUCUS\" in the last 72 hours.      TROPONIN: No results for input(s): \"CKTOTAL\", \"TROPONINI\" in the last 72 hours.    Lab Results   Component Value Date/Time    URRFLXCULT Not Indicated 05/16/2024 07:36 AM       Invalid input(s): \"TSHREFLEX\"    No components found for: \"SUE8932\"  POC GLUCOSE:  No results for input(s): \"POCGLU\" in the last 72 hours.  No results for input(s): \"LABA1C\" in the last 72 hours.   Lab Results   Component Value Date/Time    LABA1C 5.7 11/09/2023 11:01 AM         ASSESSMENT AND PLAN    Acute on chronic abdominal pain  CT scan is unremarkable  EGD colonoscopy showed gastritis with ulceration and duodenitis  Continue patient on proton pump inhibitors  Plan is to obtain arterial Doppler for SMA and PIETER  Advance diet as tolerated    Hypertension  Continue on home medication    UTI N39.0  Fluconazole and IV fluids      Insomnia  Start patient on Paxil 20 mg p.o. nightly  And reports some improvement in insomnia and appetite      Code Status: Full Code        Dispo - cc        The patient and / or the family were informed of the results of any tests, a time was given to answer questions, a plan was proposed and they agreed with plan.    JOON ORONA,  MD    This note was transcribed using Dragon Dictation software. Please disregard any translational errors.

## 2024-05-19 NOTE — PLAN OF CARE
Problem: Discharge Planning  Goal: Discharge to home or other facility with appropriate resources  5/19/2024 1145 by Hanny Carrillo RN  Outcome: Progressing     Problem: Pain  Goal: Verbalizes/displays adequate comfort level or baseline comfort level  5/19/2024 1145 by Hanny Carrillo RN  Outcome: Progressing     Problem: Safety - Adult  Goal: Free from fall injury  5/19/2024 1145 by Hanny Carrillo, RN  Outcome: Progressing     Problem: Gastrointestinal - Adult  Goal: Minimal or absence of nausea and vomiting  5/19/2024 1145 by Hanny Carrillo, RN  Outcome: Progressing     Problem: Gastrointestinal - Adult  Goal: Maintains or returns to baseline bowel function  5/19/2024 1145 by Hanny Carrillo, RN  Outcome: Progressing     Problem: Gastrointestinal - Adult  Goal: Maintains adequate nutritional intake  5/19/2024 1145 by Hanny Carrillo, RN  Outcome: Progressing

## 2024-05-20 ENCOUNTER — HOSPITAL ENCOUNTER (INPATIENT)
Dept: VASCULAR LAB | Age: 69
Discharge: HOME | End: 2024-05-22
Payer: MEDICARE

## 2024-05-20 ENCOUNTER — APPOINTMENT (OUTPATIENT)
Dept: CT IMAGING | Age: 69
End: 2024-05-20
Payer: MEDICARE

## 2024-05-20 LAB
ALBUMIN SERPL-MCNC: 3 G/DL (ref 3.4–5)
ALBUMIN/GLOB SERPL: 1 {RATIO} (ref 1.1–2.2)
ALP SERPL-CCNC: 82 U/L (ref 40–129)
ALT SERPL-CCNC: 14 U/L (ref 10–40)
AMYLASE SERPL-CCNC: 98 U/L (ref 25–115)
ANION GAP SERPL CALCULATED.3IONS-SCNC: 9 MMOL/L (ref 3–16)
AST SERPL-CCNC: 16 U/L (ref 15–37)
BASOPHILS # BLD: 0 K/UL (ref 0–0.2)
BASOPHILS NFR BLD: 0.1 %
BILIRUB SERPL-MCNC: 0.4 MG/DL (ref 0–1)
BUN SERPL-MCNC: 4 MG/DL (ref 7–20)
CALCIUM SERPL-MCNC: 8.8 MG/DL (ref 8.3–10.6)
CHLORIDE SERPL-SCNC: 95 MMOL/L (ref 99–110)
CO2 SERPL-SCNC: 27 MMOL/L (ref 21–32)
CREAT SERPL-MCNC: <0.5 MG/DL (ref 0.8–1.3)
DEPRECATED RDW RBC AUTO: 13.6 % (ref 12.4–15.4)
EOSINOPHIL # BLD: 0 K/UL (ref 0–0.6)
EOSINOPHIL NFR BLD: 0.3 %
ERYTHROCYTE [SEDIMENTATION RATE] IN BLOOD BY WESTERGREN METHOD: 53 MM/HR (ref 0–20)
GFR SERPLBLD CREATININE-BSD FMLA CKD-EPI: >90 ML/MIN/{1.73_M2}
GI PATHOGENS PNL STL NAA+PROBE: NORMAL
GLUCOSE SERPL-MCNC: 130 MG/DL (ref 70–99)
HCT VFR BLD AUTO: 32.5 % (ref 40.5–52.5)
HGB BLD-MCNC: 11.4 G/DL (ref 13.5–17.5)
LACTATE BLDV-SCNC: 1.3 MMOL/L (ref 0.4–2)
LIPASE SERPL-CCNC: 10 U/L (ref 13–60)
LIPASE SERPL-CCNC: 8 U/L (ref 13–60)
LYMPHOCYTES # BLD: 0.2 K/UL (ref 1–5.1)
LYMPHOCYTES NFR BLD: 2.4 %
MCH RBC QN AUTO: 32.9 PG (ref 26–34)
MCHC RBC AUTO-ENTMCNC: 35.1 G/DL (ref 31–36)
MCV RBC AUTO: 93.6 FL (ref 80–100)
MONOCYTES # BLD: 1 K/UL (ref 0–1.3)
MONOCYTES NFR BLD: 10.3 %
NEUTROPHILS # BLD: 8.5 K/UL (ref 1.7–7.7)
NEUTROPHILS NFR BLD: 86.9 %
PLATELET # BLD AUTO: 227 K/UL (ref 135–450)
PMV BLD AUTO: 8.8 FL (ref 5–10.5)
POTASSIUM SERPL-SCNC: 3.5 MMOL/L (ref 3.5–5.1)
PROT SERPL-MCNC: 6.1 G/DL (ref 6.4–8.2)
RBC # BLD AUTO: 3.47 M/UL (ref 4.2–5.9)
SODIUM SERPL-SCNC: 131 MMOL/L (ref 136–145)
WBC # BLD AUTO: 9.8 K/UL (ref 4–11)

## 2024-05-20 PROCEDURE — 6360000002 HC RX W HCPCS: Performed by: HOSPITALIST

## 2024-05-20 PROCEDURE — 93976 VASCULAR STUDY: CPT

## 2024-05-20 PROCEDURE — 6360000004 HC RX CONTRAST MEDICATION: Performed by: INTERNAL MEDICINE

## 2024-05-20 PROCEDURE — 94760 N-INVAS EAR/PLS OXIMETRY 1: CPT

## 2024-05-20 PROCEDURE — 99223 1ST HOSP IP/OBS HIGH 75: CPT | Performed by: SURGERY

## 2024-05-20 PROCEDURE — 82150 ASSAY OF AMYLASE: CPT

## 2024-05-20 PROCEDURE — 74174 CTA ABD&PLVS W/CONTRAST: CPT

## 2024-05-20 PROCEDURE — 83690 ASSAY OF LIPASE: CPT

## 2024-05-20 PROCEDURE — 80053 COMPREHEN METABOLIC PANEL: CPT

## 2024-05-20 PROCEDURE — 99223 1ST HOSP IP/OBS HIGH 75: CPT | Performed by: STUDENT IN AN ORGANIZED HEALTH CARE EDUCATION/TRAINING PROGRAM

## 2024-05-20 PROCEDURE — 6370000000 HC RX 637 (ALT 250 FOR IP): Performed by: NURSE PRACTITIONER

## 2024-05-20 PROCEDURE — 1200000000 HC SEMI PRIVATE

## 2024-05-20 PROCEDURE — 6370000000 HC RX 637 (ALT 250 FOR IP): Performed by: HOSPITALIST

## 2024-05-20 PROCEDURE — 85652 RBC SED RATE AUTOMATED: CPT

## 2024-05-20 PROCEDURE — 83605 ASSAY OF LACTIC ACID: CPT

## 2024-05-20 PROCEDURE — 2580000003 HC RX 258: Performed by: HOSPITALIST

## 2024-05-20 PROCEDURE — 85025 COMPLETE CBC W/AUTO DIFF WBC: CPT

## 2024-05-20 PROCEDURE — 36415 COLL VENOUS BLD VENIPUNCTURE: CPT

## 2024-05-20 RX ORDER — OXYCODONE HYDROCHLORIDE AND ACETAMINOPHEN 5; 325 MG/1; MG/1
1 TABLET ORAL EVERY 4 HOURS PRN
Status: DISCONTINUED | OUTPATIENT
Start: 2024-05-20 | End: 2024-05-22

## 2024-05-20 RX ORDER — OXYCODONE HYDROCHLORIDE AND ACETAMINOPHEN 5; 325 MG/1; MG/1
2 TABLET ORAL EVERY 4 HOURS PRN
Status: DISCONTINUED | OUTPATIENT
Start: 2024-05-20 | End: 2024-05-22

## 2024-05-20 RX ADMIN — FLUCONAZOLE 200 MG: 100 TABLET ORAL at 10:48

## 2024-05-20 RX ADMIN — OXYCODONE HYDROCHLORIDE AND ACETAMINOPHEN 2 TABLET: 5; 325 TABLET ORAL at 13:06

## 2024-05-20 RX ADMIN — ASPIRIN 81 MG: 81 TABLET, CHEWABLE ORAL at 10:48

## 2024-05-20 RX ADMIN — HYDROMORPHONE HYDROCHLORIDE 1 MG: 1 INJECTION, SOLUTION INTRAMUSCULAR; INTRAVENOUS; SUBCUTANEOUS at 07:28

## 2024-05-20 RX ADMIN — HYDROMORPHONE HYDROCHLORIDE 1 MG: 1 INJECTION, SOLUTION INTRAMUSCULAR; INTRAVENOUS; SUBCUTANEOUS at 00:56

## 2024-05-20 RX ADMIN — AMLODIPINE BESYLATE 10 MG: 10 TABLET ORAL at 10:48

## 2024-05-20 RX ADMIN — ENOXAPARIN SODIUM 30 MG: 100 INJECTION SUBCUTANEOUS at 13:08

## 2024-05-20 RX ADMIN — PAROXETINE HYDROCHLORIDE 20 MG: 20 TABLET, FILM COATED ORAL at 21:31

## 2024-05-20 RX ADMIN — LISINOPRIL 20 MG: 20 TABLET ORAL at 10:48

## 2024-05-20 RX ADMIN — OXYCODONE HYDROCHLORIDE AND ACETAMINOPHEN 2 TABLET: 5; 325 TABLET ORAL at 22:09

## 2024-05-20 RX ADMIN — SODIUM CHLORIDE, PRESERVATIVE FREE 10 ML: 5 INJECTION INTRAVENOUS at 21:31

## 2024-05-20 RX ADMIN — IOPAMIDOL 75 ML: 755 INJECTION, SOLUTION INTRAVENOUS at 08:11

## 2024-05-20 RX ADMIN — PANTOPRAZOLE SODIUM 40 MG: 40 TABLET, DELAYED RELEASE ORAL at 10:48

## 2024-05-20 RX ADMIN — OXYCODONE HYDROCHLORIDE AND ACETAMINOPHEN 2 TABLET: 5; 325 TABLET ORAL at 18:19

## 2024-05-20 RX ADMIN — ATORVASTATIN CALCIUM 80 MG: 80 TABLET, FILM COATED ORAL at 10:48

## 2024-05-20 RX ADMIN — PANTOPRAZOLE SODIUM 40 MG: 40 TABLET, DELAYED RELEASE ORAL at 21:31

## 2024-05-20 RX ADMIN — ALUMINUM HYDROXIDE, MAGNESIUM HYDROXIDE, DIMETHICONE: 400; 400; 40 SUSPENSION ORAL at 21:30

## 2024-05-20 ASSESSMENT — PAIN DESCRIPTION - LOCATION
LOCATION: ABDOMEN
LOCATION: ABDOMEN
LOCATION: ABDOMEN;BACK
LOCATION: ABDOMEN
LOCATION: ABDOMEN

## 2024-05-20 ASSESSMENT — PAIN SCALES - GENERAL
PAINLEVEL_OUTOF10: 10
PAINLEVEL_OUTOF10: 8
PAINLEVEL_OUTOF10: 10
PAINLEVEL_OUTOF10: 10
PAINLEVEL_OUTOF10: 7
PAINLEVEL_OUTOF10: 7
PAINLEVEL_OUTOF10: 10

## 2024-05-20 ASSESSMENT — PAIN - FUNCTIONAL ASSESSMENT
PAIN_FUNCTIONAL_ASSESSMENT: ACTIVITIES ARE NOT PREVENTED
PAIN_FUNCTIONAL_ASSESSMENT: ACTIVITIES ARE NOT PREVENTED

## 2024-05-20 ASSESSMENT — PAIN DESCRIPTION - ORIENTATION
ORIENTATION: MID
ORIENTATION: MID;RIGHT

## 2024-05-20 ASSESSMENT — PAIN DESCRIPTION - DESCRIPTORS
DESCRIPTORS: STABBING;DISCOMFORT
DESCRIPTORS: DISCOMFORT;STABBING
DESCRIPTORS: ACHING
DESCRIPTORS: ACHING

## 2024-05-20 ASSESSMENT — PAIN SCALES - WONG BAKER: WONGBAKER_NUMERICALRESPONSE: NO HURT

## 2024-05-20 NOTE — PROGRESS NOTES
Gastroenterology Progress Note    Darren Duncan is a 68 y.o. male patient.  Principal Problem:    Abdominal pain  Resolved Problems:    * No resolved hospital problems. *      SUBJECTIVE:  Developed severe abdominal pain overnight. Worse than it has ever been .  Hiccups really aggravate the pain.  No vomiting.  No fevers.      Current Facility-Administered Medications: PARoxetine (PAXIL) tablet 20 mg, 20 mg, Oral, Nightly  amLODIPine (NORVASC) tablet 10 mg, 10 mg, Oral, Daily  aspirin chewable tablet 81 mg, 81 mg, Oral, Daily  atorvastatin (LIPITOR) tablet 80 mg, 80 mg, Oral, Daily  lisinopril (PRINIVIL;ZESTRIL) tablet 20 mg, 20 mg, Oral, Daily  pantoprazole (PROTONIX) tablet 40 mg, 40 mg, Oral, BID  0.9 % sodium chloride infusion, , IntraVENous, Continuous  sodium chloride flush 0.9 % injection 5-40 mL, 5-40 mL, IntraVENous, 2 times per day  sodium chloride flush 0.9 % injection 10 mL, 10 mL, IntraVENous, PRN  0.9 % sodium chloride infusion, , IntraVENous, PRN  potassium chloride (KLOR-CON M) extended release tablet 40 mEq, 40 mEq, Oral, PRN **OR** potassium bicarb-citric acid (EFFER-K) effervescent tablet 40 mEq, 40 mEq, Oral, PRN **OR** potassium chloride 10 mEq/100 mL IVPB (Peripheral Line), 10 mEq, IntraVENous, PRN  ondansetron (ZOFRAN-ODT) disintegrating tablet 4 mg, 4 mg, Oral, Q8H PRN **OR** ondansetron (ZOFRAN) injection 4 mg, 4 mg, IntraVENous, Q6H PRN  polyethylene glycol (GLYCOLAX) packet 17 g, 17 g, Oral, Daily PRN  acetaminophen (TYLENOL) tablet 650 mg, 650 mg, Oral, Q6H PRN **OR** acetaminophen (TYLENOL) suppository 650 mg, 650 mg, Rectal, Q6H PRN  enoxaparin Sodium (LOVENOX) injection 30 mg, 30 mg, SubCUTAneous, Daily  fluconazole (DIFLUCAN) tablet 200 mg, 200 mg, Oral, Daily  HYDROmorphone (DILAUDID) injection 1 mg, 1 mg, IntraVENous, Q4H PRN    Physical    VITALS:  BP (!) 147/80   Pulse 90   Temp 98 °F (36.7 °C) (Oral)   Resp 16   Ht 1.753 m (5' 9\")   Wt 52.6 kg (115 lb 15.4 oz)   SpO2  small bowel, decompressed stomach, advanced atherosclerotic changes of the aorta.  Per ER he has not been taking his pantoprazole.    1. Chronic Abdominal pain, anorexia, weight loss for 2 years which may be related to the gastric ulcers and per ER he has not been taking his pantoprazole.  CT unremarkable.  Recent EGD/colonoscopy showed gastritis with ulcerations, duodenitis, no malignancy, biopsies neg for H Pylori.  No hx of pancreatitis.  No heavy ETOH use.  smokes daily.  Hx of PVD.    2. Weight loss  3.  Acute abdominal pain overnight.  His abdomen is very tender today with guarding.  11 out of  10 pain he says.  Cr normal and has been 2 days since last CT.  Given acute abdomen now, will repeat CT.  Given concern for intestinal ischemia will get CTA.       RECOMMENDATIONS:    -Stat CTA for acute abdomen.    -Has known PUD and has not been taking PPI.  Hopefully PPI will improve symptoms  -Doppler US of celiac artery, SMA and PIETER ordered for Monday given significant vascular disease.    Thank you for allowing me to participate in the care of your patient.  Please feel free to contact me with any concerns.  [phone]    Pancho Zaidi MD      Received call from vascular lab from [facility] who said there is very severe Celiac and SMA stenosis.  CTA shows 90% celiac and 50% SMA stenosis.  Based on his abdominal exam this morning, I have spoken to Dr. Craft with general surgery and placed a consult to vascular surgery.  Dr. Craft is going to touch base with vascular surgery as well.  His abdominal exam is concerning, but WBC is normal and CT is somewhat re-assuring, though I worry the ileus/pSBO may be from ischemia. Appreciate help from vascular and general surgery.  Donato Zaidi MD

## 2024-05-20 NOTE — CONSULTS
GENERAL SURGERY  Consult Note    Patient Name: Darren Duncan  MRN: [id number]  YOB: 1955   Date of Evaluation: 5/20/2024  Primary Care Physician: Deep Lozano MD    Referred by: Hospitalist    Chief Complaint   Patient presents with    Abdominal Pain     Pt c/o abdominal pain. Pt  states he was told he has stomach ulcers. Pt says he takes his medication as prescribe and gets no relief. Pt AAOX4 at triage            SUBJECTIVE:     Darren is a 68 y.o. year-old male.  He has a significant history of CVA, MI, hyperlipidemia, hypertension, and peripheral vascular disease.  He underwent a right CEA and a left femoral to above-knee popliteal bypass (Dr. Grigsby 2020).  He reports abdominal pain for over 2 years.  This has limited his oral intake.  The pain is worse with eating.  He has been losing weight.  He reports that he has not been able to eat well for this entire period of time.  He is not currently experiencing nausea or vomiting.  He has no fever or chills.  The pain he currently is experiencing is diffuse throughout the abdomen.  It is like his usual pain, but worse.  He is having bowel function, which is non-bloody      REVIEW OF SYSTEMS  A comprehensive review of systems was completed and is negative except for any elements noted above.    Past Medical History:   Diagnosis Date    Cerebral artery occlusion with cerebral infarction (HCC)     Patient said he has no residual effects    Heart attack (HCC)     3 yr ago    Hx of blood clots     right side of neck    Hyperlipidemia     Hypertension     Peripheral vascular disease, unspecified (HCC) 09/22/2020     Past Surgical History:   Procedure Laterality Date    ARTERIAL CLOT SURGERY Right 2016    FEMORAL BYPASS Left 9/22/2020    LEFT FEMORAL ENDARTERECTOMY, LEFT FEMORAL TO POPLITEAL BYPASS GRAFT performed by Armando Grigsby MD at [facility] OR     Prior to Admission medications    Medication Sig Start Date End Date Taking? Authorizing          IMAGING:     CTA ABDOMEN PELVIS W CONTRAST    Result Date: 5/20/2024  Significant atherosclerotic disease involving the abdominal aorta and its branch structures. There is at least 90% stenosis of the proximal celiac trunk. There is at least 50% stenosis of the proximal superior mesenteric artery. Findings concerning for partial small bowel obstruction versus ileus. New right middle lobe infiltrate. 3rd spacing. Additional findings noted above.     US RENAL COMPLETE    Result Date: 5/17/2024  Unremarkable ultrasound of the kidneys and urinary bladder.     CT ABDOMEN PELVIS W IV CONTRAST Additional Contrast? None    Result Date: 5/16/2024  Dilated fluid-filled small bowel, which is nonspecific.  This may represent early obstruction or ileus or enteritis. The patient is cachectic which limits evaluation of the internal abdominal and pelvic organs, but no other acute findings identified.        ASSESSMENT & PLAN:     Darren Duncan is a 68 y.o. year-old male.  He has a significant vascular history, including CVA, PAD, right CEA, and left femoral to above-knee popliteal bypass (2020).  He presents with acute on chronic abdominal pain.  This is been ongoing for approximately 2 years.  Overall, his presentation is consistent with chronic mesenteric ischemia.    Chronic mesenteric ischemia   R/o bowel compromise  - CT reviewed, as above, no evidence of small bowel thickening or pneumatosis, no free fluid or free air, paucity of intra-abdominal fat limits evaluation, stenosis of the celiac and SMA, 90% and 50 %, respectively  - Abdominal duplex confirms the above   - No evidence of jennifer bowel compromise on imaging   - labs reviewed: without leukocytosis, lactic acidosis, or other significant abnormality  - Case discussed with vascular surgery (Dr. Grigsby), no urgent indication for OR from general or vascular stanpoint, but he may benefit from vascular intervention in the coming days   - OK for clears   - trending  labs   - closely monitor abdominal exam, vitals, and clinical status for signs of bowel ischemia            Thank you for the consultation,          John Craft MD     General Surgery [phone]    Electronically signed by John Craft MD on 5/20/2024 at 4:47 PM

## 2024-05-20 NOTE — PLAN OF CARE
Problem: Discharge Planning  Goal: Discharge to home or other facility with appropriate resources  5/20/2024 0841 by Betty Fink RN  Outcome: Progressing  5/19/2024 2123 by Ana Davis RN  Outcome: Progressing     Problem: Pain  Goal: Verbalizes/displays adequate comfort level or baseline comfort level  5/20/2024 0841 by Betty Fink RN  Outcome: Progressing  5/19/2024 2123 by Ana Davis RN  Outcome: Progressing     Problem: Safety - Adult  Goal: Free from fall injury  5/20/2024 0841 by Betty Fink RN  Outcome: Progressing  5/19/2024 2123 by Ana Davis RN  Outcome: Progressing     Problem: Gastrointestinal - Adult  Goal: Minimal or absence of nausea and vomiting  5/20/2024 0841 by Betty Fink RN  Outcome: Progressing  5/19/2024 2123 by Ana Davis RN  Outcome: Progressing  Goal: Maintains or returns to baseline bowel function  5/20/2024 0841 by Betty Fink RN  Outcome: Progressing  5/19/2024 2123 by Ana Davis RN  Outcome: Progressing  Goal: Maintains adequate nutritional intake  5/20/2024 0841 by Betty Fink RN  Outcome: Progressing  5/19/2024 2123 by Ana Davis RN  Outcome: Progressing

## 2024-05-20 NOTE — PROGRESS NOTES
Physical Therapy  Attempt Note  Darren Duncan  [id number]    The pt currently out of the room at vascular lab. Per chart review the pt has developed acute abd pain overnight and is being worked up presently for that. Will check back later and see if the pt is able to participate in therapy session.     If pt. is D/C'd prior to next visit please refer back to last daily progress note for D/C status.  Electronically signed by Dominique Banegas, PT [id number] on 5/20/2024 at 10:06 AM

## 2024-05-20 NOTE — PLAN OF CARE
Problem: Discharge Planning  Goal: Discharge to home or other facility with appropriate resources  5/19/2024 2123 by Ana Davis RN  Outcome: Progressing     Problem: Pain  Goal: Verbalizes/displays adequate comfort level or baseline comfort level  5/19/2024 2123 by Ana Davis RN  Outcome: Progressing     Problem: Safety - Adult  Goal: Free from fall injury  5/19/2024 2123 by Ana Davis, RN  Outcome: Progressing     Problem: Gastrointestinal - Adult  Goal: Minimal or absence of nausea and vomiting  5/19/2024 2123 by Ana Davis RN  Outcome: Progressing     Problem: Gastrointestinal - Adult  Goal: Maintains or returns to baseline bowel function  5/19/2024 2123 by Ana Davis, RN  Outcome: Progressing     Problem: Gastrointestinal - Adult  Goal: Maintains adequate nutritional intake  5/19/2024 2123 by Ana Davis, RN  Outcome: Progressing

## 2024-05-20 NOTE — PROGRESS NOTES
Hospitalist Progress Note  5/20/2024 10:13 AM    PCP: Deep Lozano MD    [id number]     Date of Admission: 5/16/2024                                                                                                                     HOSPITAL COURSE    Patient demographics:  The patient  Darren Duncan is a 68 y.o. male     Significant past medical history:   Patient Active Problem List   Diagnosis    Other hyperlipidemia    Essential hypertension    Smoker    Rash and nonspecific skin eruption    Cellulitis and abscess of toe of left foot    Peripheral arterial disease (HCC)    Claudication (HCC)    Atherosclerosis of native arteries of extremities with rest pain, left leg (HCC)    Peripheral vascular disease, unspecified (HCC)    Atherosclerosis of artery of extremity with rest pain (HCC)    Unspecified severe protein-calorie malnutrition (HCC)    Other specified disorders of carbohydrate metabolism (HCC)    Abdominal pain         Presenting symptoms:      Diagnostic workup:      CONSULTS DURING ADMISSION :   IP CONSULT TO HOSPITALIST  IP CONSULT TO GI  IP CONSULT TO SPIRITUAL SERVICES  IP CONSULT TO GENERAL SURGERY  IP CONSULT TO VASCULAR SURGERY      Patient was diagnosed with:        Treatment while inpatient:         68 years old male with medical history significant for hypertension peripheral vascular disease and history of CVA.    Patient presented to the emergency room with abdominal pain and significant weight loss over a period of 2 years.  Patient was suspected for bowel ischemia.  Vascular surgery was consulted and patient underwent a CT angiogram of the abdomen and pelvis.  Patient also underwent arterial duplex.  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  Patient was kept on clear liquid diet initially                                                                             icterus. No conjunctival injection.   ENT: No discharge. Pharynx clear. External appearance of ears and nose normal.  Neck: Trachea midline. No obvious mass.    Resp: No accessory muscle use. No crackles. No wheezes. No rhonchi.  CV: Regular rate. Regular rhythm. No murmur or rub. No edema.   GI: Abdomen is soft diffusely tender to palpation  Skin: Warm, dry, normal texture and turgor.   Lymph: No cervical LAD. No supraclavicular LAD.   M/S: / Ext. No cyanosis. No clubbing. No joint deformity.    Neuro: CN 2-12 are intact,  no neurologic deficits noted.    PT/INR: No results for input(s): \"PROTIME\", \"INR\" in the last 72 hours.  APTT: No results for input(s): \"APTT\" in the last 72 hours.    CBC:   Recent Labs     05/20/24  0757   WBC 9.8   HGB 11.4*   HCT 32.5*   MCV 93.6          BMP:   Recent Labs     05/18/24  1254 05/18/24  2329 05/20/24  0757   *  --  131*   K 3.0* 3.8 3.5   CL 98*  --  95*   CO2 26  --  27   PHOS 2.0*  --   --    BUN 4*  --  4*   CREATININE <0.5*  --  <0.5*       LIVER PROFILE:   Recent Labs     05/20/24  0757   ALKPHOS 82   AST 16   ALT 14   BILITOT 0.4       No results for input(s): \"AMYLASE\" in the last 72 hours.    Recent Labs     05/20/24  0757   LIPASE 10.0*         UA:  No results for input(s): \"NITRITE\", \"LABCAST\", \"WBCUA\", \"RBCUA\", \"MUCUS\" in the last 72 hours.      TROPONIN: No results for input(s): \"CKTOTAL\", \"TROPONINI\" in the last 72 hours.    Lab Results   Component Value Date/Time    URRFLXCULT Not Indicated 05/16/2024 07:36 AM       Invalid input(s): \"TSHREFLEX\"    No components found for: \"FZD2650\"  POC GLUCOSE:  No results for input(s): \"POCGLU\" in the last 72 hours.  No results for input(s): \"LABA1C\" in the last 72 hours.   Lab Results   Component Value Date/Time    LABA1C 5.7 11/09/2023 11:01 AM         ASSESSMENT AND PLAN    Acute on chronic abdominal pain  EGD colonoscopy showed gastritis with ulceration and duodenitis  Continue patient on proton pump

## 2024-05-21 ENCOUNTER — APPOINTMENT (OUTPATIENT)
Dept: NUCLEAR MEDICINE | Age: 69
End: 2024-05-21
Payer: MEDICARE

## 2024-05-21 ENCOUNTER — APPOINTMENT (OUTPATIENT)
Age: 69
End: 2024-05-21
Attending: INTERNAL MEDICINE
Payer: MEDICARE

## 2024-05-21 ENCOUNTER — PREP FOR PROCEDURE (OUTPATIENT)
Dept: VASCULAR SURGERY | Age: 69
End: 2024-05-21

## 2024-05-21 ENCOUNTER — ANESTHESIA EVENT (OUTPATIENT)
Dept: OPERATING ROOM | Age: 69
End: 2024-05-21
Payer: MEDICARE

## 2024-05-21 ENCOUNTER — HOSPITAL ENCOUNTER (INPATIENT)
Age: 69
Discharge: HOME | End: 2024-05-23
Payer: MEDICARE

## 2024-05-21 DIAGNOSIS — K55.1 MESENTERIC ARTERY STENOSIS (HCC): ICD-10-CM

## 2024-05-21 PROBLEM — K55.9 MESENTERIC ISCHEMIA (HCC): Status: ACTIVE | Noted: 2024-05-21

## 2024-05-21 LAB
AMYLASE SERPL-CCNC: 57 U/L (ref 25–115)
DEPRECATED RDW RBC AUTO: 13.7 % (ref 12.4–15.4)
ECHO AO ROOT DIAM: 3.6 CM
ECHO AO ROOT INDEX: 2.24 CM/M2
ECHO AV AREA PEAK VELOCITY: 3.4 CM2
ECHO AV AREA VTI: 3.4 CM2
ECHO AV AREA/BSA PEAK VELOCITY: 2.1 CM2/M2
ECHO AV AREA/BSA VTI: 2.1 CM2/M2
ECHO AV MEAN GRADIENT: 2 MMHG
ECHO AV MEAN VELOCITY: 0.6 M/S
ECHO AV PEAK GRADIENT: 4 MMHG
ECHO AV PEAK VELOCITY: 0.9 M/S
ECHO AV VELOCITY RATIO: 1.11
ECHO AV VTI: 18.6 CM
ECHO BSA: 1.55 M2
ECHO BSA: 1.55 M2
ECHO BSA: 1.57 M2
ECHO EST RA PRESSURE: 3 MMHG
ECHO IVC PROX: 1.3 CM
ECHO LA AREA 2C: 16.4 CM2
ECHO LA AREA 4C: 19.3 CM2
ECHO LA MAJOR AXIS: 5 CM
ECHO LA MINOR AXIS: 5.4 CM
ECHO LA VOL BP: 48 ML (ref 18–58)
ECHO LA VOL MOD A2C: 36 ML (ref 18–58)
ECHO LA VOL MOD A4C: 59 ML (ref 18–58)
ECHO LA VOL/BSA BIPLANE: 30 ML/M2 (ref 16–34)
ECHO LA VOLUME INDEX MOD A2C: 22 ML/M2 (ref 16–34)
ECHO LA VOLUME INDEX MOD A4C: 37 ML/M2 (ref 16–34)
ECHO LV E' LATERAL VELOCITY: 9 CM/S
ECHO LV E' SEPTAL VELOCITY: 9 CM/S
ECHO LV EDV A2C: 69 ML
ECHO LV EDV A4C: 77 ML
ECHO LV EDV INDEX A4C: 48 ML/M2
ECHO LV EDV NDEX A2C: 43 ML/M2
ECHO LV EJECTION FRACTION A2C: 48 %
ECHO LV EJECTION FRACTION A4C: 56 %
ECHO LV EJECTION FRACTION BIPLANE: 51 % (ref 55–100)
ECHO LV ESV A2C: 36 ML
ECHO LV ESV A4C: 34 ML
ECHO LV ESV INDEX A2C: 22 ML/M2
ECHO LV ESV INDEX A4C: 21 ML/M2
ECHO LV FRACTIONAL SHORTENING: 20 % (ref 28–44)
ECHO LV INTERNAL DIMENSION DIASTOLE INDEX: 2.8 CM/M2
ECHO LV INTERNAL DIMENSION DIASTOLIC: 4.5 CM (ref 4.2–5.9)
ECHO LV INTERNAL DIMENSION SYSTOLIC INDEX: 2.24 CM/M2
ECHO LV INTERNAL DIMENSION SYSTOLIC: 3.6 CM
ECHO LV IVSD: 1.3 CM (ref 0.6–1)
ECHO LV MASS 2D: 248.4 G (ref 88–224)
ECHO LV MASS INDEX 2D: 154.3 G/M2 (ref 49–115)
ECHO LV POSTERIOR WALL DIASTOLIC: 1.5 CM (ref 0.6–1)
ECHO LV RELATIVE WALL THICKNESS RATIO: 0.67
ECHO LVOT AREA: 3.1 CM2
ECHO LVOT AV VTI INDEX: 1.09
ECHO LVOT DIAM: 2 CM
ECHO LVOT MEAN GRADIENT: 2 MMHG
ECHO LVOT PEAK GRADIENT: 4 MMHG
ECHO LVOT PEAK VELOCITY: 1 M/S
ECHO LVOT STROKE VOLUME INDEX: 39.6 ML/M2
ECHO LVOT SV: 63.7 ML
ECHO LVOT VTI: 20.3 CM
ECHO MV E VELOCITY: 0.56 M/S
ECHO MV E/E' LATERAL: 6.22
ECHO MV E/E' RATIO (AVERAGED): 6.22
ECHO MV E/E' SEPTAL: 6.22
ECHO PV MAX VELOCITY: 1.7 M/S
ECHO PV PEAK GRADIENT: 11 MMHG
ECHO RA AREA 4C: 14.1 CM2
ECHO RA END SYSTOLIC VOLUME APICAL 4 CHAMBER INDEX BSA: 19 ML/M2
ECHO RA VOLUME: 31 ML
ECHO RIGHT VENTRICULAR SYSTOLIC PRESSURE (RVSP): 12 MMHG
ECHO RV BASAL DIMENSION: 2.4 CM
ECHO RV FREE WALL PEAK S': 25 CM/S
ECHO RV MID DIMENSION: 1.9 CM
ECHO RV TAPSE: 2.3 CM (ref 1.7–?)
ECHO TV REGURGITANT MAX VELOCITY: 1.5 M/S
ECHO TV REGURGITANT PEAK GRADIENT: 9 MMHG
HCT VFR BLD AUTO: 34.4 % (ref 40.5–52.5)
HGB BLD-MCNC: 11.6 G/DL (ref 13.5–17.5)
LACTATE BLDV-SCNC: 2 MMOL/L (ref 0.4–2)
MCH RBC QN AUTO: 31.7 PG (ref 26–34)
MCHC RBC AUTO-ENTMCNC: 33.8 G/DL (ref 31–36)
MCV RBC AUTO: 93.7 FL (ref 80–100)
NUC STRESS EJECTION FRACTION: 38 %
PLATELET # BLD AUTO: 237 K/UL (ref 135–450)
PMV BLD AUTO: 8.6 FL (ref 5–10.5)
RBC # BLD AUTO: 3.67 M/UL (ref 4.2–5.9)
STRESS BASELINE DIAS BP: 67 MMHG
STRESS BASELINE HR: 95 BPM
STRESS BASELINE SYS BP: 162 MMHG
STRESS ESTIMATED WORKLOAD: 1 METS
STRESS EXERCISE DUR MIN: 1 MIN
STRESS EXERCISE DUR SEC: 40 SEC
STRESS PEAK DIAS BP: 67 MMHG
STRESS PEAK SYS BP: 162 MMHG
STRESS PERCENT HR ACHIEVED: 73 %
STRESS POST PEAK HR: 111 BPM
STRESS RATE PRESSURE PRODUCT: NORMAL BPM*MMHG
STRESS TARGET HR: 152 BPM
VAS AORTA PROX PSV: 42 CM/S
VAS CELIAC EDV: 421 CM/S
VAS CELIAC PSV: 923 CM/S
VAS COMMON HEPATIC EDV: 14 CM/S
VAS COMMON HEPATIC PSV: 145 CM/S
VAS DIST SMA EDV: 38 CM/S
VAS DIST SMA PSV: 124 CM/S
VAS MID SMA EDV: 205 CM/S
VAS MID SMA PSV: 591 CM/S
VAS ORIGIN SMA EDV: 384 CM/S
VAS ORIGIN SMA PSV: 729 CM/S
VAS PROX SMA EDV: 357 CM/S
VAS PROX SMA PSV: 843 CM/S
VAS SPLENIC EDV: 133 CM/S
VAS SPLENIC PSV: 252 CM/S
WBC # BLD AUTO: 14.9 K/UL (ref 4–11)

## 2024-05-21 PROCEDURE — 6360000002 HC RX W HCPCS: Performed by: HOSPITALIST

## 2024-05-21 PROCEDURE — 93018 CV STRESS TEST I&R ONLY: CPT | Performed by: INTERNAL MEDICINE

## 2024-05-21 PROCEDURE — 82150 ASSAY OF AMYLASE: CPT

## 2024-05-21 PROCEDURE — 78452 HT MUSCLE IMAGE SPECT MULT: CPT

## 2024-05-21 PROCEDURE — 93306 TTE W/DOPPLER COMPLETE: CPT

## 2024-05-21 PROCEDURE — 94760 N-INVAS EAR/PLS OXIMETRY 1: CPT

## 2024-05-21 PROCEDURE — 6360000002 HC RX W HCPCS: Performed by: SURGERY

## 2024-05-21 PROCEDURE — 6370000000 HC RX 637 (ALT 250 FOR IP): Performed by: HOSPITALIST

## 2024-05-21 PROCEDURE — 99232 SBSQ HOSP IP/OBS MODERATE 35: CPT | Performed by: STUDENT IN AN ORGANIZED HEALTH CARE EDUCATION/TRAINING PROGRAM

## 2024-05-21 PROCEDURE — 93976 VASCULAR STUDY: CPT | Performed by: SURGERY

## 2024-05-21 PROCEDURE — 93017 CV STRESS TEST TRACING ONLY: CPT

## 2024-05-21 PROCEDURE — 1200000000 HC SEMI PRIVATE

## 2024-05-21 PROCEDURE — 85027 COMPLETE CBC AUTOMATED: CPT

## 2024-05-21 PROCEDURE — 93016 CV STRESS TEST SUPVJ ONLY: CPT | Performed by: INTERNAL MEDICINE

## 2024-05-21 PROCEDURE — A9502 TC99M TETROFOSMIN: HCPCS | Performed by: SURGERY

## 2024-05-21 PROCEDURE — 83605 ASSAY OF LACTIC ACID: CPT

## 2024-05-21 PROCEDURE — 99223 1ST HOSP IP/OBS HIGH 75: CPT | Performed by: INTERNAL MEDICINE

## 2024-05-21 PROCEDURE — 3430000000 HC RX DIAGNOSTIC RADIOPHARMACEUTICAL: Performed by: SURGERY

## 2024-05-21 PROCEDURE — 2580000003 HC RX 258: Performed by: HOSPITALIST

## 2024-05-21 PROCEDURE — 93306 TTE W/DOPPLER COMPLETE: CPT | Performed by: INTERNAL MEDICINE

## 2024-05-21 PROCEDURE — 78452 HT MUSCLE IMAGE SPECT MULT: CPT | Performed by: INTERNAL MEDICINE

## 2024-05-21 RX ORDER — CHLORPROMAZINE HYDROCHLORIDE 25 MG/1
50 TABLET, FILM COATED ORAL ONCE
Status: DISCONTINUED | OUTPATIENT
Start: 2024-05-21 | End: 2024-05-22

## 2024-05-21 RX ORDER — REGADENOSON 0.08 MG/ML
0.4 INJECTION, SOLUTION INTRAVENOUS
Status: DISCONTINUED | OUTPATIENT
Start: 2024-05-21 | End: 2024-05-24 | Stop reason: HOSPADM

## 2024-05-21 RX ORDER — GABAPENTIN 300 MG/1
300 CAPSULE ORAL ONCE
Status: DISCONTINUED | OUTPATIENT
Start: 2024-05-21 | End: 2024-05-22

## 2024-05-21 RX ORDER — REGADENOSON 0.08 MG/ML
0.4 INJECTION, SOLUTION INTRAVENOUS
Status: COMPLETED | OUTPATIENT
Start: 2024-05-21 | End: 2024-05-21

## 2024-05-21 RX ADMIN — LISINOPRIL 20 MG: 20 TABLET ORAL at 11:54

## 2024-05-21 RX ADMIN — OXYCODONE HYDROCHLORIDE AND ACETAMINOPHEN 2 TABLET: 5; 325 TABLET ORAL at 21:25

## 2024-05-21 RX ADMIN — HYDROMORPHONE HYDROCHLORIDE 1 MG: 1 INJECTION, SOLUTION INTRAMUSCULAR; INTRAVENOUS; SUBCUTANEOUS at 18:25

## 2024-05-21 RX ADMIN — REGADENOSON 0.4 MG: 0.08 INJECTION, SOLUTION INTRAVENOUS at 09:20

## 2024-05-21 RX ADMIN — ATORVASTATIN CALCIUM 80 MG: 80 TABLET, FILM COATED ORAL at 11:54

## 2024-05-21 RX ADMIN — PANTOPRAZOLE SODIUM 40 MG: 40 TABLET, DELAYED RELEASE ORAL at 11:54

## 2024-05-21 RX ADMIN — HYDROMORPHONE HYDROCHLORIDE 1 MG: 1 INJECTION, SOLUTION INTRAMUSCULAR; INTRAVENOUS; SUBCUTANEOUS at 11:55

## 2024-05-21 RX ADMIN — ONDANSETRON 4 MG: 2 INJECTION INTRAMUSCULAR; INTRAVENOUS at 21:25

## 2024-05-21 RX ADMIN — TETROFOSMIN 33.6 MILLICURIE: 1.38 INJECTION, POWDER, LYOPHILIZED, FOR SOLUTION INTRAVENOUS at 09:40

## 2024-05-21 RX ADMIN — ONDANSETRON 4 MG: 2 INJECTION INTRAMUSCULAR; INTRAVENOUS at 05:26

## 2024-05-21 RX ADMIN — ASPIRIN 81 MG: 81 TABLET, CHEWABLE ORAL at 11:54

## 2024-05-21 RX ADMIN — PAROXETINE HYDROCHLORIDE 20 MG: 20 TABLET, FILM COATED ORAL at 21:48

## 2024-05-21 RX ADMIN — AMLODIPINE BESYLATE 10 MG: 10 TABLET ORAL at 11:54

## 2024-05-21 RX ADMIN — SODIUM CHLORIDE, PRESERVATIVE FREE 10 ML: 5 INJECTION INTRAVENOUS at 21:48

## 2024-05-21 RX ADMIN — PANTOPRAZOLE SODIUM 40 MG: 40 TABLET, DELAYED RELEASE ORAL at 21:47

## 2024-05-21 RX ADMIN — HYDROMORPHONE HYDROCHLORIDE 1 MG: 1 INJECTION, SOLUTION INTRAMUSCULAR; INTRAVENOUS; SUBCUTANEOUS at 05:19

## 2024-05-21 RX ADMIN — TETROFOSMIN 12.6 MILLICURIE: 1.38 INJECTION, POWDER, LYOPHILIZED, FOR SOLUTION INTRAVENOUS at 07:15

## 2024-05-21 ASSESSMENT — PAIN DESCRIPTION - LOCATION
LOCATION: ABDOMEN

## 2024-05-21 ASSESSMENT — PAIN DESCRIPTION - DESCRIPTORS
DESCRIPTORS: ACHING;THROBBING
DESCRIPTORS: ACHING;THROBBING

## 2024-05-21 ASSESSMENT — PAIN DESCRIPTION - ORIENTATION
ORIENTATION: RIGHT;LEFT;LOWER;MID
ORIENTATION: LOWER;RIGHT;MID

## 2024-05-21 ASSESSMENT — PAIN SCALES - GENERAL
PAINLEVEL_OUTOF10: 3
PAINLEVEL_OUTOF10: 8
PAINLEVEL_OUTOF10: 8
PAINLEVEL_OUTOF10: 9
PAINLEVEL_OUTOF10: 8
PAINLEVEL_OUTOF10: 8

## 2024-05-21 NOTE — ADT AUTH CERT
Utilization Reviews       5/18    Last Updated by Heather Barroso RN on 5/21/2024 1235     Review Status Created By   In Primary Heather Barroso RN       Review Type Associated Date   -- 5/21/2024      Criteria Review   DATE: 5/18  TYPE OF BED: med surg        RELEVANT BASELINES: (lab values, vitals, o2 amount/delivery, etc.)  He has had an 80 pound weight loss in the past 2 years.      PERTINENT UPDATES:  Continue PPI  Fluconazole and IV fluids continued  GI-consider a CTA.  Advance to FL diet   IV dilaudid given x3  Potassium replaced IV         VITALS:  98.4 (36.9)       16        77        168/68  99% RA   Pain 8/10     ABNL/PERTINENT LABS/RADIOLOGY/DIAGNOSTIC STUDIES:  05/18/24 12:54  Sodium: 133 (L)  Potassium: 3.0 (L) >3.8  Chloride: 98 (L)  BUN,BUNPL: 4 (L)  Creatinine: <0.5 (L)     Albumin: 3.0 (L)  Albumin/Globulin Ratio: 1.0 (L)     Hemoglobin Quant: 11.4 (L)  Hematocrit: 32.5 (L)     Sed rate 53        MD CONSULTS/ASSESSMENT AND PLAN:  GI note     Doppler US of celiac artery, SMA and PIETER ordered but not completed yesterday. We could consider a CTA. Will check a renal panel first given he recently received on contrast load. Continue PPI. Will advance diet to full liquid.      MEDICATIONS:  amLODIPine    10 mg Oral     Daily                    lisinopril            20 mg Oral     Daily             pantoprazole    40 mg Oral     BID                enoxaparin       30 mg SubCUTAneous          Daily             fluconazole       200 mg           Oral     Daily     IV NS 75 ml/hr        DILAUDID 1 mg EVERY 4 HOURS PRN Route: IV  x3     potassium chloride 10 mEq/100 mL IVPB   x6        ORDERS:  Consult GI  Telemetry  FL diet               5/17    Last Updated by Heather Barroso RN on 5/21/2024 1235     Review Status Created By   In Primary Heather Barroso RN       Review Type Associated Date   -- 5/21/2024      Criteria Review   DATE: 5/17  TYPE OF BED: med surg            RELEVANT  Route: IV  x1           ORDERS:  Consult GI  Telemetry  CL diet         PT/OT/SLP/CM/RN ASSESSMENT OR NOTES:  Pt is from home with his significant other. Pt reported being independent with all self care PTA and denied the need for assistance upon return home.    AM-PAC PT 23/24  OT 24/24

## 2024-05-21 NOTE — PLAN OF CARE
Problem: Discharge Planning  Goal: Discharge to home or other facility with appropriate resources  5/21/2024 0909 by Anila Mckay RN  Outcome: Progressing  5/20/2024 2242 by Jihan Nguyen RN  Outcome: Progressing     Problem: Pain  Goal: Verbalizes/displays adequate comfort level or baseline comfort level  5/21/2024 0909 by Anila Mckay RN  Outcome: Progressing  5/20/2024 2242 by Jihan Nguyen RN  Outcome: Progressing     Problem: Safety - Adult  Goal: Free from fall injury  5/21/2024 0909 by Anila Mckay RN  Outcome: Progressing  5/20/2024 2242 by Jihan Nguyen RN  Outcome: Progressing     Problem: Gastrointestinal - Adult  Goal: Minimal or absence of nausea and vomiting  5/21/2024 0909 by Anila Mckay RN  Outcome: Progressing  5/20/2024 2242 by Jihan Nguyen RN  Outcome: Progressing  Goal: Maintains or returns to baseline bowel function  5/21/2024 0909 by Anila Mckay RN  Outcome: Progressing  5/20/2024 2242 by Jihan Nguyen RN  Outcome: Progressing  Goal: Maintains adequate nutritional intake  5/21/2024 0909 by Anila Mckay RN  Outcome: Progressing  5/20/2024 2242 by Jihan Nguyen RN  Outcome: Progressing

## 2024-05-21 NOTE — PROGRESS NOTES
Physical Therapy  Attempt Note and HOLD Note  Darren Duncan  [id number]    1105: The pt currently out of the room at stress test. Will attempt back later.    1401: The pt found to be awake but sleepy and in the bed. The pt declines PT session today due to 8/10 pain and feeling sleepy from medication. The pt is reporting that he is to have surgery tomorrow due to a blockage. Will place the pt on HOLD for surgery and will need resume PT orders post-op. (Electronically signed by Dominique Banegas, PT on 5/21/2024 at 2:04 PM)      If pt. is D/C'd prior to next visit please refer back to last daily progress note for D/C status.  Electronically signed by Dominique Banegas, PT [id number] on 5/21/2024 at 11:05 AM

## 2024-05-21 NOTE — PROGRESS NOTES
VASCULAR    Seen for abdominal pain - CMI.  No change in syptoms overnight.    VSS Afeb  Abd - flat with absent BS; tender epigastric without rebound    A/P: Chronic mesenteric ischemia   Plan stress test today.   Mesenteric bypass in next 1-2 days based on cardiac status, clinical status and schedule.   Explained in great detail to pt issues at hand.   Seems to understand and agrees to proceed as recommended.   Will follow closely clinically and with serial labs.     Rg Grigsby

## 2024-05-21 NOTE — PROGRESS NOTES
Patient has complaint of hiccuping and has abd pain rating 8/10. Educated patient on prn pain medication options. Patient states that pain medication minimally helps.   Message sent to on call provider requesting one time dose of gi cocktail. New orders placed. Patient is agreeable at this time.Call light is in reach.    Electronically signed by Jihan Nguyen RN on 5/20/2024 at 8:29 PM

## 2024-05-21 NOTE — CONSULTS
Cardiovascular Consultation     Attending Physician: Rayne Mendoza MD    PATIENT: Darren Duncan  : 1955  MRN: [id number]    Reason for Consultation:   Chief Complaint   Patient presents with    Abdominal Pain     Pt c/o abdominal pain. Pt  states he was told he has stomach ulcers. Pt says he takes his medication as prescribe and gets no relief. Pt AAOX4 at triage       History of present illness:   Mr. Darren Duncan is a 68 y.o. male patient with a history of CAD, PAD, hypertension, hyperlipidemia who presented  with worsening abdominal pain. Darren reports abdominal pain over the course of the past two years. States he cannot recall what MI felt like exactly. Denies chest pain since that time. He last saw  Cardiology in 2019 and refused testing at that time. He reports medication compliance.   ROS negative other than abdominal pain.     Medical History:      Diagnosis Date    Cerebral artery occlusion with cerebral infarction (HCC)     Patient said he has no residual effects    Heart attack (HCC)     3 yr ago    Hx of blood clots     right side of neck    Hyperlipidemia     Hypertension     Peripheral vascular disease, unspecified (HCC) 2020       Surgical History:      Procedure Laterality Date    ARTERIAL CLOT SURGERY Right 2016    FEMORAL BYPASS Left 2020    LEFT FEMORAL ENDARTERECTOMY, LEFT FEMORAL TO POPLITEAL BYPASS GRAFT performed by Armando Grigsby MD at [facility] OR       Social History:  Social History     Socioeconomic History    Marital status: Legally      Spouse name: Not on file    Number of children: Not on file    Years of education: Not on file    Highest education level: Not on file   Occupational History    Not on file   Tobacco Use    Smoking status: Former     Current packs/day: 0.00     Average packs/day: 1 pack/day for 20.0 years (20.0 ttl pk-yrs)     Types: Cigarettes     Start date: 8/10/2000     Quit date: 8/10/2020     Years since  is good.  Scan significance indicates moderate to severe cardiac risk.  There is sub-diaphragmatic interfering activity.     Findings were discussed with Dr. Feliciano Frey at 16 45.     PERFUSION FINDINGS   There is a small to medium sized area of mildly decreased perfusion in the basal inferior, basal inferoseptal, basal inferolateral, mid inferior, and apical inferior segments at stress with no improvement at rest, consistent with nontransmural infarction. There is no evidence of visual transient dilation with a normal stress/rest left ventricular volume ratio of 0.97. The sum stress score is 0 (normal: less than 4, mildly abnormal: 4-8, severely abnormal: greater than 8).     5/20/2024 CTA A/P  IMPRESSION:  Significant atherosclerotic disease involving the abdominal aorta and its  branch structures. 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 There is at least 50% stenosis of the proximal superior mesenteric  artery.    STRESS TEST:   5/21/2024    Stress Function: Left ventricular function post-stress is abnormal.   Global function is moderately reduced. Post-stress ejection fraction is   38%. The stress end diastolic cavity size is mildly enlarged.     Perfusion Comments: LV perfusion is abnormal. There is no evidence of   inducible ischemia.     Perfusion Defect: There is a severe left ventricular stress perfusion   defect that is large in size present in the inferior and inferolateral   segment(s) that is mostly fixed. The defect appears to be infarction.     ECG: Resting ECG demonstrates normal sinus rhythm.     ECG: The stress ECG was not diagnostic due to failure to achieve target   heart rate.     Stress Test: A pharmacological stress test was performed using   regadenoson (Lexiscan). The patient reported no symptoms during the stress   test. Patient complained of baseline abdominal pain 8/10, unchanged during   stress test.     Telemetry: NSR, rare PVC   Echo at bedside- LVEF  approximately 50%, no significant valvular disease. Full report pending.     Impression/Recommendations    Mr. Darren Duncan is a 68 y.o. male patient of  Cardiology:     Preoperative Cardiovascular Exam   Surgery planned is Aortomesenteric Bypass surgery with Dr. Grigsby 5/22/2024  History of CAD (known IWMI 2016 (report unavailable) no significant ischemia or change on SPECT today from 2017), PAD, CVA, Hypertension, Hyperlipidemia, Tobacco   ASA, statin, ACEi.      Pre-Operative Risk assessment using 2014 ACC/AHA guidelines     Emergent procedure No  Active Cardiac Condition No (decompensated HF, Arrhythmia, MI <3 weeks, severe valve disease)  Risk Level of Procedure High Risk (aortic, major vascular, or peripheral vascular, etc.)  Revised Cardiac Risk Index Risk factors: History of ischemic heart disease  Measurement of Exercise Tolerance before Surgery >4 Yes    According to the 2014 ACC/AHA pre-operative risk assessment guidelines Draren Duncan is an intermediate risk for major cardiac complications during a high risk procedure and may continue as planned. Specific medication recommendations are listed below. Medications recommended to continue should be taken with a sip of water even when NPO.       Thank you for allowing me to participate in the care of your patient. Please do not hesitate to call.     Betty Stephens DO, [facility]  Interventional Cardiology      [facility]  o: [phone]  [address] [place] [address], [address]  [place] [address]      NOTE:  This report was transcribed using voice recognition software.  Every effort was made to ensure accuracy; however, inadvertent computerized transcription errors may be present.

## 2024-05-21 NOTE — CARE COORDINATION
Discharge Planning:  SW attempted to meet with pt but pt was in the process of testing at bedside. SW will attempt later as time permits.     Per Vascular, plan for Mesenteric bypass within the next 1-2 days.    PLAN: From home with significant other.  Step dgtr will transport home at d/c.  PT/OT following.  Awaiting vascular/cardiology clearance.     YAN Goel  [phone]  Electronically signed by YAN Lee on 5/21/2024 at 4:18 PM

## 2024-05-21 NOTE — PROGRESS NOTES
Gastroenterology Progress Note    Darren Duncan is a 68 y.o. male patient.  Principal Problem:    Abdominal pain  Resolved Problems:    * No resolved hospital problems. *      SUBJECTIVE:  Ongoing abdominal discomfort.  No vomiting. No fevers.        Current Facility-Administered Medications: PARoxetine (PAXIL) tablet 20 mg, 20 mg, Oral, Nightly  amLODIPine (NORVASC) tablet 10 mg, 10 mg, Oral, Daily  aspirin chewable tablet 81 mg, 81 mg, Oral, Daily  atorvastatin (LIPITOR) tablet 80 mg, 80 mg, Oral, Daily  lisinopril (PRINIVIL;ZESTRIL) tablet 20 mg, 20 mg, Oral, Daily  pantoprazole (PROTONIX) tablet 40 mg, 40 mg, Oral, BID  0.9 % sodium chloride infusion, , IntraVENous, Continuous  sodium chloride flush 0.9 % injection 5-40 mL, 5-40 mL, IntraVENous, 2 times per day  sodium chloride flush 0.9 % injection 10 mL, 10 mL, IntraVENous, PRN  0.9 % sodium chloride infusion, , IntraVENous, PRN  potassium chloride (KLOR-CON M) extended release tablet 40 mEq, 40 mEq, Oral, PRN **OR** potassium bicarb-citric acid (EFFER-K) effervescent tablet 40 mEq, 40 mEq, Oral, PRN **OR** potassium chloride 10 mEq/100 mL IVPB (Peripheral Line), 10 mEq, IntraVENous, PRN  ondansetron (ZOFRAN-ODT) disintegrating tablet 4 mg, 4 mg, Oral, Q8H PRN **OR** ondansetron (ZOFRAN) injection 4 mg, 4 mg, IntraVENous, Q6H PRN  polyethylene glycol (GLYCOLAX) packet 17 g, 17 g, Oral, Daily PRN  acetaminophen (TYLENOL) tablet 650 mg, 650 mg, Oral, Q6H PRN **OR** acetaminophen (TYLENOL) suppository 650 mg, 650 mg, Rectal, Q6H PRN  enoxaparin Sodium (LOVENOX) injection 30 mg, 30 mg, SubCUTAneous, Daily  fluconazole (DIFLUCAN) tablet 200 mg, 200 mg, Oral, Daily  HYDROmorphone (DILAUDID) injection 1 mg, 1 mg, IntraVENous, Q4H PRN    Physical    VITALS:  BP (!) 147/80   Pulse 90   Temp 98 °F (36.7 °C) (Oral)   Resp 16   Ht 1.753 m (5' 9\")   Wt 52.6 kg (115 lb 15.4 oz)   SpO2 99%   BMI 17.12 kg/m²   TEMPERATURE:  Current - Temp: 98 °F (36.7 °C); Max -  his pantoprazole.    1. Chronic Abdominal pain, anorexia, weight loss for 2 years suspected secondary to chronic mesenteric ischemia.  Recent EGD/colonoscopy showed gastritis with ulcerations, duodenitis, no malignancy, biopsies neg for H Pylori.  No hx of pancreatitis.  No heavy ETOH use.  smokes daily.  Hx of PVD.    2. Weight loss  3.  Acute abdominal pain.  No evidence of bowel infarction clinically or on CT.  His abdomen is very tender today with guarding.  11 out of      Plan:  Appreciate surgical assistance.  Re-vascularization planned.  No further inpatient recs.  Will sign off.  Please call with questions    Thank you for allowing me to participate in the care of your patient.  Please feel free to contact me with any concerns.  [phone]    Pancho Zaidi MD

## 2024-05-21 NOTE — PROGRESS NOTES
Hospitalist Progress Note  5/21/2024 9:06 AM    PCP: Deep Lozano MD    [phone]     Date of Admission: 5/16/2024                                                                                                                     HOSPITAL COURSE    Patient demographics:  The patient  Darren Duncan is a 68 y.o. male     Significant past medical history:   Patient Active Problem List   Diagnosis    Other hyperlipidemia    Essential hypertension    Smoker    Rash and nonspecific skin eruption    Cellulitis and abscess of toe of left foot    Peripheral arterial disease (HCC)    Claudication (HCC)    Atherosclerosis of native arteries of extremities with rest pain, left leg (HCC)    Peripheral vascular disease, unspecified (HCC)    Atherosclerosis of artery of extremity with rest pain (HCC)    Unspecified severe protein-calorie malnutrition (HCC)    Other specified disorders of carbohydrate metabolism (HCC)    Abdominal pain    Chronic mesenteric ischemia (HCC)         Presenting symptoms:   Abdominal Pain     Diagnostic workup:      CONSULTS DURING ADMISSION :   IP CONSULT TO HOSPITALIST  IP CONSULT TO GI  IP CONSULT TO SPIRITUAL SERVICES  IP CONSULT TO GENERAL SURGERY  IP CONSULT TO VASCULAR SURGERY      Patient was diagnosed with:        Treatment while inpatient:         68 years old male with medical history significant for hypertension peripheral vascular disease and history of CVA.    Patient presented to the emergency room with abdominal pain and significant weight loss over a period of 2 years.    Patient was suspected for bowel ischemia.  Vascular surgery was consulted and patient underwent a CT angiogram of the abdomen and pelvis.    Patient also underwent arterial duplex.    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  Patient was kept on clear liquid diet initially                                                                             for: \"[id number]\"  POC GLUCOSE:  No results for input(s): \"POCGLU\" in the last 72 hours.  No results for input(s): \"LABA1C\" in the last 72 hours.   Lab Results   Component Value Date/Time    LABA1C 5.7 11/09/2023 11:01 AM         ASSESSMENT AND PLAN          Chronic mesenteric ischemia  CT angiogram of the abdomen and pelvis shows stenosis of the celiac and SMA  19 and 50% respectively  Arterial duplex confirms above findings  No bowel compromise no lactic acidosis no leukocytosis  Patient is okay for clear liquid diet  Vascular surgery is following      Cardiology is consulted for preop clearance  Echocardiogram shows ejection fraction of 38%  Suspicion of ischemia    Pain management  R52  Continue with the IV and oral pain medication      Acute on chronic abdominal pain  EGD colonoscopy showed gastritis with ulceration and duodenitis  Continue patient on proton pump inhibitors      Hypertension  Continue on home medication    UTI N39.0  Fluconazole and IV fluids      Insomnia  Start patient on Paxil 20 mg p.o. nightly  And reports some improvement in insomnia and appetite      Code Status: Full Code        Dispo - cc        The patient and / or the family were informed of the results of any tests, a time was given to answer questions, a plan was proposed and they agreed with plan.    JOON ORONA MD    This note was transcribed using Dragon Dictation software. Please disregard any translational errors.

## 2024-05-21 NOTE — PROGRESS NOTES
GENERAL SURGERY  Progress Note    Patient Name: Darren Duncan  MRN: [id number]  YOB: 1955   Date of Evaluation: 5/21/2024  Primary Care Physician: Deep Lozano MD      Ileus (HCC) [K56.7]  Abdominal pain [R10.9]  Failure to thrive in adult [R62.7]  Abdominal pain, unspecified abdominal location [R10.9]    SUBJECTIVE:     Darren is stable today.  He remains hemodynamically normal.  He is afebrile.  He continues to have abdominal pain.  He denies nausea or vomiting.  His pain is controlled with pain medication.  It is not worse than yesterday, but is not necessarily better.  He is passing gas, but has not had a bowel movement.      REVIEW OF SYSTEMS  A comprehensive review of systems was completed and is negative except for any elements noted above.    OBJECTIVE:     BP (!) 146/75   Pulse (!) 102   Temp 98 °F (36.7 °C) (Oral)   Resp 16   Ht 1.753 m (5' 9\")   Wt 50.5 kg (111 lb 6.4 oz)   SpO2 99%   BMI 16.45 kg/m²     PHYSICAL EXAM  General/appearance: Awake and alert, in no acute distress  Lungs: Respirations unlabored  Cardiac: Regular rate  Abdomen: soft, non-distended, tender to palpation: generalized; severe and with voluntary guarding, no signs of jennifer peritonitis  Hernia: None  Rectal: Deferred  Incision: None  Extremities: Warm and well perfused, no edema  Neuro: No focal findings  Skin: No rashes or wounds    LABS:     Recent Labs     05/18/24  2329 05/20/24  0757 05/20/24  1331 05/21/24  0722   WBC  --  9.8  --  14.9*   HGB  --  11.4*  --  11.6*   HCT  --  32.5*  --  34.4*   PLT  --  227  --  237   NA  --  131*  --   --    K 3.8 3.5  --   --    CL  --  95*  --   --    CO2  --  27  --   --    BUN  --  4*  --   --    CREATININE  --  <0.5*  --   --    CALCIUM  --  8.8  --   --    AST  --  16  --   --    ALT  --  14  --   --    BILITOT  --  0.4  --   --    LACTA  --   --  1.3 2.0     Recent Labs     05/20/24  0757 05/20/24  0757 05/20/24  1331 05/21/24  0722   ALKPHOS 82  --    --   --    ALT 14  --   --   --    AST 16  --   --   --    BILITOT 0.4  --   --   --    AMYLASE  --    < > 98 57   LIPASE 10.0*  --  8.0*  --     < > = values in this interval not displayed.       IMAGING:     CTA ABDOMEN PELVIS W CONTRAST    Result Date: 5/20/2024  Significant atherosclerotic disease involving the abdominal aorta and its branch structures. There is at least 90% stenosis of the proximal celiac trunk. There is at least 50% stenosis of the proximal superior mesenteric artery. Findings concerning for partial small bowel obstruction versus ileus. New right middle lobe infiltrate. 3rd spacing. Additional findings noted above.     US RENAL COMPLETE    Result Date: 5/17/2024  Unremarkable ultrasound of the kidneys and urinary bladder.     CT ABDOMEN PELVIS W IV CONTRAST Additional Contrast? None    Result Date: 5/16/2024  Dilated fluid-filled small bowel, which is nonspecific.  This may represent early obstruction or ileus or enteritis. The patient is cachectic which limits evaluation of the internal abdominal and pelvic organs, but no other acute findings identified.      ASSESSMENT & PLAN:     Darren Duncan is a 68 y.o. year-old male.  He has a significant vascular history, including CVA, PAD, right CEA, and left femoral to above-knee popliteal bypass (2020).  He presents with acute on chronic abdominal pain.  This is been ongoing for approximately 2 years.  Overall, his presentation is consistent with chronic mesenteric ischemia.     Chronic mesenteric ischemia   R/o bowel compromise  - CT reviewed, as above, no evidence of small bowel thickening or pneumatosis, no free fluid or free air, paucity of intra-abdominal fat limits evaluation, stenosis of the celiac and SMA, 90% and 50 %, respectively  - Abdominal duplex confirms the above   - No evidence of jennifer bowel compromise on imaging   - trending labs: WBC up today, lactic marginal at 2  - continue IVF resuscitation  - Case discussed with vascular  surgery (Dr. Grigsby), no urgent indication for OR from general or vascular [facility], planning for mesenteric bypass tomorrow, general surgery will be available in the event there is bowel compromise, also available should patient clinical status change to suggest acute abdominal process  - OK for clears from surgical standpoint, NPO midnight   - closely monitor abdominal exam, vitals, and clinical status for signs of bowel ischemia    The patient was educated on his condition and clinical progress.  The above plan of care was discussed, in addition to the significance of medication compliance, nutrition, and healthy lifestyle.  He voiced understanding and satisfaction with his care.           John Craft MD  General Surgery   [phone]    Electronically signed by John Craft MD on 5/21/2024 at 4:32 PM

## 2024-05-21 NOTE — PROGRESS NOTES
VASCULAR SURGERY    -Planning for OR tomorrow at 8:30 for Aortomesenteric Bypass surgery with Dr. Grigsby  -NPO after midnight  -Cardiology consulted for abnormal stress test  -Attempted bedside HEATHER's - non-occlusive vessels likely suggestive of calcification    Electronically signed by IRVING Munguia - CNP on 5/21/2024 at 2:48 PM    Discussed with pt - explained risks and plan.  Understands and wishes to proceed.  Difficulty with calcified vessels explained.    Rg Grigsby

## 2024-05-22 ENCOUNTER — ANESTHESIA EVENT (OUTPATIENT)
Dept: OPERATING ROOM | Age: 69
End: 2024-05-22
Payer: MEDICARE

## 2024-05-22 ENCOUNTER — ANESTHESIA (OUTPATIENT)
Dept: OPERATING ROOM | Age: 69
End: 2024-05-22
Payer: MEDICARE

## 2024-05-22 ENCOUNTER — APPOINTMENT (OUTPATIENT)
Dept: GENERAL RADIOLOGY | Age: 69
End: 2024-05-22
Payer: MEDICARE

## 2024-05-22 PROBLEM — K55.1 CHRONIC MESENTERIC ISCHEMIA (HCC): Status: ACTIVE | Noted: 2024-05-22

## 2024-05-22 LAB
ABO + RH BLD: NORMAL
AMYLASE SERPL-CCNC: 56 U/L (ref 25–115)
ANION GAP SERPL CALCULATED.3IONS-SCNC: 12 MMOL/L (ref 3–16)
ANION GAP SERPL CALCULATED.3IONS-SCNC: 12 MMOL/L (ref 3–16)
BASE EXCESS BLDA CALC-SCNC: -1.2 MMOL/L (ref -3–3)
BASE EXCESS BLDA CALC-SCNC: -5 MMOL/L (ref -3–3)
BASE EXCESS BLDA CALC-SCNC: -5 MMOL/L (ref -3–3)
BASE EXCESS BLDA CALC-SCNC: 0.8 MMOL/L (ref -3–3)
BASE EXCESS BLDA CALC-SCNC: 1 MMOL/L (ref -3–3)
BASE EXCESS BLDV CALC-SCNC: 2.4 MMOL/L
BLD GP AB SCN SERPL QL: NORMAL
BLOOD BANK DISPENSE STATUS: NORMAL
BLOOD BANK PRODUCT CODE: NORMAL
BPU ID: NORMAL
BUN SERPL-MCNC: 19 MG/DL (ref 7–20)
BUN SERPL-MCNC: 20 MG/DL (ref 7–20)
CA-I BLD-SCNC: 1.21 MMOL/L (ref 1.12–1.32)
CA-I BLD-SCNC: 1.36 MMOL/L (ref 1.12–1.32)
CALCIUM SERPL-MCNC: 8.3 MG/DL (ref 8.3–10.6)
CALCIUM SERPL-MCNC: 8.5 MG/DL (ref 8.3–10.6)
CHLORIDE SERPL-SCNC: 107 MMOL/L (ref 99–110)
CHLORIDE SERPL-SCNC: 98 MMOL/L (ref 99–110)
CO2 BLDA-SCNC: 21 MMOL/L
CO2 BLDA-SCNC: 22 MMOL/L
CO2 BLDA-SCNC: 25.7 MMOL/L
CO2 BLDA-SCNC: 26.6 MMOL/L
CO2 BLDA-SCNC: 27.4 MMOL/L
CO2 BLDV-SCNC: 29 MMOL/L
CO2 SERPL-SCNC: 22 MMOL/L (ref 21–32)
CO2 SERPL-SCNC: 26 MMOL/L (ref 21–32)
COHGB MFR BLDA: 0.7 % (ref 0–1.5)
COHGB MFR BLDA: 0.7 % (ref 0–1.5)
COHGB MFR BLDA: 0.8 % (ref 0–1.5)
COHGB MFR BLDV: 0.7 %
CREAT SERPL-MCNC: 0.6 MG/DL (ref 0.8–1.3)
CREAT SERPL-MCNC: <0.5 MG/DL (ref 0.8–1.3)
DEPRECATED RDW RBC AUTO: 13.6 % (ref 12.4–15.4)
DEPRECATED RDW RBC AUTO: 13.7 % (ref 12.4–15.4)
DEPRECATED RDW RBC AUTO: 13.9 % (ref 12.4–15.4)
DEPRECATED RDW RBC AUTO: 14.6 % (ref 12.4–15.4)
DESCRIPTION BLOOD BANK: NORMAL
GFR SERPLBLD CREATININE-BSD FMLA CKD-EPI: >90 ML/MIN/{1.73_M2}
GFR SERPLBLD CREATININE-BSD FMLA CKD-EPI: >90 ML/MIN/{1.73_M2}
GLUCOSE BLD-MCNC: 132 MG/DL (ref 70–99)
GLUCOSE BLD-MCNC: 51 MG/DL (ref 70–99)
GLUCOSE BLD-MCNC: 94 MG/DL (ref 70–99)
GLUCOSE SERPL-MCNC: 62 MG/DL (ref 70–99)
GLUCOSE SERPL-MCNC: 85 MG/DL (ref 70–99)
HCO3 BLDA-SCNC: 20 MMOL/L (ref 21–29)
HCO3 BLDA-SCNC: 20.6 MMOL/L (ref 21–29)
HCO3 BLDA-SCNC: 24.4 MMOL/L (ref 21–29)
HCO3 BLDA-SCNC: 25.4 MMOL/L (ref 21–29)
HCO3 BLDA-SCNC: 26 MMOL/L (ref 21–29)
HCO3 BLDV-SCNC: 28 MMOL/L (ref 23–29)
HCT VFR BLD AUTO: 24 % (ref 40.5–52.5)
HCT VFR BLD AUTO: 25 % (ref 40.5–52.5)
HCT VFR BLD AUTO: 25.8 % (ref 40.5–52.5)
HCT VFR BLD AUTO: 26 % (ref 40.5–52.5)
HCT VFR BLD AUTO: 26.9 % (ref 40.5–52.5)
HCT VFR BLD AUTO: 29.5 % (ref 40.5–52.5)
HCT VFR BLD AUTO: 32.9 % (ref 40.5–52.5)
HGB BLD CALC-MCNC: 8.1 GM/DL (ref 13.5–17.5)
HGB BLD-MCNC: 10 G/DL (ref 13.5–17.5)
HGB BLD-MCNC: 11.2 G/DL (ref 13.5–17.5)
HGB BLD-MCNC: 8.7 G/DL (ref 13.5–17.5)
HGB BLD-MCNC: 8.9 G/DL (ref 13.5–17.5)
HGB BLD-MCNC: 8.9 G/DL (ref 13.5–17.5)
HGB BLD-MCNC: 9.2 G/DL (ref 13.5–17.5)
HGB BLDA-MCNC: 6.4 G/DL (ref 13.5–17.5)
HGB BLDA-MCNC: 8.4 G/DL (ref 13.5–17.5)
HGB BLDA-MCNC: 8.8 G/DL (ref 13.5–17.5)
INR PPP: 3.04 (ref 0.85–1.15)
LACTATE BLD-SCNC: 6.19 MMOL/L (ref 0.4–2)
LACTATE BLDV-SCNC: 1.7 MMOL/L (ref 0.4–1.9)
LACTATE BLDV-SCNC: 1.9 MMOL/L (ref 0.4–2)
MCH RBC QN AUTO: 31.4 PG (ref 26–34)
MCH RBC QN AUTO: 32.1 PG (ref 26–34)
MCH RBC QN AUTO: 32.1 PG (ref 26–34)
MCH RBC QN AUTO: 32.6 PG (ref 26–34)
MCHC RBC AUTO-ENTMCNC: 34 G/DL (ref 31–36)
MCHC RBC AUTO-ENTMCNC: 34.1 G/DL (ref 31–36)
MCHC RBC AUTO-ENTMCNC: 34.2 G/DL (ref 31–36)
MCHC RBC AUTO-ENTMCNC: 34.8 G/DL (ref 31–36)
MCV RBC AUTO: 92.1 FL (ref 80–100)
MCV RBC AUTO: 93.7 FL (ref 80–100)
MCV RBC AUTO: 93.9 FL (ref 80–100)
MCV RBC AUTO: 94.3 FL (ref 80–100)
METHGB MFR BLDA: 0.4 %
METHGB MFR BLDA: 0.8 %
METHGB MFR BLDA: 0.9 %
METHGB MFR BLDV: 0.7 %
O2 THERAPY: ABNORMAL
O2 THERAPY: NORMAL
PCO2 BLDA: 35 MM HG (ref 35–45)
PCO2 BLDA: 38.7 MMHG (ref 35–45)
PCO2 BLDA: 38.9 MM HG (ref 35–45)
PCO2 BLDA: 44 MMHG (ref 35–45)
PCO2 BLDA: 44 MMHG (ref 35–45)
PCO2 BLDV: 46.5 MMHG (ref 40–50)
PERFORMED ON: ABNORMAL
PERFORMED ON: NORMAL
PH BLDA: 7.33 [PH] (ref 7.35–7.45)
PH BLDA: 7.35 [PH] (ref 7.35–7.45)
PH BLDA: 7.36 [PH] (ref 7.35–7.45)
PH BLDA: 7.38 [PH] (ref 7.35–7.45)
PH BLDA: 7.42 [PH] (ref 7.35–7.45)
PH BLDV: 7.39 [PH] (ref 7.35–7.45)
PH BLDV: 7.42 [PH] (ref 7.35–7.45)
PLATELET # BLD AUTO: 106 K/UL (ref 135–450)
PLATELET # BLD AUTO: 206 K/UL (ref 135–450)
PLATELET # BLD AUTO: 243 K/UL (ref 135–450)
PLATELET # BLD AUTO: 261 K/UL (ref 135–450)
PMV BLD AUTO: 8.6 FL (ref 5–10.5)
PMV BLD AUTO: 9 FL (ref 5–10.5)
PO2 BLDA: 100.9 MM HG (ref 75–108)
PO2 BLDA: 255 MMHG (ref 75–108)
PO2 BLDA: 342 MMHG (ref 75–108)
PO2 BLDA: 386 MMHG (ref 75–108)
PO2 BLDA: 84.5 MM HG (ref 75–108)
PO2 BLDV: 321 MMHG
POC SAMPLE TYPE: ABNORMAL
POC SAMPLE TYPE: ABNORMAL
POTASSIUM BLD-SCNC: 3.9 MMOL/L (ref 3.5–5.1)
POTASSIUM SERPL-SCNC: 3.4 MMOL/L (ref 3.5–5.1)
POTASSIUM SERPL-SCNC: 4 MMOL/L (ref 3.5–5.1)
PROTHROMBIN TIME: 31.3 SEC (ref 11.9–14.9)
RBC # BLD AUTO: 2.67 M/UL (ref 4.2–5.9)
RBC # BLD AUTO: 2.92 M/UL (ref 4.2–5.9)
RBC # BLD AUTO: 3.13 M/UL (ref 4.2–5.9)
RBC # BLD AUTO: 3.51 M/UL (ref 4.2–5.9)
SAO2 % BLDA: 100 %
SAO2 % BLDA: 96 % (ref 93–100)
SAO2 % BLDA: 98 % (ref 93–100)
SAO2 % BLDA: 99.9 %
SAO2 % BLDA: 99.9 %
SAO2 % BLDV: 100 %
SODIUM BLD-SCNC: 142 MMOL/L (ref 136–145)
SODIUM SERPL-SCNC: 136 MMOL/L (ref 136–145)
SODIUM SERPL-SCNC: 141 MMOL/L (ref 136–145)
WBC # BLD AUTO: 10.2 K/UL (ref 4–11)
WBC # BLD AUTO: 11.3 K/UL (ref 4–11)
WBC # BLD AUTO: 2.2 K/UL (ref 4–11)
WBC # BLD AUTO: 8.8 K/UL (ref 4–11)

## 2024-05-22 PROCEDURE — 86850 RBC ANTIBODY SCREEN: CPT

## 2024-05-22 PROCEDURE — 80048 BASIC METABOLIC PNL TOTAL CA: CPT

## 2024-05-22 PROCEDURE — 3700000001 HC ADD 15 MINUTES (ANESTHESIA): Performed by: SURGERY

## 2024-05-22 PROCEDURE — 2580000003 HC RX 258: Performed by: HOSPITALIST

## 2024-05-22 PROCEDURE — 6360000002 HC RX W HCPCS: Performed by: NURSE ANESTHETIST, CERTIFIED REGISTERED

## 2024-05-22 PROCEDURE — 85014 HEMATOCRIT: CPT

## 2024-05-22 PROCEDURE — 2100000000 HC CCU R&B

## 2024-05-22 PROCEDURE — 2709999900 HC NON-CHARGEABLE SUPPLY: Performed by: SURGERY

## 2024-05-22 PROCEDURE — 2580000003 HC RX 258: Performed by: SURGERY

## 2024-05-22 PROCEDURE — 6360000002 HC RX W HCPCS: Performed by: NURSE PRACTITIONER

## 2024-05-22 PROCEDURE — 84132 ASSAY OF SERUM POTASSIUM: CPT

## 2024-05-22 PROCEDURE — 2580000003 HC RX 258: Performed by: NURSE PRACTITIONER

## 2024-05-22 PROCEDURE — 94760 N-INVAS EAR/PLS OXIMETRY 1: CPT

## 2024-05-22 PROCEDURE — 2500000003 HC RX 250 WO HCPCS: Performed by: STUDENT IN AN ORGANIZED HEALTH CARE EDUCATION/TRAINING PROGRAM

## 2024-05-22 PROCEDURE — 0DT80ZZ RESECTION OF SMALL INTESTINE, OPEN APPROACH: ICD-10-PCS | Performed by: SURGERY

## 2024-05-22 PROCEDURE — 3700000000 HC ANESTHESIA ATTENDED CARE: Performed by: SURGERY

## 2024-05-22 PROCEDURE — P9016 RBC LEUKOCYTES REDUCED: HCPCS

## 2024-05-22 PROCEDURE — C1768 GRAFT, VASCULAR: HCPCS | Performed by: SURGERY

## 2024-05-22 PROCEDURE — 6360000002 HC RX W HCPCS

## 2024-05-22 PROCEDURE — 36430 TRANSFUSION BLD/BLD COMPNT: CPT

## 2024-05-22 PROCEDURE — 5A1955Z RESPIRATORY VENTILATION, GREATER THAN 96 CONSECUTIVE HOURS: ICD-10-PCS | Performed by: SURGERY

## 2024-05-22 PROCEDURE — 36415 COLL VENOUS BLD VENIPUNCTURE: CPT

## 2024-05-22 PROCEDURE — 82947 ASSAY GLUCOSE BLOOD QUANT: CPT

## 2024-05-22 PROCEDURE — 71045 X-RAY EXAM CHEST 1 VIEW: CPT

## 2024-05-22 PROCEDURE — 44120 REMOVAL OF SMALL INTESTINE: CPT | Performed by: STUDENT IN AN ORGANIZED HEALTH CARE EDUCATION/TRAINING PROGRAM

## 2024-05-22 PROCEDURE — 94002 VENT MGMT INPAT INIT DAY: CPT

## 2024-05-22 PROCEDURE — 2500000003 HC RX 250 WO HCPCS: Performed by: SURGERY

## 2024-05-22 PROCEDURE — 2720000010 HC SURG SUPPLY STERILE: Performed by: SURGERY

## 2024-05-22 PROCEDURE — 88307 TISSUE EXAM BY PATHOLOGIST: CPT

## 2024-05-22 PROCEDURE — 97605 NEG PRS WND THER DME<=50SQCM: CPT | Performed by: STUDENT IN AN ORGANIZED HEALTH CARE EDUCATION/TRAINING PROGRAM

## 2024-05-22 PROCEDURE — P9035 PLATELET PHERES LEUKOREDUCED: HCPCS

## 2024-05-22 PROCEDURE — 86900 BLOOD TYPING SEROLOGIC ABO: CPT

## 2024-05-22 PROCEDURE — 47600 CHOLECYSTECTOMY: CPT | Performed by: STUDENT IN AN ORGANIZED HEALTH CARE EDUCATION/TRAINING PROGRAM

## 2024-05-22 PROCEDURE — P9041 ALBUMIN (HUMAN),5%, 50ML: HCPCS

## 2024-05-22 PROCEDURE — 2580000003 HC RX 258

## 2024-05-22 PROCEDURE — 2500000003 HC RX 250 WO HCPCS

## 2024-05-22 PROCEDURE — 3600000015 HC SURGERY LEVEL 5 ADDTL 15MIN: Performed by: SURGERY

## 2024-05-22 PROCEDURE — 2500000003 HC RX 250 WO HCPCS: Performed by: NURSE ANESTHETIST, CERTIFIED REGISTERED

## 2024-05-22 PROCEDURE — 82150 ASSAY OF AMYLASE: CPT

## 2024-05-22 PROCEDURE — 36592 COLLECT BLOOD FROM PICC: CPT

## 2024-05-22 PROCEDURE — 30233L1 TRANSFUSION OF NONAUTOLOGOUS FRESH PLASMA INTO PERIPHERAL VEIN, PERCUTANEOUS APPROACH: ICD-10-PCS | Performed by: SURGERY

## 2024-05-22 PROCEDURE — 6360000002 HC RX W HCPCS: Performed by: SURGERY

## 2024-05-22 PROCEDURE — 88304 TISSUE EXAM BY PATHOLOGIST: CPT

## 2024-05-22 PROCEDURE — 86901 BLOOD TYPING SEROLOGIC RH(D): CPT

## 2024-05-22 PROCEDURE — 0FT40ZZ RESECTION OF GALLBLADDER, OPEN APPROACH: ICD-10-PCS | Performed by: SURGERY

## 2024-05-22 PROCEDURE — 2700000000 HC OXYGEN THERAPY PER DAY

## 2024-05-22 PROCEDURE — 04100J2 BYPASS ABDOMINAL AORTA TO MESENTERIC ARTERY WITH SYNTHETIC SUBSTITUTE, OPEN APPROACH: ICD-10-PCS | Performed by: SURGERY

## 2024-05-22 PROCEDURE — 30233Q1 TRANSFUSION OF NONAUTOLOGOUS WHITE CELLS INTO PERIPHERAL VEIN, PERCUTANEOUS APPROACH: ICD-10-PCS | Performed by: SURGERY

## 2024-05-22 PROCEDURE — 94761 N-INVAS EAR/PLS OXIMETRY MLT: CPT

## 2024-05-22 PROCEDURE — 30233N1 TRANSFUSION OF NONAUTOLOGOUS RED BLOOD CELLS INTO PERIPHERAL VEIN, PERCUTANEOUS APPROACH: ICD-10-PCS | Performed by: SURGERY

## 2024-05-22 PROCEDURE — 82330 ASSAY OF CALCIUM: CPT

## 2024-05-22 PROCEDURE — 3600000005 HC SURGERY LEVEL 5 BASE: Performed by: SURGERY

## 2024-05-22 PROCEDURE — 05HM33Z INSERTION OF INFUSION DEVICE INTO RIGHT INTERNAL JUGULAR VEIN, PERCUTANEOUS APPROACH: ICD-10-PCS | Performed by: SURGERY

## 2024-05-22 PROCEDURE — 83605 ASSAY OF LACTIC ACID: CPT

## 2024-05-22 PROCEDURE — 82803 BLOOD GASES ANY COMBINATION: CPT

## 2024-05-22 PROCEDURE — 84295 ASSAY OF SERUM SODIUM: CPT

## 2024-05-22 PROCEDURE — 85018 HEMOGLOBIN: CPT

## 2024-05-22 PROCEDURE — 85027 COMPLETE CBC AUTOMATED: CPT

## 2024-05-22 PROCEDURE — 85610 PROTHROMBIN TIME: CPT

## 2024-05-22 PROCEDURE — P9017 PLASMA 1 DONOR FRZ W/IN 8 HR: HCPCS

## 2024-05-22 PROCEDURE — C1713 ANCHOR/SCREW BN/BN,TIS/BN: HCPCS | Performed by: SURGERY

## 2024-05-22 PROCEDURE — A4217 STERILE WATER/SALINE, 500 ML: HCPCS | Performed by: SURGERY

## 2024-05-22 PROCEDURE — 86923 COMPATIBILITY TEST ELECTRIC: CPT

## 2024-05-22 PROCEDURE — 30233K1 TRANSFUSION OF NONAUTOLOGOUS FROZEN PLASMA INTO PERIPHERAL VEIN, PERCUTANEOUS APPROACH: ICD-10-PCS | Performed by: SURGERY

## 2024-05-22 PROCEDURE — 35631 BPG AOR-CELIAC-MSN-RENAL: CPT | Performed by: SURGERY

## 2024-05-22 DEVICE — IMPLANTABLE DEVICE: Type: IMPLANTABLE DEVICE | Site: ABDOMEN | Status: FUNCTIONAL

## 2024-05-22 RX ORDER — 0.9 % SODIUM CHLORIDE 0.9 %
1000 INTRAVENOUS SOLUTION INTRAVENOUS ONCE
Status: COMPLETED | OUTPATIENT
Start: 2024-05-22 | End: 2024-05-23

## 2024-05-22 RX ORDER — FENTANYL CITRATE-0.9 % NACL/PF 10 MCG/ML
12.5-2 PLASTIC BAG, INJECTION (ML) INTRAVENOUS CONTINUOUS
Status: DISCONTINUED | OUTPATIENT
Start: 2024-05-22 | End: 2024-06-01 | Stop reason: HOSPADM

## 2024-05-22 RX ORDER — KETAMINE HCL IN NACL, ISO-OSM 100MG/10ML
SYRINGE (ML) INJECTION PRN
Status: DISCONTINUED | OUTPATIENT
Start: 2024-05-22 | End: 2024-05-22 | Stop reason: SDUPTHER

## 2024-05-22 RX ORDER — HEPARIN SODIUM 1000 [USP'U]/ML
INJECTION, SOLUTION INTRAVENOUS; SUBCUTANEOUS PRN
Status: DISCONTINUED | OUTPATIENT
Start: 2024-05-22 | End: 2024-05-22 | Stop reason: SDUPTHER

## 2024-05-22 RX ORDER — FENTANYL CITRATE 50 UG/ML
INJECTION, SOLUTION INTRAMUSCULAR; INTRAVENOUS PRN
Status: DISCONTINUED | OUTPATIENT
Start: 2024-05-22 | End: 2024-05-22 | Stop reason: SDUPTHER

## 2024-05-22 RX ORDER — PROTAMINE SULFATE 10 MG/ML
INJECTION, SOLUTION INTRAVENOUS PRN
Status: DISCONTINUED | OUTPATIENT
Start: 2024-05-22 | End: 2024-05-22 | Stop reason: SDUPTHER

## 2024-05-22 RX ORDER — SODIUM CHLORIDE 9 MG/ML
INJECTION, SOLUTION INTRAVENOUS PRN
Status: DISCONTINUED | OUTPATIENT
Start: 2024-05-22 | End: 2024-05-27

## 2024-05-22 RX ORDER — 0.9 % SODIUM CHLORIDE 0.9 %
500 INTRAVENOUS SOLUTION INTRAVENOUS ONCE
Status: COMPLETED | OUTPATIENT
Start: 2024-05-22 | End: 2024-05-22

## 2024-05-22 RX ORDER — SODIUM CHLORIDE 9 MG/ML
INJECTION, SOLUTION INTRAVENOUS PRN
Status: DISCONTINUED | OUTPATIENT
Start: 2024-05-22 | End: 2024-05-22

## 2024-05-22 RX ORDER — PROPOFOL 10 MG/ML
5-50 INJECTION, EMULSION INTRAVENOUS CONTINUOUS
Status: DISCONTINUED | OUTPATIENT
Start: 2024-05-22 | End: 2024-05-22

## 2024-05-22 RX ORDER — CHLORHEXIDINE GLUCONATE ORAL RINSE 1.2 MG/ML
15 SOLUTION DENTAL 2 TIMES DAILY
Status: DISCONTINUED | OUTPATIENT
Start: 2024-05-22 | End: 2024-05-22

## 2024-05-22 RX ORDER — LIDOCAINE HYDROCHLORIDE 20 MG/ML
INJECTION, SOLUTION EPIDURAL; INFILTRATION; INTRACAUDAL; PERINEURAL PRN
Status: DISCONTINUED | OUTPATIENT
Start: 2024-05-22 | End: 2024-05-22 | Stop reason: SDUPTHER

## 2024-05-22 RX ORDER — VASOPRESSIN 20 U/ML
INJECTION PARENTERAL PRN
Status: DISCONTINUED | OUTPATIENT
Start: 2024-05-22 | End: 2024-05-22 | Stop reason: SDUPTHER

## 2024-05-22 RX ORDER — 0.9 % SODIUM CHLORIDE 0.9 %
1000 INTRAVENOUS SOLUTION INTRAVENOUS ONCE
Status: DISCONTINUED | OUTPATIENT
Start: 2024-05-22 | End: 2024-05-27

## 2024-05-22 RX ORDER — ALBUMIN, HUMAN INJ 5% 5 %
SOLUTION INTRAVENOUS PRN
Status: DISCONTINUED | OUTPATIENT
Start: 2024-05-22 | End: 2024-05-22 | Stop reason: SDUPTHER

## 2024-05-22 RX ORDER — DEXTROSE MONOHYDRATE 25 G/50ML
12.5 INJECTION, SOLUTION INTRAVENOUS ONCE
Status: COMPLETED | OUTPATIENT
Start: 2024-05-22 | End: 2024-05-22

## 2024-05-22 RX ORDER — SUCCINYLCHOLINE/SOD CL,ISO/PF 200MG/10ML
SYRINGE (ML) INTRAVENOUS PRN
Status: DISCONTINUED | OUTPATIENT
Start: 2024-05-22 | End: 2024-05-22 | Stop reason: SDUPTHER

## 2024-05-22 RX ORDER — SODIUM CHLORIDE 0.9 % (FLUSH) 0.9 %
5-40 SYRINGE (ML) INJECTION EVERY 12 HOURS SCHEDULED
Status: DISCONTINUED | OUTPATIENT
Start: 2024-05-22 | End: 2024-05-24

## 2024-05-22 RX ORDER — PROPOFOL 10 MG/ML
INJECTION, EMULSION INTRAVENOUS PRN
Status: DISCONTINUED | OUTPATIENT
Start: 2024-05-22 | End: 2024-05-22 | Stop reason: SDUPTHER

## 2024-05-22 RX ORDER — NITROGLYCERIN 20 MG/100ML
5-200 INJECTION INTRAVENOUS CONTINUOUS
Status: DISCONTINUED | OUTPATIENT
Start: 2024-05-22 | End: 2024-05-29

## 2024-05-22 RX ORDER — PROPOFOL 10 MG/ML
5-50 INJECTION, EMULSION INTRAVENOUS CONTINUOUS
Status: DISCONTINUED | OUTPATIENT
Start: 2024-05-22 | End: 2024-06-01 | Stop reason: HOSPADM

## 2024-05-22 RX ORDER — PHENYLEPHRINE HCL IN 0.9% NACL 1 MG/10 ML
SYRINGE (ML) INTRAVENOUS PRN
Status: DISCONTINUED | OUTPATIENT
Start: 2024-05-22 | End: 2024-05-22

## 2024-05-22 RX ORDER — PHENYLEPHRINE HCL IN 0.9% NACL 1 MG/10 ML
SYRINGE (ML) INTRAVENOUS PRN
Status: DISCONTINUED | OUTPATIENT
Start: 2024-05-22 | End: 2024-05-22 | Stop reason: SDUPTHER

## 2024-05-22 RX ORDER — CALCIUM CHLORIDE 100 MG/ML
INJECTION INTRAVENOUS; INTRAVENTRICULAR PRN
Status: DISCONTINUED | OUTPATIENT
Start: 2024-05-22 | End: 2024-05-22 | Stop reason: SDUPTHER

## 2024-05-22 RX ORDER — MIDAZOLAM HYDROCHLORIDE 1 MG/ML
INJECTION INTRAMUSCULAR; INTRAVENOUS PRN
Status: DISCONTINUED | OUTPATIENT
Start: 2024-05-22 | End: 2024-05-22 | Stop reason: SDUPTHER

## 2024-05-22 RX ORDER — SODIUM CHLORIDE, SODIUM LACTATE, POTASSIUM CHLORIDE, CALCIUM CHLORIDE 600; 310; 30; 20 MG/100ML; MG/100ML; MG/100ML; MG/100ML
INJECTION, SOLUTION INTRAVENOUS CONTINUOUS PRN
Status: DISCONTINUED | OUTPATIENT
Start: 2024-05-22 | End: 2024-05-22 | Stop reason: SDUPTHER

## 2024-05-22 RX ORDER — FAMOTIDINE 20 MG/1
20 TABLET, FILM COATED ORAL 2 TIMES DAILY
Status: DISCONTINUED | OUTPATIENT
Start: 2024-05-22 | End: 2024-05-22

## 2024-05-22 RX ORDER — SODIUM CHLORIDE 9 MG/ML
INJECTION, SOLUTION INTRAVENOUS PRN
Status: DISCONTINUED | OUTPATIENT
Start: 2024-05-22 | End: 2024-05-23

## 2024-05-22 RX ORDER — POTASSIUM CHLORIDE 29.8 MG/ML
20 INJECTION INTRAVENOUS PRN
Status: DISCONTINUED | OUTPATIENT
Start: 2024-05-22 | End: 2024-05-31

## 2024-05-22 RX ORDER — ROCURONIUM BROMIDE 10 MG/ML
INJECTION, SOLUTION INTRAVENOUS PRN
Status: DISCONTINUED | OUTPATIENT
Start: 2024-05-22 | End: 2024-05-22 | Stop reason: SDUPTHER

## 2024-05-22 RX ORDER — DEXAMETHASONE SODIUM PHOSPHATE 4 MG/ML
INJECTION, SOLUTION INTRA-ARTICULAR; INTRALESIONAL; INTRAMUSCULAR; INTRAVENOUS; SOFT TISSUE PRN
Status: DISCONTINUED | OUTPATIENT
Start: 2024-05-22 | End: 2024-05-22 | Stop reason: SDUPTHER

## 2024-05-22 RX ORDER — DEXTROSE MONOHYDRATE 100 MG/ML
INJECTION, SOLUTION INTRAVENOUS CONTINUOUS PRN
Status: DISCONTINUED | OUTPATIENT
Start: 2024-05-22 | End: 2024-06-01 | Stop reason: HOSPADM

## 2024-05-22 RX ORDER — NOREPINEPHRINE BITARTRATE 0.06 MG/ML
1-100 INJECTION, SOLUTION INTRAVENOUS CONTINUOUS
Status: DISCONTINUED | OUTPATIENT
Start: 2024-05-22 | End: 2024-05-29

## 2024-05-22 RX ORDER — SODIUM CHLORIDE 0.9 % (FLUSH) 0.9 %
5-40 SYRINGE (ML) INJECTION PRN
Status: DISCONTINUED | OUTPATIENT
Start: 2024-05-22 | End: 2024-05-27

## 2024-05-22 RX ORDER — NOREPINEPHRINE BITARTRATE 0.06 MG/ML
INJECTION, SOLUTION INTRAVENOUS CONTINUOUS PRN
Status: DISCONTINUED | OUTPATIENT
Start: 2024-05-22 | End: 2024-05-22 | Stop reason: SDUPTHER

## 2024-05-22 RX ORDER — SODIUM CHLORIDE 9 MG/ML
INJECTION, SOLUTION INTRAVENOUS CONTINUOUS
Status: DISCONTINUED | OUTPATIENT
Start: 2024-05-22 | End: 2024-05-23

## 2024-05-22 RX ADMIN — Medication 25 MEQ: at 12:36

## 2024-05-22 RX ADMIN — CALCIUM CHLORIDE INJECTION 0.1 G: 100 INJECTION, SOLUTION INTRAVENOUS at 12:31

## 2024-05-22 RX ADMIN — Medication 180 MG: at 09:10

## 2024-05-22 RX ADMIN — CALCIUM CHLORIDE INJECTION 0.1 G: 100 INJECTION, SOLUTION INTRAVENOUS at 13:04

## 2024-05-22 RX ADMIN — Medication 100 MCG: at 13:49

## 2024-05-22 RX ADMIN — CALCIUM CHLORIDE INJECTION 0.2 G: 100 INJECTION, SOLUTION INTRAVENOUS at 13:34

## 2024-05-22 RX ADMIN — ROCURONIUM BROMIDE 20 MG: 50 INJECTION INTRAVENOUS at 13:36

## 2024-05-22 RX ADMIN — SODIUM CHLORIDE, PRESERVATIVE FREE 20 MG: 5 INJECTION INTRAVENOUS at 22:28

## 2024-05-22 RX ADMIN — SODIUM CHLORIDE, POTASSIUM CHLORIDE, SODIUM LACTATE AND CALCIUM CHLORIDE: 600; 310; 30; 20 INJECTION, SOLUTION INTRAVENOUS at 09:02

## 2024-05-22 RX ADMIN — CALCIUM CHLORIDE INJECTION 0.25 G: 100 INJECTION, SOLUTION INTRAVENOUS at 10:43

## 2024-05-22 RX ADMIN — PROTAMINE SULFATE 25 MG: 10 INJECTION, SOLUTION INTRAVENOUS at 13:41

## 2024-05-22 RX ADMIN — ALBUMIN (HUMAN) 250 ML: 12.5 INJECTION, SOLUTION INTRAVENOUS at 10:45

## 2024-05-22 RX ADMIN — ALBUMIN (HUMAN) 250 ML: 12.5 INJECTION, SOLUTION INTRAVENOUS at 14:35

## 2024-05-22 RX ADMIN — Medication 10 MG: at 10:14

## 2024-05-22 RX ADMIN — Medication 10 MG: at 11:00

## 2024-05-22 RX ADMIN — ROCURONIUM BROMIDE 25 MG: 50 INJECTION INTRAVENOUS at 09:34

## 2024-05-22 RX ADMIN — CALCIUM CHLORIDE INJECTION 0.2 G: 100 INJECTION, SOLUTION INTRAVENOUS at 13:46

## 2024-05-22 RX ADMIN — Medication 50 MEQ: at 12:06

## 2024-05-22 RX ADMIN — SODIUM CHLORIDE: 9 INJECTION, SOLUTION INTRAVENOUS at 12:42

## 2024-05-22 RX ADMIN — MIDAZOLAM 2 MG: 1 INJECTION INTRAMUSCULAR; INTRAVENOUS at 09:02

## 2024-05-22 RX ADMIN — Medication 25 MEQ: at 13:50

## 2024-05-22 RX ADMIN — CALCIUM CHLORIDE INJECTION 0.25 G: 100 INJECTION, SOLUTION INTRAVENOUS at 10:22

## 2024-05-22 RX ADMIN — PROPOFOL 35 MCG/KG/MIN: 10 INJECTION, EMULSION INTRAVENOUS at 16:12

## 2024-05-22 RX ADMIN — Medication 25 MCG/HR: at 22:59

## 2024-05-22 RX ADMIN — PROPOFOL 40 MCG/KG/MIN: 10 INJECTION, EMULSION INTRAVENOUS at 15:18

## 2024-05-22 RX ADMIN — VASOPRESSIN 1 UNITS: 20 INJECTION INTRAVENOUS at 14:43

## 2024-05-22 RX ADMIN — ROCURONIUM BROMIDE 5 MG: 50 INJECTION INTRAVENOUS at 09:10

## 2024-05-22 RX ADMIN — Medication 25 MEQ: at 12:58

## 2024-05-22 RX ADMIN — CALCIUM CHLORIDE INJECTION 0.25 G: 100 INJECTION, SOLUTION INTRAVENOUS at 10:25

## 2024-05-22 RX ADMIN — CALCIUM CHLORIDE INJECTION 0.1 G: 100 INJECTION, SOLUTION INTRAVENOUS at 13:01

## 2024-05-22 RX ADMIN — Medication 2 MCG/MIN: at 15:49

## 2024-05-22 RX ADMIN — Medication 100 MCG: at 12:43

## 2024-05-22 RX ADMIN — Medication 100 MCG: at 12:34

## 2024-05-22 RX ADMIN — PROPOFOL 50 MG: 10 INJECTION, EMULSION INTRAVENOUS at 09:09

## 2024-05-22 RX ADMIN — Medication 20 MG: at 09:09

## 2024-05-22 RX ADMIN — CALCIUM CHLORIDE INJECTION 0.1 G: 100 INJECTION, SOLUTION INTRAVENOUS at 12:36

## 2024-05-22 RX ADMIN — DEXAMETHASONE SODIUM PHOSPHATE 8 MG: 4 INJECTION, SOLUTION INTRAMUSCULAR; INTRAVENOUS at 09:52

## 2024-05-22 RX ADMIN — SODIUM CHLORIDE 500 ML: 9 INJECTION, SOLUTION INTRAVENOUS at 16:09

## 2024-05-22 RX ADMIN — PROPOFOL 20 MG: 10 INJECTION, EMULSION INTRAVENOUS at 09:10

## 2024-05-22 RX ADMIN — Medication 2 MCG/MIN: at 15:53

## 2024-05-22 RX ADMIN — ROCURONIUM BROMIDE 20 MG: 50 INJECTION INTRAVENOUS at 09:16

## 2024-05-22 RX ADMIN — CALCIUM CHLORIDE INJECTION 0.2 G: 100 INJECTION, SOLUTION INTRAVENOUS at 13:02

## 2024-05-22 RX ADMIN — LIDOCAINE HYDROCHLORIDE 100 MG: 20 INJECTION, SOLUTION EPIDURAL; INFILTRATION; INTRACAUDAL; PERINEURAL at 09:09

## 2024-05-22 RX ADMIN — Medication 25 MEQ: at 13:32

## 2024-05-22 RX ADMIN — Medication 60 MCG: at 11:42

## 2024-05-22 RX ADMIN — Medication 100 MCG: at 13:45

## 2024-05-22 RX ADMIN — Medication 20 MCG: at 11:35

## 2024-05-22 RX ADMIN — HYDROMORPHONE HYDROCHLORIDE 0.2 MG: 1 INJECTION, SOLUTION INTRAMUSCULAR; INTRAVENOUS; SUBCUTANEOUS at 11:00

## 2024-05-22 RX ADMIN — ROCURONIUM BROMIDE 10 MG: 50 INJECTION INTRAVENOUS at 11:01

## 2024-05-22 RX ADMIN — HYDROMORPHONE HYDROCHLORIDE 0.2 MG: 1 INJECTION, SOLUTION INTRAMUSCULAR; INTRAVENOUS; SUBCUTANEOUS at 09:50

## 2024-05-22 RX ADMIN — SODIUM CHLORIDE 2000 MG: 900 INJECTION INTRAVENOUS at 13:13

## 2024-05-22 RX ADMIN — PIPERACILLIN AND TAZOBACTAM 3375 MG: 3; .375 INJECTION, POWDER, FOR SOLUTION INTRAVENOUS at 17:03

## 2024-05-22 RX ADMIN — CALCIUM CHLORIDE INJECTION 0.2 G: 100 INJECTION, SOLUTION INTRAVENOUS at 13:49

## 2024-05-22 RX ADMIN — SODIUM CHLORIDE 1000 ML: 9 INJECTION, SOLUTION INTRAVENOUS at 19:02

## 2024-05-22 RX ADMIN — ROCURONIUM BROMIDE 20 MG: 50 INJECTION INTRAVENOUS at 12:33

## 2024-05-22 RX ADMIN — ROCURONIUM BROMIDE 20 MG: 50 INJECTION INTRAVENOUS at 14:48

## 2024-05-22 RX ADMIN — HEPARIN SODIUM 5000 UNITS: 1000 INJECTION INTRAVENOUS; SUBCUTANEOUS at 11:34

## 2024-05-22 RX ADMIN — VASOPRESSIN 1 UNITS: 20 INJECTION INTRAVENOUS at 14:19

## 2024-05-22 RX ADMIN — Medication 5 MCG/MIN: at 09:38

## 2024-05-22 RX ADMIN — SODIUM CHLORIDE: 9 INJECTION, SOLUTION INTRAVENOUS at 16:11

## 2024-05-22 RX ADMIN — VASOPRESSIN 2 UNITS: 20 INJECTION INTRAVENOUS at 13:54

## 2024-05-22 RX ADMIN — Medication 20 MCG: at 11:39

## 2024-05-22 RX ADMIN — DEXTROSE MONOHYDRATE 12.5 G: 25 INJECTION, SOLUTION INTRAVENOUS at 17:06

## 2024-05-22 RX ADMIN — HYDROMORPHONE HYDROCHLORIDE 0.6 MG: 1 INJECTION, SOLUTION INTRAMUSCULAR; INTRAVENOUS; SUBCUTANEOUS at 14:28

## 2024-05-22 RX ADMIN — SODIUM CHLORIDE 2000 MG: 900 INJECTION INTRAVENOUS at 09:17

## 2024-05-22 RX ADMIN — Medication 10 MG: at 13:18

## 2024-05-22 RX ADMIN — ALBUMIN (HUMAN) 250 ML: 12.5 INJECTION, SOLUTION INTRAVENOUS at 09:29

## 2024-05-22 RX ADMIN — CALCIUM CHLORIDE INJECTION 0.25 G: 100 INJECTION, SOLUTION INTRAVENOUS at 10:35

## 2024-05-22 RX ADMIN — SODIUM CHLORIDE: 9 INJECTION, SOLUTION INTRAVENOUS at 17:01

## 2024-05-22 RX ADMIN — PROPOFOL 30 MCG/KG/MIN: 10 INJECTION, EMULSION INTRAVENOUS at 16:38

## 2024-05-22 RX ADMIN — HEPARIN SODIUM 1000 UNITS: 1000 INJECTION INTRAVENOUS; SUBCUTANEOUS at 12:36

## 2024-05-22 RX ADMIN — FENTANYL CITRATE 100 MCG: 50 INJECTION INTRAMUSCULAR; INTRAVENOUS at 09:02

## 2024-05-22 RX ADMIN — SODIUM CHLORIDE: 9 INJECTION, SOLUTION INTRAVENOUS at 20:06

## 2024-05-22 ASSESSMENT — PULMONARY FUNCTION TESTS
PIF_VALUE: 17
PIF_VALUE: 19
PIF_VALUE: 18
PIF_VALUE: 17
PIF_VALUE: 27
PIF_VALUE: 15
PIF_VALUE: 17
PIF_VALUE: 21
PIF_VALUE: 26
PIF_VALUE: 17
PIF_VALUE: 27
PIF_VALUE: 17
PIF_VALUE: 16
PIF_VALUE: 15
PIF_VALUE: 17
PIF_VALUE: 18
PIF_VALUE: 18
PIF_VALUE: 17
PIF_VALUE: 16
PIF_VALUE: 18
PIF_VALUE: 19
PIF_VALUE: 22
PIF_VALUE: 17
PIF_VALUE: 16

## 2024-05-22 ASSESSMENT — LIFESTYLE VARIABLES: SMOKING_STATUS: 1

## 2024-05-22 ASSESSMENT — PAIN - FUNCTIONAL ASSESSMENT
PAIN_FUNCTIONAL_ASSESSMENT: PREVENTS OR INTERFERES SOME ACTIVE ACTIVITIES AND ADLS
PAIN_FUNCTIONAL_ASSESSMENT: 0-10

## 2024-05-22 ASSESSMENT — PAIN SCALES - GENERAL: PAINLEVEL_OUTOF10: 0

## 2024-05-22 ASSESSMENT — PAIN DESCRIPTION - DESCRIPTORS: DESCRIPTORS: SHARP

## 2024-05-22 NOTE — PLAN OF CARE
Problem: Discharge Planning  Goal: Discharge to home or other facility with appropriate resources  5/21/2024 2301 by Jihan Nguyen RN  Outcome: Progressing  5/21/2024 0909 by Anila Mckay RN  Outcome: Progressing     Problem: Pain  Goal: Verbalizes/displays adequate comfort level or baseline comfort level  5/21/2024 2301 by Jihan Nguyen RN  Outcome: Progressing  5/21/2024 0909 by Anila Mckay RN  Outcome: Progressing     Problem: Safety - Adult  Goal: Free from fall injury  5/21/2024 2301 by Jihan Nguyen RN  Outcome: Progressing  5/21/2024 0909 by Anila Mckay RN  Outcome: Progressing     Problem: Gastrointestinal - Adult  Goal: Minimal or absence of nausea and vomiting  5/21/2024 2301 by Jihan Nguyen RN  Outcome: Progressing  5/21/2024 0909 by Anila Mckay RN  Outcome: Progressing  Goal: Maintains or returns to baseline bowel function  5/21/2024 2301 by Jihan Nguyen RN  Outcome: Progressing  5/21/2024 0909 by Anila Mckay RN  Outcome: Progressing  Goal: Maintains adequate nutritional intake  5/21/2024 2301 by Jihan Nguyen RN  Outcome: Progressing  5/21/2024 0909 by Anila Mckay RN  Outcome: Progressing

## 2024-05-22 NOTE — PROGRESS NOTES
Patient c/o  hiccups that aggravates his abd pain. Pain is rated 8/10. Hiccups are intermittent. Patient attempted GI cocktail last night with minimal relief. Patient is getting prn percocet and dilaudid which only helps untill the hiccups return. Message sent to on call provider to request orders.     Electronically signed by Jihan Nguyen RN on 5/21/2024 at 10:42 PM      New orders placed.     Electronically signed by Jihan Nguyen RN on 5/21/2024 at 11:15 PM

## 2024-05-22 NOTE — PROGRESS NOTES
CVP: 1  Arterial BP:  102/29 (46)   NIVBP: 89/54 (66)  150 ml output from wound vac on medial abdomen   150 ml coffee ground/ bloody output from OG   1x medium watery BM   11 ml urine output since 1600.     Call placed to Vascular Surgeon on call. Notified Dr. Sheffield of data above. Telephone order received for 1 L fluid bolus & Q6 H&H.

## 2024-05-22 NOTE — ANESTHESIA PROCEDURE NOTES
Central Venous Line:    A central venous line was placed using ultrasound guidance and surface landmarks, in the pre-op for the following indication(s): central venous access and CVP monitoring.5/22/2024 8:26 AM5/22/2024 8:36 AM    Sterility preparation included the following: provider used sterile gloves, gown, hat and mask, hand hygiene performed prior to central venous catheter insertion, sterile gel and probe cover used in ultrasound-guided central venous catheter insertion and maximum sterile barriers used during central venous catheter insertion.    The patient was placed in Trendelenburg position.The right internal jugular vein was prepped.    The site was prepped with Chloraprep.  A 7 Fr (size), 16 (length), triple lumen was placed.    During the procedure, the following specific steps were taken: target vein identified, needle advanced into vein and blood aspirated and guidewire advanced into vein.    Intravenous verification was obtained by ultrasound and venous blood return.    Post insertion care included: all ports aspirated, all ports flushed easily, guidewire removed intact, Biopatch applied, line sutured in place and dressing applied.    During the procedure the patient experienced: patient tolerated procedure well with no complications and EBL 0mL.      Outcomes: uncomplicated  Real-time US image taken/store: Yes  Anesthesia type: general..No  Staffing  Performed: Anesthesiologist   Anesthesiologist: Ayesha Nobles MD  Performed by: Ayesha Nobles MD  Authorized by: Ayesha Nobles MD    Preanesthetic Checklist  Completed: patient identified, IV checked, risks and benefits discussed, surgical/procedural consents, equipment checked, pre-op evaluation, timeout performed, anesthesia consent given, oxygen available and monitors applied/VS acknowledged

## 2024-05-22 NOTE — PROGRESS NOTES
Hospitalist Progress Note  5/22/2024 9:40 AM    PCP: Deep Lozano MD    [id number]     Date of Admission: 5/16/2024                                                                                                                     HOSPITAL COURSE    Patient demographics:  The patient  Darren Duncan is a 68 y.o. male     Significant past medical history:   Patient Active Problem List   Diagnosis    Other hyperlipidemia    Essential hypertension    Smoker    Rash and nonspecific skin eruption    Cellulitis and abscess of toe of left foot    Peripheral arterial disease (HCC)    Claudication (HCC)    Atherosclerosis of native arteries of extremities with rest pain, left leg (HCC)    Peripheral vascular disease, unspecified (HCC)    Atherosclerosis of artery of extremity with rest pain (HCC)    Unspecified severe protein-calorie malnutrition (HCC)    Other specified disorders of carbohydrate metabolism (HCC)    Abdominal pain    Mesenteric ischemia (HCC)    Mesenteric artery stenosis (HCC)         Presenting symptoms:   Abdominal Pain     Diagnostic workup:      CONSULTS DURING ADMISSION :   IP CONSULT TO HOSPITALIST  IP CONSULT TO GI  IP CONSULT TO SPIRITUAL SERVICES  IP CONSULT TO GENERAL SURGERY  IP CONSULT TO VASCULAR SURGERY  IP CONSULT TO CARDIOLOGY      Patient was diagnosed with:        Treatment while inpatient:         68 years old male with medical history significant for hypertension peripheral vascular disease and history of CVA.    Patient presented to the emergency room with abdominal pain and significant weight loss over a period of 2 years.    Patient was suspected for bowel ischemia.  Vascular surgery was consulted and patient underwent a CT angiogram of the abdomen and pelvis.    Patient also underwent arterial duplex.    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  Patient was kept on clear liquid diet initially                  °C) Temporal (!) 108 15 96 % --   05/22/24 0531 -- -- -- -- -- -- 51.2 kg (112 lb 12.8 oz)        72HR INTAKE/OUTPUT:    Intake/Output Summary (Last 24 hours) at 5/22/2024 0940  Last data filed at 5/22/2024 0646  Gross per 24 hour   Intake 120 ml   Output 700 ml   Net -580 ml         Exam:    Gen:   Alert and oriented ×3    Eyes: PERRL. No sclera icterus. No conjunctival injection.   ENT: No discharge. Pharynx clear. External appearance of ears and nose normal.  Neck: Trachea midline. No obvious mass.    Resp: No accessory muscle use. No crackles. No wheezes. No rhonchi.  CV: Regular rate. Regular rhythm. No murmur or rub. No edema.   GI: Abdomen is soft diffusely tender to palpation  Skin: Warm, dry, normal texture and turgor.   Lymph: No cervical LAD. No supraclavicular LAD.   M/S: / Ext. No cyanosis. No clubbing. No joint deformity.    Neuro: CN 2-12 are intact,  no neurologic deficits noted.    PT/INR: No results for input(s): \"PROTIME\", \"INR\" in the last 72 hours.  APTT: No results for input(s): \"APTT\" in the last 72 hours.    CBC:   Recent Labs     05/20/24 0757 05/21/24  0722 05/22/24  0617   WBC 9.8 14.9* 11.3*   HGB 11.4* 11.6* 11.2*   HCT 32.5* 34.4* 32.9*   MCV 93.6 93.7 93.9    237 261       BMP:   Recent Labs     05/20/24 0757 05/22/24  0617   * 136   K 3.5 3.4*   CL 95* 98*   CO2 27 26   BUN 4* 19   CREATININE <0.5* <0.5*       LIVER PROFILE:   Recent Labs     05/20/24 0757   ALKPHOS 82   AST 16   ALT 14   BILITOT 0.4       Recent Labs     05/20/24  1331 05/21/24  0722 05/22/24  0617   AMYLASE 98 57 56       Recent Labs     05/20/24  0757 05/20/24  1331   LIPASE 10.0* 8.0*         UA:  No results for input(s): \"NITRITE\", \"LABCAST\", \"WBCUA\", \"RBCUA\", \"MUCUS\" in the last 72 hours.      TROPONIN: No results for input(s): \"CKTOTAL\", \"TROPONINI\" in the last 72 hours.    Lab Results   Component Value Date/Time    URRFLXCULT Not Indicated 05/16/2024 07:36 AM       Invalid input(s): \"TSHREFLEX\"     No components found for: \"NUN2831\"  POC GLUCOSE:  No results for input(s): \"POCGLU\" in the last 72 hours.  No results for input(s): \"LABA1C\" in the last 72 hours.   Lab Results   Component Value Date/Time    LABA1C 5.7 11/09/2023 11:01 AM         ASSESSMENT AND PLAN    Acute respiratory failure  Full ventilator support  Pulmonary is consulted    Chronic mesenteric ischemia  Status post revascularization-5/22/2024  Partial bowel resection  Vascular surgery is following    Hypotension  IV fluids and pressors  Maintain systolic blood pressure more than 100    Cardiology is consulted for preop clearance  Echocardiogram shows ejection fraction of 38%      Pain management  R52  Continue with the IV and oral pain medication      Acute on chronic abdominal pain  Likely related to bowel ischemia  EGD colonoscopy showed gastritis with ulceration and duodenitis  Continue patient on proton pump inhibitors      Hypertension  Continue on home medication    UTI N39.0  Received 5 days of fluconazole      Insomnia  Start patient on Paxil 20 mg p.o. nightly  And reports some improvement in insomnia and appetite      Code Status: Full Code        Dispo - cc        The patient and / or the family were informed of the results of any tests, a time was given to answer questions, a plan was proposed and they agreed with plan.    JOON ORONA MD    This note was transcribed using Dragon Dictation software. Please disregard any translational errors.

## 2024-05-22 NOTE — ANESTHESIA PRE PROCEDURE
[facility] Department of Anesthesiology  Pre-Anesthesia Evaluation/Consultation       Name:  Darren Duncan  : 1955  Age:  68 y.o.                                           MRN:  [id number]  Date: 2024           Surgeon: Surgeon(s):  Armando Grigsby MD    Procedure: Procedure(s):  AORTOMESENTERIC BYPASS     Allergies   Allergen Reactions   • Tree Nut [Macadamia Nut Oil]      cashews     Patient Active Problem List   Diagnosis   • Other hyperlipidemia   • Essential hypertension   • Smoker   • Rash and nonspecific skin eruption   • Cellulitis and abscess of toe of left foot   • Peripheral arterial disease (HCC)   • Claudication (HCC)   • Atherosclerosis of native arteries of extremities with rest pain, left leg (HCC)   • Peripheral vascular disease, unspecified (HCC)   • Atherosclerosis of artery of extremity with rest pain (HCC)   • Unspecified severe protein-calorie malnutrition (HCC)   • Other specified disorders of carbohydrate metabolism (HCC)   • Abdominal pain   • Mesenteric ischemia (HCC)   • Mesenteric artery stenosis (HCC)     Past Medical History:   Diagnosis Date   • Cerebral artery occlusion with cerebral infarction (HCC)     Patient said he has no residual effects   • Heart attack (HCC)     3 yr ago   • Hx of blood clots     right side of neck   • Hyperlipidemia    • Hypertension    • Peripheral vascular disease, unspecified (HCC) 2020     Past Surgical History:   Procedure Laterality Date   • ARTERIAL CLOT SURGERY Right    • FEMORAL BYPASS Left 2020    LEFT FEMORAL ENDARTERECTOMY, LEFT FEMORAL TO POPLITEAL BYPASS GRAFT performed by Armando Grigsby MD at [facility] OR     Social History     Tobacco Use   • Smoking status: Former     Current packs/day: 0.00     Average packs/day: 1 pack/day for 20.0 years (20.0 ttl pk-yrs)     Types: Cigarettes     Start date: 8/10/2000     Quit date: 8/10/2020     Years since quitting: 3.7   • Smokeless tobacco: Never   Vaping Use   • Vaping

## 2024-05-22 NOTE — PROGRESS NOTES
GENERAL SURGERY  Progress Note    Patient Name: Darren Duncan  MRN: [id number]  YOB: 1955   Date of Evaluation: 5/22/2024  Primary Care Physician: Deep Lozano MD      Ileus (HCC) [K56.7]  Abdominal pain [R10.9]  Failure to thrive in adult [R62.7]  Abdominal pain, unspecified abdominal location [R10.9]    SUBJECTIVE:     Darren was seen and examined in the operating room today.  He was found to have necrotic and nonviable segment of small intestine, as well as ischemic changes to the gallbladder      REVIEW OF SYSTEMS  A comprehensive review of systems was completed and is negative except for any elements noted above.    OBJECTIVE:     BP (!) 141/73   Pulse 99   Temp 98.9 °F (37.2 °C) (Temporal)   Resp 14   Ht 1.753 m (5' 9.02\")   Wt 57.7 kg (127 lb 3.3 oz)   SpO2 100%   BMI 18.78 kg/m²     PHYSICAL EXAM  Intubated, sedated, open abdomen intraoperative    LABS:     Recent Labs     05/20/24  0757 05/20/24  1331 05/21/24  0722 05/22/24  0616 05/22/24  0617 05/22/24  0956 05/22/24  1155 05/22/24  1551 05/22/24  1600   WBC 9.8  --  14.9*  --  11.3* 10.2 8.8  --  2.2*   HGB 11.4*  --  11.6*  --  11.2* 10.0* 8.7* 8.1* 9.2*   HCT 32.5*  --  34.4*  --  32.9* 29.5* 25.0*  --  26.9*     --  237  --  261 243 206  --  106*   *  --   --   --  136  --   --   --  141   K 3.5  --   --   --  3.4*  --   --   --  4.0   CL 95*  --   --   --  98*  --   --   --  107   CO2 27  --   --   --  26  --   --   --  22   BUN 4*  --   --   --  19  --   --   --  20   CREATININE <0.5*  --   --   --  <0.5*  --   --   --  0.6*   CALCIUM 8.8  --   --   --  8.5  --   --   --  8.3   AST 16  --   --   --   --   --   --   --   --    ALT 14  --   --   --   --   --   --   --   --    BILITOT 0.4  --   --   --   --   --   --   --   --    LACTA  --  1.3 2.0 1.9  --   --   --   --   --    LACTSEPSIS  --   --   --   --   --  1.7  --   --   --        Recent Labs     05/20/24  0757 05/20/24  1331 05/21/24  0722

## 2024-05-22 NOTE — ANESTHESIA POSTPROCEDURE EVALUATION
[facility] Department of Anesthesiology  Post-Anesthesia Note       Name:  Darren Duncan                                  Age:  68 y.o.  MRN:  [id number]     Last Vitals & Oxygen Saturation: BP (!) 141/73   Pulse 100   Temp 98.9 °F (37.2 °C) (Temporal)   Resp 14   Ht 1.753 m (5' 9\")   Wt 51.2 kg (112 lb 12.8 oz)   SpO2 100%   BMI 16.66 kg/m²   Patient Vitals for the past 4 hrs:   Pulse Resp SpO2   05/22/24 1544 100 14 100 %       Level of consciousness:  Sedated on vent    Respiratory: Ventilated    Cardiovascular: Hemodynamically stable.    Hydration: Adequate.    Post-op assessment: Tolerated anesthetic well without complication.    Complications:  Transported to ICU on propac and ambu bag. No issues.  Report given to ICU staff    Aries Bautista MD  May 22, 2024   4:10 PM

## 2024-05-22 NOTE — OP NOTE
GENERAL SURGERY  Operative Report    Patient Name: Darren Duncan  YOB: 1955   MRN: [id number]  Age: 68 y.o.   Sex: male       DATE OF OPERATION: 5/22/24     PREOPERATIVE DIAGNOSIS: Mesenteric artery stenosis (HCC) [K55.1]    POSTOPERATIVE DIAGNOSIS: ischemia necrosis of the small bowel, ischemic gallbladder    PROCEDURE(S) PERFORMED: SMALL BOWEL RESECTION, CHOLECYSTECTOMY     SURGEON: John Craft MD  Surgical Assistant: Norma Bower  Scrub Person First: Lisa Rueda  Scrub Person Second: Pedrito Muñoz RN; Jayda Castillo RN    ANESTHESIA: General      INDICATION:     Darren Duncan is a 68 y.o. male who presented with chronic mesenteric ischemia.  He was having worsening abdominal pain, concerning for acute on chronic pathology.  He initially had no findings consistent with small bowel compromise.  His labs were largely normal, CT scan did not demonstrate any free air, free fluid, or pneumatosis.  The case was closely discussed with vascular surgery, who decided to proceed with operative planning and ultimately with mesenteric bypass.  Upon their initial injury to the abdomen, there was a length of necrotic small bowel with full-thickness injury, as well as full-thickness ischemic changes to the gallbladder.  General surgery was notified intraoperatively.    Risks, benefits, and alternatives to the planned surgical procedure were discussed with the patient and family preoperatively    PROCEDURE DETAILS:     Upon entry into the operating room, the patient was already under general anesthesia with an open abdomen.  Dr. Grigsby's portion of the operation had been completed and will be dictated in a separate report.    Upon abdominal exploration, there was an obvious length (approximately 100 cm) of necrotic small bowel with full-thickness injury.  The small bowel was run from the ligament of Treitz to the ligament of Treves.  There was an additional, all hemorrhagic area more proximal, a  couple possible serosal injuries along the course of the small bowel, and the main a forementioned segment with full-thickness injury.  Further abdominal exploration revealed ischemic changes to the fundus of the gallbladder, by way of multiple areas of discoloration, concerning for gangrene.    The decision was made to proceed with small bowel resection.  Windows were created in the mesentery proximal and distal to the area of concern.  The small bowel was transected with sequential firings of the blue load UNRULY stapler.  The intervening mesentery was then divided with the large jaw LigaSure device.  The specimen was passed off the field for permanent pathology.  The staple line of the distal small bowel segment was then marked with a Prolene suture.    Next, attention was turned to the ischemic gallbladder.  The gallbladder was grasped with a ring forcep, elevated and retracted from the liver bed.  Using electrocautery, the gallbladder was  from the liver bed using electrocautery.  The posterior wall of the gallbladder was noted to be ischemic and gangrenous.  There was significant bleeding from the gallbladder fossa, likely related to the patient's heparinization and some degree of coagulopathy after several hours in the operating room.  This was controlled with combination of direct pressure, electrocautery, thrombin-soaked Gelfoam, and Floseal.  The cystic artery was identified and noted to be quite diminutive.  It was thoroughly controlled with electrocautery.  Finally, the infundibulum of the gallbladder was identified and the cystic duct was isolated.  It was clamped and ligated with a 2-0 silk suture x 2.  It was then divided using the LigaSure device.  The gallbladder was passed off the field for permanent pathology.    The small bowel was then returned to the abdominal cavity.  The abdomen was copiously irrigated with warm sterile saline.  It was dried.    An [facility] wound VAC was tailored to fit

## 2024-05-22 NOTE — OP NOTE
[facility]          [address] [place] [address]                            OPERATIVE REPORT      PATIENT NAME: RAMU XIE               : 1955  MED REC NO: [id number]                      ROOM: [place] [id number]  ACCOUNT NO: [id number]                       ADMIT DATE: 2024  PROVIDER: Armando Grigsby MD      DATE OF PROCEDURE:  2024    SURGEON:  Armando Grigsby MD    PREOPERATIVE DIAGNOSIS:  Chronic mesenteric ischemia with suspected acute mesenteric ischemia conversion.    POSTOPERATIVE DIAGNOSIS:  Chronic mesenteric ischemia with suspected acute mesenteric ischemia conversion.    PROCEDURE:  Antegrade aorto common hepatic-superior mesenteric artery bypass (Dacron 12 mm x 6 mm).    ANESTHESIA:  General endotracheal.    ESTIMATED BLOOD LOSS:  400 mL.    HISTORY:  The patient is a 68-year-old gentleman with a known history of significant arterial disease, having undergone a left femoral-popliteal bypass graft in the past, who presented with 2 years of postprandial abdominal pain leading to 70-pound weight loss.  He presented to the hospital at this time because of increased amount of pain, which was continuous.  Workup including a CTA demonstrated a severe stenosis in both the celiac and superior mesenteric artery.  It was recommended he undergo revascularization.  He had significant aortic calcifications and superior mesenteric artery calcifications that made the operation challenging, but it was felt that a surgical revascularization was more likely to be successful and long-term patency would be expected to be better in this case.  It would also allow for intraoperative evaluation of his bowel integrity by John Craft, the general surgeon involved.  The patient agreed with all questions answered.    TECHNIQUE:  The patient was brought to the operating room, placed on the operative table in supine position.  Anesthesia had previously  was completed and flow was restored.  There was noted to be a single site of bleeding which was controlled with single 5-0 Prolene.  At this point, hemostasis was ensured at all sites.  The bowel was then re-evaluated and there was approximately 80 cm segment which appeared to have irreversible changes.  Dr. Craft had been advised this before proceeding with bypass and he was notified and returned to the operating room for evaluation and resectional treatment.  The gallbladder also had several signs of mural ischemia and there was consideration for cholecystectomy by Dr. Craft.  This portion of the procedure will be dictated separately.  The soft tissue overlying the lesser sac was reapproximated as well using 3-0 Vicryls.  At this point, the patient was left in the care of Dr. Craft, who is to perform small bowel resection and possible cholecystectomy and close the abdomen temporarily for planned re-evaluation in the next 24-48 hours.          ASTRID ADAN MD      D:  05/22/2024 15:01:51     T:  05/22/2024 17:10:03     BUTCH/RITIKA  Job #:  [id number]     Doc#:  [id number]

## 2024-05-22 NOTE — BRIEF OP NOTE
Brief Postoperative Note      Patient: Darren Duncan  YOB: 1955  MRN: [id number]    Date of Procedure: 5/22/2024    Preop diagnosis: Chronic mesenteric ischemia    Post-Op Diagnosis: Same       Procedure: Aorto-common hepatic - SMA bypass (12 mm x 6 mm Dacron graft);    Small bowel resection    Surgeon(s):  John Craft MD Zenni, Gregory C, MD    Assistant:  Surgical Assistant: Norma Bower    Anesthesia: General    Estimated Blood Loss (mL): 400    Complications: None    Specimens:   * No specimens in log *    Implants:  Implant Name Type Inv. Item Serial No.  Lot No. LRB No. Used Action   GRAFT VASC VASCUTEK GELSFT + 12 MM MAIN BOR SIZEX6 MM LEG - [id number] Vascular grafts GRAFT VASC VASCUTEK GELSFT + 12 MM MAIN BOR SIZEX6 MM LEG  TERUMO CARDIOVASCULAR- [id number] N/A 1 Implanted         Drains:   Urinary Catheter 05/22/24 [place] (Active)       Findings:  Infection Present At Time Of Surgery (PATOS) (choose all levels that have infection present):  No infection present  Other Findings: irreversible midgut SB ischemia; no perforation; ischemic changes gallbladder    Electronically signed by Armando Grigsby MD on 5/22/2024 at 2:34 PM

## 2024-05-22 NOTE — CONSENT
Informed Consent for Blood Component Transfusion Note    I have discussed with the patient the rationale for blood component transfusion; its benefits in treating or preventing fatigue, organ damage, or death; and its risk which includes mild transfusion reactions, rare risk of blood borne infection, or more serious but rare reactions. I have discussed the alternatives to transfusion, including the risk and consequences of not receiving transfusion. The patient had an opportunity to ask questions and had agreed to proceed with transfusion of blood components.    Electronically signed by Ayesha Nobles MD on 5/22/24 at 12:23 PM EDT

## 2024-05-23 ENCOUNTER — APPOINTMENT (OUTPATIENT)
Dept: GENERAL RADIOLOGY | Age: 69
End: 2024-05-23
Payer: MEDICARE

## 2024-05-23 ENCOUNTER — ANESTHESIA (OUTPATIENT)
Dept: OPERATING ROOM | Age: 69
End: 2024-05-23
Payer: MEDICARE

## 2024-05-23 LAB
ALBUMIN SERPL-MCNC: 2 G/DL (ref 3.4–5)
ALBUMIN/GLOB SERPL: 1.3 {RATIO} (ref 1.1–2.2)
ALP SERPL-CCNC: 64 U/L (ref 40–129)
ALT SERPL-CCNC: 535 U/L (ref 10–40)
ANION GAP SERPL CALCULATED.3IONS-SCNC: 10 MMOL/L (ref 3–16)
ANION GAP SERPL CALCULATED.3IONS-SCNC: 9 MMOL/L (ref 3–16)
ANION GAP SERPL CALCULATED.3IONS-SCNC: 9 MMOL/L (ref 3–16)
AST SERPL-CCNC: 2231 U/L (ref 15–37)
BASE EXCESS BLDA CALC-SCNC: -4 MMOL/L (ref -3–3)
BASE EXCESS BLDA CALC-SCNC: -6 MMOL/L (ref -3–3)
BILIRUB SERPL-MCNC: 0.7 MG/DL (ref 0–1)
BLOOD BANK DISPENSE STATUS: NORMAL
BLOOD BANK PRODUCT CODE: NORMAL
BPU ID: NORMAL
BUN SERPL-MCNC: 30 MG/DL (ref 7–20)
BUN SERPL-MCNC: 33 MG/DL (ref 7–20)
BUN SERPL-MCNC: 35 MG/DL (ref 7–20)
CA-I BLD-SCNC: 1.02 MMOL/L (ref 1.12–1.32)
CALCIUM SERPL-MCNC: 7.2 MG/DL (ref 8.3–10.6)
CALCIUM SERPL-MCNC: 7.2 MG/DL (ref 8.3–10.6)
CALCIUM SERPL-MCNC: 7.6 MG/DL (ref 8.3–10.6)
CHLORIDE SERPL-SCNC: 107 MMOL/L (ref 99–110)
CHLORIDE SERPL-SCNC: 109 MMOL/L (ref 99–110)
CHLORIDE SERPL-SCNC: 111 MMOL/L (ref 99–110)
CO2 BLDA-SCNC: 20 MMOL/L
CO2 BLDA-SCNC: 20 MMOL/L
CO2 BLDA-SCNC: 21 MMOL/L
CO2 BLDA-SCNC: 22 MMOL/L
CO2 SERPL-SCNC: 19 MMOL/L (ref 21–32)
CO2 SERPL-SCNC: 20 MMOL/L (ref 21–32)
CO2 SERPL-SCNC: 22 MMOL/L (ref 21–32)
CREAT SERPL-MCNC: 1.2 MG/DL (ref 0.8–1.3)
CREAT SERPL-MCNC: 1.6 MG/DL (ref 0.8–1.3)
CREAT SERPL-MCNC: 1.7 MG/DL (ref 0.8–1.3)
DEPRECATED RDW RBC AUTO: 15.2 % (ref 12.4–15.4)
DESCRIPTION BLOOD BANK: NORMAL
GFR SERPLBLD CREATININE-BSD FMLA CKD-EPI: 43 ML/MIN/{1.73_M2}
GFR SERPLBLD CREATININE-BSD FMLA CKD-EPI: 46 ML/MIN/{1.73_M2}
GFR SERPLBLD CREATININE-BSD FMLA CKD-EPI: 66 ML/MIN/{1.73_M2}
GLUCOSE BLD-MCNC: 70 MG/DL (ref 70–99)
GLUCOSE BLD-MCNC: 74 MG/DL (ref 70–99)
GLUCOSE BLD-MCNC: 76 MG/DL (ref 70–99)
GLUCOSE BLD-MCNC: 76 MG/DL (ref 70–99)
GLUCOSE BLD-MCNC: 79 MG/DL (ref 70–99)
GLUCOSE BLD-MCNC: 80 MG/DL (ref 70–99)
GLUCOSE BLD-MCNC: 96 MG/DL (ref 70–99)
GLUCOSE BLD-MCNC: 99 MG/DL (ref 70–99)
GLUCOSE SERPL-MCNC: 75 MG/DL (ref 70–99)
GLUCOSE SERPL-MCNC: 88 MG/DL (ref 70–99)
GLUCOSE SERPL-MCNC: 97 MG/DL (ref 70–99)
HCO3 BLDA-SCNC: 19.3 MMOL/L (ref 21–29)
HCO3 BLDA-SCNC: 19.5 MMOL/L (ref 21–29)
HCO3 BLDA-SCNC: 19.8 MMOL/L (ref 21–29)
HCO3 BLDA-SCNC: 20.8 MMOL/L (ref 21–29)
HCT VFR BLD AUTO: 23 % (ref 40.5–52.5)
HCT VFR BLD AUTO: 24 % (ref 40.5–52.5)
HCT VFR BLD AUTO: 24.5 % (ref 40.5–52.5)
HGB BLD CALC-MCNC: 7.7 GM/DL (ref 13.5–17.5)
HGB BLD-MCNC: 8.2 G/DL (ref 13.5–17.5)
HGB BLD-MCNC: 8.7 G/DL (ref 13.5–17.5)
INHALED O2 FLOW RATE: 40 L/MIN
INR PPP: 1.6 (ref 0.85–1.15)
INR PPP: 2.15 (ref 0.85–1.15)
LACTATE BLD-SCNC: 1.31 MMOL/L (ref 0.4–2)
LACTATE BLDV-SCNC: 1.5 MMOL/L (ref 0.4–2)
LACTATE BLDV-SCNC: 1.9 MMOL/L (ref 0.4–2)
LIPASE SERPL-CCNC: 10 U/L (ref 13–60)
MAGNESIUM SERPL-MCNC: 1.9 MG/DL (ref 1.8–2.4)
MCH RBC QN AUTO: 31.3 PG (ref 26–34)
MCHC RBC AUTO-ENTMCNC: 34.2 G/DL (ref 31–36)
MCV RBC AUTO: 91.7 FL (ref 80–100)
PCO2 BLDA: 32.2 MM HG (ref 35–45)
PCO2 BLDA: 34.7 MM HG (ref 35–45)
PERFORMED ON: ABNORMAL
PERFORMED ON: NORMAL
PH BLDA: 7.37 [PH] (ref 7.35–7.45)
PH BLDA: 7.38 [PH] (ref 7.35–7.45)
PH BLDA: 7.39 [PH] (ref 7.35–7.45)
PH BLDA: 7.42 [PH] (ref 7.35–7.45)
PHOSPHATE SERPL-MCNC: 2.5 MG/DL (ref 2.5–4.9)
PLATELET # BLD AUTO: 84 K/UL (ref 135–450)
PLATELET BLD QL SMEAR: ABNORMAL
PMV BLD AUTO: 9.6 FL (ref 5–10.5)
PO2 BLDA: 60 MM HG (ref 75–108)
PO2 BLDA: 61 MM HG (ref 75–108)
PO2 BLDA: 71.4 MM HG (ref 75–108)
PO2 BLDA: 93.9 MM HG (ref 75–108)
POC SAMPLE TYPE: ABNORMAL
POTASSIUM BLD-SCNC: 3.2 MMOL/L (ref 3.5–5.1)
POTASSIUM SERPL-SCNC: 3.9 MMOL/L (ref 3.5–5.1)
POTASSIUM SERPL-SCNC: 4 MMOL/L (ref 3.5–5.1)
POTASSIUM SERPL-SCNC: 4.2 MMOL/L (ref 3.5–5.1)
PROT SERPL-MCNC: 3.5 G/DL (ref 6.4–8.2)
PROTHROMBIN TIME: 19.2 SEC (ref 11.9–14.9)
PROTHROMBIN TIME: 24 SEC (ref 11.9–14.9)
RBC # BLD AUTO: 2.61 M/UL (ref 4.2–5.9)
SAO2 % BLDA: 90 % (ref 93–100)
SAO2 % BLDA: 91 % (ref 93–100)
SAO2 % BLDA: 95 % (ref 93–100)
SAO2 % BLDA: 97 % (ref 93–100)
SLIDE REVIEW: ABNORMAL
SODIUM BLD-SCNC: 143 MMOL/L (ref 136–145)
SODIUM SERPL-SCNC: 138 MMOL/L (ref 136–145)
SODIUM SERPL-SCNC: 139 MMOL/L (ref 136–145)
SODIUM SERPL-SCNC: 139 MMOL/L (ref 136–145)
WBC # BLD AUTO: 4.8 K/UL (ref 4–11)

## 2024-05-23 PROCEDURE — APPNB15 APP NON BILLABLE TIME 0-15 MINS: Performed by: NURSE PRACTITIONER

## 2024-05-23 PROCEDURE — C9290 INJ, BUPIVACAINE LIPOSOME: HCPCS | Performed by: STUDENT IN AN ORGANIZED HEALTH CARE EDUCATION/TRAINING PROGRAM

## 2024-05-23 PROCEDURE — 6360000002 HC RX W HCPCS: Performed by: STUDENT IN AN ORGANIZED HEALTH CARE EDUCATION/TRAINING PROGRAM

## 2024-05-23 PROCEDURE — 2500000003 HC RX 250 WO HCPCS: Performed by: SURGERY

## 2024-05-23 PROCEDURE — 36592 COLLECT BLOOD FROM PICC: CPT

## 2024-05-23 PROCEDURE — 3700000000 HC ANESTHESIA ATTENDED CARE: Performed by: STUDENT IN AN ORGANIZED HEALTH CARE EDUCATION/TRAINING PROGRAM

## 2024-05-23 PROCEDURE — 6360000002 HC RX W HCPCS: Performed by: NURSE PRACTITIONER

## 2024-05-23 PROCEDURE — 0DB80ZZ EXCISION OF SMALL INTESTINE, OPEN APPROACH: ICD-10-PCS | Performed by: STUDENT IN AN ORGANIZED HEALTH CARE EDUCATION/TRAINING PROGRAM

## 2024-05-23 PROCEDURE — 2580000003 HC RX 258: Performed by: SURGERY

## 2024-05-23 PROCEDURE — 0JQ80ZZ REPAIR ABDOMEN SUBCUTANEOUS TISSUE AND FASCIA, OPEN APPROACH: ICD-10-PCS | Performed by: STUDENT IN AN ORGANIZED HEALTH CARE EDUCATION/TRAINING PROGRAM

## 2024-05-23 PROCEDURE — 36430 TRANSFUSION BLD/BLD COMPNT: CPT

## 2024-05-23 PROCEDURE — 83735 ASSAY OF MAGNESIUM: CPT

## 2024-05-23 PROCEDURE — 99291 CRITICAL CARE FIRST HOUR: CPT | Performed by: INTERNAL MEDICINE

## 2024-05-23 PROCEDURE — 6360000002 HC RX W HCPCS: Performed by: NURSE ANESTHETIST, CERTIFIED REGISTERED

## 2024-05-23 PROCEDURE — 2500000003 HC RX 250 WO HCPCS: Performed by: NURSE ANESTHETIST, CERTIFIED REGISTERED

## 2024-05-23 PROCEDURE — 94761 N-INVAS EAR/PLS OXIMETRY MLT: CPT

## 2024-05-23 PROCEDURE — 83690 ASSAY OF LIPASE: CPT

## 2024-05-23 PROCEDURE — A4217 STERILE WATER/SALINE, 500 ML: HCPCS | Performed by: STUDENT IN AN ORGANIZED HEALTH CARE EDUCATION/TRAINING PROGRAM

## 2024-05-23 PROCEDURE — 2720000010 HC SURG SUPPLY STERILE: Performed by: STUDENT IN AN ORGANIZED HEALTH CARE EDUCATION/TRAINING PROGRAM

## 2024-05-23 PROCEDURE — 82330 ASSAY OF CALCIUM: CPT

## 2024-05-23 PROCEDURE — 94640 AIRWAY INHALATION TREATMENT: CPT

## 2024-05-23 PROCEDURE — 80053 COMPREHEN METABOLIC PANEL: CPT

## 2024-05-23 PROCEDURE — 99024 POSTOP FOLLOW-UP VISIT: CPT | Performed by: SURGERY

## 2024-05-23 PROCEDURE — 2700000000 HC OXYGEN THERAPY PER DAY

## 2024-05-23 PROCEDURE — 99024 POSTOP FOLLOW-UP VISIT: CPT | Performed by: STUDENT IN AN ORGANIZED HEALTH CARE EDUCATION/TRAINING PROGRAM

## 2024-05-23 PROCEDURE — 2580000003 HC RX 258: Performed by: STUDENT IN AN ORGANIZED HEALTH CARE EDUCATION/TRAINING PROGRAM

## 2024-05-23 PROCEDURE — 3600000004 HC SURGERY LEVEL 4 BASE: Performed by: STUDENT IN AN ORGANIZED HEALTH CARE EDUCATION/TRAINING PROGRAM

## 2024-05-23 PROCEDURE — 94003 VENT MGMT INPAT SUBQ DAY: CPT

## 2024-05-23 PROCEDURE — 85027 COMPLETE CBC AUTOMATED: CPT

## 2024-05-23 PROCEDURE — 84100 ASSAY OF PHOSPHORUS: CPT

## 2024-05-23 PROCEDURE — 85610 PROTHROMBIN TIME: CPT

## 2024-05-23 PROCEDURE — 84295 ASSAY OF SERUM SODIUM: CPT

## 2024-05-23 PROCEDURE — 2580000003 HC RX 258: Performed by: NURSE ANESTHETIST, CERTIFIED REGISTERED

## 2024-05-23 PROCEDURE — 44130 BOWEL TO BOWEL FUSION: CPT | Performed by: STUDENT IN AN ORGANIZED HEALTH CARE EDUCATION/TRAINING PROGRAM

## 2024-05-23 PROCEDURE — 83605 ASSAY OF LACTIC ACID: CPT

## 2024-05-23 PROCEDURE — 84132 ASSAY OF SERUM POTASSIUM: CPT

## 2024-05-23 PROCEDURE — 82803 BLOOD GASES ANY COMBINATION: CPT

## 2024-05-23 PROCEDURE — 2709999900 HC NON-CHARGEABLE SUPPLY: Performed by: STUDENT IN AN ORGANIZED HEALTH CARE EDUCATION/TRAINING PROGRAM

## 2024-05-23 PROCEDURE — 3700000001 HC ADD 15 MINUTES (ANESTHESIA): Performed by: STUDENT IN AN ORGANIZED HEALTH CARE EDUCATION/TRAINING PROGRAM

## 2024-05-23 PROCEDURE — 82947 ASSAY GLUCOSE BLOOD QUANT: CPT

## 2024-05-23 PROCEDURE — 3600000014 HC SURGERY LEVEL 4 ADDTL 15MIN: Performed by: STUDENT IN AN ORGANIZED HEALTH CARE EDUCATION/TRAINING PROGRAM

## 2024-05-23 PROCEDURE — 88307 TISSUE EXAM BY PATHOLOGIST: CPT

## 2024-05-23 PROCEDURE — 2100000000 HC CCU R&B

## 2024-05-23 PROCEDURE — 85014 HEMATOCRIT: CPT

## 2024-05-23 PROCEDURE — 6360000002 HC RX W HCPCS: Performed by: SURGERY

## 2024-05-23 PROCEDURE — 71045 X-RAY EXAM CHEST 1 VIEW: CPT

## 2024-05-23 PROCEDURE — 85018 HEMOGLOBIN: CPT

## 2024-05-23 RX ORDER — SODIUM CHLORIDE 0.9 % (FLUSH) 0.9 %
5-40 SYRINGE (ML) INJECTION PRN
Status: DISCONTINUED | OUTPATIENT
Start: 2024-05-23 | End: 2024-05-24

## 2024-05-23 RX ORDER — DEXAMETHASONE SODIUM PHOSPHATE 4 MG/ML
INJECTION, SOLUTION INTRA-ARTICULAR; INTRALESIONAL; INTRAMUSCULAR; INTRAVENOUS; SOFT TISSUE PRN
Status: DISCONTINUED | OUTPATIENT
Start: 2024-05-23 | End: 2024-05-23 | Stop reason: SDUPTHER

## 2024-05-23 RX ORDER — FENTANYL CITRATE 50 UG/ML
25 INJECTION, SOLUTION INTRAMUSCULAR; INTRAVENOUS EVERY 5 MIN PRN
Status: DISCONTINUED | OUTPATIENT
Start: 2024-05-23 | End: 2024-05-24

## 2024-05-23 RX ORDER — BUPIVACAINE HYDROCHLORIDE 5 MG/ML
INJECTION, SOLUTION EPIDURAL; INTRACAUDAL
Status: DISCONTINUED | OUTPATIENT
Start: 2024-05-23 | End: 2024-05-23 | Stop reason: ALTCHOICE

## 2024-05-23 RX ORDER — SODIUM CHLORIDE 0.9 % (FLUSH) 0.9 %
5-40 SYRINGE (ML) INJECTION EVERY 12 HOURS SCHEDULED
Status: DISCONTINUED | OUTPATIENT
Start: 2024-05-23 | End: 2024-05-29

## 2024-05-23 RX ORDER — ONDANSETRON 2 MG/ML
4 INJECTION INTRAMUSCULAR; INTRAVENOUS
Status: DISCONTINUED | OUTPATIENT
Start: 2024-05-23 | End: 2024-05-24

## 2024-05-23 RX ORDER — SODIUM CHLORIDE 9 MG/ML
INJECTION, SOLUTION INTRAVENOUS PRN
Status: DISCONTINUED | OUTPATIENT
Start: 2024-05-23 | End: 2024-05-24

## 2024-05-23 RX ORDER — PIPERACILLIN SODIUM, TAZOBACTAM SODIUM 2; .25 G/10ML; G/10ML
INJECTION, POWDER, LYOPHILIZED, FOR SOLUTION INTRAVENOUS PRN
Status: DISCONTINUED | OUTPATIENT
Start: 2024-05-23 | End: 2024-05-23 | Stop reason: SDUPTHER

## 2024-05-23 RX ORDER — FENTANYL CITRATE 50 UG/ML
50 INJECTION, SOLUTION INTRAMUSCULAR; INTRAVENOUS EVERY 5 MIN PRN
Status: DISCONTINUED | OUTPATIENT
Start: 2024-05-23 | End: 2024-05-24

## 2024-05-23 RX ORDER — VASOPRESSIN 20 U/ML
INJECTION PARENTERAL PRN
Status: DISCONTINUED | OUTPATIENT
Start: 2024-05-23 | End: 2024-05-23 | Stop reason: SDUPTHER

## 2024-05-23 RX ORDER — SODIUM CHLORIDE 450 MG/100ML
INJECTION, SOLUTION INTRAVENOUS CONTINUOUS
Status: DISCONTINUED | OUTPATIENT
Start: 2024-05-23 | End: 2024-05-24

## 2024-05-23 RX ORDER — CALCIUM GLUCONATE 20 MG/ML
1000 INJECTION, SOLUTION INTRAVENOUS ONCE
Status: COMPLETED | OUTPATIENT
Start: 2024-05-23 | End: 2024-05-23

## 2024-05-23 RX ORDER — FENTANYL CITRATE 50 UG/ML
INJECTION, SOLUTION INTRAMUSCULAR; INTRAVENOUS PRN
Status: DISCONTINUED | OUTPATIENT
Start: 2024-05-23 | End: 2024-05-23 | Stop reason: SDUPTHER

## 2024-05-23 RX ORDER — SODIUM CHLORIDE 9 MG/ML
INJECTION, SOLUTION INTRAVENOUS CONTINUOUS PRN
Status: DISCONTINUED | OUTPATIENT
Start: 2024-05-23 | End: 2024-05-23 | Stop reason: SDUPTHER

## 2024-05-23 RX ORDER — NALOXONE HYDROCHLORIDE 0.4 MG/ML
INJECTION, SOLUTION INTRAMUSCULAR; INTRAVENOUS; SUBCUTANEOUS PRN
Status: DISCONTINUED | OUTPATIENT
Start: 2024-05-23 | End: 2024-05-24

## 2024-05-23 RX ORDER — ROCURONIUM BROMIDE 10 MG/ML
INJECTION, SOLUTION INTRAVENOUS PRN
Status: DISCONTINUED | OUTPATIENT
Start: 2024-05-23 | End: 2024-05-23 | Stop reason: SDUPTHER

## 2024-05-23 RX ORDER — PHENYLEPHRINE HCL IN 0.9% NACL 1 MG/10 ML
SYRINGE (ML) INTRAVENOUS PRN
Status: DISCONTINUED | OUTPATIENT
Start: 2024-05-23 | End: 2024-05-23 | Stop reason: SDUPTHER

## 2024-05-23 RX ORDER — BUDESONIDE 0.5 MG/2ML
0.5 INHALANT ORAL
Status: DISCONTINUED | OUTPATIENT
Start: 2024-05-23 | End: 2024-05-28

## 2024-05-23 RX ORDER — ALBUTEROL SULFATE 2.5 MG/3ML
2.5 SOLUTION RESPIRATORY (INHALATION) EVERY 4 HOURS PRN
Status: DISCONTINUED | OUTPATIENT
Start: 2024-05-23 | End: 2024-05-31

## 2024-05-23 RX ORDER — ONDANSETRON 2 MG/ML
INJECTION INTRAMUSCULAR; INTRAVENOUS PRN
Status: DISCONTINUED | OUTPATIENT
Start: 2024-05-23 | End: 2024-05-23 | Stop reason: SDUPTHER

## 2024-05-23 RX ORDER — MAGNESIUM HYDROXIDE 1200 MG/15ML
LIQUID ORAL CONTINUOUS PRN
Status: COMPLETED | OUTPATIENT
Start: 2024-05-23 | End: 2024-05-23

## 2024-05-23 RX ORDER — 0.9 % SODIUM CHLORIDE 0.9 %
1000 INTRAVENOUS SOLUTION INTRAVENOUS ONCE
Status: COMPLETED | OUTPATIENT
Start: 2024-05-23 | End: 2024-05-23

## 2024-05-23 RX ADMIN — Medication 100 MCG: at 15:37

## 2024-05-23 RX ADMIN — SODIUM CHLORIDE, PRESERVATIVE FREE 20 MG: 5 INJECTION INTRAVENOUS at 20:23

## 2024-05-23 RX ADMIN — ONDANSETRON 4 MG: 2 INJECTION INTRAMUSCULAR; INTRAVENOUS at 15:26

## 2024-05-23 RX ADMIN — PIPERACILLIN AND TAZOBACTAM 2250 MG: 2; .25 INJECTION, POWDER, LYOPHILIZED, FOR SOLUTION INTRAVENOUS at 14:14

## 2024-05-23 RX ADMIN — CALCIUM GLUCONATE 2000 MG: 20 INJECTION, SOLUTION INTRAVENOUS at 07:54

## 2024-05-23 RX ADMIN — SODIUM CHLORIDE 1000 ML: 9 INJECTION, SOLUTION INTRAVENOUS at 17:34

## 2024-05-23 RX ADMIN — BUDESONIDE 500 MCG: 0.5 SUSPENSION RESPIRATORY (INHALATION) at 09:04

## 2024-05-23 RX ADMIN — Medication 100 MCG: at 15:35

## 2024-05-23 RX ADMIN — ROCURONIUM BROMIDE 30 MG: 10 INJECTION, SOLUTION INTRAVENOUS at 15:31

## 2024-05-23 RX ADMIN — Medication 100 MCG: at 14:17

## 2024-05-23 RX ADMIN — Medication 50 MCG/HR: at 12:43

## 2024-05-23 RX ADMIN — Medication 10 ML: at 08:02

## 2024-05-23 RX ADMIN — BUDESONIDE 500 MCG: 0.5 SUSPENSION RESPIRATORY (INHALATION) at 20:00

## 2024-05-23 RX ADMIN — SODIUM CHLORIDE: 9 INJECTION, SOLUTION INTRAVENOUS at 14:03

## 2024-05-23 RX ADMIN — FENTANYL CITRATE 25 MCG: 50 INJECTION INTRAMUSCULAR; INTRAVENOUS at 14:22

## 2024-05-23 RX ADMIN — Medication 25 MCG/HR: at 16:33

## 2024-05-23 RX ADMIN — FENTANYL CITRATE 25 MCG: 50 INJECTION INTRAMUSCULAR; INTRAVENOUS at 14:52

## 2024-05-23 RX ADMIN — PIPERACILLIN AND TAZOBACTAM 3375 MG: 3; .375 INJECTION, POWDER, FOR SOLUTION INTRAVENOUS at 00:40

## 2024-05-23 RX ADMIN — SODIUM CHLORIDE: 4.5 INJECTION, SOLUTION INTRAVENOUS at 07:47

## 2024-05-23 RX ADMIN — PROPOFOL 20 MCG/KG/MIN: 10 INJECTION, EMULSION INTRAVENOUS at 03:15

## 2024-05-23 RX ADMIN — FENTANYL CITRATE 25 MCG: 50 INJECTION INTRAMUSCULAR; INTRAVENOUS at 15:35

## 2024-05-23 RX ADMIN — VASOPRESSIN 2 UNITS: 20 INJECTION INTRAVENOUS at 15:39

## 2024-05-23 RX ADMIN — SODIUM CHLORIDE: 4.5 INJECTION, SOLUTION INTRAVENOUS at 17:06

## 2024-05-23 RX ADMIN — SODIUM CHLORIDE, PRESERVATIVE FREE 20 MG: 5 INJECTION INTRAVENOUS at 08:02

## 2024-05-23 RX ADMIN — Medication 100 MCG: at 14:10

## 2024-05-23 RX ADMIN — SODIUM CHLORIDE: 9 INJECTION, SOLUTION INTRAVENOUS at 02:59

## 2024-05-23 RX ADMIN — DEXTROSE MONOHYDRATE 125 ML: 100 INJECTION, SOLUTION INTRAVENOUS at 09:51

## 2024-05-23 RX ADMIN — Medication 100 MCG: at 14:45

## 2024-05-23 RX ADMIN — PIPERACILLIN AND TAZOBACTAM 3375 MG: 3; .375 INJECTION, POWDER, FOR SOLUTION INTRAVENOUS at 08:01

## 2024-05-23 RX ADMIN — POTASSIUM CHLORIDE 20 MEQ: 29.8 INJECTION, SOLUTION INTRAVENOUS at 02:01

## 2024-05-23 RX ADMIN — Medication 100 MCG: at 15:18

## 2024-05-23 RX ADMIN — FENTANYL CITRATE 25 MCG: 50 INJECTION INTRAMUSCULAR; INTRAVENOUS at 14:26

## 2024-05-23 RX ADMIN — DEXTROSE MONOHYDRATE 125 ML: 100 INJECTION, SOLUTION INTRAVENOUS at 17:31

## 2024-05-23 RX ADMIN — SODIUM CHLORIDE: 9 INJECTION, SOLUTION INTRAVENOUS at 07:49

## 2024-05-23 RX ADMIN — DEXAMETHASONE SODIUM PHOSPHATE 4 MG: 4 INJECTION, SOLUTION INTRAMUSCULAR; INTRAVENOUS at 14:25

## 2024-05-23 RX ADMIN — ROCURONIUM BROMIDE 20 MG: 10 INJECTION, SOLUTION INTRAVENOUS at 14:43

## 2024-05-23 RX ADMIN — SODIUM CHLORIDE: 9 INJECTION, SOLUTION INTRAVENOUS at 03:12

## 2024-05-23 RX ADMIN — PROPOFOL 20 MCG/KG/MIN: 10 INJECTION, EMULSION INTRAVENOUS at 18:34

## 2024-05-23 RX ADMIN — SODIUM CHLORIDE: 4.5 INJECTION, SOLUTION INTRAVENOUS at 22:20

## 2024-05-23 RX ADMIN — ROCURONIUM BROMIDE 30 MG: 10 INJECTION, SOLUTION INTRAVENOUS at 14:15

## 2024-05-23 RX ADMIN — Medication 100 MCG: at 15:07

## 2024-05-23 RX ADMIN — PROPOFOL 20 MCG/KG/MIN: 10 INJECTION, EMULSION INTRAVENOUS at 20:31

## 2024-05-23 RX ADMIN — CALCIUM GLUCONATE 1000 MG: 20 INJECTION, SOLUTION INTRAVENOUS at 19:44

## 2024-05-23 RX ADMIN — PROPOFOL 20 MCG/KG/MIN: 10 INJECTION, EMULSION INTRAVENOUS at 00:13

## 2024-05-23 RX ADMIN — SODIUM CHLORIDE: 9 INJECTION, SOLUTION INTRAVENOUS at 13:57

## 2024-05-23 RX ADMIN — POTASSIUM CHLORIDE 20 MEQ: 29.8 INJECTION, SOLUTION INTRAVENOUS at 01:18

## 2024-05-23 ASSESSMENT — PULMONARY FUNCTION TESTS
PIF_VALUE: 20
PIF_VALUE: 22
PIF_VALUE: 22
PIF_VALUE: 21
PIF_VALUE: 20
PIF_VALUE: 22
PIF_VALUE: 19
PIF_VALUE: 20
PIF_VALUE: 22
PIF_VALUE: 20
PIF_VALUE: 15
PIF_VALUE: 23
PIF_VALUE: 22
PIF_VALUE: 23
PIF_VALUE: 21
PIF_VALUE: 21
PIF_VALUE: 16
PIF_VALUE: 22
PIF_VALUE: 21
PIF_VALUE: 22
PIF_VALUE: 21
PIF_VALUE: 18
PIF_VALUE: 0
PIF_VALUE: 22
PIF_VALUE: 21
PIF_VALUE: 22
PIF_VALUE: 21
PIF_VALUE: 19
PIF_VALUE: 22
PIF_VALUE: 21
PIF_VALUE: 18
PIF_VALUE: 21
PIF_VALUE: 21
PIF_VALUE: 22
PIF_VALUE: 23
PIF_VALUE: 21
PIF_VALUE: 21
PIF_VALUE: 18
PIF_VALUE: 19
PIF_VALUE: 21
PIF_VALUE: 19
PIF_VALUE: 23

## 2024-05-23 ASSESSMENT — PAIN SCALES - GENERAL
PAINLEVEL_OUTOF10: 0

## 2024-05-23 ASSESSMENT — LIFESTYLE VARIABLES: SMOKING_STATUS: 1

## 2024-05-23 NOTE — PROGRESS NOTES
NAME:  Darren Duncan  YOB: 1955  MEDICAL RECORD NUMBER:  [id number]    Shift Summary: Low UOPm fluids bolus's, 2 U of FFP. Hypertensive and tachycardic during care, sedation restarted. BGL 74.     Family updated: Yes:       Rhythm: Sinus Tachycardia     Most recent vitals:   Visit Vitals  BP (!) 65/44   Pulse (!) 107   Temp 99.1 °F (37.3 °C) (Esophageal)   Resp 14   Ht 1.753 m (5' 9.02\")   Wt 57.7 kg (127 lb 3.3 oz)   SpO2 97%   BMI 18.78 kg/m²      ABP (Arterial line BP): (!) 104/27  ABP Mean (Arterial Line Mean): (!) 46 mmHg    No data found.    Patient Vitals for the past 12 hrs:   CVP (Mean)   05/23/24 0021 8 mmHg   05/22/24 2300 1 mmHg   05/22/24 1913 0 mmHg   05/22/24 1900 1 mmHg         Respiratory support needed (if any):  - Ventilator Settings:  Vent Days 2    Vt (Set, mL): 440 mL  Resp Rate (Set): 14 bpm  FiO2 : 40 %    PEEP/CPAP (cmH2O): 5       Admission weight Weight - Scale: 49.6 kg (109 lb 5.6 oz) (05/16/24 0701)    Today's weight    Wt Readings from Last 1 Encounters:   05/22/24 57.7 kg (127 lb 3.3 oz)        King need assessed each shift: YES -  - continue king r/t -    UOP >30ml/hr: NO -    Last documented BM (in last 48 hrs):  Patient Vitals for the past 48 hrs:   Last BM (including prior to admit)   05/21/24 0819 05/20/24 05/22/24 1830 05/22/24 05/22/24 2000 05/22/24 05/23/24 0200 05/22/24                Restraints (in use currently or dc'd in last 12 hrs): Yes    Order current and documentation up to date? Yes    Lines/Drains reviewed @ bedside.  CVC  05/22/24 Right Internal jugular (Active)   Number of days: 0       Peripheral IV 05/21/24 Right Antecubital (Active)   Number of days: 1       Peripheral IV 05/22/24 Left Arm (Active)   Number of days: 0       Arterial Line 05/22/24 Right Radial (Active)   Number of days: 0       Urinary Catheter 05/22/24 King (Active)   Number of days: 0         Drip rates at handoff:    sodium chloride      sodium chloride Stopped

## 2024-05-23 NOTE — ANESTHESIA PRE PROCEDURE
[facility] Department of Anesthesiology  Pre-Anesthesia Evaluation/Consultation       Name:  Darren Duncan  : 1955  Age:  68 y.o.                                           MRN:  [id number]  Date: 2024           Surgeon: Surgeon(s):  John Craft MD    Procedure: Procedure(s):  SECOND LOOK LAPAROTOMY, POSSIBLE SMALL BOWEL RESECTION, POSSIBLE ANASTOMOSIS AND ABDOMINAL CLOSURE     Allergies   Allergen Reactions    Tree Nut [Macadamia Nut Oil]      cashews     Patient Active Problem List   Diagnosis    Other hyperlipidemia    Essential hypertension    Smoker    Rash and nonspecific skin eruption    Cellulitis and abscess of toe of left foot    Peripheral arterial disease (HCC)    Claudication (HCC)    Atherosclerosis of native arteries of extremities with rest pain, left leg (HCC)    Peripheral vascular disease, unspecified (HCC)    Atherosclerosis of artery of extremity with rest pain (HCC)    Unspecified severe protein-calorie malnutrition (HCC)    Other specified disorders of carbohydrate metabolism (HCC)    Abdominal pain    Mesenteric ischemia (HCC)    Mesenteric artery stenosis (HCC)    Chronic mesenteric ischemia (HCC)     Past Medical History:   Diagnosis Date    Cerebral artery occlusion with cerebral infarction (HCC)     Patient said he has no residual effects    Heart attack (HCC)     3 yr ago    Hx of blood clots     right side of neck    Hyperlipidemia     Hypertension     Peripheral vascular disease, unspecified (HCC) 2020     Past Surgical History:   Procedure Laterality Date    ARTERIAL CLOT SURGERY Right 2016    CHOLECYSTECTOMY N/A 2024    OPEN CHOLECYSTECTOMY AND SMALL BOWEL RESECTION performed by John Craft MD at [facility] OR    FEMORAL BYPASS Left 2020    LEFT FEMORAL ENDARTERECTOMY, LEFT FEMORAL TO POPLITEAL BYPASS GRAFT performed by Armando Grigsby MD at [facility] OR    VASCULAR SURGERY N/A 2024    AORTOMESENTERIC BYPASS WITH 12X 6 GRAFT performed by

## 2024-05-23 NOTE — PROGRESS NOTES
Remove top blue foam from wound vac and two retained lap sponges from abdomen. Verified them being removed.

## 2024-05-23 NOTE — PROGRESS NOTES
Lab was contacted to inquire on an H&H drawn at 1845 that had not yet resulted. Lab stated that they were not aware of that specimen. RN redrew H&H and contacted lab to let them know that the specimen was on the way to them.       Lab called back and stated they were not able to run the new lab without a new order being placed or lab label. A new label was sent.      H&H resulted 8.7

## 2024-05-23 NOTE — PROGRESS NOTES
Hospitalist Progress Note  5/23/2024 9:21 AM    PCP: Deep Lozano MD    [phone]     Date of Admission: 5/16/2024                                                                                                                     HOSPITAL COURSE    Patient demographics:  The patient  Darren Duncan is a 68 y.o. male     Significant past medical history:   Patient Active Problem List   Diagnosis    Other hyperlipidemia    Essential hypertension    Smoker    Rash and nonspecific skin eruption    Cellulitis and abscess of toe of left foot    Peripheral arterial disease (HCC)    Claudication (HCC)    Atherosclerosis of native arteries of extremities with rest pain, left leg (HCC)    Peripheral vascular disease, unspecified (HCC)    Atherosclerosis of artery of extremity with rest pain (HCC)    Unspecified severe protein-calorie malnutrition (HCC)    Other specified disorders of carbohydrate metabolism (HCC)    Abdominal pain    Mesenteric ischemia (HCC)    Mesenteric artery stenosis (HCC)    Chronic mesenteric ischemia (HCC)         Presenting symptoms:   Abdominal Pain     Diagnostic workup:      CONSULTS DURING ADMISSION :   IP CONSULT TO GI  IP CONSULT TO SPIRITUAL SERVICES  IP CONSULT TO GENERAL SURGERY  IP CONSULT TO VASCULAR SURGERY  IP CONSULT TO CARDIOLOGY  IP CONSULT TO PULMONOLOGY      Patient was diagnosed with:  Chronic mesenteric ischemia  Status post revascularization 5/22/2024  Partial bowel resection      Treatment while inpatient:         68 years old male with medical history significant for hypertension peripheral vascular disease and history of CVA.    Patient presented to the emergency room with abdominal pain and significant weight loss over a period of 2 years.    Patient was suspected for bowel ischemia.  Vascular surgery was consulted and patient underwent a CT angiogram of the abdomen and pelvis.    Patient also underwent arterial duplex.    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  Patient was kept on clear liquid diet initially                                                                                ----------------------------------------------------------      SUBJECTIVE COMPLAINTS-abdominal pain    Diet: Diet NPO      OBJECTIVE:   Patient Active Problem List   Diagnosis    Other hyperlipidemia    Essential hypertension    Smoker    Rash and nonspecific skin eruption    Cellulitis and abscess of toe of left foot    Peripheral arterial disease (HCC)    Claudication (HCC)    Atherosclerosis of native arteries of extremities with rest pain, left leg (HCC)    Peripheral vascular disease, unspecified (HCC)    Atherosclerosis of artery of extremity with rest pain (HCC)    Unspecified severe protein-calorie malnutrition (HCC)    Other specified disorders of carbohydrate metabolism (HCC)    Abdominal pain    Mesenteric ischemia (HCC)    Mesenteric artery stenosis (HCC)    Chronic mesenteric ischemia (HCC)       Allergies  Tree nut [macadamia nut oil]    Medications    Scheduled Meds:   budesonide  0.5 mg Nebulization BID RT    piperacillin-tazobactam  2,250 mg IntraVENous Q8H    sodium chloride flush  5-40 mL IntraVENous 2 times per day    famotidine (PEPCID) injection  20 mg IntraVENous BID    sodium chloride  1,000 mL IntraVENous Once    [Held by provider] PARoxetine  20 mg Oral Nightly    [Held by provider] amLODIPine  10 mg Oral Daily    [Held by provider] aspirin  81 mg Oral Daily    [Held by provider] atorvastatin  80 mg Oral Daily    [Held by provider] lisinopril  20 mg Oral Daily    [Held by provider] pantoprazole  40 mg Oral BID     Continuous Infusions:   sodium chloride 175 mL/hr at 05/23/24 0747    sodium chloride      sodium chloride      sodium chloride 5 mL/hr at 05/23/24 0749    nitroGLYCERIN      norepinephrine Stopped (05/23/24 0011)    fentaNYL 75 mcg/hr (05/23/24 0513)    propofol 20 mcg/kg/min (05/23/24 0513)

## 2024-05-23 NOTE — PROGRESS NOTES
NAME:  Darren Duncan  YOB: 1955  MEDICAL RECORD NUMBER:  [id number]    Shift Summary: Patient returned from OR, second look laparotomy, small bowel resection and abdominal closure. Pt has wound vac to medial abdomen incision. Patient's A-line BP and NIVBP significantly different. Per Dr. Palm, ok to use NIVBP. Pt's CVP 2. 9 ml urine output.  Call placed to vascular on-call. Spoke with Dr. Callahan. Telephone order for 1L fluid bolus. SPO2:88% on monitor, FIO2 increased to 50% after ABG results (see Results review).     Family updated: Yes:       Rhythm: Normal Sinus Rhythm     Most recent vitals:   Visit Vitals  BP (!) 107/52   Pulse 87   Temp (!) 96.7 °F (35.9 °C) (Esophageal)   Resp 14   Ht 1.735 m (5' 8.31\")   Wt 62.6 kg (138 lb 0.1 oz)   SpO2 97%   BMI 20.80 kg/m²      ABP (Arterial line BP): (!) 152/33  ABP Mean (Arterial Line Mean): (!) 63 mmHg    No data found.    Patient Vitals for the past 12 hrs:   CVP (Mean)   05/23/24 1730 0 mmHg   05/23/24 1715 1 mmHg   05/23/24 1700 1 mmHg   05/23/24 1250 5 mmHg   05/23/24 1224 5 mmHg   05/23/24 1200 5 mmHg   05/23/24 1100 4 mmHg   05/23/24 0800 295 mmHg   05/23/24 0610 5 mmHg         Respiratory support needed (if any):  - Ventilator Settings:  Vent Days 2    Vt (Set, mL): 0 mL  Resp Rate (Set): 14 bpm  FiO2 : 50 %    PEEP/CPAP (cmH2O): 5       Admission weight Weight - Scale: 49.6 kg (109 lb 5.6 oz) (05/16/24 0701)    Today's weight    Wt Readings from Last 1 Encounters:   05/23/24 62.6 kg (138 lb 0.1 oz)        Tilley need assessed each shift: Yes  UOP >30ml/hr: NO - MD aware  Last documented BM (in last 48 hrs):  Patient Vitals for the past 48 hrs:   Last BM (including prior to admit)   05/22/24 1830 05/22/24 05/22/24 2000 05/22/24 05/23/24 0200 05/22/24                Restraints (in use currently or dc'd in last 12 hrs): Yes    Order current and documentation up to date? Yes    Lines/Drains reviewed @ bedside.  CVC  05/22/24 Right Internal  jugular (Active)   Number of days: 1       Peripheral IV 05/22/24 Left Arm (Active)   Number of days: 1       Arterial Line 05/22/24 Right Radial (Active)   Number of days: 1       Urinary Catheter 05/22/24 Tilley (Active)   Number of days: 1         Drip rates at handoff:    sodium chloride 175 mL/hr at 05/23/24 1706    sodium chloride      sodium chloride      sodium chloride      sodium chloride 5 mL/hr at 05/23/24 1013    nitroGLYCERIN      norepinephrine Stopped (05/23/24 0011)    fentaNYL 25 mcg/hr (05/23/24 1633)    propofol 15 mcg/kg/min (05/23/24 1013)    dextrose      sodium chloride         Lab Data:   CBC:   Recent Labs     05/22/24  1600 05/22/24  1745 05/23/24  0100 05/23/24  0415   WBC 2.2*  --   --  4.8   HGB 9.2*   < > 7.7*  8.7* 8.2*   HCT 26.9*   < > 24.5* 24.0*   MCV 92.1  --   --  91.7   *  --   --  84*    < > = values in this interval not displayed.     BMP:    Recent Labs     05/23/24  0415 05/23/24  1332    139   K 4.2 4.0   CO2 19* 22   BUN 30* 33*   CREATININE 1.2 1.6*     LIVR:   Recent Labs     05/23/24  0415   AST 2,231*   *     PT/INR:   Recent Labs     05/23/24  0415 05/23/24  1332   INR 2.15* 1.60*     APTT: No results for input(s): \"APTT\" in the last 72 hours.  ABG:   Recent Labs     05/23/24  0624 05/23/24  1713   PHART 7.418 7.365   RSY4JML 32.2* 34.7*   PO2ART 71.4* 60.0*       Any consults during the shift? No    Any signed and held orders to be released?  No        4 Eyes Skin Assessment       The patient is being assessed for  Shift Handoff    I agree that at least one RN has performed a thorough Head to Toe Skin Assessment on the patient. ALL assessment sites listed below have been assessed.      Areas assessed by both nurses: Head, Face, Ears, Shoulders, Back, Chest, Arms, Elbows, Hands, Sacrum. Buttock, Coccyx, Ischium, Legs. Feet and Heels, and Under Medical Devices         Does the Patient have a Wound? No noted wound(s)    Wound Care Orders initiated

## 2024-05-23 NOTE — PROGRESS NOTES
Pt is having large liquid bowel movements x2. During cleaning pt he became more responsive and started lightly biting down on the ETT tube, became tachycardic, and hypertensive. Fenatanyl was increased to assist with discomfort.     Pt became tachy and hypertensive, Propofol was restarted. RASS @ +1. Pt followed commands appropriately but was agitated and visibly uncomfortable.     FlexiSeal was placed to try and avoid turning pt for cleanup, however his stool was liquid  and leaked around the seal.         CVP reassessed and read 8.       Will continue to monitor pt.      K+ 3.2 on epoc, RN obtained result d/t the amount of stool the pt was having and the sudden tachycardia.

## 2024-05-23 NOTE — ANESTHESIA POSTPROCEDURE EVALUATION
Department of Anesthesiology  Postprocedure Note    Patient: Darren Duncan  MRN: [id number]  YOB: 1955  Date of evaluation: 5/23/2024    Procedure Summary       Date: 05/23/24 Room / Location: [address]    Anesthesia Start: 1357 Anesthesia Stop: 1604    Procedure: SECOND LOOK LAPAROTOMY, SMALL BOWEL WEDGE RESECTION AND PRIMARY CLOSURE, SMALL BOWEL ANASTOMOSIS, ABDOMINAL CLOSURE, INCISIONAL WOUND VAC PLACEMENT (Abdomen) Diagnosis:       Mesenteric ischemia (HCC)      (Mesenteric ischemia (HCC) [K55.9])    Surgeons: John Craft MD Responsible Provider: Joe Palm MD    Anesthesia Type: general ASA Status: 4            Anesthesia Type: No value filed.    Ramon Phase I: Ramon Score: 10    Ramon Phase II:      Anesthesia Post Evaluation    Patient location during evaluation: ICU  Patient participation: complete - patient cannot participate  Level of consciousness: sedated and ventilated  Airway patency: patent  Nausea & Vomiting: no nausea and no vomiting  Cardiovascular status: hemodynamically stable  Respiratory status: ventilator  Hydration status: euvolemic  Pain management: adequate    No notable events documented.

## 2024-05-23 NOTE — OP NOTE
GENERAL SURGERY  Operative Report    Patient Name: Darren Duncan  YOB: 1955   MRN: [id number]  Age: 68 y.o.   Sex: male       DATE OF OPERATION: 5/23/24     PREOPERATIVE DIAGNOSIS: Mesenteric ischemia (HCC) [K55.9]    POSTOPERATIVE DIAGNOSIS: Same, ischemic necrosis of the small bowel, gastrointestinal discontinuity status post small bowel resection     PROCEDURE(S) PERFORMED: SECOND LOOK LAPAROTOMY, SMALL BOWEL WEDGE RESECTION AND PRIMARY CLOSURE, SMALL BOWEL ANASTOMOSIS, ABDOMINAL CLOSURE, INCISIONAL WOUND VAC PLACEMENT     SURGEON: John Craft MD  Surgical Assistant: Marisol Erickson  Scrub Person First: Nora Salter    ANESTHESIA: General      INDICATION:     Darren Duncan is a 68 y.o. male who presents with presented with chronic mesenteric ischemia. He was having worsening abdominal pain, concerning for acute on chronic pathology. He initially had no findings consistent with small bowel compromise. His labs were largely normal, CT scan did not demonstrate any free air, free fluid, or pneumatosis. The case was closely discussed with vascular surgery, who decided to proceed with operative planning and ultimately with mesenteric bypass.  Intraoperatively, he was found to have full-thickness injury to the midportion of the small intestine (80 to 100 cm), as well as ischemic changes to the gallbladder wall.  He underwent small bowel resection, cholecystectomy, and temporary abdominal closure with plans to return to the operating room in 24 to 48 hours for second look and restoration of GI continuity.    Risks, benefits, and alternatives to the planned surgical procedure were discussed with the patients family.  All questions were answered.  They agreed to proceed.  Informed consent was obtained.    PROCEDURE DETAILS:     After informed consent was obtained, the patient was brought to the operating room and placed in the supine position.  Scheduled antibiotics were being given.   placed.  The anastomosis was widely patent and appeared viable with no evidence of leak.    Attention was then turned back to the ischemic area at the proximal small bowel.  Given the small size of the lesion, it was decided to proceed with wedge resection.  Stay sutures were placed at the edges of the lesion transversely.  Using cut electrocautery, the area of injury was excised in a full-thickness fashion.  There was punctate bleeding noted from the cut edges of the small bowel.  This was controlled with judicious electrocautery.  The enterotomy was then closed with 3-0 PDS suture in 2 layers, a first layer of full-thickness running suture, followed by a second layer of running seromuscular sutures.  The repair of the enterotomy was watertight and widely patent.    The abdomen was then copiously irrigated with warm sterile saline.  It was suctioned dry.    The gallbladder fossa was again evaluated.  There was noted to be some fresh blood clot in this area.  A few areas of punctate bleeding were noted from the previously hemostatic site.  It was controlled with direct electrocautery and was found to be dry.  Surgicel powder was then applied to the area for additional hemostasis potential.    Next, 20 cc of Exparel were combined with 30 cc of 0.5% Marcaine and 50 cc of injectable saline.  This anesthetic mixture was injected into the bilateral transversus abdominis planes under direct vision for postoperative pain control.    The abdominal wall fascia was then closed with #0 looped PDS x2 in a running fashion.  The subcutaneous space was irrigated and dried.  Hemostasis was confirmed.  The remaining 20 cc of local anesthetic mixture was injected into the subcuticular tissues.  The skin was reapproximated with staples.  A Prevena incisional wound VAC was applied.     FINDING(S): Dilated, fluid-filled, edematous and thickened small bowel.  Intact proximal and distal staple lines.  1 additional area of full-thickness  ischemic change approximately 20 cm distal to the ligament of Treitz, which was 1 cm in diameter and isolated to the antimesenteric border of the bowel.  No other areas of injury were identified.  Scant bleeding from the gallbladder fossa controlled with electrocautery and Surgicel powder.    ESTIMATED BLOOD LOSS: less than 50  mL    SPECIMEN:   ID Type Source Tests Collected by Time Destination   A : A- Ischemic Injury of Small Bowel Tissue Tissue SURGICAL PATHOLOGY John Craft MD 5/23/2024 1520      DRAIN(S): none    DISPOSITION: PACU - hemodynamically stable.           CONDITION: stable        John Craft MD  General Surgery [phone]    Electronically signed by John Craft MD on 5/23/2024 at 4:15 PM

## 2024-05-23 NOTE — PROGRESS NOTES
NAME:  Darren Duncan  YOB: 1955  MEDICAL RECORD NUMBER:  [id number]    Shift Summary: Patient arrived to CVU s/p Aortomesenteric bypass with Dr. Grigsby and small bowel resection with Dr. Craft. Patient intubated and sedated on 40 mcg of propofol on admission. Rass -5. Propofol titrated off. Pupils 2, with sluggish reaction. Patient with open medial abdomen incision with wound vac set to 125 mmHg. NG to left nare at 61, ETT 24 @ lip. Patient's fiance and brother at the bedside. Q4 CVP, notify <5. Q6 H&H, notify <8.  Dr. Grigsby and on-call vascular managing patient. [facility] contacted for [facility] 3. Notify lif[facility] if change in mental status.     Family updated: Yes:       Rhythm: Normal Sinus Rhythm / Sinus Tacycardia    Most recent vitals:   Visit Vitals  BP (!) 84/51   Pulse 96   Temp 97.7 °F (36.5 °C) (Oral)   Resp 15   Ht 1.753 m (5' 9.02\")   Wt 57.7 kg (127 lb 3.3 oz)   SpO2 99%   BMI 18.78 kg/m²      ABP (Arterial line BP): (!) 113/30  ABP Mean (Arterial Line Mean): (!) 49 mmHg    No data found.    Patient Vitals for the past 12 hrs:   CVP (Mean)   05/22/24 1913 0 mmHg   05/22/24 1900 1 mmHg         Respiratory support needed (if any):  - Ventilator Settings:  Vent Days 1    Vt (Set, mL): 440 mL  Resp Rate (Set): 14 bpm  FiO2 : 40 %    PEEP/CPAP (cmH2O): 5       Admission weight Weight - Scale: 49.6 kg (109 lb 5.6 oz) (05/16/24 0701)    Today's weight    Wt Readings from Last 1 Encounters:   05/22/24 57.7 kg (127 lb 3.3 oz)        King need assessed each shift: YES -  - continue king r/t - hemodynamically unstable  UOP >30ml/hr: No  Last documented BM (in last 48 hrs):  Patient Vitals for the past 48 hrs:   Last BM (including prior to admit)   05/20/24 2239 05/20/24 05/21/24 0819 05/20/24 05/22/24 1830 05/22/24                Restraints (in use currently or dc'd in last 12 hrs): Yes    Order current and documentation up to date? Yes    Lines/Drains reviewed @ bedside.  CVC  05/22/24  by RN: No       Thomas Prevention initiated by RN: Yes    Pressure Injury (Stage 3,4, Unstageable, DTI, NWPT, and Complex wounds) if present, place Wound referral order by RN under : No    New Ostomies, if present place, Ostomy referral order under : No     Nurse 1 eSignature: Electronically signed by Dorene Cooper RN on 5/22/24 at 8:41 PM EDT    **SHARE this note so that the co-signing nurse can place an eSignature**    Nurse 2 eSignature: Electronically signed by Meenakshi Jaramillo RN on 5/23/24 at 3:52 AM EDT

## 2024-05-23 NOTE — PROGRESS NOTES
GENERAL SURGERY  Progress Note    Patient Name: Darren Duncan  MRN: [id number]  YOB: 1955   Date of Evaluation: 5/23/2024  Primary Care Physician: Deep Lozano MD      Ileus ([facility]) [K56.7]  Abdominal pain [R10.9]  Failure to thrive in adult [R62.7]  Abdominal pain, unspecified abdominal location [R10.9]    SUBJECTIVE:     Darren is stable.  He remains intubated and sedated and mechanically ventilated.  ABThera wound VAC in place.  Oliguric.  NG tube with 925 out.      REVIEW OF SYSTEMS  Unable to be obtained    OBJECTIVE:     BP (!) 80/47   Pulse 96   Temp (!) 96.7 °F (35.9 °C) (Esophageal)   Resp 13   Ht 1.735 m (5' 8.31\")   Wt 62.6 kg (138 lb 0.1 oz)   SpO2 100%   BMI 20.80 kg/m²     PHYSICAL EXAM  Intubated, sedated, ABThera in place    LABS:     Recent Labs     05/21/24  0722 05/22/24  0616 05/22/24  0617 05/22/24  0956 05/22/24  1155 05/22/24  1551 05/22/24  1600 05/22/24  1653 05/22/24  1745 05/22/24  2149 05/23/24  0100 05/23/24  0415 05/23/24  0815 05/23/24  1332   WBC 14.9*  --    < > 10.2 8.8  --  2.2*  --   --   --   --  4.8  --   --    HGB 11.6*  --    < > 10.0* 8.7*   < > 9.2*  --    < > 8.9* 7.7*  8.7* 8.2*  --   --    HCT 34.4*  --    < > 29.5* 25.0*  --  26.9*  --    < > 26.0* 24.5* 24.0*  --   --      --    < > 243 206  --  106*  --   --   --   --  84*  --   --    NA  --   --    < >  --   --   --  141  --   --   --   --  139  --  139   K  --   --    < >  --   --   --  4.0  --   --   --   --  4.2  --  4.0   CL  --   --    < >  --   --   --  107  --   --   --   --  111*  --  107   CO2  --   --    < >  --   --   --  22  --   --   --   --  19*  --  22   BUN  --   --    < >  --   --   --  20  --   --   --   --  30*  --  33*   CREATININE  --   --    < >  --   --   --  0.6*  --   --   --   --  1.2  --  1.6*   MG  --   --   --   --   --   --   --   --   --   --   --  1.90  --   --    PHOS  --   --   --   --   --   --   --   --   --   --   --  2.5  --   --     CALCIUM  --   --    < >  --   --   --  8.3  --   --   --   --  7.2*  --  7.6*   INR  --   --   --   --   --   --   --  3.04*  --   --   --  2.15*  --  1.60*   AST  --   --   --   --   --   --   --   --   --   --   --  2,231*  --   --    ALT  --   --   --   --   --   --   --   --   --   --   --  535*  --   --    BILITOT  --   --   --   --   --   --   --   --   --   --   --  0.7  --   --    LACTA 2.0 1.9  --   --   --   --   --   --   --   --   --   --  1.9  --    LACTSEPSIS  --   --   --  1.7  --   --   --   --   --   --   --   --   --   --     < > = values in this interval not displayed.       Recent Labs     05/22/24  0617 05/23/24  0415   ALKPHOS  --  64   ALT  --  535*   AST  --  2,231*   BILITOT  --  0.7   AMYLASE 56  --    LIPASE  --  10.0*         IMAGING:     CTA ABDOMEN PELVIS W CONTRAST    Result Date: 5/20/2024  Significant atherosclerotic disease involving the abdominal aorta and its branch structures. There is at least 90% stenosis of the proximal celiac trunk. There is at least 50% stenosis of the proximal superior mesenteric artery. Findings concerning for partial small bowel obstruction versus ileus. New right middle lobe infiltrate. 3rd spacing. Additional findings noted above.     US RENAL COMPLETE    Result Date: 5/17/2024  Unremarkable ultrasound of the kidneys and urinary bladder.     CT ABDOMEN PELVIS W IV CONTRAST Additional Contrast? None    Result Date: 5/16/2024  Dilated fluid-filled small bowel, which is nonspecific.  This may represent early obstruction or ileus or enteritis. The patient is cachectic which limits evaluation of the internal abdominal and pelvic organs, but no other acute findings identified.      ASSESSMENT & PLAN:     Darren Duncan is a 68 y.o. year-old male.  He has a significant vascular history, including CVA, PAD, right CEA, and left femoral to above-knee popliteal bypass (2020).  He presents with acute on chronic abdominal pain.  This is been ongoing for

## 2024-05-23 NOTE — PROGRESS NOTES
2204-Dr. Robins called to get status update on pt, new orders were placed for FFP, fluid adjusted to TKO (per MD), INR order placed.         2217- Blood bank called to confirm FFP and stated as soon as it was thawed out, they would contact RN to verify.

## 2024-05-23 NOTE — PROGRESS NOTES
Comprehensive Nutrition Assessment    Type and Reason for Visit:  Initial, NPO/Clear Liquid    Nutrition Recommendations/Plan:   Due to risk of malnutrition may need to consider TPN, recommend Clinimix 5/20 E start at 30-40 ml/hr first bag.  If electrolytes stable after first bag, recommend increasing to goal rate of 75 ml/hr second bag.  Lipids bi-weekly providing 143 calories from fat     Malnutrition Assessment:  Malnutrition Status:  At risk for malnutrition (Comment) (05/23/24 9441)    Context:  Acute Illness     Findings of the 6 clinical characteristics of malnutrition:  Energy Intake:  50% or less of estimated energy requirements for 5 or more days  Weight Loss:   (weight loss between February and May of this year per EMR, about 20 lbs, has flucutated some recently likely fluid shifts)     Body Fat Loss:  Unable to assess     Muscle Mass Loss:  Unable to assess    Fluid Accumulation:  Unable to assess     Strength:  Not Performed    Nutrition Assessment:    Patient admitted with abdominal pain.  NPO since 5/20.  Ischemic bowel and gallbladder noted, Status post open keon and small bowel resection on 5/22.   Levo at 5 mcg/kg/min and Propofol at 30 mcg providing 274 calories from fat.  Weight loss noted prior to admission, approximately 20 lbs from February to May 2024.  At risk for malnutrition with recent weight loss and NPO status.  May need to consider TPN if compliant with goals of care, will provide recommendations for general surgery.    Nutrition Related Findings:    labs reviewed on 5/22, lytes within normal limits Wound Type: Surgical Incision       Current Nutrition Intake & Therapies:    Average Meal Intake: NPO  Average Supplements Intake: NPO  Diet NPO  Current Parenteral Nutrition Orders:  Goal PN Orders Provides: Clinimix 5/20 E start at 30-40 ml/hr first bag.  If electrolytes stable after first bag, recommend increasing to goal rate of 75 ml/hr 90 grams of protein, 1598 calories, GIR 4

## 2024-05-23 NOTE — PROGRESS NOTES
VASCULAR  POD#1    Intubated and sedated. Temporarily stopped sedation and moved all extremities to command.    103/26 (off pressors)  107  Tmax 99.1 CVP 5  I/O + 7900 ( cc including OR, , drains 800)  Heart RRR  Lungs clear  Abd flat, silent  VAC in place  Palpable B femoral pulses - no pedal pulses palpable (audible L pedal, R nonaudible)  Feet pink    CXR - no pneumothorax; perihilar congestion    INR 2.15    HgB 8.2 WBC 4.8 Plts 84K    Creat 1.2 Cl 111  Alb 2.0  AST 2231      A/P: S/P aorto-hepatic-mesenteric. Clinically patent with signs of reperfusion.   S/P SB resection.   ATN - not unexpected.   Thrombocytopenia - consumptive.    No signs of bleeding.   Will transfuse 2 units FFP now and prepare platelets for OR today.   Decrease IVFs and change to 0.45 NS.   Observe ATN for now.   Continue sedation and vent per pulmonary.   No plans for TPN at this time as pt's volume status remains unbalanced. Longer term anticipate initiation.   Back to OR today per Dr. Craft for second look.   Critical status.     Rg Grigsby

## 2024-05-23 NOTE — PROGRESS NOTES
Physical Therapy  Daily Treatment Attempt and Discharge    24    Name: Darren Duncan   : 1955    MRN: [id number]    PT follow-up attempt. Patient transferred to ICU. Will need new orders to resume therapy.    Electronically signed by Pancho Rosario PT on 2024 at 6:38 AM

## 2024-05-23 NOTE — PROGRESS NOTES
Pt temp 99.5 via esophageal probe, bear hugger initiated.    CVP 1  Arterial pressure:116/25  UOP 15ml      Call placed to Dr. Sheffield regarding low UOP and CVP. New orders placed.

## 2024-05-23 NOTE — CONSULTS
REASON FOR CONSULTATION/CC: Vent management       Consult at request of Dr. Grigsby  PCP: Deep Lozano MD  Established Pulmonologist:   Dr. Brunson    HISTORY OF PRESENT ILLNESS: Darren Duncan is a 68 y.o. year old male with a history of   who presents with        Patient has had significant weight gain with abdominal pain with eating.  Taken to surgery yesterday found to have ischemic bowel with vascular disease.    Request keep patient intubated today secondary to planned second look by general surgery.    This note may have been  transcribed using Dragon Dictation software. Please disregard any translational errors.      Assessment:        Pulmonary Nodule  Emphysema   Hx Tobacco abuse  Marijuana use    Plan:      Hospital Day 7     Mechanical Vent  AC/VC +14, 440, 5, 40%.  Consider extubation tomorrow if second look as acceptable today    Antegrade Aortocommon hepatic-mesenteric artery bypass  Vascular surgery  S/p stent S/p 12 mm x 6 mm        S/p Ex lap with 100 cm necrotic small bowel resection  General surgery following.  Plan to go back to the operating room today.  Defer to general surgery for nutritional plan.  Zosyn      COPD  Nebulized budesonide twice daily  Try to avoid anticholinergics given bowel surgery.  Albuterol twice to daily      Sedation  Continue heavy sedation given repeat 50 OR today    Pulmonary nodule  Pulmonary nodule with consolidation with groundglass opacities circumferential.  Concerning for RISHABH.  PET scan was negative.  Did not complete follow-up CT chest.  If patient goes for repeat CT, will add on CT chest to assess.  Consider obtaining CT chest while admitted.    Thrombocytopenia  -Concern for consumptive etiology.  Given 2 units of FFP and platelets and prepped for OR.  INR 2.15     Transaminitis  Significant increase in AST ALT  Concerning ratio for ischemic hepatitis.  Follow daily    Acute kidney injury  Concern for ATN  Currently on half-normal saline.  Metabolic  Non-tender. Non-distended. No hernia.   Skin: Warm, dry, normal texture and turgor. No nodule on exposed extremities.   Lymph: No cervical LAD. No supraclavicular LAD.   M/S: No cyanosis. No clubbing. No joint deformity.    Neuro: sedated  Psych: sedated      Data Reviewed:   LABS:  CBC:   Recent Labs     05/22/24  0956 05/22/24  1155 05/22/24  1551 05/22/24  1600 05/22/24  1745 05/22/24  2149 05/23/24  0100 05/23/24  0415   WBC 10.2 8.8  --  2.2*  --   --   --  4.8   HGB 10.0* 8.7*   < > 9.2*   < > 8.9* 7.7*  8.7* 8.2*   HCT 29.5* 25.0*  --  26.9*   < > 26.0* 24.5* 24.0*   MCV 94.3 93.7  --  92.1  --   --   --  91.7    206  --  106*  --   --   --   --     < > = values in this interval not displayed.     BMP:   Recent Labs     05/22/24  0617 05/22/24  1600 05/23/24  0415    141 139   K 3.4* 4.0 4.2   CL 98* 107 111*   CO2 26 22 19*   BUN 19 20 30*   CREATININE <0.5* 0.6* 1.2     LIVER PROFILE:   Recent Labs     05/20/24  0757 05/20/24  1331 05/21/24  0722 05/22/24  0617 05/23/24  0415   AST 16  --   --   --  2,231*   ALT 14  --   --   --  535*   LIPASE 10.0* 8.0*  --   --  10.0*   AMYLASE  --  98 57 56  --    BILITOT 0.4  --   --   --  0.7   ALKPHOS 82  --   --   --  64     PT/INR:   Recent Labs     05/22/24  1653 05/23/24  0415   PROTIME 31.3* 24.0*   INR 3.04* 2.15*     APTT: No results for input(s): \"APTT\" in the last 72 hours.  UA:No results for input(s): \"NITRITE\", \"COLORU\", \"PHUR\", \"LABCAST\", \"WBCUA\", \"RBCUA\", \"MUCUS\", \"TRICHOMONAS\", \"YEAST\", \"BACTERIA\", \"CLARITYU\", \"SPECGRAV\", \"LEUKOCYTESUR\", \"UROBILINOGEN\", \"BILIRUBINUR\", \"BLOODU\", \"GLUCOSEU\", \"AMORPHOUS\" in the last 72 hours.    Invalid input(s): \"KETONESU\"  Recent Labs     05/23/24  0100 05/23/24  0624   PHART 7.384 7.418   VWZ4GIJ 32.2* 32.2*   PO2ART 61.0* 71.4*       Vent Information  Ventilator ID: [id number]  Ventilator Initiate: Yes  Vent Mode: AC/PRVC    Radiology Review:  Pertinent images / reports were reviewed as a part of this visit.    CT  Chest w/ contrast: No results found for this or any previous visit.      CT Chest w/o contrast: No results found for this or any previous visit.      CTPA: No results found for this or any previous visit.      CXR PA/LAT: Results for orders placed during the hospital encounter of 12/12/23    XR CHEST (2 VW)    Narrative  EXAMINATION:  TWO XRAY VIEWS OF THE CHEST    12/12/2023 1:41 pm    COMPARISON:  09/15/2020    HISTORY:  ORDERING SYSTEM PROVIDED HISTORY: Weight loss  TECHNOLOGIST PROVIDED HISTORY:  Reason for exam:->Weight loss  Reason for Exam: Weight loss    FINDINGS:  The lungs are without acute focal process.  There is no effusion or  pneumothorax. The cardiomediastinal silhouette is stable. The osseous  structures are stable.    Impression  No acute process.    Stable exam.      CXR portable: Results for orders placed during the hospital encounter of 05/16/24    XR CHEST PORTABLE    Narrative  EXAMINATION:  ONE XRAY VIEW OF THE CHEST    5/22/2024 3:31 pm    COMPARISON:  12/12/2023    HISTORY:  ORDERING SYSTEM PROVIDED HISTORY: possible pneumothorax  TECHNOLOGIST PROVIDED HISTORY:  Reason for exam:->possible pneumothorax  Reason for Exam: possible pneumothorax, et and ng tubes    FINDINGS:  Endotracheal tube is above the álzaro.  Lungs are normally expanded.  Central  venous catheter has been placed from the right.  Tip is in the distal SVC  right atrium junction.  No visualized pneumothorax.  No subcutaneous  emphysema.  Left pleural effusion    Impression  No visualized pneumothorax.

## 2024-05-23 NOTE — PLAN OF CARE
Problem: Discharge Planning  Goal: Discharge to home or other facility with appropriate resources  Outcome: Progressing     Problem: Pain  Goal: Verbalizes/displays adequate comfort level or baseline comfort level  Outcome: Progressing  Flowsheets  Taken 5/23/2024 0200  Verbalizes/displays adequate comfort level or baseline comfort level: Assess pain using appropriate pain scale  Taken 5/22/2024 2211  Verbalizes/displays adequate comfort level or baseline comfort level: Assess pain using appropriate pain scale     Problem: Safety - Adult  Goal: Free from fall injury  Outcome: Progressing     Problem: Gastrointestinal - Adult  Goal: Minimal or absence of nausea and vomiting  Outcome: Progressing  Flowsheets (Taken 5/22/2024 1600 by Dorene Cooper RN)  Minimal or absence of nausea and vomiting:   Maintain NPO status until nausea and vomiting are resolved   Nasogastric tube to low intermittent suction as ordered   Provide nonpharmacologic comfort measures as appropriate   Administer IV fluids as ordered to ensure adequate hydration  Goal: Maintains or returns to baseline bowel function  Outcome: Progressing  Flowsheets (Taken 5/22/2024 1600 by Dorene Cooper RN)  Maintains or returns to baseline bowel function:   Assess bowel function   Administer IV fluids as ordered to ensure adequate hydration  Goal: Maintains adequate nutritional intake  Outcome: Progressing  Flowsheets (Taken 5/22/2024 1600 by Dorene Cooper RN)  Maintains adequate nutritional intake: Monitor intake and output, weight and lab values     Problem: Chronic Conditions and Co-morbidities  Goal: Patient's chronic conditions and co-morbidity symptoms are monitored and maintained or improved  Outcome: Progressing     Problem: Safety - Medical Restraint  Goal: Remains free of injury from restraints (Restraint for Interference with Medical Device)  Description: INTERVENTIONS:  1. Determine that other, less restrictive measures have been tried or would

## 2024-05-24 ENCOUNTER — APPOINTMENT (OUTPATIENT)
Dept: GENERAL RADIOLOGY | Age: 69
End: 2024-05-24
Payer: MEDICARE

## 2024-05-24 ENCOUNTER — APPOINTMENT (OUTPATIENT)
Dept: CT IMAGING | Age: 69
End: 2024-05-24
Payer: MEDICARE

## 2024-05-24 LAB
ALBUMIN SERPL-MCNC: 2 G/DL (ref 3.4–5)
ALBUMIN/GLOB SERPL: 1.3 {RATIO} (ref 1.1–2.2)
ALP SERPL-CCNC: 73 U/L (ref 40–129)
ALT SERPL-CCNC: 125 U/L (ref 10–40)
AMMONIA PLAS-SCNC: 54 UMOL/L (ref 16–60)
ANION GAP SERPL CALCULATED.3IONS-SCNC: 10 MMOL/L (ref 3–16)
ANION GAP SERPL CALCULATED.3IONS-SCNC: 12 MMOL/L (ref 3–16)
AST SERPL-CCNC: 1281 U/L (ref 15–37)
BASE EXCESS BLDA CALC-SCNC: -6 MMOL/L (ref -3–3)
BASE EXCESS BLDA CALC-SCNC: -6 MMOL/L (ref -3–3)
BASE EXCESS BLDA CALC-SCNC: -7 MMOL/L (ref -3–3)
BILIRUB SERPL-MCNC: 0.6 MG/DL (ref 0–1)
BLOOD BANK DISPENSE STATUS: NORMAL
BLOOD BANK PRODUCT CODE: NORMAL
BPU ID: NORMAL
BUN SERPL-MCNC: 41 MG/DL (ref 7–20)
BUN SERPL-MCNC: 45 MG/DL (ref 7–20)
CALCIUM SERPL-MCNC: 6.2 MG/DL (ref 8.3–10.6)
CALCIUM SERPL-MCNC: 7.5 MG/DL (ref 8.3–10.6)
CHLORIDE SERPL-SCNC: 108 MMOL/L (ref 99–110)
CHLORIDE SERPL-SCNC: 109 MMOL/L (ref 99–110)
CO2 BLDA-SCNC: 19 MMOL/L
CO2 BLDA-SCNC: 20 MMOL/L
CO2 BLDA-SCNC: 20 MMOL/L
CO2 SERPL-SCNC: 17 MMOL/L (ref 21–32)
CO2 SERPL-SCNC: 18 MMOL/L (ref 21–32)
CREAT SERPL-MCNC: 1.9 MG/DL (ref 0.8–1.3)
CREAT SERPL-MCNC: 2.2 MG/DL (ref 0.8–1.3)
DEPRECATED RDW RBC AUTO: 15.9 % (ref 12.4–15.4)
DESCRIPTION BLOOD BANK: NORMAL
GFR SERPLBLD CREATININE-BSD FMLA CKD-EPI: 32 ML/MIN/{1.73_M2}
GFR SERPLBLD CREATININE-BSD FMLA CKD-EPI: 38 ML/MIN/{1.73_M2}
GLUCOSE BLD-MCNC: 67 MG/DL (ref 70–99)
GLUCOSE BLD-MCNC: 70 MG/DL (ref 70–99)
GLUCOSE BLD-MCNC: 79 MG/DL (ref 70–99)
GLUCOSE BLD-MCNC: 81 MG/DL (ref 70–99)
GLUCOSE BLD-MCNC: 86 MG/DL (ref 70–99)
GLUCOSE BLD-MCNC: 98 MG/DL (ref 70–99)
GLUCOSE BLD-MCNC: 99 MG/DL (ref 70–99)
GLUCOSE SERPL-MCNC: 75 MG/DL (ref 70–99)
GLUCOSE SERPL-MCNC: 95 MG/DL (ref 70–99)
HCO3 BLDA-SCNC: 18.3 MMOL/L (ref 21–29)
HCO3 BLDA-SCNC: 18.6 MMOL/L (ref 21–29)
HCO3 BLDA-SCNC: 18.8 MMOL/L (ref 21–29)
HCT VFR BLD AUTO: 21.4 % (ref 40.5–52.5)
HCT VFR BLD AUTO: 23 % (ref 40.5–52.5)
HGB BLD CALC-MCNC: 7.9 GM/DL (ref 13.5–17.5)
HGB BLD-MCNC: 7.5 G/DL (ref 13.5–17.5)
INHALED O2 FLOW RATE: 40 L/MIN
INR PPP: 1.42 (ref 0.85–1.15)
LACTATE BLDV-SCNC: 0.8 MMOL/L (ref 0.4–2)
LACTATE BLDV-SCNC: 1.1 MMOL/L (ref 0.4–2)
LACTATE BLDV-SCNC: 1.1 MMOL/L (ref 0.4–2)
MCH RBC QN AUTO: 31.8 PG (ref 26–34)
MCHC RBC AUTO-ENTMCNC: 35.1 G/DL (ref 31–36)
MCV RBC AUTO: 90.5 FL (ref 80–100)
PCO2 BLDA: 28.9 MM HG (ref 35–45)
PCO2 BLDA: 31 MM HG (ref 35–45)
PCO2 BLDA: 33 MM HG (ref 35–45)
PERFORMED ON: ABNORMAL
PERFORMED ON: NORMAL
PH BLDA: 7.36 [PH] (ref 7.35–7.45)
PH BLDA: 7.39 [PH] (ref 7.35–7.45)
PH BLDA: 7.41 [PH] (ref 7.35–7.45)
PLATELET # BLD AUTO: 86 K/UL (ref 135–450)
PMV BLD AUTO: 8.7 FL (ref 5–10.5)
PO2 BLDA: 137.5 MM HG (ref 75–108)
PO2 BLDA: 142 MM HG (ref 75–108)
PO2 BLDA: 98.9 MM HG (ref 75–108)
POC SAMPLE TYPE: ABNORMAL
POTASSIUM SERPL-SCNC: 3.8 MMOL/L (ref 3.5–5.1)
POTASSIUM SERPL-SCNC: 4.1 MMOL/L (ref 3.5–5.1)
PROT SERPL-MCNC: 3.6 G/DL (ref 6.4–8.2)
PROTHROMBIN TIME: 17.5 SEC (ref 11.9–14.9)
RBC # BLD AUTO: 2.36 M/UL (ref 4.2–5.9)
SAO2 % BLDA: 98 % (ref 93–100)
SAO2 % BLDA: 99 % (ref 93–100)
SAO2 % BLDA: 99 % (ref 93–100)
SODIUM SERPL-SCNC: 136 MMOL/L (ref 136–145)
SODIUM SERPL-SCNC: 138 MMOL/L (ref 136–145)
WBC # BLD AUTO: 7.6 K/UL (ref 4–11)

## 2024-05-24 PROCEDURE — 94640 AIRWAY INHALATION TREATMENT: CPT

## 2024-05-24 PROCEDURE — 71250 CT THORAX DX C-: CPT

## 2024-05-24 PROCEDURE — 83605 ASSAY OF LACTIC ACID: CPT

## 2024-05-24 PROCEDURE — 6360000002 HC RX W HCPCS: Performed by: SURGERY

## 2024-05-24 PROCEDURE — 36430 TRANSFUSION BLD/BLD COMPNT: CPT

## 2024-05-24 PROCEDURE — 82140 ASSAY OF AMMONIA: CPT

## 2024-05-24 PROCEDURE — 2580000003 HC RX 258: Performed by: SURGERY

## 2024-05-24 PROCEDURE — 2500000003 HC RX 250 WO HCPCS: Performed by: HOSPITALIST

## 2024-05-24 PROCEDURE — 85610 PROTHROMBIN TIME: CPT

## 2024-05-24 PROCEDURE — 99024 POSTOP FOLLOW-UP VISIT: CPT | Performed by: STUDENT IN AN ORGANIZED HEALTH CARE EDUCATION/TRAINING PROGRAM

## 2024-05-24 PROCEDURE — 2580000003 HC RX 258: Performed by: INTERNAL MEDICINE

## 2024-05-24 PROCEDURE — 94003 VENT MGMT INPAT SUBQ DAY: CPT

## 2024-05-24 PROCEDURE — 82803 BLOOD GASES ANY COMBINATION: CPT

## 2024-05-24 PROCEDURE — 95717 EEG PHYS/QHP 2-12 HR W/O VID: CPT | Performed by: PSYCHIATRY & NEUROLOGY

## 2024-05-24 PROCEDURE — 2500000003 HC RX 250 WO HCPCS: Performed by: INTERNAL MEDICINE

## 2024-05-24 PROCEDURE — 2700000000 HC OXYGEN THERAPY PER DAY

## 2024-05-24 PROCEDURE — 6360000002 HC RX W HCPCS: Performed by: NURSE PRACTITIONER

## 2024-05-24 PROCEDURE — 80053 COMPREHEN METABOLIC PANEL: CPT

## 2024-05-24 PROCEDURE — 2500000003 HC RX 250 WO HCPCS: Performed by: SURGERY

## 2024-05-24 PROCEDURE — 82947 ASSAY GLUCOSE BLOOD QUANT: CPT

## 2024-05-24 PROCEDURE — 94761 N-INVAS EAR/PLS OXIMETRY MLT: CPT

## 2024-05-24 PROCEDURE — 99291 CRITICAL CARE FIRST HOUR: CPT | Performed by: INTERNAL MEDICINE

## 2024-05-24 PROCEDURE — 85014 HEMATOCRIT: CPT

## 2024-05-24 PROCEDURE — 99024 POSTOP FOLLOW-UP VISIT: CPT | Performed by: SURGERY

## 2024-05-24 PROCEDURE — 85027 COMPLETE CBC AUTOMATED: CPT

## 2024-05-24 PROCEDURE — 70450 CT HEAD/BRAIN W/O DYE: CPT

## 2024-05-24 PROCEDURE — 2580000003 HC RX 258: Performed by: ANESTHESIOLOGY

## 2024-05-24 PROCEDURE — 2100000000 HC CCU R&B

## 2024-05-24 PROCEDURE — 71045 X-RAY EXAM CHEST 1 VIEW: CPT

## 2024-05-24 PROCEDURE — 36415 COLL VENOUS BLD VENIPUNCTURE: CPT

## 2024-05-24 RX ORDER — CALCIUM GLUCONATE 20 MG/ML
2000 INJECTION, SOLUTION INTRAVENOUS ONCE
Status: COMPLETED | OUTPATIENT
Start: 2024-05-24 | End: 2024-05-24

## 2024-05-24 RX ORDER — CALCIUM GLUCONATE 20 MG/ML
1000 INJECTION, SOLUTION INTRAVENOUS ONCE
Status: COMPLETED | OUTPATIENT
Start: 2024-05-24 | End: 2024-05-24

## 2024-05-24 RX ORDER — SODIUM CHLORIDE 9 MG/ML
INJECTION, SOLUTION INTRAVENOUS PRN
Status: DISCONTINUED | OUTPATIENT
Start: 2024-05-24 | End: 2024-05-24

## 2024-05-24 RX ADMIN — SODIUM CHLORIDE: 9 INJECTION, SOLUTION INTRAVENOUS at 07:59

## 2024-05-24 RX ADMIN — SODIUM CHLORIDE: 4.5 INJECTION, SOLUTION INTRAVENOUS at 04:07

## 2024-05-24 RX ADMIN — CALCIUM GLUCONATE 1000 MG: 20 INJECTION, SOLUTION INTRAVENOUS at 11:06

## 2024-05-24 RX ADMIN — SODIUM CHLORIDE, PRESERVATIVE FREE 20 MG: 5 INJECTION INTRAVENOUS at 21:09

## 2024-05-24 RX ADMIN — PIPERACILLIN AND TAZOBACTAM 2250 MG: 2; .25 INJECTION, POWDER, LYOPHILIZED, FOR SOLUTION INTRAVENOUS at 15:49

## 2024-05-24 RX ADMIN — PIPERACILLIN AND TAZOBACTAM 2250 MG: 2; .25 INJECTION, POWDER, LYOPHILIZED, FOR SOLUTION INTRAVENOUS at 08:24

## 2024-05-24 RX ADMIN — Medication 10 ML: at 08:37

## 2024-05-24 RX ADMIN — BUDESONIDE 500 MCG: 0.5 SUSPENSION RESPIRATORY (INHALATION) at 19:38

## 2024-05-24 RX ADMIN — SODIUM CHLORIDE: 9 INJECTION, SOLUTION INTRAVENOUS at 08:45

## 2024-05-24 RX ADMIN — CALCIUM GLUCONATE 1000 MG: 20 INJECTION, SOLUTION INTRAVENOUS at 06:24

## 2024-05-24 RX ADMIN — SODIUM CHLORIDE, PRESERVATIVE FREE 10 ML: 5 INJECTION INTRAVENOUS at 21:09

## 2024-05-24 RX ADMIN — SODIUM CHLORIDE: 4.5 INJECTION, SOLUTION INTRAVENOUS at 15:45

## 2024-05-24 RX ADMIN — SODIUM BICARBONATE: 84 INJECTION, SOLUTION INTRAVENOUS at 20:17

## 2024-05-24 RX ADMIN — BUDESONIDE 500 MCG: 0.5 SUSPENSION RESPIRATORY (INHALATION) at 08:15

## 2024-05-24 RX ADMIN — SODIUM CHLORIDE, PRESERVATIVE FREE 20 MG: 5 INJECTION INTRAVENOUS at 08:37

## 2024-05-24 RX ADMIN — ALBUTEROL SULFATE 2.5 MG: 2.5 SOLUTION RESPIRATORY (INHALATION) at 19:39

## 2024-05-24 RX ADMIN — CALCIUM GLUCONATE 2000 MG: 20 INJECTION, SOLUTION INTRAVENOUS at 06:44

## 2024-05-24 RX ADMIN — PIPERACILLIN AND TAZOBACTAM 2250 MG: 2; .25 INJECTION, POWDER, LYOPHILIZED, FOR SOLUTION INTRAVENOUS at 00:33

## 2024-05-24 RX ADMIN — DEXTROSE MONOHYDRATE 125 ML: 100 INJECTION, SOLUTION INTRAVENOUS at 05:49

## 2024-05-24 ASSESSMENT — PULMONARY FUNCTION TESTS
PIF_VALUE: 19
PIF_VALUE: 21
PIF_VALUE: 20
PIF_VALUE: 19
PIF_VALUE: 18
PIF_VALUE: 21
PIF_VALUE: 22
PIF_VALUE: 18
PIF_VALUE: 19
PIF_VALUE: 21
PIF_VALUE: 22
PIF_VALUE: 19
PIF_VALUE: 20
PIF_VALUE: 16
PIF_VALUE: 20
PIF_VALUE: 19
PIF_VALUE: 20
PIF_VALUE: 17
PIF_VALUE: 20
PIF_VALUE: 19
PIF_VALUE: 16
PIF_VALUE: 19
PIF_VALUE: 19
PIF_VALUE: 21
PIF_VALUE: 19
PIF_VALUE: 17
PIF_VALUE: 17
PIF_VALUE: 21
PIF_VALUE: 19
PIF_VALUE: 18
PIF_VALUE: 21
PIF_VALUE: 18
PIF_VALUE: 18
PIF_VALUE: 22
PIF_VALUE: 18
PIF_VALUE: 20
PIF_VALUE: 19
PIF_VALUE: 18
PIF_VALUE: 18
PIF_VALUE: 29
PIF_VALUE: 20
PIF_VALUE: 21
PIF_VALUE: 18

## 2024-05-24 ASSESSMENT — PAIN SCALES - GENERAL
PAINLEVEL_OUTOF10: 0

## 2024-05-24 NOTE — PROGRESS NOTES
Dr. Callahan contacted via [employer] system and made aware of the pt's hemoglobin of 7.5 and low UOP overnight. New orders placed to transfuse 1 unit of PRBC.

## 2024-05-24 NOTE — PROGRESS NOTES
General and Vascular Surgery                                                           Daily Progress Note                                                             Jordan Guerrero PA-C    Pt Name: Darren Duncan  Medical Record Number: [id number]  Date of Birth 1955   Today's Date: 5/24/2024    ASSESSMENT/PLAN  S/P: 5/22:Small bowel resection, cholecystectomy  5/23: Second look laparotomy, small bowel wedge resection and primary closure, small bowel anastomosis, abdominal closure, incisional wound vac placement.  Intubated  WBC count WNL: 7.6  Hgb: 8.2 --> 7.5. To receive 1 unit of PRBC's  Pain controlled  Wound vac over midline abdominal wound  +flexiseal secondary to loose BM's  Continue current therapy. Will continue to monitor and give further recommendations as needed.      EDUCATION  Patient educated about their illness/diagnosis, stated above, and all questions answered. We discussed the importance of nutrition, medications they are taking, and healthy lifestyle.  SUBJECTIVE  Darren has improved from yesterday. Pain is well controlled. He has no nausea and no vomiting.  He has passed flatus and has had a bowel movement. He is tolerating NPO.     OBJECTIVE  VITALS:  height is 1.735 m (5' 8.31\") and weight is 62.6 kg (138 lb 0.1 oz). His esophageal temperature is 98.6 °F (37 °C). His blood pressure is 100/55 (abnormal) and his pulse is 90. His respiration is 14 and oxygen saturation is 100%. VITALS:  BP (!) 100/55   Pulse 90   Temp 98.6 °F (37 °C) (Esophageal)   Resp 14   Ht 1.735 m (5' 8.31\")   Wt 62.6 kg (138 lb 0.1 oz)   SpO2 100%   BMI 20.80 kg/m²   GENERAL: No distress  LUNGS: intubated   HEART: normal rate and regular rhythm  ABDOMEN: soft, incisional tenderness. +distension   EXTREMITY: no cyanosis, clubbing or edema  I/O last 3 completed shifts:  In: 87588.9 [I.V.:5673.8; Blood:1390.7; IV Piggyback:3152.4]  Out: 2596 [Urine:197;  Emesis/NG output:1175; Drains:925; Stool:200; Blood:99]  I/O this shift:  In: 574.5 [I.V.:535.6; IV Piggyback:38.9]  Out: 10 [Urine:10]    LABS  Recent Labs     05/23/24  0415 05/23/24  1332 05/24/24  0340 05/24/24  0654   WBC 4.8  --  7.6  --    HGB 8.2*  --  7.5*  --    HCT 24.0*  --  21.4*  --    PLT 84*  --  86*  --       < > 138  --    K 4.2   < > 3.8  --    *   < > 109  --    CO2 19*   < > 17*  --    BUN 30*   < > 41*  --    CREATININE 1.2   < > 1.9*  --    MG 1.90  --   --   --    PHOS 2.5  --   --   --    CALCIUM 7.2*   < > 6.2*  --    INR 2.15*   < >  --  1.42*   AST 2,231*  --  1,281*  --    *  --  125*  --    BILITOT 0.7  --  0.6  --     < > = values in this interval not displayed.   CBC:   Lab Results   Component Value Date/Time    WBC 7.6 05/24/2024 03:40 AM    RBC 2.36 05/24/2024 03:40 AM    HGB 7.5 05/24/2024 03:40 AM    HCT 21.4 05/24/2024 03:40 AM    MCV 90.5 05/24/2024 03:40 AM    MCH 31.8 05/24/2024 03:40 AM    MCHC 35.1 05/24/2024 03:40 AM    RDW 15.9 05/24/2024 03:40 AM    PLT 86 05/24/2024 03:40 AM    MPV 8.7 05/24/2024 03:40 AM     CMP:    Lab Results   Component Value Date/Time     05/24/2024 03:40 AM    K 3.8 05/24/2024 03:40 AM     05/24/2024 03:40 AM    CO2 17 05/24/2024 03:40 AM    BUN 41 05/24/2024 03:40 AM    CREATININE 1.9 05/24/2024 03:40 AM    GFRAA >60 03/01/2021 06:25 AM    AGRATIO 1.3 05/24/2024 03:40 AM    LABGLOM 38 05/24/2024 03:40 AM    LABGLOM >60 11/09/2023 11:01 AM    GLUCOSE 75 05/24/2024 03:40 AM    CALCIUM 6.2 05/24/2024 03:40 AM    BILITOT 0.6 05/24/2024 03:40 AM    ALKPHOS 73 05/24/2024 03:40 AM    AST 1,281 05/24/2024 03:40 AM     05/24/2024 03:40 AM         Jordan Guerrero PA-C   Electronically signed 5/24/2024 at 10:18 AM

## 2024-05-24 NOTE — PLAN OF CARE
Problem: Discharge Planning  Goal: Discharge to home or other facility with appropriate resources  Outcome: Progressing  Flowsheets (Taken 5/23/2024 2000)  Discharge to home or other facility with appropriate resources: Identify barriers to discharge with patient and caregiver     Problem: Pain  Goal: Verbalizes/displays adequate comfort level or baseline comfort level  Outcome: Progressing  Flowsheets (Taken 5/24/2024 0200)  Verbalizes/displays adequate comfort level or baseline comfort level: Encourage patient to monitor pain and request assistance     Problem: Safety - Adult  Goal: Free from fall injury  Outcome: Progressing     Problem: Gastrointestinal - Adult  Goal: Minimal or absence of nausea and vomiting  Outcome: Progressing  Goal: Maintains or returns to baseline bowel function  Outcome: Progressing  Goal: Maintains adequate nutritional intake  Outcome: Progressing     Problem: Chronic Conditions and Co-morbidities  Goal: Patient's chronic conditions and co-morbidity symptoms are monitored and maintained or improved  Outcome: Progressing  Flowsheets (Taken 5/23/2024 2000)  Care Plan - Patient's Chronic Conditions and Co-Morbidity Symptoms are Monitored and Maintained or Improved: Monitor and assess patient's chronic conditions and comorbid symptoms for stability, deterioration, or improvement     Problem: Safety - Medical Restraint  Goal: Remains free of injury from restraints (Restraint for Interference with Medical Device)  Description: INTERVENTIONS:  1. Determine that other, less restrictive measures have been tried or would not be effective before applying the restraint  2. Evaluate the patient's condition at the time of restraint application  3. Inform patient/family regarding the reason for restraint  4. Q2H: Monitor safety, psychosocial status, comfort, nutrition and hydration  Outcome: Progressing  Flowsheets  Taken 5/24/2024 0000  Remains free of injury from restraints (restraint for

## 2024-05-24 NOTE — PROGRESS NOTES
Jonh RN in room during handoff and on the phone with the provider,MD was made aware of low UOP and CVP. See orders.      @1615 while assessing pt and cleaning him, he became agitated and hypertensive. Pt had a slight grimace. Propofol infusion was started at initial infusion rate 20 mcg/kg/min.        2122---Pt RASS goal was decreased past desired goal -3, propofol was turned down to 10mcg/kg/min.        @0215 Propofol titrated off, RASS goal -4. RN will monitor      0344- Gased obtained, Po2 137.5, respiratory notified and fio2 decreased to 45%  per Jade RRT, RN will monitor.      0545--Fio2 decreased to 40% d/t Pao2 142 per Jade RRT

## 2024-05-24 NOTE — PROGRESS NOTES
Narcotic Waste Documentation    35 ml of fentanyl wasted in drug buster per [facility] policy.    Primary RN: Electronically signed by Dorene Cooper RN on 5/24/2024 at 11:23 AM   Witness RN: Electronically signed by Mxa Diamond RN on 5/24/2024 at 11:29 AM

## 2024-05-24 NOTE — PROGRESS NOTES
Hospitalist Progress Note  5/24/2024 8:08 AM    PCP: Deep Lozano MD    [id number]     Date of Admission: 5/16/2024                                                                                                                     HOSPITAL COURSE    Patient demographics:  The patient  Darren Duncan is a 68 y.o. male     Significant past medical history:   Patient Active Problem List   Diagnosis    Other hyperlipidemia    Essential hypertension    Smoker    Rash and nonspecific skin eruption    Cellulitis and abscess of toe of left foot    Peripheral arterial disease (HCC)    Claudication (HCC)    Atherosclerosis of native arteries of extremities with rest pain, left leg (HCC)    Peripheral vascular disease, unspecified (HCC)    Atherosclerosis of artery of extremity with rest pain (HCC)    Unspecified severe protein-calorie malnutrition (HCC)    Other specified disorders of carbohydrate metabolism (HCC)    Abdominal pain    Mesenteric ischemia (HCC)    Mesenteric artery stenosis (HCC)    Chronic mesenteric ischemia (HCC)         Presenting symptoms:   Abdominal Pain     Diagnostic workup:      CONSULTS DURING ADMISSION :   IP CONSULT TO GI  IP CONSULT TO SPIRITUAL SERVICES  IP CONSULT TO GENERAL SURGERY  IP CONSULT TO VASCULAR SURGERY  IP CONSULT TO CARDIOLOGY  IP CONSULT TO PULMONOLOGY      Patient was diagnosed with:  Chronic mesenteric ischemia  Status post revascularization 5/22/2024  Partial bowel resection      Treatment while inpatient:         68 years old male with medical history significant for hypertension peripheral vascular disease and history of CVA.    Patient presented to the emergency room with abdominal pain and significant weight loss over a period of 2 years.    Patient was suspected for bowel ischemia.  Vascular surgery was consulted and patient underwent a CT angiogram of the abdomen and pelvis.    Patient also underwent arterial duplex.    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  Patient was kept on clear liquid diet initially                                                                                ----------------------------------------------------------      SUBJECTIVE COMPLAINTS-abdominal pain    Diet: Diet NPO      OBJECTIVE:   Patient Active Problem List   Diagnosis    Other hyperlipidemia    Essential hypertension    Smoker    Rash and nonspecific skin eruption    Cellulitis and abscess of toe of left foot    Peripheral arterial disease (HCC)    Claudication (HCC)    Atherosclerosis of native arteries of extremities with rest pain, left leg (HCC)    Peripheral vascular disease, unspecified (HCC)    Atherosclerosis of artery of extremity with rest pain (HCC)    Unspecified severe protein-calorie malnutrition (HCC)    Other specified disorders of carbohydrate metabolism (HCC)    Abdominal pain    Mesenteric ischemia (HCC)    Mesenteric artery stenosis (HCC)    Chronic mesenteric ischemia (HCC)       Allergies  Tree nut [macadamia nut oil]    Medications    Scheduled Meds:   calcium gluconate  2,000 mg IntraVENous Once    Followed by    calcium gluconate-NaCl  1,000 mg IntraVENous Once    budesonide  0.5 mg Nebulization BID RT    piperacillin-tazobactam  2,250 mg IntraVENous Q8H    sodium chloride flush  5-40 mL IntraVENous 2 times per day    sodium chloride flush  5-40 mL IntraVENous 2 times per day    famotidine (PEPCID) injection  20 mg IntraVENous BID    sodium chloride  1,000 mL IntraVENous Once    [Held by provider] PARoxetine  20 mg Oral Nightly    [Held by provider] amLODIPine  10 mg Oral Daily    [Held by provider] aspirin  81 mg Oral Daily    [Held by provider] atorvastatin  80 mg Oral Daily    [Held by provider] lisinopril  20 mg Oral Daily    [Held by provider] pantoprazole  40 mg Oral BID     Continuous Infusions:   sodium chloride      sodium chloride 100 mL/hr at 05/24/24 0752    sodium chloride      sodium  -- -- -- 98 16 100 %        72HR INTAKE/OUTPUT:    Intake/Output Summary (Last 24 hours) at 5/24/2024 0808  Last data filed at 5/24/2024 0705  Gross per 24 hour   Intake 6166 ml   Output 1211 ml   Net 4955 ml         Exam:    Gen:   Alert and oriented ×3    Eyes: PERRL. No sclera icterus. No conjunctival injection.   ENT: No discharge. Pharynx clear. External appearance of ears and nose normal.  Neck: Trachea midline. No obvious mass.    Resp: No accessory muscle use. No crackles. No wheezes. No rhonchi.  CV: Regular rate. Regular rhythm. No murmur or rub. No edema.   GI: Abdomen is soft diffusely tender to palpation  Skin: Warm, dry, normal texture and turgor.   Lymph: No cervical LAD. No supraclavicular LAD.   M/S: / Ext. No cyanosis. No clubbing. No joint deformity.    Neuro: CN 2-12 are intact,  no neurologic deficits noted.    PT/INR:   Recent Labs     05/22/24  1653 05/23/24  0415 05/23/24  1332 05/24/24  0654   PROTIME 31.3* 24.0* 19.2* 17.5*   INR 3.04* 2.15* 1.60* 1.42*     APTT: No results for input(s): \"APTT\" in the last 72 hours.    CBC:   Recent Labs     05/22/24  1155 05/22/24  1551 05/22/24  1600 05/22/24  1745 05/22/24  2149 05/23/24  0100 05/23/24  0415 05/24/24  0340   WBC 8.8  --  2.2*  --   --   --  4.8 7.6   HGB 8.7*   < > 9.2*   < > 8.9* 7.7*  8.7* 8.2* 7.5*   HCT 25.0*  --  26.9*   < > 26.0* 24.5* 24.0* 21.4*   MCV 93.7  --  92.1  --   --   --  91.7 90.5     --  106*  --   --   --  84* 86*    < > = values in this interval not displayed.       BMP:   Recent Labs     05/23/24  0415 05/23/24  1332 05/23/24  2020 05/24/24  0340    139 138 138   K 4.2 4.0 3.9 3.8   * 107 109 109   CO2 19* 22 20* 17*   PHOS 2.5  --   --   --    BUN 30* 33* 35* 41*   CREATININE 1.2 1.6* 1.7* 1.9*       LIVER PROFILE:   Recent Labs     05/23/24  0415 05/24/24  0340   ALKPHOS 64 73   AST 2,231* 1,281*   * 125*   BILITOT 0.7 0.6       Recent Labs     05/22/24  0617   AMYLASE 56       Recent  Dictation software. Please disregard any translational errors.

## 2024-05-24 NOTE — PROGRESS NOTES
NAME:  Darren Duncan  YOB: 1955  MEDICAL RECORD NUMBER:  [id number]    Shift Summary: CVP increased to 6-8, Hemoglobin 7.5 1 U PRBC transfused, 46 ml UOP, pt is 11L positive, RASS -4 sedation shut off. FIO2 40%.     Family updated: No    Rhythm: Normal Sinus Rhythm     Most recent vitals:   Visit Vitals  BP (!) 139/41   Pulse 92   Temp 98.3 °F (36.8 °C) (Esophageal)   Resp 15   Ht 1.735 m (5' 8.31\")   Wt 62.6 kg (138 lb 0.1 oz)   SpO2 100%   BMI 20.80 kg/m²      ABP (Arterial line BP): 134/40  ABP Mean (Arterial Line Mean): 66 mmHg    No data found.    Patient Vitals for the past 12 hrs:   CVP (Mean)   05/24/24 0600 8 mmHg   05/24/24 0545 7 mmHg   05/24/24 0530 6 mmHg   05/24/24 0515 5 mmHg   05/24/24 0500 5 mmHg   05/24/24 0445 6 mmHg   05/24/24 0430 5 mmHg   05/24/24 0415 5 mmHg   05/24/24 0400 5 mmHg   05/24/24 0345 8 mmHg   05/24/24 0330 6 mmHg   05/24/24 0315 7 mmHg   05/24/24 0300 7 mmHg   05/24/24 0245 6 mmHg   05/24/24 0230 7 mmHg   05/24/24 0200 7 mmHg   05/24/24 0145 6 mmHg   05/24/24 0130 5 mmHg   05/24/24 0115 6 mmHg   05/24/24 0100 6 mmHg   05/24/24 0045 6 mmHg   05/24/24 0030 5 mmHg   05/24/24 0015 2 mmHg   05/24/24 0000 3 mmHg   05/23/24 2345 4 mmHg   05/23/24 2330 5 mmHg   05/23/24 2315 3 mmHg   05/23/24 2300 3 mmHg   05/23/24 2230 4 mmHg   05/23/24 2215 4 mmHg   05/23/24 2200 2 mmHg   05/23/24 2150 2 mmHg   05/23/24 2145 1 mmHg   05/23/24 2135 2 mmHg   05/23/24 2130 1 mmHg   05/23/24 2120 5 mmHg   05/23/24 2115 5 mmHg   05/23/24 2105 6 mmHg   05/23/24 2100 5 mmHg   05/23/24 2050 5 mmHg   05/23/24 2030 6 mmHg   05/23/24 2020 6 mmHg   05/23/24 2005 6 mmHg   05/23/24 2000 6 mmHg         Respiratory support needed (if any):  - Ventilator Settings:  Vent Days ***    Vt (Set, mL): 0 mL  Resp Rate (Set): 14 bpm  FiO2 : 40 %    PEEP/CPAP (cmH2O): 5       Admission weight Weight - Scale: 49.6 kg (109 lb 5.6 oz) (05/16/24 0701)    Today's weight    Wt Readings from Last 1 Encounters:    05/23/24 62.6 kg (138 lb 0.1 oz)        King need assessed each shift: YES -  - continue king r/t - ***  UOP >30ml/hr: NO -    Last documented BM (in last 48 hrs):  Patient Vitals for the past 48 hrs:   Last BM (including prior to admit)   05/22/24 1830 05/22/24 05/22/24 2000 05/22/24 05/23/24 0200 05/22/24 05/24/24 0000 05/23/24 05/24/24 0045 05/23/24 05/24/24 0200 05/23/24                Restraints (in use currently or dc'd in last 12 hrs): Yes    Order current and documentation up to date? Yes    Lines/Drains reviewed @ bedside.  CVC  05/22/24 Right Internal jugular (Active)   Number of days: 1       Peripheral IV 05/22/24 Left Arm (Active)   Number of days: 1       Arterial Line 05/22/24 Right Radial (Active)   Number of days: 1       Urinary Catheter 05/22/24 King (Active)   Number of days: 1         Drip rates at handoff:    sodium chloride      sodium chloride 10 mL/hr at 05/24/24 0705    sodium chloride      sodium chloride      sodium chloride      sodium chloride Stopped (05/23/24 1110)    nitroGLYCERIN      norepinephrine Stopped (05/23/24 0011)    fentaNYL 50 mcg/hr (05/23/24 1950)    propofol Stopped (05/24/24 0223)    dextrose      sodium chloride         Lab Data:   CBC:   Recent Labs     05/23/24 0415 05/24/24  0340   WBC 4.8 7.6   HGB 8.2* 7.5*   HCT 24.0* 21.4*   MCV 91.7 90.5   PLT 84* 86*     BMP:    Recent Labs     05/23/24 2020 05/24/24  0340    138   K 3.9 3.8   CO2 20* 17*   BUN 35* 41*   CREATININE 1.7* 1.9*     LIVR:   Recent Labs     05/23/24 0415 05/24/24  0340   AST 2,231* 1,281*   * 125*     PT/INR:   Recent Labs     05/23/24  1332 05/24/24  0654   INR 1.60* 1.42*     APTT: No results for input(s): \"APTT\" in the last 72 hours.  ABG:   Recent Labs     05/24/24  0344 05/24/24  0538   PHART 7.390 7.408   [id number] 31.0* 28.9*   PO2ART 137.5* 142.0*       Any consults during the shift? No    Any signed and held orders to be released?  No        4 Eyes Skin

## 2024-05-24 NOTE — PROGRESS NOTES
With ammonia level being 54, we will proceed with a CT head. Since obtaining CT, will also get CT chest with navigational bronchoscopy protocol.         Obtain rapid EEG after returning from head CT.

## 2024-05-24 NOTE — PROGRESS NOTES
Comprehensive Nutrition Assessment    Type and Reason for Visit:  Reassess    Nutrition Recommendations/Plan:   If TPN being considered recommend Clinimix 5/20 E start at 30-40 ml/hr first bag.  If electrolytes stable after first bag, recommend increasing to goal rate of 75 ml/hr 90 grams of protein, 1598 calories, GIR 4 mg/kg/min.    Lipids bi-weekly providing 143 calories from fat     Malnutrition Assessment:  Malnutrition Status:  At risk for malnutrition (Comment) (05/23/24 4449)    Context:  Acute Illness     Findings of the 6 clinical characteristics of malnutrition:  Energy Intake:  50% or less of estimated energy requirements for 5 or more days  Weight Loss:   (weight loss between February and May of this year per EMR, about 20 lbs, has flucutated some recently likely fluid shifts)     Body Fat Loss:  Unable to assess     Muscle Mass Loss:  Unable to assess    Fluid Accumulation:  Unable to assess     Strength:  Not Performed    Nutrition Assessment:    Follow up:  NPO at this time.  Remains on vent support.    Nutrition Related Findings:    labs reviewed on 5/22, lytes within normal limits Wound Type: Surgical Incision       Current Nutrition Intake & Therapies:    Average Meal Intake: NPO  Average Supplements Intake: NPO  Diet NPO  Current Parenteral Nutrition Orders:  Goal PN Orders Provides: Clinimix 5/20 E start at 30-40 ml/hr first bag.  If electrolytes stable after first bag, recommend increasing to goal rate of 75 ml/hr 90 grams of protein, 1598 calories, GIR 4 mg/kg/min.  Lipids bi-weekly providing 143 calories from fat    Anthropometric Measures:  Height: 173.5 cm (5' 8.31\")  Ideal Body Weight (IBW): 156 lbs (71 kg)       Current Body Weight: 57.7 kg (127 lb 3.3 oz),   IBW. Weight Source: Standing Scale  Current BMI (kg/m2): 18.8                          BMI Categories: Underweight (BMI less than 22) age over 65    Estimated Daily Nutrient Needs:  Energy Requirements Based On: Kcal/kg  Weight

## 2024-05-24 NOTE — PROGRESS NOTES
Headband: applied  Date/Time: 05/24/24 , 1053  Recorder: recording started  Skin: intact  Highest Seizure [place] Percentage past hour: 0%    Rapid EEG discontinued at 1415. Ok to discontinue per Dr. Ta.       General info regarding Seizure [place] %:  Minimum duration of study is 2 hours. If Seizure [place] has remained 0% throughout the entire 2-hour duration, communicate with provider to stop the recording.     Seizure [place] 0-10% - Continue to monitor and complete 2-hour study.  Seizure [place] 11-89% - Epileptiform activity present. Notify provider for next steps.  Seizure [place] >/= 90% - Epileptiform activity consistent with Status Epilepticus. Immediately notify provider.     *Patients with Seizure [place] above 10% that persists may require a study longer than 2 hours. Maximum recording duration is 24 hours. Please update provider with a persistent increase in Seizure [place] above 10%.

## 2024-05-24 NOTE — PROGRESS NOTES
Pt becomes hypertensive with stimulation, Pt is not following commands.      All sedation has been titrated off, RN will monitor for changes.     Fluid was slowed to 10ml/hr during transfusion

## 2024-05-24 NOTE — PLAN OF CARE
Problem: Discharge Planning  Goal: Discharge to home or other facility with appropriate resources  5/24/2024 1613 by Dorene Cooper RN  Outcome: Progressing  Flowsheets (Taken 5/24/2024 0800)  Discharge to home or other facility with appropriate resources:   Identify barriers to discharge with patient and caregiver   Identify discharge learning needs (meds, wound care, etc)    Problem: Pain  Goal: Verbalizes/displays adequate comfort level or baseline comfort level  5/24/2024 1613 by Dorene Cooper RN  Outcome: Progressing    Problem: Safety - Adult  Goal: Free from fall injury  5/24/2024 1613 by Dorene Cooper RN  Outcome: Progressing    Problem: Gastrointestinal - Adult  Goal: Minimal or absence of nausea and vomiting  5/24/2024 1613 by Dorene Cooper RN    Problem: Gastrointestinal - Adult  Goal: Maintains adequate nutritional intake  5/24/2024 1613 by Dorene Cooper RN  Outcome: Progressing    Problem: Safety - Medical Restraint  Goal: Remains free of injury from restraints (Restraint for Interference with Medical Device)  Description: INTERVENTIONS:  1. Determine that other, less restrictive measures have been tried or would not be effective before applying the restraint  2. Evaluate the patient's condition at the time of restraint application  3. Inform patient/family regarding the reason for restraint  4. Q2H: Monitor safety, psychosocial status, comfort, nutrition and hydration  5/24/2024 1613 by Dorene Cooper RN  Outcome: Progressing  Flowsheets  Taken 5/24/2024 0802 by Dorene Cooper RN  Remains free of injury from restraints (restraint for interference with medical device):   Determine that other, less restrictive measures have been tried or would not be effective before applying the restraint   Every 2 hours: Monitor safety, psychosocial status, comfort, nutrition and hydration   Evaluate the patient's condition at the time of restraint application   Inform patient/family regarding the reason

## 2024-05-24 NOTE — PROGRESS NOTES
VASCULAR                 POD#2     Intubated and sedated. Temporarily stopped sedation as too heavily sedated several hours ago.  Reexploration yesterday with small bowel anastomosis.  Currently getting 1 unit PRBC     130/39 (off pressors)               90                  Tmax 99.1       CVP 6-8  I/O + 4200 (UO minimal 137, , stool 200, drain 225); +40229 cc since surgery  Heart RRR  Lungs clear  Abd flat, silent; soft bruit heard  Wound dressing in place  Palpable B femoral pulses - no pedal pulses palpable (audible L pedal, R nonaudible)  Feet pink     CXR - pending     INR pending     HgB 7.5          WBC 7.6         Plts 86K     Creat 1.9 (baseline < 0.5)         Cl 109    Na 139    K 3.8         Alb 2.0             AST 1281           TB 0.6             A/P:     S/P aorto-hepatic-mesenteric. Clinically patent with signs of reperfusion.              S/P SB resection with reanatomosis yesterday - remaining bowel viable, well perfused & edematous.              ATN - not unexpected and profound. Would hope for recovery.              Thrombocytopenia - consumptive. Observe.              No signs of bleeding.              Decrease IVFs to 100 cc/hr as CVP in nl range now.              Observe ATN for now. If CXR shows pulmonary edema will need renal to consider ultrafiltration/CRRT.              Continue sedation and vent per pulmonary.   Would not think extubation in the near future as pt's fluid status remains in flux.              No plans for TPN at this time as pt's volume status remains unbalanced. Longer term anticipate initiation.              Continue antibx to protect aortomesenteric graft considering bowel ischemia.              Critical status.                 Rg Grigsby    CXR reviewed: unchanged and clear without pulmonary edema. Will continue hydration, hemodynamic support and observe for return of renal function. May eventually require CRRT if metabolic or volume issues develop (ie  acidosis, pulmonary edema, etc).

## 2024-05-24 NOTE — PROGRESS NOTES
NAME:  Darren Duncan  YOB: 1955  MEDICAL RECORD NUMBER:  [id number]    Shift Summary: Patient's RASS -4. CT scan of head & chest today (see results). Patient beginning to grimace and shrug shoulders with suctioning. Repeat hemoglobin 7.9. CVP trending down. 42 ml urine output from 1992-1985. Call placed to [facility] Vascular Surgeon to notify MD regarding hemoglobin and CVP. Waiting for return phone call. Dextrose 10% fluid bolus for hypoglycemia.     Family updated: Yes, Pt's wife/POA and brother updated at bedside and via telephone.     Rhythm: Normal Sinus Rhythm     Most recent vitals:   Visit Vitals  BP (!) 108/57   Pulse 84   Temp 98.2 °F (36.8 °C) (Esophageal)   Resp 14   Ht 1.735 m (5' 8.31\")   Wt 62.6 kg (138 lb 0.1 oz)   SpO2 100%   BMI 20.80 kg/m²      ABP (Arterial line BP): 139/41  ABP Mean (Arterial Line Mean): 67 mmHg    No data found.    Patient Vitals for the past 12 hrs:   CVP (Mean)   05/24/24 1200 5 mmHg   05/24/24 0800 7 mmHg   05/24/24 0645 6 mmHg   05/24/24 0630 6 mmHg   05/24/24 0615 6 mmHg   05/24/24 0600 8 mmHg   05/24/24 0545 7 mmHg   05/24/24 0530 6 mmHg   05/24/24 0515 5 mmHg   05/24/24 0500 5 mmHg   05/24/24 0445 6 mmHg   05/24/24 0430 5 mmHg   05/24/24 0415 5 mmHg         Respiratory support needed (if any):  - Ventilator Settings:  Vent Days 3    Vt (Set, mL): 440 mL  Resp Rate (Set): 14 bpm  FiO2 : 40 %    PEEP/CPAP (cmH2O): 5       Admission weight Weight - Scale: 49.6 kg (109 lb 5.6 oz) (05/16/24 0701)    Today's weight    Wt Readings from Last 1 Encounters:   05/23/24 62.6 kg (138 lb 0.1 oz)        King need assessed each shift: YES -  - continue king r/t - hemodynamically unstable   UOP >30ml/hr: NO - MD aware  Last documented BM (in last 48 hrs):  Patient Vitals for the past 48 hrs:   Last BM (including prior to admit)   05/22/24 1830 05/22/24 05/22/24 2000 05/22/24 05/23/24 0200 05/22/24 05/24/24 0000 05/23/24 05/24/24 0045 05/23/24 05/24/24 0200  05/23/24 05/24/24 0800 05/24/24                Restraints (in use currently or dc'd in last 12 hrs): Yes    Order current and documentation up to date? Yes    Lines/Drains reviewed @ bedside.  CVC  05/22/24 Right Internal jugular (Active)   Number of days: 2       Peripheral IV 05/22/24 Left Arm (Active)   Number of days: 2       Arterial Line 05/22/24 Right Radial (Active)   Number of days: 2       Urinary Catheter 05/22/24 Tilley (Active)   Number of days: 2         Drip rates at handoff:    sodium chloride 100 mL/hr at 05/24/24 1545    sodium chloride Stopped (05/23/24 1110)    nitroGLYCERIN      norepinephrine Stopped (05/23/24 0011)    fentaNYL 50 mcg/hr (05/23/24 1950)    propofol Stopped (05/24/24 0223)    dextrose      sodium chloride         Lab Data:   CBC:   Recent Labs     05/23/24  0415 05/24/24  0340 05/24/24  1157   WBC 4.8 7.6  --    HGB 8.2* 7.5* 7.9*   HCT 24.0* 21.4*  --    MCV 91.7 90.5  --    PLT 84* 86*  --      BMP:    Recent Labs     05/24/24  0340 05/24/24  1430    136   K 3.8 4.1   CO2 17* 18*   BUN 41* 45*   CREATININE 1.9* 2.2*     LIVR:   Recent Labs     05/23/24  0415 05/24/24  0340   AST 2,231* 1,281*   * 125*     PT/INR:   Recent Labs     05/23/24  1332 05/24/24  0654   INR 1.60* 1.42*     APTT: No results for input(s): \"APTT\" in the last 72 hours.  ABG:   Recent Labs     05/24/24  0538 05/24/24  1157   PHART 7.408 7.359   ANQ1BEK 28.9* 33.0*   PO2ART 142.0* 98.9       Any consults during the shift? No    Any signed and held orders to be released?  No        4 Eyes Skin Assessment       The patient is being assessed for  Shift Handoff    I agree that at least one RN has performed a thorough Head to Toe Skin Assessment on the patient. ALL assessment sites listed below have been assessed.      Areas assessed by both nurses: Head, Face, Ears, Shoulders, Back, Chest, Arms, Elbows, Hands, Sacrum. Buttock, Coccyx, Ischium, Legs. Feet and Heels, and Under Medical Devices

## 2024-05-24 NOTE — PROGRESS NOTES
NAME:  Darren Duncan  YOB: 1955  MEDICAL RECORD NUMBER:  [id number]    Shift Summary: RASS -3, starting to open eyes to pain. Dr Grigsby was informed about repeat hemoglobin 7.9 after 1 unit of PRBC transfused, advised not to transfuse another PRBC for now. Nephrology was consulted due to oliguria and creatine continuously trending up, nephrology will make some adjustments to IV fluids, full note to follow.    Family updated: Yes:       Rhythm: Normal Sinus Rhythm     Most recent vitals:   Visit Vitals  BP (!) 100/54   Pulse 84   Temp 98.1 °F (36.7 °C) (Esophageal)   Resp 14   Ht 1.735 m (5' 8.31\")   Wt 62.6 kg (138 lb 0.1 oz)   SpO2 100%   BMI 20.80 kg/m²      ABP (Arterial line BP): 139/44  ABP Mean (Arterial Line Mean): 71 mmHg    No data found.    Patient Vitals for the past 12 hrs:   CVP (Mean)   05/24/24 1800 9 mmHg   05/24/24 1700 8 mmHg   05/24/24 1600 7 mmHg   05/24/24 1500 3 mmHg   05/24/24 1200 5 mmHg   05/24/24 0800 7 mmHg         Respiratory support needed (if any):  - Ventilator Settings:  Vent Days 3    Vt (Set, mL): 0 mL  Resp Rate (Set): 14 bpm  FiO2 : 40 %    PEEP/CPAP (cmH2O): 5       Admission weight Weight - Scale: 49.6 kg (109 lb 5.6 oz) (05/16/24 0701)    Today's weight    Wt Readings from Last 1 Encounters:   05/23/24 62.6 kg (138 lb 0.1 oz)        King need assessed each shift: YES -  - continue king r/t -    UOP >30ml/hr: NO - Nephrology was consulted.  Last documented BM (in last 48 hrs):  Patient Vitals for the past 48 hrs:   Last BM (including prior to admit)   05/22/24 2000 05/22/24 05/23/24 0200 05/22/24 05/24/24 0000 05/23/24 05/24/24 0045 05/23/24 05/24/24 0200 05/23/24 05/24/24 0800 05/24/24 05/24/24 1500 05/24/24                Restraints (in use currently or dc'd in last 12 hrs): Yes    Order current and documentation up to date? Yes    Lines/Drains reviewed @ bedside.  CVC  05/22/24 Right Internal jugular (Active)   Number of days: 2       Peripheral  IV 05/22/24 Left Arm (Active)   Number of days: 2       Arterial Line 05/22/24 Right Radial (Active)   Number of days: 2       Urinary Catheter 05/22/24 Tilley (Active)   Number of days: 2         Drip rates at handoff:    sodium bicarbonate 150 mEq in sterile water 1,000 mL infusion      sodium chloride Stopped (05/23/24 1110)    nitroGLYCERIN      norepinephrine Stopped (05/23/24 0011)    fentaNYL 50 mcg/hr (05/23/24 1950)    propofol Stopped (05/24/24 0223)    dextrose      sodium chloride         Lab Data:   CBC:   Recent Labs     05/23/24  0415 05/24/24  0340 05/24/24  1157   WBC 4.8 7.6  --    HGB 8.2* 7.5* 7.9*   HCT 24.0* 21.4*  --    MCV 91.7 90.5  --    PLT 84* 86*  --      BMP:    Recent Labs     05/24/24  0340 05/24/24  1430    136   K 3.8 4.1   CO2 17* 18*   BUN 41* 45*   CREATININE 1.9* 2.2*     LIVR:   Recent Labs     05/23/24  0415 05/24/24  0340   AST 2,231* 1,281*   * 125*     PT/INR:   Recent Labs     05/23/24  1332 05/24/24  0654   INR 1.60* 1.42*     APTT: No results for input(s): \"APTT\" in the last 72 hours.  ABG:   Recent Labs     05/24/24  0538 05/24/24  1157   PHART 7.408 7.359   MTO9OMH 28.9* 33.0*   PO2ART 142.0* 98.9       Any consults during the shift? Yes: Consults received: : Nephrology    Any signed and held orders to be released?  No        4 Eyes Skin Assessment       The patient is being assessed for  Shift Handoff    I agree that at least one RN has performed a thorough Head to Toe Skin Assessment on the patient. ALL assessment sites listed below have been assessed.      Areas assessed by both nurses: Head, Face, Ears, Shoulders, Back, Chest, Arms, Elbows, Hands, Sacrum. Buttock, Coccyx, Ischium, Legs. Feet and Heels, and Under Medical Devices         Does the Patient have a Wound? No noted wound(s)    Wound Care Orders initiated by RN: No       Thomas Prevention initiated by RN: Yes    Pressure Injury (Stage 3,4, Unstageable, DTI, NWPT, and Complex wounds) if present,

## 2024-05-24 NOTE — PLAN OF CARE
Consult received.  Patient s/p surgery mesenteric ischemia.  Becoming oliguric.  Electrolytes remain noncritical at this time.  Will make adjustments to IV fluids.  Full note to follow.  Please call with any questions

## 2024-05-24 NOTE — PROGRESS NOTES
Dr. Robins at bedside verbal orders for Calcium Gluconate, Fluids @ 100ml/hr after transfusion, and an INR.

## 2024-05-24 NOTE — PROGRESS NOTES
Pulmonary Progress Note    Date of Admission: 5/16/2024   LOS: 8 days       CC:  Chief Complaint   Patient presents with    Abdominal Pain     Pt c/o abdominal pain. Pt  states he was told he has stomach ulcers. Pt says he takes his medication as prescribe and gets no relief. Pt AAOX4 at triage          Subjective:  Sedation weaned off overnight.  Not responding.  Transfused overnight.        Assessment:         Plan:     This note may have been transcribed using Dragon Dictation software. Please disregard any translational errors.       Hospital Day: 8     Mechanical Vent  AC/VC +14, 440, 5, 40%.  Mental status currently not acceptable for extubation.  If patient wakes, extubation within reason.       Mental status/sedation  Patient currently has poor mental status  Has not received significant sedation prior to this.  Fentanyl and propofol after last or   Check ammonia with transaminitis  If ammonia normal, will get EEG and CT head.  Sedation currently off      Antegrade Aortocommon hepatic-mesenteric artery bypass  Vascular surgery  S/p stent S/p 12 mm x 6 mm           S/p Ex lap with 100 cm necrotic small bowel resection  General surgery following.    OR 5/22 and 5/23  Closed   Defer to general surgery for nutritional plan.  Zosyn        COPD  Nebulized budesonide twice daily  Try to avoid anticholinergics given bowel surgery.  Albuterol PRN        Pulmonary nodule  Pulmonary nodule with consolidation with groundglass opacities circumferential.  Concerning for RISHABH.  PET scan was negative.  Did not complete follow-up CT chest.  If he goes for his CT for any reason, will add CT chest.     Thrombocytopenia with anemia  -Concern for consumptive etiology.  Transfusion overnight     Transaminitis  AST ALT significantly improved from yesterday.  Check ammonia.     Acute kidney injury  Urine output.  Creatinine increased to 1.9.  Metabolic acidosis.  pH controlled with vent  8 L positive from yesterday, 10 L positive    PROTIME 31.3* 24.0* 19.2*   INR 3.04* 2.15* 1.60*     APTT: No results for input(s): \"APTT\" in the last 72 hours.  UA:No results for input(s): \"NITRITE\", \"COLORU\", \"PHUR\", \"LABCAST\", \"WBCUA\", \"RBCUA\", \"MUCUS\", \"TRICHOMONAS\", \"YEAST\", \"BACTERIA\", \"CLARITYU\", \"SPECGRAV\", \"LEUKOCYTESUR\", \"UROBILINOGEN\", \"BILIRUBINUR\", \"BLOODU\", \"GLUCOSEU\", \"AMORPHOUS\" in the last 72 hours.    Invalid input(s): \"KETONESU\"  No results for input(s): \"PH\", \"PCO2\", \"PO2\" in the last 72 hours.    Cx:      Films:  Radiology Review:  Pertinent images / reports were reviewed as a part of this visit.    CT Chest w/ contrast: No results found for this or any previous visit.      CT Chest w/o contrast: No results found for this or any previous visit.      CTPA: No results found for this or any previous visit.      CXR PA/LAT: Results for orders placed during the hospital encounter of 12/12/23    XR CHEST (2 VW)    Narrative  EXAMINATION:  TWO XRAY VIEWS OF THE CHEST    12/12/2023 1:41 pm    COMPARISON:  09/15/2020    HISTORY:  ORDERING SYSTEM PROVIDED HISTORY: Weight loss  TECHNOLOGIST PROVIDED HISTORY:  Reason for exam:->Weight loss  Reason for Exam: Weight loss    FINDINGS:  The lungs are without acute focal process.  There is no effusion or  pneumothorax. The cardiomediastinal silhouette is stable. The osseous  structures are stable.    Impression  No acute process.    Stable exam.      CXR portable: Results for orders placed during the hospital encounter of 05/16/24    XR CHEST PORTABLE (Preliminary)  This result has not been signed. Information might be incomplete.    Narrative  EXAMINATION:  ONE XRAY VIEW OF THE CHEST    5/23/2024 5:53 am    COMPARISON:  05/22/2024, 12/12/2023    HISTORY:  ORDERING SYSTEM PROVIDED HISTORY: intubated  TECHNOLOGIST PROVIDED HISTORY:  Reason for exam:->intubated  Reason for Exam: intubated    FINDINGS:  A single frontal view of the chest was performed.  There is a stable  endotracheal tube in place tip lying

## 2024-05-25 ENCOUNTER — APPOINTMENT (OUTPATIENT)
Dept: GENERAL RADIOLOGY | Age: 69
End: 2024-05-25
Payer: MEDICARE

## 2024-05-25 PROBLEM — N17.9 AKI (ACUTE KIDNEY INJURY) (HCC): Status: ACTIVE | Noted: 2024-05-25

## 2024-05-25 PROBLEM — I42.9 CARDIOMYOPATHY (HCC): Status: ACTIVE | Noted: 2024-05-25

## 2024-05-25 LAB
ALBUMIN SERPL-MCNC: 1.9 G/DL (ref 3.4–5)
ALBUMIN/GLOB SERPL: 0.8 {RATIO} (ref 1.1–2.2)
ALP SERPL-CCNC: 84 U/L (ref 40–129)
ALT SERPL-CCNC: 34 U/L (ref 10–40)
ANION GAP SERPL CALCULATED.3IONS-SCNC: 12 MMOL/L (ref 3–16)
AST SERPL-CCNC: 790 U/L (ref 15–37)
BILIRUB SERPL-MCNC: 0.5 MG/DL (ref 0–1)
BUN SERPL-MCNC: 50 MG/DL (ref 7–20)
CALCIUM SERPL-MCNC: 7.4 MG/DL (ref 8.3–10.6)
CHLORIDE SERPL-SCNC: 106 MMOL/L (ref 99–110)
CO2 SERPL-SCNC: 18 MMOL/L (ref 21–32)
CREAT SERPL-MCNC: 2.3 MG/DL (ref 0.8–1.3)
CREAT UR-MCNC: 68.5 MG/DL (ref 39–259)
DEPRECATED RDW RBC AUTO: 17.1 % (ref 12.4–15.4)
GFR SERPLBLD CREATININE-BSD FMLA CKD-EPI: 30 ML/MIN/{1.73_M2}
GLUCOSE BLD-MCNC: 74 MG/DL (ref 70–99)
GLUCOSE BLD-MCNC: 87 MG/DL (ref 70–99)
GLUCOSE SERPL-MCNC: 85 MG/DL (ref 70–99)
HCT VFR BLD AUTO: 26.2 % (ref 40.5–52.5)
HGB BLD-MCNC: 8.9 G/DL (ref 13.5–17.5)
INR PPP: 1.16 (ref 0.85–1.15)
MCH RBC QN AUTO: 29.9 PG (ref 26–34)
MCHC RBC AUTO-ENTMCNC: 33.9 G/DL (ref 31–36)
MCV RBC AUTO: 88.2 FL (ref 80–100)
PERFORMED ON: NORMAL
PERFORMED ON: NORMAL
PLATELET # BLD AUTO: 59 K/UL (ref 135–450)
PMV BLD AUTO: 8.2 FL (ref 5–10.5)
POTASSIUM SERPL-SCNC: 4.1 MMOL/L (ref 3.5–5.1)
PROT SERPL-MCNC: 4.4 G/DL (ref 6.4–8.2)
PROTHROMBIN TIME: 15 SEC (ref 11.9–14.9)
RBC # BLD AUTO: 2.97 M/UL (ref 4.2–5.9)
SODIUM SERPL-SCNC: 136 MMOL/L (ref 136–145)
SODIUM UR-SCNC: 31 MMOL/L
TRIGL SERPL-MCNC: 168 MG/DL (ref 0–150)
UUN UR-MCNC: 435.8 MG/DL (ref 800–1666)
WBC # BLD AUTO: 13.2 K/UL (ref 4–11)

## 2024-05-25 PROCEDURE — 2580000003 HC RX 258: Performed by: INTERNAL MEDICINE

## 2024-05-25 PROCEDURE — 84300 ASSAY OF URINE SODIUM: CPT

## 2024-05-25 PROCEDURE — 86022 PLATELET ANTIBODIES: CPT

## 2024-05-25 PROCEDURE — 94640 AIRWAY INHALATION TREATMENT: CPT

## 2024-05-25 PROCEDURE — 84478 ASSAY OF TRIGLYCERIDES: CPT

## 2024-05-25 PROCEDURE — 2580000003 HC RX 258: Performed by: SURGERY

## 2024-05-25 PROCEDURE — 85610 PROTHROMBIN TIME: CPT

## 2024-05-25 PROCEDURE — 80053 COMPREHEN METABOLIC PANEL: CPT

## 2024-05-25 PROCEDURE — 2100000000 HC CCU R&B

## 2024-05-25 PROCEDURE — 2500000003 HC RX 250 WO HCPCS: Performed by: HOSPITALIST

## 2024-05-25 PROCEDURE — 2500000003 HC RX 250 WO HCPCS: Performed by: INTERNAL MEDICINE

## 2024-05-25 PROCEDURE — 2700000000 HC OXYGEN THERAPY PER DAY

## 2024-05-25 PROCEDURE — 2580000003 HC RX 258: Performed by: HOSPITALIST

## 2024-05-25 PROCEDURE — 84540 ASSAY OF URINE/UREA-N: CPT

## 2024-05-25 PROCEDURE — 94003 VENT MGMT INPAT SUBQ DAY: CPT

## 2024-05-25 PROCEDURE — 2580000003 HC RX 258: Performed by: ANESTHESIOLOGY

## 2024-05-25 PROCEDURE — 2500000003 HC RX 250 WO HCPCS: Performed by: SURGERY

## 2024-05-25 PROCEDURE — 6360000002 HC RX W HCPCS: Performed by: NURSE PRACTITIONER

## 2024-05-25 PROCEDURE — 6370000000 HC RX 637 (ALT 250 FOR IP): Performed by: INTERNAL MEDICINE

## 2024-05-25 PROCEDURE — 99291 CRITICAL CARE FIRST HOUR: CPT | Performed by: INTERNAL MEDICINE

## 2024-05-25 PROCEDURE — 6360000002 HC RX W HCPCS: Performed by: SURGERY

## 2024-05-25 PROCEDURE — 85027 COMPLETE CBC AUTOMATED: CPT

## 2024-05-25 PROCEDURE — 71045 X-RAY EXAM CHEST 1 VIEW: CPT

## 2024-05-25 PROCEDURE — 99024 POSTOP FOLLOW-UP VISIT: CPT | Performed by: SURGERY

## 2024-05-25 PROCEDURE — 94761 N-INVAS EAR/PLS OXIMETRY MLT: CPT

## 2024-05-25 PROCEDURE — 37799 UNLISTED PX VASCULAR SURGERY: CPT

## 2024-05-25 PROCEDURE — 82570 ASSAY OF URINE CREATININE: CPT

## 2024-05-25 PROCEDURE — 6360000002 HC RX W HCPCS: Performed by: INTERNAL MEDICINE

## 2024-05-25 PROCEDURE — 99024 POSTOP FOLLOW-UP VISIT: CPT | Performed by: STUDENT IN AN ORGANIZED HEALTH CARE EDUCATION/TRAINING PROGRAM

## 2024-05-25 RX ORDER — IPRATROPIUM BROMIDE AND ALBUTEROL SULFATE 2.5; .5 MG/3ML; MG/3ML
1 SOLUTION RESPIRATORY (INHALATION) 3 TIMES DAILY
Status: DISCONTINUED | OUTPATIENT
Start: 2024-05-25 | End: 2024-05-31

## 2024-05-25 RX ORDER — DEXMEDETOMIDINE HYDROCHLORIDE 4 UG/ML
.1-1.5 INJECTION, SOLUTION INTRAVENOUS CONTINUOUS
Status: DISCONTINUED | OUTPATIENT
Start: 2024-05-25 | End: 2024-05-30

## 2024-05-25 RX ADMIN — DEXMEDETOMIDINE HYDROCHLORIDE 0.2 MCG/KG/HR: 4 INJECTION, SOLUTION INTRAVENOUS at 19:05

## 2024-05-25 RX ADMIN — EPOETIN ALFA-EPBX 10000 UNITS: 10000 INJECTION, SOLUTION INTRAVENOUS; SUBCUTANEOUS at 17:30

## 2024-05-25 RX ADMIN — BUDESONIDE 500 MCG: 0.5 SUSPENSION RESPIRATORY (INHALATION) at 07:58

## 2024-05-25 RX ADMIN — IPRATROPIUM BROMIDE AND ALBUTEROL SULFATE 1 DOSE: 2.5; .5 SOLUTION RESPIRATORY (INHALATION) at 19:49

## 2024-05-25 RX ADMIN — SODIUM BICARBONATE: 84 INJECTION, SOLUTION INTRAVENOUS at 12:24

## 2024-05-25 RX ADMIN — SODIUM CHLORIDE, PRESERVATIVE FREE 20 MG: 5 INJECTION INTRAVENOUS at 21:35

## 2024-05-25 RX ADMIN — BUDESONIDE 500 MCG: 0.5 SUSPENSION RESPIRATORY (INHALATION) at 19:50

## 2024-05-25 RX ADMIN — PIPERACILLIN AND TAZOBACTAM 2250 MG: 2; .25 INJECTION, POWDER, LYOPHILIZED, FOR SOLUTION INTRAVENOUS at 16:11

## 2024-05-25 RX ADMIN — PIPERACILLIN AND TAZOBACTAM 2250 MG: 2; .25 INJECTION, POWDER, LYOPHILIZED, FOR SOLUTION INTRAVENOUS at 00:30

## 2024-05-25 RX ADMIN — Medication 25 MCG/HR: at 06:14

## 2024-05-25 RX ADMIN — ALBUTEROL SULFATE 2.5 MG: 2.5 SOLUTION RESPIRATORY (INHALATION) at 15:48

## 2024-05-25 RX ADMIN — Medication 2 MCG/MIN: at 23:14

## 2024-05-25 RX ADMIN — SODIUM CHLORIDE, PRESERVATIVE FREE 10 ML: 5 INJECTION INTRAVENOUS at 21:35

## 2024-05-25 RX ADMIN — PIPERACILLIN AND TAZOBACTAM 2250 MG: 2; .25 INJECTION, POWDER, LYOPHILIZED, FOR SOLUTION INTRAVENOUS at 08:09

## 2024-05-25 RX ADMIN — SODIUM CHLORIDE, PRESERVATIVE FREE 10 ML: 5 INJECTION INTRAVENOUS at 08:10

## 2024-05-25 ASSESSMENT — PULMONARY FUNCTION TESTS
PIF_VALUE: 19
PIF_VALUE: 27
PIF_VALUE: 20
PIF_VALUE: 16
PIF_VALUE: 17
PIF_VALUE: 19
PIF_VALUE: 18
PIF_VALUE: 22
PIF_VALUE: 18
PIF_VALUE: 36
PIF_VALUE: 22
PIF_VALUE: 22
PIF_VALUE: 24
PIF_VALUE: 29
PIF_VALUE: 17
PIF_VALUE: 20
PIF_VALUE: 23
PIF_VALUE: 23
PIF_VALUE: 19
PIF_VALUE: 20
PIF_VALUE: 21
PIF_VALUE: 21
PIF_VALUE: 25
PIF_VALUE: 24
PIF_VALUE: 17
PIF_VALUE: 20
PIF_VALUE: 18
PIF_VALUE: 20
PIF_VALUE: 18
PIF_VALUE: 17
PIF_VALUE: 18
PIF_VALUE: 22
PIF_VALUE: 22
PIF_VALUE: 19
PIF_VALUE: 18
PIF_VALUE: 17
PIF_VALUE: 18
PIF_VALUE: 19
PIF_VALUE: 17
PIF_VALUE: 24
PIF_VALUE: 21
PIF_VALUE: 19
PIF_VALUE: 20
PIF_VALUE: 17
PIF_VALUE: 25
PIF_VALUE: 22
PIF_VALUE: 19
PIF_VALUE: 20
PIF_VALUE: 20
PIF_VALUE: 21
PIF_VALUE: 25
PIF_VALUE: 18
PIF_VALUE: 18
PIF_VALUE: 19
PIF_VALUE: 18

## 2024-05-25 ASSESSMENT — PAIN SCALES - GENERAL
PAINLEVEL_OUTOF10: 0

## 2024-05-25 NOTE — PROGRESS NOTES
Pulmonary Critical Care Progress Note     Patient's name:  Darren Duncan  Medical Record Number: [id number]  Patient's account/billing number: [id number]  Patient's YOB: 1955  Age: 68 y.o.  Date of Admission: 5/16/2024  6:56 AM  Date of Consult: 5/25/2024      Primary Care Physician: Deep Lozano MD      Code Status: Full Code    Chief complaint: Acute respiratory failure    Assessment and Plan     Acute respiratory failure mechanical ventilation  Chronic mesenteric ischemia status post revascularization 5/22/2024 Antegrade Aortocommon hepatic-mesenteric artery bypass   Hypertension.  Acute kidney injury with metabolic acidosis  Cardiomyopathy  COPD    Plan:  Continue ventilator support, started on SBT, no plans for extubation today  Tube feeding per vascular  Zosyn  IV fluid per nephrology   GI prophylaxis  Bronchodilators  Due to the immediate potential for life-threatening deterioration due to above, I spent 31 minutes providing critical care.  This time is excluding time spent performing procedures.  This note was transcribed using Dragon Dictation software. Please disregard any translational errors.            Overnight:  No acute events overnight  Afebrile    REVIEW OF SYSTEMS:  Review of Systems -   Unable to obtain sedated on mechanical ventilation        Physical Exam:    Vitals: BP (!) 116/59   Pulse 86   Temp 98.2 °F (36.8 °C) (Esophageal)   Resp 12   Ht 1.735 m (5' 8.31\")   Wt 68 kg (149 lb 14.6 oz)   SpO2 100%   BMI 22.59 kg/m²     Last Body weight:   Wt Readings from Last 3 Encounters:   05/25/24 68 kg (149 lb 14.6 oz)   02/27/24 60.8 kg (134 lb)   02/01/24 63.5 kg (140 lb)       Body Mass Index : Body mass index is 22.59 kg/m².      Intake and Output summary:   Intake/Output Summary (Last 24 hours) at 5/25/2024 1136  Last data filed at 5/25/2024 0952  Gross per 24 hour   Intake 2270.85 ml   Output 460 ml   Net 1810.85 ml       Physical Examination:     Gen:  Sedated on mechanical ventilation  Eyes: PERRL. Anicteric sclera. No conjunctival injection.   ENT: No discharge. Posterior oropharynx clear. External appearance of ears and nose normal.  Neck: Trachea midline. No mass   Resp: Diminished bilaterally with no active wheezing  CV: Regular rate. Regular rhythm. No murmur or rub. No edema.   GI: Midline incision with wound VAC soft, Non-tender. Non-distended. +BS  Skin: Warm, dry, w/o erythema.   Lymph: No cervical or supraclavicular LAD.   M/S: No cyanosis. No clubbing.    Neuro: Sedated on mechanical ventilation no seizures or abnormal activities        Laboratory findings:-    CBC:   Recent Labs     05/25/24  0310   WBC 13.2*   HGB 8.9*   PLT 59*     BMP:    Recent Labs     05/24/24  0340 05/24/24  1430 05/25/24  0310    136 136   K 3.8 4.1 4.1    108 106   CO2 17* 18* 18*   BUN 41* 45* 50*   CREATININE 1.9* 2.2* 2.3*   GLUCOSE 75 95 85     S. Calcium:  Recent Labs     05/25/24  0310   CALCIUM 7.4*     S. Ionized Calcium:Invalid input(s): \"IONCA\"  S. Magnesium:  Recent Labs     05/23/24  0415   MG 1.90     S. Phosphorus:  Recent Labs     05/23/24  0415   PHOS 2.5     S. Glucose:  Recent Labs     05/24/24 2008 05/24/24  2125 05/25/24  0316   POCGLU 79 86 87           Radiology Review:  Pertinent images / reports were reviewed as a part of this visit.      Aminah Morales MD, M.D.            5/25/2024, 11:36 AM

## 2024-05-25 NOTE — PLAN OF CARE
Problem: Pain  Goal: Verbalizes/displays adequate comfort level or baseline comfort level  5/25/2024 1024 by Patricia Mckeon RN  Outcome: Progressing  Flowsheets (Taken 5/25/2024 0800)  Verbalizes/displays adequate comfort level or baseline comfort level:   Assess pain using appropriate pain scale   Administer analgesics based on type and severity of pain and evaluate response   Implement non-pharmacological measures as appropriate and evaluate response     Problem: Safety - Adult  Goal: Free from fall injury  5/25/2024 1024 by Patricia Mckeon RN  Outcome: Progressing  Flowsheets (Taken 5/25/2024 0800)  Free From Fall Injury: Instruct family/caregiver on patient safety     Problem: Gastrointestinal - Adult  Goal: Minimal or absence of nausea and vomiting  5/25/2024 1024 by Patricia Mckeon RN  Outcome: Progressing  Flowsheets (Taken 5/25/2024 0800)  Minimal or absence of nausea and vomiting:   Administer IV fluids as ordered to ensure adequate hydration   Maintain NPO status until nausea and vomiting are resolved   Nasogastric tube to low intermittent suction as ordered   Provide nonpharmacologic comfort measures as appropriate   Administer ordered antiemetic medications as needed     Problem: Safety - Medical Restraint  Goal: Remains free of injury from restraints (Restraint for Interference with Medical Device)  Description: INTERVENTIONS:  1. Determine that other, less restrictive measures have been tried or would not be effective before applying the restraint  2. Evaluate the patient's condition at the time of restraint application  3. Inform patient/family regarding the reason for restraint  4. Q2H: Monitor safety, psychosocial status, comfort, nutrition and hydration  5/25/2024 1024 by Patricia Mckeon RN  Outcome: Progressing  Flowsheets (Taken 5/25/2024 0800)  Remains free of injury from restraints (restraint for interference with medical device):   Determine that other, less restrictive measures  have been tried or would not be effective before applying the restraint   Evaluate the patient's condition at the time of restraint application   Inform patient/family regarding the reason for restraint   Every 2 hours: Monitor safety, psychosocial status, comfort, nutrition and hydration

## 2024-05-25 NOTE — PROGRESS NOTES
NAME:  Darren Duncan  YOB: 1955  MEDICAL RECORD NUMBER:  [id number]    Shift Summary: no significant events overnight. Fentanyl started early this morning as patient was having high blood pressure and was fighting the ventilator.    Family updated: Yes:       Rhythm: Normal Sinus Rhythm    Most recent vitals:   Visit Vitals  BP (!) 109/56   Pulse 85   Temp 98.3 °F (36.8 °C) (Esophageal)   Resp 14   Ht 1.735 m (5' 8.31\")   Wt 68 kg (149 lb 14.6 oz)   SpO2 100%   BMI 22.59 kg/m²      ABP (Arterial line BP): 149/44  ABP Mean (Arterial Line Mean): 74 mmHg    No data found.    Patient Vitals for the past 12 hrs:   CVP (Mean)   05/24/24 1900 6 mmHg         Respiratory support needed (if any):   Ventilator Settings:  Vent Days 4    Vt (Set, mL): 0 mL  Resp Rate (Set): 14 bpm  FiO2 : 40 %    PEEP/CPAP (cmH2O): 5    Admission weight Weight - Scale: 49.6 kg (109 lb 5.6 oz) (05/16/24 0701)    Today's weight    Wt Readings from Last 1 Encounters:   05/25/24 68 kg (149 lb 14.6 oz)        Tilley need assessed each shift: Yes  UOP >30ml/hr: No  Last documented BM (in last 48 hrs):  Patient Vitals for the past 48 hrs:   Last BM (including prior to admit)   05/24/24 0000 05/23/24 05/24/24 0045 05/23/24 05/24/24 0200 05/23/24 05/24/24 0800 05/24/24 05/24/24 1500 05/24/24 05/24/24 2000 05/24/24                Restraints (in use currently or dc'd in last 12 hrs): Yes    Order current and documentation up to date? Yes    Lines/Drains reviewed @ bedside.  CVC  05/22/24 Right Internal jugular (Active)   Number of days: 2       Peripheral IV 05/22/24 Left Arm (Active)   Number of days: 2       Arterial Line 05/22/24 Right Radial (Active)   Number of days: 2       Urinary Catheter 05/22/24 Tilley (Active)   Number of days: 2         Drip rates at handoff:    sodium bicarbonate 150 mEq in sterile water 1,000 mL infusion 75 mL/hr at 05/25/24 0614    sodium chloride Stopped (05/23/24 1110)    nitroGLYCERIN       norepinephrine Stopped (05/23/24 0011)    fentaNYL 25 mcg/hr (05/25/24 0614)    propofol Stopped (05/24/24 0223)    dextrose      sodium chloride         Lab Data:   CBC:   Recent Labs     05/24/24  0340 05/24/24  1157 05/25/24  0310   WBC 7.6  --  13.2*   HGB 7.5* 7.9* 8.9*   HCT 21.4*  --  26.2*   MCV 90.5  --  88.2   PLT 86*  --  59*     BMP:    Recent Labs     05/24/24  1430 05/25/24  0310    136   K 4.1 4.1   CO2 18* 18*   BUN 45* 50*   CREATININE 2.2* 2.3*     LIVR:   Recent Labs     05/24/24  0340 05/25/24  0310   AST 1,281* 790*   * 34     PT/INR:   Recent Labs     05/24/24  0654 05/25/24  0310   INR 1.42* 1.16*     APTT: No results for input(s): \"APTT\" in the last 72 hours.  ABG:   Recent Labs     05/24/24  0538 05/24/24  1157   PHART 7.408 7.359   [id number] 28.9* 33.0*   PO2ART 142.0* 98.9       Any consults during the shift? No    Any signed and held orders to be released?  No        4 Eyes Skin Assessment       The patient is being assessed for  Shift Handoff    I agree that at least one RN has performed a thorough Head to Toe Skin Assessment on the patient. ALL assessment sites listed below have been assessed.      Areas assessed by both nurses: Head, Face, Ears, Shoulders, Back, Chest, Arms, Elbows, Hands, Sacrum. Buttock, Coccyx, Ischium, Legs. Feet and Heels, Under Medical Devices , and Other          Does the Patient have a Wound? Yes wound(s) were present on assessment. LDA wound assessment was Initiated and completed by RN    Wound Care Orders initiated by RN: Yes       Thomas Prevention initiated by RN: No    Pressure Injury (Stage 3,4, Unstageable, DTI, NWPT, and Complex wounds) if present, place Wound referral order by RN under : No    New Ostomies, if present place, Ostomy referral order under : No     Nurse 1 eSignature: Electronically signed by Umesh Fernández RN on 5/25/24 at 6:59 AM EDT    **SHARE this note so that the co-signing nurse can place an  eSignature**    Nurse 2 eSignature: Electronically signed by Patricia Mckeon RN on 5/25/24 at 7:35 AM EDT

## 2024-05-25 NOTE — PROGRESS NOTES
NAME:  Darren Duncan  YOB: 1955  MEDICAL RECORD NUMBER:  [id number]    Shift Summary: SBT started this morning. Patient was more awake, following commands. Around 1830, patient became restless/RASS +2, tachycardic, hypertensive, tachypneic, desating to 88%. He was put back to AC/PRVC by the RT. Dr Duran was contacted, advised to start on Precedex 0.2 mcg/kg/hr and keep Fentanyl at current dose of 25mcg/hr.     Family updated: Yes:       Rhythm: Sinus Tachycardia    Most recent vitals:   Visit Vitals  /62   Pulse (!) 105   Temp 98.9 °F (37.2 °C) (Esophageal)   Resp 27   Ht 1.735 m (5' 8.31\")   Wt 68 kg (149 lb 14.6 oz)   SpO2 93%   BMI 22.59 kg/m²      ABP (Arterial line BP): (!) 168/39  ABP Mean (Arterial Line Mean): 77 mmHg    No data found.    Patient Vitals for the past 12 hrs:   CVP (Mean)   05/25/24 1800 0 mmHg   05/25/24 1700 7 mmHg   05/25/24 1600 7 mmHg   05/25/24 1500 6 mmHg   05/25/24 1400 9 mmHg   05/25/24 1300 7 mmHg   05/25/24 1200 4 mmHg   05/25/24 1100 3 mmHg   05/25/24 1000 4 mmHg   05/25/24 0910 5 mmHg   05/25/24 0900 4 mmHg         Respiratory support needed (if any):  - Ventilator Settings:  Vent Days 4    Vt (Set, mL): 0 mL  Resp Rate (Set): 14 bpm  FiO2 : 40 %    PEEP/CPAP (cmH2O): 5       Admission weight Weight - Scale: 49.6 kg (109 lb 5.6 oz) (05/16/24 0701)    Today's weight    Wt Readings from Last 1 Encounters:   05/25/24 68 kg (149 lb 14.6 oz)        King need assessed each shift: YES -  - continue king r/t -    UOP >30ml/hr: NO -    Last documented BM (in last 48 hrs):  Patient Vitals for the past 48 hrs:   Last BM (including prior to admit)   05/24/24 0000 05/23/24 05/24/24 0045 05/23/24 05/24/24 0200 05/23/24 05/24/24 0800 05/24/24 05/24/24 1500 05/24/24 05/24/24 2000 05/24/24 05/25/24 0800 05/25/24                Restraints (in use currently or dc'd in last 12 hrs): Yes    Order current and documentation up to date? Yes    Lines/Drains reviewed @  bedside.  CVC  05/22/24 Right Internal jugular (Active)   Number of days: 3       Peripheral IV 05/22/24 Left Arm (Active)   Number of days: 3       Arterial Line 05/22/24 Right Radial (Active)   Number of days: 3       Urinary Catheter 05/22/24 Tilley (Active)   Number of days: 3         Drip rates at handoff:    sodium bicarbonate 150 mEq in sterile water 1,000 mL infusion 75 mL/hr at 05/25/24 1515    dexmedeTOMIDine 0.4 mcg/kg/hr (05/25/24 1923)    sodium chloride Stopped (05/23/24 1110)    nitroGLYCERIN      norepinephrine Stopped (05/23/24 0011)    fentaNYL 25 mcg/hr (05/25/24 1923)    propofol Stopped (05/24/24 0223)    dextrose      sodium chloride         Lab Data:   CBC:   Recent Labs     05/24/24  0340 05/24/24  1157 05/25/24  0310   WBC 7.6  --  13.2*   HGB 7.5* 7.9* 8.9*   HCT 21.4*  --  26.2*   MCV 90.5  --  88.2   PLT 86*  --  59*     BMP:    Recent Labs     05/24/24  1430 05/25/24  0310    136   K 4.1 4.1   CO2 18* 18*   BUN 45* 50*   CREATININE 2.2* 2.3*     LIVR:   Recent Labs     05/24/24  0340 05/25/24  0310   AST 1,281* 790*   * 34     PT/INR:   Recent Labs     05/24/24  0654 05/25/24  0310   INR 1.42* 1.16*     APTT: No results for input(s): \"APTT\" in the last 72 hours.  ABG:   Recent Labs     05/24/24  0538 05/24/24  1157   PHART 7.408 7.359   [id number] 28.9* 33.0*   PO2ART 142.0* 98.9       Any consults during the shift? No    Any signed and held orders to be released?  No        4 Eyes Skin Assessment       The patient is being assessed for  Shift Handoff    I agree that at least one RN has performed a thorough Head to Toe Skin Assessment on the patient. ALL assessment sites listed below have been assessed.      Areas assessed by both nurses: Head, Face, Ears, Shoulders, Back, Chest, Arms, Elbows, Hands, Sacrum. Buttock, Coccyx, Ischium, Legs. Feet and Heels, and Under Medical Devices         Does the Patient have a Wound? Yes wound(s) were present on assessment. LDA wound

## 2024-05-25 NOTE — CONSULTS
Nephrology ([facility]) Consult Note    Darern Duncan is a 68 y.o. male whom we were asked to see for ANIYA.  He presented on 05/16 with worsening of his chronic abdominal pain.  He had unintentional weight loss over the last 2 years.  He was evaluated by GI when his abdominal pain worsened on 05/20.  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  He went to the OR on 05/22 with partial small bowel resection and cholecystectomy and was taken back on 05/23 for small bowel anastomosis and abdominal closure.      05/21              lisinopril stopped  05/22 Cr 0.6  05/23 Cr 1.2  05/24 Cr 1.9  05/25 Cr 2.3    Unable to see vitals during surgery.    Patient ventilated, so no history possible.    Past Medical History:  h/o CVA  PVD  HTN  HLP    Review of System:  Otherwise unremarkable    Allergies:  Tree nut [macadamia nut oil]    Medications:  Current medications reviewed.    Social History:  former tobacco  Family Medical History:  Negative for kidney disase    Physical Exam:  Blood pressure (!) 108/58, pulse 84, temperature 98.4 °F (36.9 °C), temperature source Esophageal, resp. rate 12, height 1.735 m (5' 8.31\"), weight 68 kg (149 lb 14.6 oz), SpO2 100 %.      Intake/Output Summary (Last 24 hours) at 5/25/2024 1413  Last data filed at 5/25/2024 1400  Gross per 24 hour   Intake 2413.39 ml   Output 600 ml   Net 1813.39 ml       General:  NAD, ventilated, ill-appearing, normal body habitus  HEENT:  PERRL, EOMI  Neck:  Supple, normal range of movement  Chest:  coarse BS  CV:  RRR, no murmurs or rubs, no carotid bruit, no abdominal bruits  Abdomen:  surgical site  Extremities:  1+ edema  Neurological:  N/A  Lymphatics:  No palpable lymph nodes  Skin:  No rash, no jaundice  Psychiatric:  N/A    Laboratory Studies:  Lab Results   Component Value Date/Time     05/25/2024 03:10 AM    K 4.1 05/25/2024 03:10 AM     05/25/2024 03:10 AM    CO2 18 (L) 05/25/2024 03:10 AM    BUN 50 (H)  05/25/2024 03:10 AM    CREATININE 2.3 (H) 05/25/2024 03:10 AM    CALCIUM 7.4 (L) 05/25/2024 03:10 AM    PHOS 2.5 05/23/2024 04:15 AM    MG 1.90 05/23/2024 04:15 AM     Lab Results   Component Value Date/Time    WBC 13.2 (H) 05/25/2024 03:10 AM    HGB 8.9 (L) 05/25/2024 03:10 AM    HCT 26.2 (L) 05/25/2024 03:10 AM    PLT 59 (L) 05/25/2024 03:10 AM       Assessment/Plan:  Reviewed old records and labs.    1) ANIYA   - suspect ATN secondary to hemodynamic changes during 05/22 surgery   - check urine studies   - king present.   - UO appears to be picking up on its own   - continue empiric volume   - no indication for dialysis    2) s/p partial small bowel resection on 05/22   - per surgery    3) ventilated   - per pulmonary   - on FiO2 40%    4) ID   - on zosyn    5) anemia   - will hold off iron studies   - will start AURORA to minimize risk of PRBC    6) thrombocytopenia   - > 50% drop in platelet count   - suspect secondary to consumption   - not currently on heparin   - rule out HIT    7) FEN   - metabolic acidosis    - on NaHCO3 gtt    critical care:  30+ min

## 2024-05-25 NOTE — PROGRESS NOTES
Clinical Pharmacy Note  Renal Dose Adjustment    Darren Duncan is receiving Famotidine 20 mg IVP Q12H.  This medication is renally eliminated.  Based on the patient's Estimated Creatinine Clearance: 30 mL/min (A) (based on SCr of 2.3 mg/dL (H)). and urine output, the dose has been adjusted to Famotidine 20 mg IVP Q24H per protocol.    Pharmacy will continue to monitor and adjust dose as needed for changes in renal function.       Umang Mansfield [facility], PharmD, BCPS  5/25/2024 7:50 AM

## 2024-05-25 NOTE — PROGRESS NOTES
[facility] Progress Note      Chief Complaint:   Chief Complaint   Patient presents with    Abdominal Pain     Pt c/o abdominal pain. Pt  states he was told he has stomach ulcers. Pt says he takes his medication as prescribe and gets no relief. Pt AAOX4 at triage         3 Days Post-Op: Aorta hepatic mesenteric bypass and small bowel resection, postop day 2 reanastomosis and abdominal closure    Darren Duncan is a 68 y.o. male patient.     Subjective  No acute events overnight  Patient more awake on vent this morning  Urine output increasing  Creatinine stable  Off pressors  Hemoglobin uptrending  Patient started on bicarb drip per nephrology yesterday    1. Abdominal pain, unspecified abdominal location    2. Failure to thrive in adult    3. Ileus (HCC)    4. Pre-operative cardiovascular examination    5. Cardiomyopathy, unspecified type (HCC)    6. Mesenteric artery stenosis (HCC)    7. Mesenteric ischemia (HCC)      Past Medical History:   Diagnosis Date    Cerebral artery occlusion with cerebral infarction (HCC)     Patient said he has no residual effects    Heart attack (HCC)     3 yr ago    Hx of blood clots     right side of neck    Hyperlipidemia     Hypertension     Peripheral vascular disease, unspecified (HCC) 09/22/2020     Past Surgical History Pertinent Negatives:   Procedure Date Noted    APPENDECTOMY 06/22/2018    BRAIN SURGERY 06/22/2018    CARDIAC SURGERY 06/22/2018    CARDIAC VALVE REPLACEMENT 06/22/2018    CHOLECYSTECTOMY 06/22/2018    CORONARY ARTERY BYPASS GRAFT 06/22/2018    COSMETIC SURGERY 06/22/2018    EYE SURGERY 06/22/2018    FRACTURE SURGERY 06/22/2018    HERNIA REPAIR 06/22/2018    JOINT REPLACEMENT 06/22/2018    KNEE ARTHROPLASTY 06/22/2018    PROSTATE SURGERY 06/22/2018    PTCA 06/22/2018    SMALL INTESTINE SURGERY 06/22/2018    SUBTOTAL COLECTOMY 06/22/2018    TOTAL COLECTOMY 06/22/2018    TOTAL HIP ARTHROPLASTY 06/22/2018    UPPER GASTROINTESTINAL ENDOSCOPY  06/22/2018     Scheduled Meds:   famotidine (PEPCID) injection  20 mg IntraVENous Nightly    budesonide  0.5 mg Nebulization BID RT    piperacillin-tazobactam  2,250 mg IntraVENous Q8H    sodium chloride flush  5-40 mL IntraVENous 2 times per day    sodium chloride  1,000 mL IntraVENous Once    [Held by provider] amLODIPine  10 mg Oral Daily    [Held by provider] aspirin  81 mg Oral Daily    [Held by provider] atorvastatin  80 mg Oral Daily    [Held by provider] lisinopril  20 mg Oral Daily    [Held by provider] pantoprazole  40 mg Oral BID     Continuous Infusions:   sodium bicarbonate 150 mEq in sterile water 1,000 mL infusion 75 mL/hr at 05/25/24 0614    sodium chloride Stopped (05/23/24 1110)    nitroGLYCERIN      norepinephrine Stopped (05/23/24 0011)    fentaNYL 25 mcg/hr (05/25/24 0614)    propofol Stopped (05/24/24 0223)    dextrose      sodium chloride       PRN Meds:albuterol, sodium chloride flush, sodium chloride, potassium chloride, dextrose bolus **OR** dextrose bolus, dextrose, sodium chloride    Principal Problem:    Abdominal pain  Active Problems:    Mesenteric ischemia (HCC)    Mesenteric artery stenosis (HCC)    Chronic mesenteric ischemia (HCC)  Resolved Problems:    * No resolved hospital problems. *    Blood pressure 122/60, pulse 87, temperature 98.3 °F (36.8 °C), temperature source Esophageal, resp. rate 14, height 1.735 m (5' 8.31\"), weight 68 kg (149 lb 14.6 oz), SpO2 100 %.      Objective:  Vital signs (most recent): Blood pressure 122/60, pulse 87, temperature 98.3 °F (36.8 °C), temperature source Esophageal, resp. rate 14, height 1.735 m (5' 8.31\"), weight 68 kg (149 lb 14.6 oz), SpO2 100 %.  General appearance: Comfortable and in no acute distress.    Lungs:  The patient is intubated.    Heart: Normal rate.  Regular rhythm.    Abdomen: Abdomen is soft.    Wound:  Clean.        Assessment & Plan    68-year-old male status post aortomesenteric hepatic bypass.    Due to rising creatinine,  O-L Flap Text: The defect edges were debeveled with a #15 scalpel blade.  Given the location of the defect, shape of the defect and the proximity to free margins an O-L flap was deemed most appropriate.  Using a sterile surgical marker, an appropriate advancement flap was drawn incorporating the defect and placing the expected incisions within the relaxed skin tension lines where possible.    The area thus outlined was incised deep to adipose tissue with a #15 scalpel blade.  The skin margins were undermined to an appropriate distance in all directions utilizing iris scissors.

## 2024-05-25 NOTE — PROGRESS NOTES
General and Vascular Surgery                                                           Daily Progress Note                                                             John Craft MD    Pt Name: Darren Duncan  Medical Record Number: [id number]  Date of Birth 1955   Today's Date: 5/25/2024    ASSESSMENT/PLAN  S/P: 5/22:Small bowel resection, cholecystectomy  5/23: Second look laparotomy, small bowel wedge resection and primary closure, small bowel anastomosis, abdominal closure, incisional wound vac placement.  Intubated, management per ICU  Trending labs   Monitor s/s bleeding and Hb  S/p 1 unit pRBC 5/24  Pain control   Incisional wound vac to remain for 5 days   NPO  Continue NG decompression  +flexiseal secondary to loose BM's  Nephro consult recommendations noted      EDUCATION  Patient educated about their illness/diagnosis, stated above, and all questions answered. We discussed the importance of nutrition, medications they are taking, and healthy lifestyle.    SUBJECTIVE  Darren is stable. No pressors.  UOP improving.  Mental status improved, requiring more sedation.  Hb stable     OBJECTIVE  VITALS:  height is 1.735 m (5' 8.31\") and weight is 68 kg (149 lb 14.6 oz). His esophageal temperature is 98.4 °F (36.9 °C). His blood pressure is 98/55 (abnormal) and his pulse is 87. His respiration is 22 and oxygen saturation is 97%. VITALS:  BP (!) 98/55   Pulse 87   Temp 98.4 °F (36.9 °C) (Esophageal)   Resp 22   Ht 1.735 m (5' 8.31\")   Wt 68 kg (149 lb 14.6 oz)   SpO2 97%   BMI 22.59 kg/m²   GENERAL: No distress  LUNGS: intubated   HEART: normal rate and regular rhythm  ABDOMEN: soft, incisional tenderness   EXTREMITY: no cyanosis, clubbing or edema  I/O last 3 completed shifts:  In: 6387.2 [I.V.:4811.5; Blood:234; NG/GT:25; IV Piggyback:1316.8]  Out: 1058 [Urine:258; Emesis/NG output:500; Stool:300]  I/O this shift:  In: 470 [I.V.:420;

## 2024-05-25 NOTE — PROGRESS NOTES
Hospitalist Progress Note      PCP: Deep Lozano MD    Date of Admission: 5/16/2024  Current Hospital Day: 10    Chief Admission Complaint:     Presenting History:      68 years old male with medical history significant for hypertension peripheral vascular disease and history of CVA.     Patient presented to the emergency room with abdominal pain and significant weight loss over a period of 2 years.    Patient was suspected for bowel ischemia.  Vascular surgery was consulted and patient underwent a CT angiogram of the abdomen and pelvis.    Patient also underwent arterial duplex.    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  Patient was kept on clear liquid diet initially                   Patient with a diagnosis of chronic mesenteric ischemia, status post revascularization on 5/22/2024, partial bowel obstruction     Assessment/Plan:    Current Principle Problem:    Abdominal pain    Acute respiratory failure with hypoxia currently on the ventilator Vent management per pulmonary critical care.  Chronic mesenteric ischemia status post revascularization 5/22/2024, partial bowel resection NG tube to intermittent wall suction for GI decompression continue antibiotic until 5/27/2024 vascular, general surgery consulted and following.  Coagulopathy status post fresh frozen plasma.  Hypotension continue IV fluid and pressors to maintain systolic blood pressure more than 100  Cardiology consulted and following preop evaluation EF of 38%.  Acute on chronic abdominal pain likely related to bowel ischemia EGD, colonoscopy showed gastritis with ulceration and duodenitis continue with IV Pepcid.  History of hypertension currently medication on hold due to hypotension.  UTI received day 6 of fluconazole.  Insomnia patient is sedated on ventilator Paxil discontinue was started during this hospitalization.    DVT Prophylaxis: SCDs    Recent Labs     05/23/24  9741

## 2024-05-26 LAB
ALBUMIN SERPL-MCNC: 1.9 G/DL (ref 3.4–5)
ANION GAP SERPL CALCULATED.3IONS-SCNC: 14 MMOL/L (ref 3–16)
BASE EXCESS BLDA CALC-SCNC: -2 MMOL/L (ref -3–3)
BLOOD BANK DISPENSE STATUS: NORMAL
BLOOD BANK PRODUCT CODE: NORMAL
BPU ID: NORMAL
BUN SERPL-MCNC: 61 MG/DL (ref 7–20)
CALCIUM SERPL-MCNC: 7.3 MG/DL (ref 8.3–10.6)
CHLORIDE SERPL-SCNC: 105 MMOL/L (ref 99–110)
CO2 BLDA-SCNC: 24 MMOL/L
CO2 SERPL-SCNC: 21 MMOL/L (ref 21–32)
CREAT SERPL-MCNC: 2.4 MG/DL (ref 0.8–1.3)
DEPRECATED RDW RBC AUTO: 16.9 % (ref 12.4–15.4)
DESCRIPTION BLOOD BANK: NORMAL
GFR SERPLBLD CREATININE-BSD FMLA CKD-EPI: 29 ML/MIN/{1.73_M2}
GLUCOSE SERPL-MCNC: 71 MG/DL (ref 70–99)
HCO3 BLDA-SCNC: 22.6 MMOL/L (ref 21–29)
HCT VFR BLD AUTO: 26.4 % (ref 40.5–52.5)
HGB BLD-MCNC: 9.1 G/DL (ref 13.5–17.5)
INR PPP: 1.22 (ref 0.85–1.15)
LACTATE BLDV-SCNC: 1 MMOL/L (ref 0.4–2)
MCH RBC QN AUTO: 30.4 PG (ref 26–34)
MCHC RBC AUTO-ENTMCNC: 34.4 G/DL (ref 31–36)
MCV RBC AUTO: 88.3 FL (ref 80–100)
PCO2 BLDA: 34.9 MM HG (ref 35–45)
PERFORMED ON: ABNORMAL
PH BLDA: 7.42 [PH] (ref 7.35–7.45)
PHOSPHATE SERPL-MCNC: 3.1 MG/DL (ref 2.5–4.9)
PLATELET # BLD AUTO: 50 K/UL (ref 135–450)
PMV BLD AUTO: 8.9 FL (ref 5–10.5)
PO2 BLDA: 145.3 MM HG (ref 75–108)
POC SAMPLE TYPE: ABNORMAL
POTASSIUM SERPL-SCNC: 3.6 MMOL/L (ref 3.5–5.1)
PROTHROMBIN TIME: 15.6 SEC (ref 11.9–14.9)
RBC # BLD AUTO: 2.99 M/UL (ref 4.2–5.9)
SAO2 % BLDA: 99 % (ref 93–100)
SODIUM SERPL-SCNC: 140 MMOL/L (ref 136–145)
WBC # BLD AUTO: 16.1 K/UL (ref 4–11)

## 2024-05-26 PROCEDURE — 2500000003 HC RX 250 WO HCPCS: Performed by: INTERNAL MEDICINE

## 2024-05-26 PROCEDURE — 85027 COMPLETE CBC AUTOMATED: CPT

## 2024-05-26 PROCEDURE — 94640 AIRWAY INHALATION TREATMENT: CPT

## 2024-05-26 PROCEDURE — 6360000002 HC RX W HCPCS: Performed by: SURGERY

## 2024-05-26 PROCEDURE — 99291 CRITICAL CARE FIRST HOUR: CPT | Performed by: INTERNAL MEDICINE

## 2024-05-26 PROCEDURE — 6360000002 HC RX W HCPCS: Performed by: NURSE PRACTITIONER

## 2024-05-26 PROCEDURE — 85610 PROTHROMBIN TIME: CPT

## 2024-05-26 PROCEDURE — 99024 POSTOP FOLLOW-UP VISIT: CPT | Performed by: SURGERY

## 2024-05-26 PROCEDURE — 2700000000 HC OXYGEN THERAPY PER DAY

## 2024-05-26 PROCEDURE — 2100000000 HC CCU R&B

## 2024-05-26 PROCEDURE — 83605 ASSAY OF LACTIC ACID: CPT

## 2024-05-26 PROCEDURE — 2500000003 HC RX 250 WO HCPCS: Performed by: SURGERY

## 2024-05-26 PROCEDURE — 2580000003 HC RX 258: Performed by: INTERNAL MEDICINE

## 2024-05-26 PROCEDURE — 86022 PLATELET ANTIBODIES: CPT

## 2024-05-26 PROCEDURE — 2580000003 HC RX 258: Performed by: SURGERY

## 2024-05-26 PROCEDURE — 82803 BLOOD GASES ANY COMBINATION: CPT

## 2024-05-26 PROCEDURE — 94761 N-INVAS EAR/PLS OXIMETRY MLT: CPT

## 2024-05-26 PROCEDURE — 94003 VENT MGMT INPAT SUBQ DAY: CPT

## 2024-05-26 PROCEDURE — 99024 POSTOP FOLLOW-UP VISIT: CPT | Performed by: STUDENT IN AN ORGANIZED HEALTH CARE EDUCATION/TRAINING PROGRAM

## 2024-05-26 PROCEDURE — 6370000000 HC RX 637 (ALT 250 FOR IP): Performed by: INTERNAL MEDICINE

## 2024-05-26 PROCEDURE — 80069 RENAL FUNCTION PANEL: CPT

## 2024-05-26 RX ADMIN — Medication 25 MCG/HR: at 05:42

## 2024-05-26 RX ADMIN — SODIUM CHLORIDE, PRESERVATIVE FREE 20 MG: 5 INJECTION INTRAVENOUS at 22:03

## 2024-05-26 RX ADMIN — DEXMEDETOMIDINE HYDROCHLORIDE 1.2 MCG/KG/HR: 4 INJECTION, SOLUTION INTRAVENOUS at 23:54

## 2024-05-26 RX ADMIN — IPRATROPIUM BROMIDE AND ALBUTEROL SULFATE 1 DOSE: 2.5; .5 SOLUTION RESPIRATORY (INHALATION) at 08:34

## 2024-05-26 RX ADMIN — PIPERACILLIN AND TAZOBACTAM 2250 MG: 2; .25 INJECTION, POWDER, LYOPHILIZED, FOR SOLUTION INTRAVENOUS at 16:32

## 2024-05-26 RX ADMIN — SODIUM CHLORIDE: 9 INJECTION, SOLUTION INTRAVENOUS at 04:47

## 2024-05-26 RX ADMIN — PIPERACILLIN AND TAZOBACTAM 2250 MG: 2; .25 INJECTION, POWDER, LYOPHILIZED, FOR SOLUTION INTRAVENOUS at 08:21

## 2024-05-26 RX ADMIN — SODIUM BICARBONATE: 84 INJECTION, SOLUTION INTRAVENOUS at 18:19

## 2024-05-26 RX ADMIN — DEXMEDETOMIDINE HYDROCHLORIDE 0.6 MCG/KG/HR: 4 INJECTION, SOLUTION INTRAVENOUS at 07:18

## 2024-05-26 RX ADMIN — Medication 10 ML: at 22:03

## 2024-05-26 RX ADMIN — IPRATROPIUM BROMIDE AND ALBUTEROL SULFATE 1 DOSE: 2.5; .5 SOLUTION RESPIRATORY (INHALATION) at 12:30

## 2024-05-26 RX ADMIN — SODIUM BICARBONATE: 84 INJECTION, SOLUTION INTRAVENOUS at 02:46

## 2024-05-26 RX ADMIN — BUDESONIDE 500 MCG: 0.5 SUSPENSION RESPIRATORY (INHALATION) at 08:34

## 2024-05-26 RX ADMIN — PIPERACILLIN AND TAZOBACTAM 2250 MG: 2; .25 INJECTION, POWDER, LYOPHILIZED, FOR SOLUTION INTRAVENOUS at 00:22

## 2024-05-26 RX ADMIN — IPRATROPIUM BROMIDE AND ALBUTEROL SULFATE 1 DOSE: 2.5; .5 SOLUTION RESPIRATORY (INHALATION) at 20:25

## 2024-05-26 RX ADMIN — BUDESONIDE 500 MCG: 0.5 SUSPENSION RESPIRATORY (INHALATION) at 20:25

## 2024-05-26 RX ADMIN — DEXMEDETOMIDINE HYDROCHLORIDE 0.6 MCG/KG/HR: 4 INJECTION, SOLUTION INTRAVENOUS at 17:13

## 2024-05-26 RX ADMIN — PIPERACILLIN AND TAZOBACTAM 2250 MG: 2; .25 INJECTION, POWDER, LYOPHILIZED, FOR SOLUTION INTRAVENOUS at 23:51

## 2024-05-26 ASSESSMENT — PULMONARY FUNCTION TESTS
PIF_VALUE: 20
PIF_VALUE: 17
PIF_VALUE: 16
PIF_VALUE: 20
PIF_VALUE: 20
PIF_VALUE: 16
PIF_VALUE: 20
PIF_VALUE: 17
PIF_VALUE: 19
PIF_VALUE: 23
PIF_VALUE: 15
PIF_VALUE: 20
PIF_VALUE: 18
PIF_VALUE: 16
PIF_VALUE: 16
PIF_VALUE: 17
PIF_VALUE: 19
PIF_VALUE: 17
PIF_VALUE: 21
PIF_VALUE: 17
PIF_VALUE: 20
PIF_VALUE: 17
PIF_VALUE: 17
PIF_VALUE: 20
PIF_VALUE: 16
PIF_VALUE: 22
PIF_VALUE: 16
PIF_VALUE: 19
PIF_VALUE: 16
PIF_VALUE: 18
PIF_VALUE: 16
PIF_VALUE: 18
PIF_VALUE: 16
PIF_VALUE: 16
PIF_VALUE: 21
PIF_VALUE: 16
PIF_VALUE: 20
PIF_VALUE: 16
PIF_VALUE: 19
PIF_VALUE: 20
PIF_VALUE: 18
PIF_VALUE: 21
PIF_VALUE: 17
PIF_VALUE: 17
PIF_VALUE: 18
PIF_VALUE: 18
PIF_VALUE: 15
PIF_VALUE: 16
PIF_VALUE: 18
PIF_VALUE: 17
PIF_VALUE: 16
PIF_VALUE: 17
PIF_VALUE: 16
PIF_VALUE: 17
PIF_VALUE: 20
PIF_VALUE: 19
PIF_VALUE: 21
PIF_VALUE: 19
PIF_VALUE: 18
PIF_VALUE: 18
PIF_VALUE: 16
PIF_VALUE: 17
PIF_VALUE: 18
PIF_VALUE: 20
PIF_VALUE: 21
PIF_VALUE: 21
PIF_VALUE: 16
PIF_VALUE: 18
PIF_VALUE: 17
PIF_VALUE: 17
PIF_VALUE: 16
PIF_VALUE: 17
PIF_VALUE: 16
PIF_VALUE: 17
PIF_VALUE: 15
PIF_VALUE: 20
PIF_VALUE: 17
PIF_VALUE: 17
PIF_VALUE: 18
PIF_VALUE: 19
PIF_VALUE: 20
PIF_VALUE: 18
PIF_VALUE: 16
PIF_VALUE: 18
PIF_VALUE: 20
PIF_VALUE: 19
PIF_VALUE: 17
PIF_VALUE: 18
PIF_VALUE: 20
PIF_VALUE: 15
PIF_VALUE: 18
PIF_VALUE: 21
PIF_VALUE: 19

## 2024-05-26 ASSESSMENT — PAIN SCALES - GENERAL
PAINLEVEL_OUTOF10: 0

## 2024-05-26 NOTE — PROGRESS NOTES
NAME:  Darren Duncan  YOB: 1955  MEDICAL RECORD NUMBER:  [id number]    Shift Summary: Agitated- precedex started. Levo restarted for cuff SBP>100. UO improved to 35-40ml/hr. DTI found on coccyx.    Family updated: yes significant other via phone    Rhythm: Normal Sinus Rhythm     Most recent vitals:   Visit Vitals  BP (!) 112/57   Pulse 68   Temp 97.2 °F (36.2 °C) (Esophageal)   Resp 14   Ht 1.735 m (5' 8.31\")   Wt 67.6 kg (149 lb 0.5 oz)   SpO2 100%   BMI 22.46 kg/m²      ABP (Arterial line BP): 141/45  ABP Mean (Arterial Line Mean): 73 mmHg    No data found.    Patient Vitals for the past 12 hrs:   CVP (Mean)   05/26/24 0400 6 mmHg   05/26/24 0000 5 mmHg   05/25/24 2000 6 mmHg         Respiratory support needed (if any):  - Ventilator Settings:  Vent Days   Vt (Set, mL): 0 mL  Resp Rate (Set): 14 bpm  FiO2 : 40 %    PEEP/CPAP (cmH2O): 5       Admission weight Weight - Scale: 49.6 kg (109 lb 5.6 oz) (05/16/24 0701)    Today's weight    Wt Readings from Last 1 Encounters:   05/26/24 67.6 kg (149 lb 0.5 oz)        King need assessed each shift: YES -  - continue king r/t - strict I/O  UOP >30ml/hr: YES  Last documented BM (in last 48 hrs):  Patient Vitals for the past 48 hrs:   Last BM (including prior to admit)   05/24/24 0800 05/24/24 05/24/24 1500 05/24/24 05/24/24 2000 05/24/24 05/25/24 0800 05/25/24 05/25/24 2000 05/25/24 05/26/24 0200 05/26/24                Restraints (in use currently or dc'd in last 12 hrs): Yes    Order current and documentation up to date? Yes    Lines/Drains reviewed @ bedside.  CVC  05/22/24 Right Internal jugular (Active)   Number of days: 3       Peripheral IV 05/22/24 Left Arm (Active)   Number of days: 3       Arterial Line 05/22/24 Right Radial (Active)   Number of days: 3       Urinary Catheter 05/22/24 King (Active)   Number of days: 3         Drip rates at handoff:    sodium bicarbonate 150 mEq in sterile water 1,000 mL infusion 75 mL/hr at 05/26/24  eSignature: Electronically signed by Alondra Martinez RN on 5/26/24 at 6:46 AM EDT    **SHARE this note so that the co-signing nurse can place an eSignature**    Nurse 2 eSignature: Electronically signed by Soni Cloud RN on 5/26/24 at 6:56 PM EDT

## 2024-05-26 NOTE — PROGRESS NOTES
NAME:  Darren Duncan  YOB: 1955  MEDICAL RECORD NUMBER:  [id number]    Shift Summary: Levo turned off, Vent on SIMV/AC+, Fentanyl turned down to 12.5, Had low temp earlier put Bear hugger on temp back up and has stayed up.    Family updated: Yes:  At bedside    Rhythm: Normal Sinus Rhythm     Most recent vitals:   Visit Vitals  BP (!) 127/58   Pulse 77   Temp 99.4 °F (37.4 °C) (Esophageal)   Resp 15   Ht 1.735 m (5' 8.31\")   Wt 67.6 kg (149 lb 0.5 oz)   SpO2 100%   BMI 22.46 kg/m²      ABP (Arterial line BP): 157/43  ABP Mean (Arterial Line Mean): 75 mmHg    No data found.    Patient Vitals for the past 12 hrs:   CVP (Mean)   05/26/24 1830 1 mmHg   05/26/24 1815 1 mmHg   05/26/24 1800 1 mmHg   05/26/24 1745 2 mmHg   05/26/24 1730 2 mmHg   05/26/24 1715 3 mmHg   05/26/24 1700 3 mmHg   05/26/24 1645 4 mmHg   05/26/24 1630 3 mmHg   05/26/24 1615 4 mmHg   05/26/24 1600 3 mmHg   05/26/24 1545 3 mmHg   05/26/24 1530 3 mmHg   05/26/24 1515 5 mmHg   05/26/24 1500 2 mmHg   05/26/24 1445 3 mmHg   05/26/24 1430 2 mmHg   05/26/24 1415 2 mmHg   05/26/24 1400 2 mmHg   05/26/24 1345 1 mmHg   05/26/24 1330 2 mmHg   05/26/24 1315 2 mmHg   05/26/24 1300 1 mmHg   05/26/24 1245 2 mmHg   05/26/24 1230 3 mmHg   05/26/24 1215 3 mmHg   05/26/24 1200 3 mmHg   05/26/24 1015 2 mmHg   05/26/24 1000 1 mmHg   05/26/24 0945 3 mmHg   05/26/24 0930 1 mmHg   05/26/24 0915 1 mmHg   05/26/24 0900 1 mmHg   05/26/24 0845 2 mmHg   05/26/24 0830 3 mmHg   05/26/24 0815 2 mmHg   05/26/24 0800 3 mmHg   05/26/24 0745 3 mmHg   05/26/24 0730 4 mmHg   05/26/24 0715 1 mmHg   05/26/24 0700 2 mmHg         Respiratory support needed (if any):  - Ventilator Settings:  Vent Days ***    Vt (Set, mL): 0 mL  Resp Rate (Set): 7 bpm  FiO2 : 40 %    PEEP/CPAP (cmH2O): 5       Admission weight Weight - Scale: 49.6 kg (109 lb 5.6 oz) (05/16/24 0701)    Today's weight    Wt Readings from Last 1 Encounters:   05/26/24 67.6 kg (149 lb 0.5 oz)        Jarek soto  assessed each shift: YES -  - continue king r/t - ***  UOP >30ml/hr: YES  Last documented BM (in last 48 hrs):  Patient Vitals for the past 48 hrs:   Last BM (including prior to admit)   05/24/24 2000 05/24/24 05/25/24 0800 05/25/24 05/25/24 2000 05/25/24 05/26/24 0200 05/26/24 05/26/24 0800 05/26/24 05/26/24 1000 05/26/24 05/26/24 1200 05/26/24 05/26/24 1600 05/26/24                Restraints (in use currently or dc'd in last 12 hrs): Yes    Order current and documentation up to date? Yes    Lines/Drains reviewed @ bedside.  CVC  05/22/24 Right Internal jugular (Active)   Number of days: 4       Peripheral IV 05/22/24 Left Arm (Active)   Number of days: 4       Arterial Line 05/22/24 Right Radial (Active)   Number of days: 4       Urinary Catheter 05/22/24 King (Active)   Number of days: 4         Drip rates at handoff:    sodium bicarbonate 150 mEq in sterile water 1,000 mL infusion 75 mL/hr at 05/26/24 1819    dexmedeTOMIDine 0.6 mcg/kg/hr (05/26/24 1713)    sodium chloride 5 mL/hr at 05/26/24 1334    nitroGLYCERIN      norepinephrine Stopped (05/26/24 0823)    fentaNYL 12.5 mcg/hr (05/26/24 1334)    propofol Stopped (05/24/24 0223)    dextrose      sodium chloride         Lab Data:   CBC:   Recent Labs     05/25/24  0310 05/26/24  0850   WBC 13.2* 16.1*   HGB 8.9* 9.1*   HCT 26.2* 26.4*   MCV 88.2 88.3   PLT 59* 50*     BMP:    Recent Labs     05/25/24  0310 05/26/24  0500    140   K 4.1 3.6   CO2 18* 21   BUN 50* 61*   CREATININE 2.3* 2.4*     LIVR:   Recent Labs     05/24/24  0340 05/25/24  0310   AST 1,281* 790*   * 34     PT/INR:   Recent Labs     05/25/24  0310 05/26/24  0500   INR 1.16* 1.22*     APTT: No results for input(s): \"APTT\" in the last 72 hours.  ABG:   Recent Labs     05/24/24  1157 05/26/24  0934   PHART 7.359 7.419   OWK1SRN 33.0* 34.9*   PO2ART 98.9 145.3*       Any consults during the shift? No    Any signed and held orders to be released?  No        4 Eyes Skin

## 2024-05-26 NOTE — PROGRESS NOTES
hospitalization.  Thrombocytopenia monitor closely check HIT antibody       DVT Prophylaxis: SCDs    Recent Labs     05/24/24  0340 05/25/24  0310   PLT 86* 59*     Diet: Diet NPO  Code Status: Full Code      PT/OT Eval Status:     Dispo -     Subjective: Sedated on ventilator      Medications:  Reviewed    Infusion Medications    sodium bicarbonate 150 mEq in sterile water 1,000 mL infusion 75 mL/hr at 05/26/24 0600    dexmedeTOMIDine 0.6 mcg/kg/hr (05/26/24 0718)    sodium chloride 5 mL/hr at 05/26/24 0600    nitroGLYCERIN      norepinephrine 1 mcg/min (05/26/24 0600)    fentaNYL 25 mcg/hr (05/26/24 0600)    propofol Stopped (05/24/24 0223)    dextrose      sodium chloride       Scheduled Medications    famotidine (PEPCID) injection  20 mg IntraVENous Nightly    ipratropium 0.5 mg-albuterol 2.5 mg  1 Dose Inhalation TID    epoetin more-epbx  10,000 Units SubCUTAneous Weekly    budesonide  0.5 mg Nebulization BID RT    piperacillin-tazobactam  2,250 mg IntraVENous Q8H    sodium chloride flush  5-40 mL IntraVENous 2 times per day    sodium chloride  1,000 mL IntraVENous Once    [Held by provider] amLODIPine  10 mg Oral Daily    [Held by provider] aspirin  81 mg Oral Daily    [Held by provider] atorvastatin  80 mg Oral Daily    [Held by provider] lisinopril  20 mg Oral Daily    [Held by provider] pantoprazole  40 mg Oral BID     PRN Meds: albuterol, sodium chloride flush, sodium chloride, potassium chloride, dextrose bolus **OR** dextrose bolus, dextrose, sodium chloride      Intake/Output Summary (Last 24 hours) at 5/26/2024 0809  Last data filed at 5/26/2024 0600  Gross per 24 hour   Intake 2902.71 ml   Output 950 ml   Net 1952.71 ml       Physical Exam Performed:    BP (!) 105/54   Pulse 62   Temp 97.2 °F (36.2 °C) (Esophageal)   Resp 14   Ht 1.735 m (5' 8.31\")   Wt 67.6 kg (149 lb 0.5 oz)   SpO2 100%   BMI 22.46 kg/m²     General appearance: No apparent distress, appears stated age and sedated on  ventilator  HEENT: Pupils equal, round, and reactive to light. Conjunctivae/corneas clear.  Neck: Supple, with full range of motion. No jugular venous distention. Trachea midline.  Respiratory:  Normal respiratory effort. Clear to auscultation, bilaterally without Rales/Wheezes/Rhonchi.  Cardiovascular: Regular rate and rhythm with normal S1/S2 without murmurs, rubs or gallops.  Abdomen: Soft, non-tender, non-distended with normal bowel sounds.  Musculoskeletal: No clubbing, cyanosis or edema bilaterally.  Full range of motion without deformity.  Skin: Skin color, texture, turgor normal.  No rashes or lesions.  Neurologic: On ventilator  Psychiatric: On ventilator  Capillary Refill: Brisk,< 3 seconds   Peripheral Pulses: +2 palpable, equal bilaterally       Labs: Personally reviewed and interpreted for clinical significance    Recent Labs     05/24/24  0340 05/24/24  1157 05/25/24  0310   WBC 7.6  --  13.2*   HGB 7.5* 7.9* 8.9*   HCT 21.4*  --  26.2*   PLT 86*  --  59*     Recent Labs     05/24/24  1430 05/25/24  0310 05/26/24  0500    136 140   K 4.1 4.1 3.6    106 105   CO2 18* 18* 21   BUN 45* 50* 61*   CREATININE 2.2* 2.3* 2.4*   CALCIUM 7.5* 7.4* 7.3*   PHOS  --   --  3.1     Recent Labs     05/24/24  0340 05/25/24  0310   AST 1,281* 790*   * 34   BILITOT 0.6 0.5   ALKPHOS 73 84     Recent Labs     05/24/24  0654 05/25/24  0310 05/26/24  0500   INR 1.42* 1.16* 1.22*     No results for input(s): \"CKTOTAL\", \"TROPHS\" in the last 72 hours.    Urinalysis:      Lab Results   Component Value Date/Time    NITRU Negative 05/16/2024 07:36 AM    WBCUA 1 05/16/2024 07:36 AM    BACTERIA None Seen 05/16/2024 07:36 AM    RBCUA 3 05/16/2024 07:36 AM    BLOODU Negative 05/16/2024 07:36 AM    GLUCOSEU Negative 05/16/2024 07:36 AM       Consults:    IP CONSULT TO GI  IP CONSULT TO SPIRITUAL SERVICES  IP CONSULT TO GENERAL SURGERY  IP CONSULT TO VASCULAR SURGERY  IP CONSULT TO CARDIOLOGY  IP CONSULT TO  PULMONOLOGY  IP CONSULT TO NEPHROLOGY    Discussed management of the case in detail w/ RN on 5/26/24 w/ recs for         MOISE COLE MD

## 2024-05-26 NOTE — PROGRESS NOTES
Pulmonary Critical Care Progress Note     Patient's name:  Darren Duncan  Medical Record Number: [id number]  Patient's account/billing number: [id number]  Patient's YOB: 1955  Age: 68 y.o.  Date of Admission: 5/16/2024  6:56 AM  Date of Consult: 5/26/2024      Primary Care Physician: Deep Lozano MD      Code Status: Full Code    Chief complaint: Acute respiratory failure    Assessment and Plan     Acute respiratory failure mechanical ventilation  Chronic mesenteric ischemia status post revascularization 5/22/2024 Antegrade Aortocommon hepatic-mesenteric artery bypass   Hypertension.  Acute kidney injury with metabolic acidosis  Cardiomyopathy  COPD    Plan:  Continue ventilator support, SIMV, no plan to extubate today   Zosyn  IV fluid per nephrology   GI prophylaxis  Bronchodilators  Due to the immediate potential for life-threatening deterioration due to above, I spent 31 minutes providing critical care.  This time is excluding time spent performing procedures.  This note was transcribed using Dragon Dictation software. Please disregard any translational errors.        Overnight:  No acute events overnight  Afebrile    REVIEW OF SYSTEMS:  Review of Systems -   Unable to obtain sedated on mechanical ventilation        Physical Exam:    Vitals: BP (!) 111/56   Pulse 72   Temp (!) 95.6 °F (35.3 °C) (Esophageal)   Resp 14   Ht 1.735 m (5' 8.31\")   Wt 67.6 kg (149 lb 0.5 oz)   SpO2 100%   BMI 22.46 kg/m²     Last Body weight:   Wt Readings from Last 3 Encounters:   05/26/24 67.6 kg (149 lb 0.5 oz)   02/27/24 60.8 kg (134 lb)   02/01/24 63.5 kg (140 lb)       Body Mass Index : Body mass index is 22.46 kg/m².      Intake and Output summary:   Intake/Output Summary (Last 24 hours) at 5/26/2024 1140  Last data filed at 5/26/2024 1000  Gross per 24 hour   Intake 2600.27 ml   Output 1025 ml   Net 1575.27 ml         Physical Examination:     Gen: Sedated on mechanical  ventilation  Eyes: PERRL. Anicteric sclera. No conjunctival injection.   ENT: No discharge. Posterior oropharynx clear. External appearance of ears and nose normal.  Neck: Trachea midline. No mass   Resp: Diminished bilaterally with no active wheezing  CV: Regular rate. Regular rhythm. No murmur or rub. No edema.   GI: Midline incision with wound VAC soft, Non-tender. Non-distended. +BS  Skin: Warm, dry, w/o erythema.   Lymph: No cervical or supraclavicular LAD.   M/S: No cyanosis. No clubbing.    Neuro: Sedated on mechanical ventilation no seizures or abnormal activities        Laboratory findings:-    CBC:   Recent Labs     05/26/24  0850   WBC 16.1*   HGB 9.1*   PLT 50*       BMP:    Recent Labs     05/24/24  1430 05/25/24  0310 05/26/24  0500    136 140   K 4.1 4.1 3.6    106 105   CO2 18* 18* 21   BUN 45* 50* 61*   CREATININE 2.2* 2.3* 2.4*   GLUCOSE 95 85 71       S. Calcium:  Recent Labs     05/26/24  0500   CALCIUM 7.3*       S. Ionized Calcium:Invalid input(s): \"IONCA\"  S. Magnesium:  No results for input(s): \"MG\" in the last 72 hours.    S. Phosphorus:  Recent Labs     05/26/24  0500   PHOS 3.1       S. Glucose:  Recent Labs     05/24/24  2125 05/25/24  0316 05/25/24  1537   POCGLU 86 87 74             Radiology Review:  Pertinent images / reports were reviewed as a part of this visit.      Aminah Morales MD, M.D.            5/26/2024, 11:40 AM

## 2024-05-26 NOTE — PROCEDURES
INTERPRETATION:  This more than 3-hour, rapid 8-channel EEG recording is abnormal.   It showed moderate to severe continuous generalized slowing background activity.     The EEG findings were consistent with moderate to severe nonspecific generalized cerebral dysfunction or medication effect.    CLASSIFICATION:  Dysrhythmia grade 2, generalized.    DESCRIPTION:  BACKGROUND:  The EEG background showed continuous generalized low amplitude intermixed 2 to 7 Hz theta/delta activity with occasional EEG attenuation.  The EEG showed fair variability and reactivity.      INTERICTAL DISCHARGES: None    SEIZURE BURDEN: 0%

## 2024-05-26 NOTE — PROGRESS NOTES
General and Vascular Surgery                                                           Daily Progress Note                                                             John Craft MD    Pt Name: Darren Duncan  Medical Record Number: [id number]  Date of Birth 1955   Today's Date: 5/26/2024    ASSESSMENT/PLAN  S/P: 5/22:Small bowel resection, cholecystectomy  5/23: Second look laparotomy, small bowel wedge resection and primary closure, small bowel anastomosis, abdominal closure, incisional wound vac placement.  Intubated, management per ICU, wean and extubate as able  Trending labs   Monitor s/s bleeding and Hb  S/p 1 unit pRBC 5/24  Pain control   Incisional wound vac to remain for 5 days   NPO  Continue NG decompression  +flexiseal secondary to loose BM's  Nephro consult recommendations noted, trending UOP and Cr.   Continue IV antibiotics for at least 4 days post op (source control)    EDUCATION  Patient educated about their illness/diagnosis, stated above, and all questions answered. We discussed the importance of nutrition, medications they are taking, and healthy lifestyle.    SUBJECTIVE  Darren is stable. No pressors.  UOP improving.  Hb stable     OBJECTIVE  VITALS:  height is 1.735 m (5' 8.31\") and weight is 67.6 kg (149 lb 0.5 oz). His esophageal temperature is 97.2 °F (36.2 °C). His blood pressure is 105/54 (abnormal) and his pulse is 65. His respiration is 22 and oxygen saturation is 100%. VITALS:  BP (!) 105/54   Pulse 65   Temp 97.2 °F (36.2 °C) (Esophageal)   Resp 22   Ht 1.735 m (5' 8.31\")   Wt 67.6 kg (149 lb 0.5 oz)   SpO2 100%   BMI 22.46 kg/m²   GENERAL: No distress  LUNGS: intubated   HEART: normal rate and regular rhythm  ABDOMEN: soft, incisional tenderness, incisional wound vac in place  EXTREMITY: no cyanosis, clubbing or edema  I/O last 3 completed shifts:  In: 4280.9 [I.V.:4031.1; IV Piggyback:249.8]  Out: 1345

## 2024-05-26 NOTE — PLAN OF CARE
Problem: Discharge Planning  Goal: Discharge to home or other facility with appropriate resources  Outcome: Progressing  Flowsheets (Taken 5/26/2024 0800)  Discharge to home or other facility with appropriate resources: Identify barriers to discharge with patient and caregiver     Problem: Pain  Goal: Verbalizes/displays adequate comfort level or baseline comfort level  Outcome: Progressing  Flowsheets (Taken 5/26/2024 0800)  Verbalizes/displays adequate comfort level or baseline comfort level:   Assess pain using appropriate pain scale   Administer analgesics based on type and severity of pain and evaluate response   Implement non-pharmacological measures as appropriate and evaluate response   Consider cultural and social influences on pain and pain management     Problem: Safety - Adult  Goal: Free from fall injury  Outcome: Progressing     Problem: Gastrointestinal - Adult  Goal: Minimal or absence of nausea and vomiting  Outcome: Progressing  Flowsheets (Taken 5/26/2024 0800)  Minimal or absence of nausea and vomiting:   Administer IV fluids as ordered to ensure adequate hydration   Maintain NPO status until nausea and vomiting are resolved   Nasogastric tube to low intermittent suction as ordered   Administer ordered antiemetic medications as needed   Provide nonpharmacologic comfort measures as appropriate   Advance diet as tolerated, if ordered   Nutrition consult to assist patient with adequate nutrition and appropriate food choices  Goal: Maintains or returns to baseline bowel function  Outcome: Progressing  Flowsheets (Taken 5/26/2024 0800)  Maintains or returns to baseline bowel function: Assess bowel function  Goal: Maintains adequate nutritional intake  Outcome: Progressing  Flowsheets (Taken 5/26/2024 0800)  Maintains adequate nutritional intake: Monitor intake and output, weight and lab values     Problem: Chronic Conditions and Co-morbidities  Goal: Patient's chronic conditions and co-morbidity  symptoms are monitored and maintained or improved  Outcome: Progressing  Flowsheets (Taken 5/26/2024 0800)  Care Plan - Patient's Chronic Conditions and Co-Morbidity Symptoms are Monitored and Maintained or Improved: Monitor and assess patient's chronic conditions and comorbid symptoms for stability, deterioration, or improvement     Problem: Safety - Medical Restraint  Goal: Remains free of injury from restraints (Restraint for Interference with Medical Device)  Description: INTERVENTIONS:  1. Determine that other, less restrictive measures have been tried or would not be effective before applying the restraint  2. Evaluate the patient's condition at the time of restraint application  3. Inform patient/family regarding the reason for restraint  4. Q2H: Monitor safety, psychosocial status, comfort, nutrition and hydration  Outcome: Progressing  Flowsheets  Taken 5/26/2024 1200  Remains free of injury from restraints (restraint for interference with medical device): Every 2 hours: Monitor safety, psychosocial status, comfort, nutrition and hydration  Taken 5/26/2024 1000  Remains free of injury from restraints (restraint for interference with medical device): Every 2 hours: Monitor safety, psychosocial status, comfort, nutrition and hydration  Taken 5/26/2024 0800  Remains free of injury from restraints (restraint for interference with medical device): Every 2 hours: Monitor safety, psychosocial status, comfort, nutrition and hydration     Problem: Nutrition Deficit:  Goal: Optimize nutritional status  Outcome: Progressing     Problem: Skin/Tissue Integrity  Goal: Absence of new skin breakdown  Description: 1.  Monitor for areas of redness and/or skin breakdown  2.  Assess vascular access sites hourly  3.  Every 4-6 hours minimum:  Change oxygen saturation probe site  4.  Every 4-6 hours:  If on nasal continuous positive airway pressure, respiratory therapy assess nares and determine need for appliance change or

## 2024-05-26 NOTE — PROGRESS NOTES
Nephrology ([facility]) Progress Note    CC: ANIYA Duncan is a 68 y.o. male whom we were asked to see for ANIYA.  He presented on 05/16 with worsening of his chronic abdominal pain.  He had unintentional weight loss over the last 2 years.  He was evaluated by GI when his abdominal pain worsened on 05/20.  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  He went to the OR on 05/22 with partial small bowel resection and cholecystectomy and was taken back on 05/23 for small bowel anastomosis and abdominal closure.      Subjective:    HPI:  Ventilated.  Renal function stable.  UO better.  ROS:  In bed.  PMFSH:  medications reviewed.  Family present.    Objective:  Blood pressure (!) 111/56, pulse 72, temperature (!) 95.6 °F (35.3 °C), temperature source Esophageal, resp. rate 14, height 1.735 m (5' 8.31\"), weight 67.6 kg (149 lb 0.5 oz), SpO2 100 %.    Intake/Output Summary (Last 24 hours) at 5/26/2024 1210  Last data filed at 5/26/2024 1000  Gross per 24 hour   Intake 2600.27 ml   Output 995 ml   Net 1605.27 ml     General:  NAD, ventilated  Chest:  coarse BS  CVS:  RRR  Abdominal:  NTND, soft, decreased BS  Extremities:  1+ edema  Skin:  no rash    Labs:  Renal panel:  Lab Results   Component Value Date/Time     05/26/2024 05:00 AM    K 3.6 05/26/2024 05:00 AM    K 4.1 05/25/2024 03:10 AM    CO2 21 05/26/2024 05:00 AM    BUN 61 (H) 05/26/2024 05:00 AM    CREATININE 2.4 (H) 05/26/2024 05:00 AM    CALCIUM 7.3 (L) 05/26/2024 05:00 AM    PHOS 3.1 05/26/2024 05:00 AM    MG 1.90 05/23/2024 04:15 AM     CBC:  Lab Results   Component Value Date/Time    WBC 16.1 (H) 05/26/2024 08:50 AM    HGB 9.1 (L) 05/26/2024 08:50 AM    HCT 26.4 (L) 05/26/2024 08:50 AM    PLT 50 (L) 05/26/2024 08:50 AM       Assessment/Plan:  Reviewed old records and labs.    1) ANIYA              - suspect ATN secondary to hemodynamic changes during 05/22 surgery              - check urine studies              - fernando  present.              - UO picking up on its own, so holding off diuretics              - will likely decrease IVF tomorrow              - no indication for dialysis     2) s/p partial small bowel resection on 05/22              - per surgery     3) ventilated              - per pulmonary     4) ID              - on zosyn     5) anemia              - will hold off iron studies              - will start AURORA to minimize risk of PRBC     6) thrombocytopenia              - > 50% drop in platelet count              - suspect secondary to consumption              - not currently on heparin              - HIT Ab pending     7) FEN              - metabolic acidosis                          - on NaHCO3 gtt     critical care:  30+ min

## 2024-05-26 NOTE — PROGRESS NOTES
[facility] Vascular Surgery Progress Note      Chief Complaint:   Chief Complaint   Patient presents with    Abdominal Pain     Pt c/o abdominal pain. Pt  states he was told he has stomach ulcers. Pt says he takes his medication as prescribe and gets no relief. Pt AAOX4 at triage         3 Days Post-Op: Aorta hepatic mesenteric bypass and small bowel resection, postop day 2 reanastomosis and abdominal closure    Darren Duncan is a 68 y.o. male patient.     Subjective  No acute events overnight  Agitation yesterday causing hypertension, started precedex gtt  Urine output adequate  Creatinine stable  Intermittent hypotension when asleep requiring small dose of levo.    Patient started on bicarb drip per nephrology yesterday    1. Abdominal pain, unspecified abdominal location    2. Failure to thrive in adult    3. Ileus (HCC)    4. Pre-operative cardiovascular examination    5. Cardiomyopathy, unspecified type (HCC)    6. Mesenteric artery stenosis (HCC)    7. Mesenteric ischemia (HCC)      Past Medical History:   Diagnosis Date    Cerebral artery occlusion with cerebral infarction (HCC)     Patient said he has no residual effects    Heart attack (HCC)     3 yr ago    Hx of blood clots     right side of neck    Hyperlipidemia     Hypertension     Peripheral vascular disease, unspecified (HCC) 09/22/2020     Past Surgical History Pertinent Negatives:   Procedure Date Noted    APPENDECTOMY 06/22/2018    BRAIN SURGERY 06/22/2018    CARDIAC SURGERY 06/22/2018    CARDIAC VALVE REPLACEMENT 06/22/2018    CHOLECYSTECTOMY 06/22/2018    CORONARY ARTERY BYPASS GRAFT 06/22/2018    COSMETIC SURGERY 06/22/2018    EYE SURGERY 06/22/2018    FRACTURE SURGERY 06/22/2018    HERNIA REPAIR 06/22/2018    JOINT REPLACEMENT 06/22/2018    KNEE ARTHROPLASTY 06/22/2018    PROSTATE SURGERY 06/22/2018    PTCA 06/22/2018    SMALL INTESTINE SURGERY 06/22/2018    SUBTOTAL COLECTOMY 06/22/2018    TOTAL COLECTOMY 06/22/2018    TOTAL HIP  ARTHROPLASTY 06/22/2018    UPPER GASTROINTESTINAL ENDOSCOPY 06/22/2018     Scheduled Meds:   famotidine (PEPCID) injection  20 mg IntraVENous Nightly    ipratropium 0.5 mg-albuterol 2.5 mg  1 Dose Inhalation TID    epoetin more-epbx  10,000 Units SubCUTAneous Weekly    budesonide  0.5 mg Nebulization BID RT    piperacillin-tazobactam  2,250 mg IntraVENous Q8H    sodium chloride flush  5-40 mL IntraVENous 2 times per day    sodium chloride  1,000 mL IntraVENous Once    [Held by provider] amLODIPine  10 mg Oral Daily    [Held by provider] aspirin  81 mg Oral Daily    [Held by provider] atorvastatin  80 mg Oral Daily    [Held by provider] lisinopril  20 mg Oral Daily    [Held by provider] pantoprazole  40 mg Oral BID     Continuous Infusions:   sodium bicarbonate 150 mEq in sterile water 1,000 mL infusion 75 mL/hr at 05/26/24 0600    dexmedeTOMIDine 0.6 mcg/kg/hr (05/26/24 0718)    sodium chloride 5 mL/hr at 05/26/24 0600    nitroGLYCERIN      norepinephrine 1 mcg/min (05/26/24 0600)    fentaNYL 25 mcg/hr (05/26/24 0600)    propofol Stopped (05/24/24 0223)    dextrose      sodium chloride       PRN Meds:albuterol, sodium chloride flush, sodium chloride, potassium chloride, dextrose bolus **OR** dextrose bolus, dextrose, sodium chloride    Principal Problem:    Abdominal pain  Active Problems:    Mesenteric ischemia (HCC)    Mesenteric artery stenosis (HCC)    Chronic mesenteric ischemia (HCC)    Cardiomyopathy (HCC)    ANIYA (acute kidney injury) (HCC)  Resolved Problems:    * No resolved hospital problems. *    Blood pressure (!) 105/54, pulse 62, temperature 97.2 °F (36.2 °C), temperature source Esophageal, resp. rate 14, height 1.735 m (5' 8.31\"), weight 67.6 kg (149 lb 0.5 oz), SpO2 100 %.      Objective:  Vital signs (most recent): Blood pressure (!) 105/54, pulse 62, temperature 97.2 °F (36.2 °C), temperature source Esophageal, resp. rate 14, height 1.735 m (5' 8.31\"), weight 67.6 kg (149 lb 0.5 oz), SpO2 100

## 2024-05-27 LAB
ALBUMIN SERPL-MCNC: 1.9 G/DL (ref 3.4–5)
ANION GAP SERPL CALCULATED.3IONS-SCNC: 14 MMOL/L (ref 3–16)
BASE EXCESS BLDA CALC-SCNC: -1 MMOL/L (ref -3–3)
BUN SERPL-MCNC: 67 MG/DL (ref 7–20)
CALCIUM SERPL-MCNC: 7.4 MG/DL (ref 8.3–10.6)
CHLORIDE SERPL-SCNC: 102 MMOL/L (ref 99–110)
CO2 BLDA-SCNC: 24 MMOL/L
CO2 SERPL-SCNC: 22 MMOL/L (ref 21–32)
CREAT SERPL-MCNC: 2.2 MG/DL (ref 0.8–1.3)
GFR SERPLBLD CREATININE-BSD FMLA CKD-EPI: 32 ML/MIN/{1.73_M2}
GLUCOSE BLD-MCNC: 120 MG/DL (ref 70–99)
GLUCOSE BLD-MCNC: 62 MG/DL (ref 70–99)
GLUCOSE BLD-MCNC: 72 MG/DL (ref 70–99)
GLUCOSE BLD-MCNC: 77 MG/DL (ref 70–99)
GLUCOSE BLD-MCNC: 96 MG/DL (ref 70–99)
GLUCOSE SERPL-MCNC: 70 MG/DL (ref 70–99)
HCO3 BLDA-SCNC: 23 MMOL/L (ref 21–29)
MAGNESIUM SERPL-MCNC: 2 MG/DL (ref 1.8–2.4)
PCO2 BLDA: 30.9 MM HG (ref 35–45)
PERFORMED ON: ABNORMAL
PERFORMED ON: ABNORMAL
PERFORMED ON: NORMAL
PH BLDA: 7.48 [PH] (ref 7.35–7.45)
PHOSPHATE SERPL-MCNC: 3.7 MG/DL (ref 2.5–4.9)
PO2 BLDA: 86.4 MM HG (ref 75–108)
POC SAMPLE TYPE: ABNORMAL
POTASSIUM SERPL-SCNC: 3.5 MMOL/L (ref 3.5–5.1)
PROCALCITONIN SERPL IA-MCNC: 10.53 NG/ML (ref 0–0.15)
SAO2 % BLDA: 97 % (ref 93–100)
SODIUM SERPL-SCNC: 138 MMOL/L (ref 136–145)

## 2024-05-27 PROCEDURE — 82947 ASSAY GLUCOSE BLOOD QUANT: CPT

## 2024-05-27 PROCEDURE — 2500000003 HC RX 250 WO HCPCS: Performed by: SURGERY

## 2024-05-27 PROCEDURE — 82803 BLOOD GASES ANY COMBINATION: CPT

## 2024-05-27 PROCEDURE — 2700000000 HC OXYGEN THERAPY PER DAY

## 2024-05-27 PROCEDURE — 94761 N-INVAS EAR/PLS OXIMETRY MLT: CPT

## 2024-05-27 PROCEDURE — 99291 CRITICAL CARE FIRST HOUR: CPT | Performed by: INTERNAL MEDICINE

## 2024-05-27 PROCEDURE — 83735 ASSAY OF MAGNESIUM: CPT

## 2024-05-27 PROCEDURE — 89220 SPUTUM SPECIMEN COLLECTION: CPT

## 2024-05-27 PROCEDURE — 6360000002 HC RX W HCPCS: Performed by: SURGERY

## 2024-05-27 PROCEDURE — 87070 CULTURE OTHR SPECIMN AEROBIC: CPT

## 2024-05-27 PROCEDURE — 2580000003 HC RX 258: Performed by: SURGERY

## 2024-05-27 PROCEDURE — 36415 COLL VENOUS BLD VENIPUNCTURE: CPT

## 2024-05-27 PROCEDURE — 2580000003 HC RX 258: Performed by: ANESTHESIOLOGY

## 2024-05-27 PROCEDURE — 87186 SC STD MICRODIL/AGAR DIL: CPT

## 2024-05-27 PROCEDURE — 84145 PROCALCITONIN (PCT): CPT

## 2024-05-27 PROCEDURE — 2500000003 HC RX 250 WO HCPCS: Performed by: INTERNAL MEDICINE

## 2024-05-27 PROCEDURE — 99024 POSTOP FOLLOW-UP VISIT: CPT | Performed by: STUDENT IN AN ORGANIZED HEALTH CARE EDUCATION/TRAINING PROGRAM

## 2024-05-27 PROCEDURE — 2580000003 HC RX 258: Performed by: STUDENT IN AN ORGANIZED HEALTH CARE EDUCATION/TRAINING PROGRAM

## 2024-05-27 PROCEDURE — 87077 CULTURE AEROBIC IDENTIFY: CPT

## 2024-05-27 PROCEDURE — C1751 CATH, INF, PER/CENT/MIDLINE: HCPCS

## 2024-05-27 PROCEDURE — 87205 SMEAR GRAM STAIN: CPT

## 2024-05-27 PROCEDURE — 37799 UNLISTED PX VASCULAR SURGERY: CPT

## 2024-05-27 PROCEDURE — 36569 INSJ PICC 5 YR+ W/O IMAGING: CPT

## 2024-05-27 PROCEDURE — 05HY33Z INSERTION OF INFUSION DEVICE INTO UPPER VEIN, PERCUTANEOUS APPROACH: ICD-10-PCS | Performed by: STUDENT IN AN ORGANIZED HEALTH CARE EDUCATION/TRAINING PROGRAM

## 2024-05-27 PROCEDURE — 80069 RENAL FUNCTION PANEL: CPT

## 2024-05-27 PROCEDURE — 6360000002 HC RX W HCPCS: Performed by: NURSE PRACTITIONER

## 2024-05-27 PROCEDURE — 2500000003 HC RX 250 WO HCPCS: Performed by: STUDENT IN AN ORGANIZED HEALTH CARE EDUCATION/TRAINING PROGRAM

## 2024-05-27 PROCEDURE — 6360000002 HC RX W HCPCS: Performed by: STUDENT IN AN ORGANIZED HEALTH CARE EDUCATION/TRAINING PROGRAM

## 2024-05-27 PROCEDURE — 2100000000 HC CCU R&B

## 2024-05-27 PROCEDURE — 6370000000 HC RX 637 (ALT 250 FOR IP): Performed by: INTERNAL MEDICINE

## 2024-05-27 PROCEDURE — 2580000003 HC RX 258: Performed by: INTERNAL MEDICINE

## 2024-05-27 PROCEDURE — 94640 AIRWAY INHALATION TREATMENT: CPT

## 2024-05-27 RX ORDER — SODIUM CHLORIDE 0.9 % (FLUSH) 0.9 %
5-40 SYRINGE (ML) INJECTION EVERY 12 HOURS SCHEDULED
Status: DISCONTINUED | OUTPATIENT
Start: 2024-05-27 | End: 2024-06-01 | Stop reason: HOSPADM

## 2024-05-27 RX ORDER — LIDOCAINE HYDROCHLORIDE 10 MG/ML
5 INJECTION, SOLUTION EPIDURAL; INFILTRATION; INTRACAUDAL; PERINEURAL ONCE
Status: DISCONTINUED | OUTPATIENT
Start: 2024-05-27 | End: 2024-05-30

## 2024-05-27 RX ORDER — SODIUM CHLORIDE 9 MG/ML
25 INJECTION, SOLUTION INTRAVENOUS PRN
Status: DISCONTINUED | OUTPATIENT
Start: 2024-05-27 | End: 2024-06-01 | Stop reason: HOSPADM

## 2024-05-27 RX ORDER — HYDRALAZINE HYDROCHLORIDE 20 MG/ML
10 INJECTION INTRAMUSCULAR; INTRAVENOUS EVERY 6 HOURS PRN
Status: DISCONTINUED | OUTPATIENT
Start: 2024-05-27 | End: 2024-06-01 | Stop reason: HOSPADM

## 2024-05-27 RX ORDER — SODIUM CHLORIDE 0.9 % (FLUSH) 0.9 %
5-40 SYRINGE (ML) INJECTION PRN
Status: DISCONTINUED | OUTPATIENT
Start: 2024-05-27 | End: 2024-06-01 | Stop reason: HOSPADM

## 2024-05-27 RX ADMIN — PIPERACILLIN AND TAZOBACTAM 2250 MG: 2; .25 INJECTION, POWDER, LYOPHILIZED, FOR SOLUTION INTRAVENOUS at 08:07

## 2024-05-27 RX ADMIN — POTASSIUM CHLORIDE 20 MEQ: 29.8 INJECTION, SOLUTION INTRAVENOUS at 09:41

## 2024-05-27 RX ADMIN — IPRATROPIUM BROMIDE AND ALBUTEROL SULFATE 1 DOSE: 2.5; .5 SOLUTION RESPIRATORY (INHALATION) at 08:01

## 2024-05-27 RX ADMIN — LEUCINE, PHENYLALANINE, LYSINE, METHIONINE, ISOLEUCINE, VALINE, HISTIDINE, THREONINE, TRYPTOPHAN, ALANINE, GLYCINE, ARGININE, PROLINE, SERINE, TYROSINE, SODIUM ACETATE, DIBASIC POTASSIUM PHOSPHATE, MAGNESIUM CHLORIDE, SODIUM CHLORIDE, CALCIUM CHLORIDE, DEXTROSE
365; 280; 290; 200; 300; 290; 240; 210; 90; 1035; 515; 575; 340; 250; 20; 340; 261; 51; 59; 33; 20 INJECTION INTRAVENOUS at 17:51

## 2024-05-27 RX ADMIN — HYDRALAZINE HYDROCHLORIDE 10 MG: 20 INJECTION INTRAMUSCULAR; INTRAVENOUS at 20:58

## 2024-05-27 RX ADMIN — DEXMEDETOMIDINE HYDROCHLORIDE 1.2 MCG/KG/HR: 4 INJECTION, SOLUTION INTRAVENOUS at 05:15

## 2024-05-27 RX ADMIN — IPRATROPIUM BROMIDE AND ALBUTEROL SULFATE 1 DOSE: 2.5; .5 SOLUTION RESPIRATORY (INHALATION) at 19:14

## 2024-05-27 RX ADMIN — DEXMEDETOMIDINE HYDROCHLORIDE 1.2 MCG/KG/HR: 4 INJECTION, SOLUTION INTRAVENOUS at 21:07

## 2024-05-27 RX ADMIN — SODIUM CHLORIDE, PRESERVATIVE FREE 10 ML: 5 INJECTION INTRAVENOUS at 19:58

## 2024-05-27 RX ADMIN — SODIUM CHLORIDE, PRESERVATIVE FREE 10 ML: 5 INJECTION INTRAVENOUS at 08:04

## 2024-05-27 RX ADMIN — DEXMEDETOMIDINE HYDROCHLORIDE 1.2 MCG/KG/HR: 4 INJECTION, SOLUTION INTRAVENOUS at 16:11

## 2024-05-27 RX ADMIN — POTASSIUM CHLORIDE 20 MEQ: 29.8 INJECTION, SOLUTION INTRAVENOUS at 08:18

## 2024-05-27 RX ADMIN — IPRATROPIUM BROMIDE AND ALBUTEROL SULFATE 1 DOSE: 2.5; .5 SOLUTION RESPIRATORY (INHALATION) at 12:00

## 2024-05-27 RX ADMIN — SODIUM CHLORIDE, PRESERVATIVE FREE 20 MG: 5 INJECTION INTRAVENOUS at 19:58

## 2024-05-27 RX ADMIN — BUDESONIDE 500 MCG: 0.5 SUSPENSION RESPIRATORY (INHALATION) at 08:01

## 2024-05-27 RX ADMIN — PIPERACILLIN AND TAZOBACTAM 3375 MG: 3; .375 INJECTION, POWDER, LYOPHILIZED, FOR SOLUTION INTRAVENOUS at 16:09

## 2024-05-27 RX ADMIN — BUDESONIDE 500 MCG: 0.5 SUSPENSION RESPIRATORY (INHALATION) at 19:14

## 2024-05-27 RX ADMIN — DEXMEDETOMIDINE HYDROCHLORIDE 1.2 MCG/KG/HR: 4 INJECTION, SOLUTION INTRAVENOUS at 09:47

## 2024-05-27 RX ADMIN — SODIUM BICARBONATE: 84 INJECTION, SOLUTION INTRAVENOUS at 06:14

## 2024-05-27 ASSESSMENT — PULMONARY FUNCTION TESTS
PIF_VALUE: 14
PIF_VALUE: 17
PIF_VALUE: 20
PIF_VALUE: 17
PIF_VALUE: 18
PIF_VALUE: 20
PIF_VALUE: 19
PIF_VALUE: 15
PIF_VALUE: 20
PIF_VALUE: 18
PIF_VALUE: 20
PIF_VALUE: 19
PIF_VALUE: 20
PIF_VALUE: 18
PIF_VALUE: 20
PIF_VALUE: 18
PIF_VALUE: 17
PIF_VALUE: 18
PIF_VALUE: 18
PIF_VALUE: 20
PIF_VALUE: 17
PIF_VALUE: 19
PIF_VALUE: 18
PIF_VALUE: 19
PIF_VALUE: 20
PIF_VALUE: 19
PIF_VALUE: 17
PIF_VALUE: 20
PIF_VALUE: 16
PIF_VALUE: 16
PIF_VALUE: 20
PIF_VALUE: 20
PIF_VALUE: 19
PIF_VALUE: 20
PIF_VALUE: 18
PIF_VALUE: 20

## 2024-05-27 ASSESSMENT — PAIN SCALES - GENERAL
PAINLEVEL_OUTOF10: 0

## 2024-05-27 NOTE — PROGRESS NOTES
General and Vascular Surgery                                                           Daily Progress Note                                                             John Craft MD    Pt Name: Darren Duncan  Medical Record Number: [id number]  Date of Birth 1955   Today's Date: 5/27/2024    ASSESSMENT/PLAN  S/P: 5/22:Small bowel resection, cholecystectomy  5/23: Second look laparotomy, small bowel wedge resection and primary closure, small bowel anastomosis, abdominal closure, incisional wound vac placement.  Intubated, management per ICU, wean and extubate as able  Trending labs   Monitor s/s bleeding, transfuse PRN  Pain control   Incisional wound vac to remain for 5 days   Will initiate trophic tube feeds via NG, return to suction for vomiting or intolerance  Recommend PICC TPN due to prolonged NPO  +flexiseal secondary to loose BM's  Nephro consult recommendations noted, trending UOP and Cr.   Continue IV antibiotics for at least 4 days post op (source control)    EDUCATION  Patient educated about their illness/diagnosis, stated above, and all questions answered. We discussed the importance of nutrition, medications they are taking, and healthy lifestyle.    SUBJECTIVE  Darren is stable. No pressors.  UOP improving.  Hb stable     OBJECTIVE  VITALS:  height is 1.735 m (5' 8.31\") and weight is 71.2 kg (156 lb 15.5 oz). His esophageal temperature is 97.9 °F (36.6 °C). His blood pressure is 119/58 (abnormal) and his pulse is 69. His respiration is 14 and oxygen saturation is 100%. VITALS:  BP (!) 119/58   Pulse 69   Temp 97.9 °F (36.6 °C) (Esophageal)   Resp 14   Ht 1.735 m (5' 8.31\")   Wt 71.2 kg (156 lb 15.5 oz)   SpO2 100%   BMI 23.65 kg/m²   GENERAL: No distress  LUNGS: intubated   HEART: normal rate and regular rhythm  ABDOMEN: soft, incisional tenderness, incisional wound vac in place  EXTREMITY: no cyanosis, clubbing or  edema  I/O last 3 completed shifts:  In: 4801.5 [I.V.:4552.1; IV Piggyback:249.5]  Out: 1705 [Urine:1255; Emesis/NG output:450]  I/O this shift:  In: -   Out: 115 [Urine:115]    LABS  Recent Labs     05/25/24  0310 05/25/24  0310 05/26/24  0500 05/26/24  0850 05/27/24  0510   WBC 13.2*  --   --  16.1*  --    HGB 8.9*  --   --  9.1*  --    HCT 26.2*  --   --  26.4*  --    PLT 59*  --   --  50*  --      --  140  --  138   K 4.1  --  3.6  --  3.5     --  105  --  102   CO2 18*  --  21  --  22   BUN 50*  --  61*  --  67*   CREATININE 2.3*  --  2.4*  --  2.2*   PHOS  --    < > 3.1  --  3.7   CALCIUM 7.4*  --  7.3*  --  7.4*   INR 1.16*  --  1.22*  --   --    *  --   --   --   --    ALT 34  --   --   --   --    BILITOT 0.5  --   --   --   --     < > = values in this interval not displayed.     CBC:   Lab Results   Component Value Date/Time    WBC 16.1 05/26/2024 08:50 AM    RBC 2.99 05/26/2024 08:50 AM    HGB 9.1 05/26/2024 08:50 AM    HCT 26.4 05/26/2024 08:50 AM    MCV 88.3 05/26/2024 08:50 AM    MCH 30.4 05/26/2024 08:50 AM    MCHC 34.4 05/26/2024 08:50 AM    RDW 16.9 05/26/2024 08:50 AM    PLT 50 05/26/2024 08:50 AM    MPV 8.9 05/26/2024 08:50 AM     CMP:    Lab Results   Component Value Date/Time     05/27/2024 05:10 AM    K 3.5 05/27/2024 05:10 AM    K 4.1 05/25/2024 03:10 AM     05/27/2024 05:10 AM    CO2 22 05/27/2024 05:10 AM    BUN 67 05/27/2024 05:10 AM    CREATININE 2.2 05/27/2024 05:10 AM    GFRAA >60 03/01/2021 06:25 AM    AGRATIO 0.8 05/25/2024 03:10 AM    LABGLOM 32 05/27/2024 05:10 AM    LABGLOM >60 11/09/2023 11:01 AM    GLUCOSE 70 05/27/2024 05:10 AM    CALCIUM 7.4 05/27/2024 05:10 AM    BILITOT 0.5 05/25/2024 03:10 AM    ALKPHOS 84 05/25/2024 03:10 AM     05/25/2024 03:10 AM    ALT 34 05/25/2024 03:10 AM         John Craft MD  Electronically signed 5/27/2024 at 12:13 PM

## 2024-05-27 NOTE — PROGRESS NOTES
[facility] Vascular Surgery Progress Note      Chief Complaint:   Chief Complaint   Patient presents with    Abdominal Pain     Pt c/o abdominal pain. Pt  states he was told he has stomach ulcers. Pt says he takes his medication as prescribe and gets no relief. Pt AAOX4 at triage         4 Days Post-Op: Aorta hepatic mesenteric bypass and small bowel resection, postop day 2 reanastomosis and abdominal closure     Darren Duncan is a 68 y.o. male patient.     Subjective  No acute events overnight  Patient with intermittent agitation requiring increase in Precedex  Otherwise has remained hemodynamically stable  Urine output continues to improve, creatinine improving  Off pressors this morning    1. Abdominal pain, unspecified abdominal location    2. Failure to thrive in adult    3. Ileus (HCC)    4. Pre-operative cardiovascular examination    5. Cardiomyopathy, unspecified type (HCC)    6. Mesenteric artery stenosis (HCC)    7. Mesenteric ischemia (HCC)      Past Medical History:   Diagnosis Date    Cerebral artery occlusion with cerebral infarction (HCC)     Patient said he has no residual effects    Heart attack (HCC)     3 yr ago    Hx of blood clots     right side of neck    Hyperlipidemia     Hypertension     Peripheral vascular disease, unspecified (HCC) 09/22/2020     Past Surgical History Pertinent Negatives:   Procedure Date Noted    APPENDECTOMY 06/22/2018    BRAIN SURGERY 06/22/2018    CARDIAC SURGERY 06/22/2018    CARDIAC VALVE REPLACEMENT 06/22/2018    CHOLECYSTECTOMY 06/22/2018    CORONARY ARTERY BYPASS GRAFT 06/22/2018    COSMETIC SURGERY 06/22/2018    EYE SURGERY 06/22/2018    FRACTURE SURGERY 06/22/2018    HERNIA REPAIR 06/22/2018    JOINT REPLACEMENT 06/22/2018    KNEE ARTHROPLASTY 06/22/2018    PROSTATE SURGERY 06/22/2018    PTCA 06/22/2018    SMALL INTESTINE SURGERY 06/22/2018    SUBTOTAL COLECTOMY 06/22/2018    TOTAL COLECTOMY 06/22/2018    TOTAL HIP ARTHROPLASTY 06/22/2018    UPPER  stimulation  Appropriate nods yes no to questions  Abdomen soft, appropriately tender, nondistended, Prevena incisional VAC in place  Palpable femoral pulses bilaterally      Assessment & Plan    68-year-old male status post aortomesenteric hepatic bypass with small bowel resection and anastomosis by general surgery, Dr. Craft.    Continue vent per pulmonology  Continue antibiotics  Wean sedation as able for tolerance on vent  Appreciate nephrology assistance for ATN, patient mobilizing third spaced fluid  Platelets remain low secondary to consumption, HIT panel screening pending per nephrology  Please call vascular surgery for any acute changes or concerns.      Nuno Duran MD, VI  [facility] Vascular and Endovascular Surgery   5/27/2024  9:43 AM

## 2024-05-27 NOTE — PROGRESS NOTES
Arrived to place PICC line with bedside RN Patricia Edouard. Pre-procedure and timeout done with RN, discussed limitations of placement and allergies. Consent confirmed. Vital signs stable. Labs, allergies, medications, and code status reviewed. No contraindications noted.     Procedure explained to pt, including the risk and benefits of the procedure. All questions answered. Pt verbalizes understanding of the procedure and states no more questions.       Pt's basilic, brachial, and cephalic are all easily collapsible with no indication for a clot. Vein selected is large enough for catheter. Pt tolerated sterile procedure well, with no difficulty accessing basilic vein, when accessed - blood was free flowing and non-pulsatile. Guidewire, introducer, and catheter went in smoothly. PICC line verified with 3CG technology with peaked P-waves (please see image below).      Nurses:  OK to use PICC.    Please replace all existing IV tubing with new IV tubing prior to using the PICC for current IV infusions.  Please remove any PIVs from PICC arm.  All of the above may be sources of infection or an increase chance of a clot.     After removing IJ CVC please obtain XRAY to ensure PICC tip did not become malpositioned. Especially with TPN.      Post procedure - reorganized pt table, placed pt in lowest position, with call light and educated on line care. Instructed pt/RN not to use arm for at least 30min to avoid bleeding. Reported off to bedside RN.      If you have any questions please call number below and dispatch will direct you to the PICC RN that is on call.    [phone]

## 2024-05-27 NOTE — PROGRESS NOTES
Hospitalist Progress Note  5/27/2024 9:18 AM    PCP: Deep Lozano MD    [phone]     Date of Admission: 5/16/2024                                                                                                                     HOSPITAL COURSE    Patient demographics:  The patient  Darren Duncan is a 68 y.o. male     Significant past medical history:   Patient Active Problem List   Diagnosis    Other hyperlipidemia    Essential hypertension    Smoker    Rash and nonspecific skin eruption    Cellulitis and abscess of toe of left foot    Peripheral arterial disease (HCC)    Claudication (HCC)    Atherosclerosis of native arteries of extremities with rest pain, left leg (HCC)    Peripheral vascular disease, unspecified (HCC)    Atherosclerosis of artery of extremity with rest pain (HCC)    Unspecified severe protein-calorie malnutrition (HCC)    Other specified disorders of carbohydrate metabolism (HCC)    Abdominal pain    Mesenteric ischemia (HCC)    Mesenteric artery stenosis (HCC)    Chronic mesenteric ischemia (HCC)    Cardiomyopathy (HCC)    ANIYA (acute kidney injury) (HCC)         Presenting symptoms:   Abdominal Pain     Diagnostic workup:      CONSULTS DURING ADMISSION :   IP CONSULT TO GI  IP CONSULT TO SPIRITUAL SERVICES  IP CONSULT TO GENERAL SURGERY  IP CONSULT TO VASCULAR SURGERY  IP CONSULT TO CARDIOLOGY  IP CONSULT TO PULMONOLOGY  IP CONSULT TO NEPHROLOGY      Patient was diagnosed with:  Chronic mesenteric ischemia  Status post revascularization 5/22/2024  Partial bowel resection      Treatment while inpatient:         68 years old male with medical history significant for hypertension peripheral vascular disease and history of CVA.    Patient presented to the emergency room with abdominal pain and significant weight loss over a period of 2 years.    Patient was suspected for bowel ischemia.  Vascular surgery was consulted and patient underwent a CT angiogram of the abdomen and  infusion 75 mL/hr at 05/27/24 0614    dexmedeTOMIDine 1.2 mcg/kg/hr (05/27/24 0600)    sodium chloride 5 mL/hr at 05/27/24 0600    nitroGLYCERIN      norepinephrine Stopped (05/26/24 0823)    fentaNYL 12.5 mcg/hr (05/27/24 0600)    propofol Stopped (05/24/24 0223)    dextrose      sodium chloride       PRN Meds:  albuterol, sodium chloride flush, sodium chloride, potassium chloride, dextrose bolus **OR** dextrose bolus, dextrose, sodium chloride    Vitals   Vitals /wt Patient Vitals for the past 8 hrs:   BP Temp Temp src Pulse Resp SpO2 Weight   05/27/24 0900 (!) 104/48 -- -- 67 13 100 % --   05/27/24 0802 -- -- -- 63 11 100 % --   05/27/24 0800 (!) 111/49 -- -- 65 16 100 % --   05/27/24 0700 (!) 119/56 -- -- 63 11 100 % --   05/27/24 0630 (!) 119/56 -- -- 64 16 100 % --   05/27/24 0600 (!) 115/51 -- -- 66 16 100 % 71.2 kg (156 lb 15.5 oz)   05/27/24 0536 -- -- -- 62 -- -- --   05/27/24 0530 (!) 116/51 -- -- 63 11 100 % --   05/27/24 0500 (!) 114/47 -- -- 66 11 100 % --   05/27/24 0430 122/61 -- -- 66 14 100 % --   05/27/24 0400 (!) 114/52 97.9 °F (36.6 °C) Esophageal 68 20 100 % --   05/27/24 0352 -- -- -- 68 13 100 % --   05/27/24 0330 (!) 118/59 -- -- 65 13 100 % --   05/27/24 0300 118/65 -- -- 80 14 100 % --   05/27/24 0230 (!) 123/58 -- -- 69 13 100 % --   05/27/24 0200 (!) 113/49 -- -- 70 12 100 % --   05/27/24 0130 (!) 111/49 -- -- 68 12 99 % --        72HR INTAKE/OUTPUT:    Intake/Output Summary (Last 24 hours) at 5/27/2024 0918  Last data filed at 5/27/2024 0900  Gross per 24 hour   Intake 2368.79 ml   Output 1145 ml   Net 1223.79 ml         Exam:    Gen:   Alert and oriented ×3    Eyes: PERRL. No sclera icterus. No conjunctival injection.   ENT: No discharge. Pharynx clear. External appearance of ears and nose normal.  Neck: Trachea midline. No obvious mass.    Resp: No accessory muscle use. No crackles. No wheezes. No rhonchi.  CV: Regular rate. Regular rhythm. No murmur or rub. No edema.   GI: Abdomen is  soft diffusely tender to palpation  Skin: Warm, dry, normal texture and turgor.   Lymph: No cervical LAD. No supraclavicular LAD.   M/S: / Ext. No cyanosis. No clubbing. No joint deformity.    Neuro: CN 2-12 are intact,  no neurologic deficits noted.    PT/INR:   Recent Labs     05/25/24  0310 05/26/24  0500   PROTIME 15.0* 15.6*   INR 1.16* 1.22*     APTT: No results for input(s): \"APTT\" in the last 72 hours.    CBC:   Recent Labs     05/24/24  1157 05/25/24  0310 05/26/24  0850   WBC  --  13.2* 16.1*   HGB 7.9* 8.9* 9.1*   HCT  --  26.2* 26.4*   MCV  --  88.2 88.3   PLT  --  59* 50*       BMP:   Recent Labs     05/24/24  1430 05/25/24  0310 05/26/24  0500 05/27/24  0510    136 140 138   K 4.1 4.1 3.6 3.5    106 105 102   CO2 18* 18* 21 22   PHOS  --   --  3.1 3.7   BUN 45* 50* 61* 67*   CREATININE 2.2* 2.3* 2.4* 2.2*       LIVER PROFILE:   Recent Labs     05/25/24  0310   ALKPHOS 84   *   ALT 34   BILITOT 0.5       No results for input(s): \"AMYLASE\" in the last 72 hours.      No results for input(s): \"LIPASE\" in the last 72 hours.        UA:  No results for input(s): \"NITRITE\", \"LABCAST\", \"WBCUA\", \"RBCUA\", \"MUCUS\" in the last 72 hours.      TROPONIN: No results for input(s): \"CKTOTAL\", \"TROPONINI\" in the last 72 hours.    Lab Results   Component Value Date/Time    URRFLXCULT Not Indicated 05/16/2024 07:36 AM       Invalid input(s): \"TSHREFLEX\"    No components found for: \"ZGB1633\"  POC GLUCOSE:    Recent Labs     05/24/24 2125 05/25/24  0316 05/25/24  1537 05/27/24  0843 05/27/24  0854   POCGLU 86 87 74 62* 77     No results for input(s): \"LABA1C\" in the last 72 hours.   Lab Results   Component Value Date/Time    LABA1C 5.7 11/09/2023 11:01 AM         ASSESSMENT AND PLAN    Acute respiratory failure  Patient remains on the ventilator  Pulmonary is following    Chronic mesenteric ischemia  Status post revascularization-5/22/2024  Partial bowel resection  Completed antibiotics  Plan is to start

## 2024-05-27 NOTE — PROGRESS NOTES
Pulmonary Critical Care Progress Note     Patient's name:  Darren Duncan  Medical Record Number: [id number]  Patient's account/billing number: [id number]  Patient's YOB: 1955  Age: 68 y.o.  Date of Admission: 5/16/2024  6:56 AM  Date of Consult: 5/27/2024      Primary Care Physician: Deep Lozano MD      Code Status: Full Code    Chief complaint: Acute respiratory failure    Assessment and Plan     Acute respiratory failure mechanical ventilation  Chronic mesenteric ischemia status post revascularization 5/22/2024 Antegrade Aortocommon hepatic-mesenteric artery bypass   Hypertension.  Acute kidney injury with metabolic acidosis  Cardiomyopathy  COPD    Plan:  Continue ventilator support, SIMV, no plan to extubate today  Zosyn  Sedation Precedex and fentanyl   IV fluid per nephrology, ++positive    GI prophylaxis  Bronchodilators  Due to the immediate potential for life-threatening deterioration due to above, I spent 31 minutes providing critical care.  This time is excluding time spent performing procedures.  This note was transcribed using Dragon Dictation software. Please disregard any translational errors.        Overnight:  No acute events overnight  Afebrile    REVIEW OF SYSTEMS:  Review of Systems -   Unable to obtain sedated on mechanical ventilation        Physical Exam:    Vitals: BP (!) 111/49   Pulse 63   Temp 97.9 °F (36.6 °C) (Esophageal)   Resp 11   Ht 1.735 m (5' 8.31\")   Wt 71.2 kg (156 lb 15.5 oz)   SpO2 100%   BMI 23.65 kg/m²     Last Body weight:   Wt Readings from Last 3 Encounters:   05/27/24 71.2 kg (156 lb 15.5 oz)   02/27/24 60.8 kg (134 lb)   02/01/24 63.5 kg (140 lb)       Body Mass Index : Body mass index is 23.65 kg/m².      Intake and Output summary:   Intake/Output Summary (Last 24 hours) at 5/27/2024 0902  Last data filed at 5/27/2024 0800  Gross per 24 hour   Intake 2368.79 ml   Output 1105 ml   Net 1263.79 ml         Physical Examination:

## 2024-05-27 NOTE — PROGRESS NOTES
Nephrology ([facility]) Progress Note    CC: ANIYA Duncan is a 68 y.o. male whom we were asked to see for ANIYA.  He presented on 05/16 with worsening of his chronic abdominal pain.  He had unintentional weight loss over the last 2 years.  He was evaluated by GI when his abdominal pain worsened on 05/20.  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  He went to the OR on 05/22 with partial small bowel resection and cholecystectomy and was taken back on 05/23 for small bowel anastomosis and abdominal closure.      Subjective:    HPI:  Ventilated.  FiO2 30%.  Renal function perhaps slightly better.  UO better.  ROS:  In bed.  PMFSH:  medications reviewed.  Family present.    Objective:  Blood pressure (!) 108/58, pulse 78, temperature 96.9 °F (36.1 °C), temperature source Esophageal, resp. rate 14, height 1.735 m (5' 8.31\"), weight 71.2 kg (156 lb 15.5 oz), SpO2 100 %.    Intake/Output Summary (Last 24 hours) at 5/27/2024 1425  Last data filed at 5/27/2024 1400  Gross per 24 hour   Intake 2431.37 ml   Output 1075 ml   Net 1356.37 ml       General:  NAD, ventilated  Chest:  coarse BS  CVS:  RRR  Abdominal:  NTND, soft, decreased BS  Extremities:  1+ edema  Skin:  no rash    Labs:  Renal panel:  Lab Results   Component Value Date/Time     05/27/2024 05:10 AM    K 3.5 05/27/2024 05:10 AM    K 4.1 05/25/2024 03:10 AM    CO2 22 05/27/2024 05:10 AM    BUN 67 (H) 05/27/2024 05:10 AM    CREATININE 2.2 (H) 05/27/2024 05:10 AM    CALCIUM 7.4 (L) 05/27/2024 05:10 AM    PHOS 3.7 05/27/2024 05:10 AM    MG 2.00 05/27/2024 01:13 PM     CBC:  Lab Results   Component Value Date/Time    WBC 16.1 (H) 05/26/2024 08:50 AM    HGB 9.1 (L) 05/26/2024 08:50 AM    HCT 26.4 (L) 05/26/2024 08:50 AM    PLT 50 (L) 05/26/2024 08:50 AM       Assessment/Plan:  Reviewed old records and labs.    1) ANIYA   - baseline 05/22 Cr 0.6              - suspect ATN secondary to hemodynamic changes during 05/22 surgery               - fernando present.              - UO picking up on its own, so holding off diuretics              - no indication for dialysis   - PICC line is okay from renal standpoint   - will consider adding diuretics tomorrow to balance I+O's     2) s/p partial small bowel resection on 05/22              - per surgery     3) ventilated              - per pulmonary     4) ID              - on zosyn     5) anemia              - will hold off iron studies              - will start AURORA to minimize risk of PRBC     6) thrombocytopenia              - > 50% drop in platelet count              - suspect secondary to consumption              - not currently on heparin              - HIT Ab pending     7) FEN              - metabolic acidosis                          - d/c NaHCO3 gtt   - TPN being started    - d/w pharmacy     critical care:  30+ min

## 2024-05-27 NOTE — PROGRESS NOTES
Pt RASS +2. Biting ETT and hitting side rails. Tachypneic and thrashing head back and forth. Pt reoriented and precedex increased. Will monitor closely.

## 2024-05-27 NOTE — PROGRESS NOTES
New PICC Line was inserted on patient's left basilic vein, to commence TPN at 18:00. Contacted Dr Duran to ask if the [facility] CVC line can be removed, advised this RN to keep the CVC for now.

## 2024-05-27 NOTE — PROGRESS NOTES
NAME:  Darren Duncan  YOB: 1955  MEDICAL RECORD NUMBER:  [id number]    Shift Summary: Pt agitated and precedex increased to 1.2 mcg/kg/hr.    Family updated: Yes:  Janet    Rhythm: Normal Sinus Rhythm     Most recent vitals:   Visit Vitals  BP (!) 119/56   Pulse 64   Temp 97.9 °F (36.6 °C) (Esophageal)   Resp 16   Ht 1.735 m (5' 8.31\")   Wt 71.2 kg (156 lb 15.5 oz)   SpO2 100%   BMI 23.65 kg/m²      ABP (Arterial line BP): 146/44  ABP Mean (Arterial Line Mean): 72 mmHg    No data found.    Patient Vitals for the past 12 hrs:   CVP (Mean)   05/27/24 0000 6 mmHg   05/26/24 2000 8 mmHg         Respiratory support needed (if any):  - Ventilator Settings:  Vent Days 5    Vt (Set, mL): 0 mL  Resp Rate (Set): 7 bpm  FiO2 : 30 %    PEEP/CPAP (cmH2O): 5       Admission weight Weight - Scale: 49.6 kg (109 lb 5.6 oz) (05/16/24 0701)    Today's weight    Wt Readings from Last 1 Encounters:   05/27/24 71.2 kg (156 lb 15.5 oz)        King need assessed each shift: YES -  - continue king r/t - strict I/O  UOP >30ml/hr: YES  Last documented BM (in last 48 hrs):  Patient Vitals for the past 48 hrs:   Last BM (including prior to admit)   05/25/24 0800 05/25/24 05/25/24 2000 05/25/24 05/26/24 0200 05/26/24 05/26/24 0800 05/26/24 05/26/24 1000 05/26/24 05/26/24 1200 05/26/24 05/26/24 1600 05/26/24 05/26/24 2000 05/26/24 05/27/24 0400 05/27/24                Restraints (in use currently or dc'd in last 12 hrs): Yes    Order current and documentation up to date? Yes    Lines/Drains reviewed @ bedside.  CVC  05/22/24 Right Internal jugular (Active)   Number of days: 4       Arterial Line 05/22/24 Right Radial (Active)   Number of days: 4       Urinary Catheter 05/22/24 King (Active)   Number of days: 4         Drip rates at handoff:    sodium bicarbonate 150 mEq in sterile water 1,000 mL infusion 75 mL/hr at 05/27/24 0614    dexmedeTOMIDine 1.2 mcg/kg/hr (05/27/24 0600)    sodium chloride 5 mL/hr at  05/27/24 0600    nitroGLYCERIN      norepinephrine Stopped (05/26/24 0823)    fentaNYL 12.5 mcg/hr (05/27/24 0600)    propofol Stopped (05/24/24 0223)    dextrose      sodium chloride         Lab Data:   CBC:   Recent Labs     05/25/24  0310 05/26/24  0850   WBC 13.2* 16.1*   HGB 8.9* 9.1*   HCT 26.2* 26.4*   MCV 88.2 88.3   PLT 59* 50*     BMP:    Recent Labs     05/26/24  0500 05/27/24  0510    138   K 3.6 3.5   CO2 21 22   BUN 61* 67*   CREATININE 2.4* 2.2*     LIVR:   Recent Labs     05/25/24  0310   *   ALT 34     PT/INR:   Recent Labs     05/25/24  0310 05/26/24  0500   INR 1.16* 1.22*     APTT: No results for input(s): \"APTT\" in the last 72 hours.  ABG:   Recent Labs     05/24/24  1157 05/26/24  0934   PHART 7.359 7.419   [id number] 33.0* 34.9*   PO2ART 98.9 145.3*       Any consults during the shift? No    Any signed and held orders to be released?  No        4 Eyes Skin Assessment       The patient is being assessed for  Shift Handoff    I agree that at least one RN has performed a thorough Head to Toe Skin Assessment on the patient. ALL assessment sites listed below have been assessed.      Areas assessed by both nurses: Head, Face, Ears, Shoulders, Back, Chest, Arms, Elbows, Hands, Sacrum. Buttock, Coccyx, Ischium, Legs. Feet and Heels, and Under Medical Devices         Does the Patient have a Wound? Yes wound(s) were present on assessment. LDA wound assessment was Initiated and completed by RN    Wound Care Orders initiated by RN: Yes       Thomas Prevention initiated by RN: Yes    Pressure Injury (Stage 3,4, Unstageable, DTI, NWPT, and Complex wounds) if present, place Wound referral order by RN under : Yes    New Ostomies, if present place, Ostomy referral order under : No     Nurse 1 eSignature: Electronically signed by Alondra Martinez RN on 5/27/24 at 6:39 AM EDT    **SHARE this note so that the co-signing nurse can place an eSignature**    Nurse 2 eSignature:  Electronically signed by Patricia Mckeon RN on 5/27/24 at 7:36 AM EDT

## 2024-05-27 NOTE — PLAN OF CARE
Problem: Pain  Goal: Verbalizes/displays adequate comfort level or baseline comfort level  5/27/2024 0920 by Patricia Mckeon, RN  Outcome: Progressing  Flowsheets (Taken 5/27/2024 0800)  Verbalizes/displays adequate comfort level or baseline comfort level:   Assess pain using appropriate pain scale   Administer analgesics based on type and severity of pain and evaluate response   Implement non-pharmacological measures as appropriate and evaluate response     Problem: Safety - Adult  Goal: Free from fall injury  5/27/2024 0920 by Patricia Mckeon, RN  Outcome: Progressing  Flowsheets (Taken 5/27/2024 0800)  Free From Fall Injury: Instruct family/caregiver on patient safety     Problem: Gastrointestinal - Adult  Goal: Minimal or absence of nausea and vomiting  5/27/2024 0920 by Patricia Mckeon, RN  Outcome: Progressing  Flowsheets (Taken 5/27/2024 0800)  Minimal or absence of nausea and vomiting:   Administer IV fluids as ordered to ensure adequate hydration   Maintain NPO status until nausea and vomiting are resolved   Nasogastric tube to low intermittent suction as ordered   Provide nonpharmacologic comfort measures as appropriate   Advance diet as tolerated, if ordered   Nutrition consult to assist patient with adequate nutrition and appropriate food choices   Administer ordered antiemetic medications as needed     Problem: Gastrointestinal - Adult  Goal: Maintains or returns to baseline bowel function  Outcome: Progressing  Flowsheets (Taken 5/27/2024 0800)  Maintains or returns to baseline bowel function:   Assess bowel function   Administer IV fluids as ordered to ensure adequate hydration   Nutrition consult to assist patient with appropriate food choices     Problem: Safety - Medical Restraint  Goal: Remains free of injury from restraints (Restraint for Interference with Medical Device)  Description: INTERVENTIONS:  1. Determine that other, less restrictive measures have been tried or would not be

## 2024-05-27 NOTE — PLAN OF CARE
Problem: Gastrointestinal - Adult  Goal: Maintains adequate nutritional intake  Outcome: Not Progressing- remains NPO    Problem: Discharge Planning  Goal: Discharge to home or other facility with appropriate resources  Outcome: Progressing        Problem: Nutrition Deficit:  Goal: Optimize nutritional status  Outcome: Not Progressing- remains NPO      Problem: Pain  Goal: Verbalizes/displays adequate comfort level or baseline comfort level  Outcome: Progressing     Problem: Safety - Adult  Goal: Free from fall injury  Outcome: Progressing  Flowsheets (Taken 5/27/2024 0536)  Free From Fall Injury: Instruct family/caregiver on patient safety     Problem: Gastrointestinal - Adult  Goal: Minimal or absence of nausea and vomiting  Outcome: Progressing     Problem: Gastrointestinal - Adult  Goal: Maintains adequate nutritional intake  Outcome: Not Progressing     Problem: Chronic Conditions and Co-morbidities  Goal: Patient's chronic conditions and co-morbidity symptoms are monitored and maintained or improved  Outcome: Progressing     Problem: Safety - Medical Restraint  Goal: Remains free of injury from restraints (Restraint for Interference with Medical Device)  Description: INTERVENTIONS:  1. Determine that other, less restrictive measures have been tried or would not be effective before applying the restraint  2. Evaluate the patient's condition at the time of restraint application  3. Inform patient/family regarding the reason for restraint  4. Q2H: Monitor safety, psychosocial status, comfort, nutrition and hydration  Outcome: Progressing  Flowsheets  Taken 5/26/2024 2000 by Alondra Martinez RN  Remains free of injury from restraints (restraint for interference with medical device): Every 2 hours: Monitor safety, psychosocial status, comfort, nutrition and hydration  Taken 5/26/2024 1800 by Soni Cloud RN  Remains free of injury from restraints (restraint for interference with medical device): Determine

## 2024-05-28 PROBLEM — J96.01 ACUTE RESPIRATORY FAILURE WITH HYPOXIA (HCC): Status: ACTIVE | Noted: 2024-05-28

## 2024-05-28 PROBLEM — J44.9 CHRONIC OBSTRUCTIVE PULMONARY DISEASE (HCC): Status: ACTIVE | Noted: 2024-05-28

## 2024-05-28 LAB
ALBUMIN SERPL-MCNC: 1.9 G/DL (ref 3.4–5)
ALP SERPL-CCNC: 75 U/L (ref 40–129)
ALT SERPL-CCNC: 8 U/L (ref 10–40)
ANION GAP SERPL CALCULATED.3IONS-SCNC: 11 MMOL/L (ref 3–16)
AST SERPL-CCNC: 129 U/L (ref 15–37)
BILIRUB DIRECT SERPL-MCNC: 0.4 MG/DL (ref 0–0.3)
BILIRUB INDIRECT SERPL-MCNC: 0.1 MG/DL (ref 0–1)
BILIRUB SERPL-MCNC: 0.5 MG/DL (ref 0–1)
BUN SERPL-MCNC: 71 MG/DL (ref 7–20)
CA-I BLD-SCNC: 1.09 MMOL/L (ref 1.12–1.32)
CALCIUM SERPL-MCNC: 7.3 MG/DL (ref 8.3–10.6)
CHLORIDE SERPL-SCNC: 104 MMOL/L (ref 99–110)
CO2 SERPL-SCNC: 24 MMOL/L (ref 21–32)
CREAT SERPL-MCNC: 1.9 MG/DL (ref 0.8–1.3)
DEPRECATED RDW RBC AUTO: 16.2 % (ref 12.4–15.4)
GFR SERPLBLD CREATININE-BSD FMLA CKD-EPI: 38 ML/MIN/{1.73_M2}
GLUCOSE BLD-MCNC: 173 MG/DL (ref 70–99)
GLUCOSE BLD-MCNC: 203 MG/DL (ref 70–99)
GLUCOSE BLD-MCNC: 230 MG/DL (ref 70–99)
GLUCOSE BLD-MCNC: 258 MG/DL (ref 70–99)
GLUCOSE BLD-MCNC: 293 MG/DL (ref 70–99)
GLUCOSE BLD-MCNC: 302 MG/DL (ref 70–99)
GLUCOSE BLD-MCNC: 333 MG/DL (ref 70–99)
GLUCOSE SERPL-MCNC: 208 MG/DL (ref 70–99)
HCT VFR BLD AUTO: 28.8 % (ref 40.5–52.5)
HEPARIN INDUCED PLATELET ANTIBODY: NEGATIVE
HEPARIN INDUCED PLATELET ANTIBODY: NEGATIVE
HGB BLD-MCNC: 9.7 G/DL (ref 13.5–17.5)
MAGNESIUM SERPL-MCNC: 2.1 MG/DL (ref 1.8–2.4)
MCH RBC QN AUTO: 30 PG (ref 26–34)
MCHC RBC AUTO-ENTMCNC: 33.7 G/DL (ref 31–36)
MCV RBC AUTO: 89 FL (ref 80–100)
PERFORMED ON: ABNORMAL
PH BLDV: 7.4 [PH] (ref 7.35–7.45)
PHOSPHATE SERPL-MCNC: 3.6 MG/DL (ref 2.5–4.9)
PLATELET # BLD AUTO: 52 K/UL (ref 135–450)
PMV BLD AUTO: 9.3 FL (ref 5–10.5)
POTASSIUM SERPL-SCNC: 3.8 MMOL/L (ref 3.5–5.1)
PROT SERPL-MCNC: 3.8 G/DL (ref 6.4–8.2)
RBC # BLD AUTO: 3.24 M/UL (ref 4.2–5.9)
SODIUM SERPL-SCNC: 139 MMOL/L (ref 136–145)
TRIGL SERPL-MCNC: 78 MG/DL (ref 0–150)
WBC # BLD AUTO: 11.7 K/UL (ref 4–11)

## 2024-05-28 PROCEDURE — 2580000003 HC RX 258: Performed by: INTERNAL MEDICINE

## 2024-05-28 PROCEDURE — 6360000002 HC RX W HCPCS: Performed by: STUDENT IN AN ORGANIZED HEALTH CARE EDUCATION/TRAINING PROGRAM

## 2024-05-28 PROCEDURE — 2500000003 HC RX 250 WO HCPCS: Performed by: SURGERY

## 2024-05-28 PROCEDURE — 6370000000 HC RX 637 (ALT 250 FOR IP)

## 2024-05-28 PROCEDURE — 6370000000 HC RX 637 (ALT 250 FOR IP): Performed by: SURGERY

## 2024-05-28 PROCEDURE — 85027 COMPLETE CBC AUTOMATED: CPT

## 2024-05-28 PROCEDURE — 2100000000 HC CCU R&B

## 2024-05-28 PROCEDURE — 84478 ASSAY OF TRIGLYCERIDES: CPT

## 2024-05-28 PROCEDURE — 6370000000 HC RX 637 (ALT 250 FOR IP): Performed by: INTERNAL MEDICINE

## 2024-05-28 PROCEDURE — 94761 N-INVAS EAR/PLS OXIMETRY MLT: CPT

## 2024-05-28 PROCEDURE — 2500000003 HC RX 250 WO HCPCS: Performed by: INTERNAL MEDICINE

## 2024-05-28 PROCEDURE — 2700000000 HC OXYGEN THERAPY PER DAY

## 2024-05-28 PROCEDURE — 82330 ASSAY OF CALCIUM: CPT

## 2024-05-28 PROCEDURE — 99291 CRITICAL CARE FIRST HOUR: CPT | Performed by: INTERNAL MEDICINE

## 2024-05-28 PROCEDURE — 80076 HEPATIC FUNCTION PANEL: CPT

## 2024-05-28 PROCEDURE — 6370000000 HC RX 637 (ALT 250 FOR IP): Performed by: HOSPITALIST

## 2024-05-28 PROCEDURE — 2580000003 HC RX 258: Performed by: ANESTHESIOLOGY

## 2024-05-28 PROCEDURE — 6360000002 HC RX W HCPCS: Performed by: NURSE PRACTITIONER

## 2024-05-28 PROCEDURE — 84100 ASSAY OF PHOSPHORUS: CPT

## 2024-05-28 PROCEDURE — 94640 AIRWAY INHALATION TREATMENT: CPT

## 2024-05-28 PROCEDURE — 99024 POSTOP FOLLOW-UP VISIT: CPT | Performed by: STUDENT IN AN ORGANIZED HEALTH CARE EDUCATION/TRAINING PROGRAM

## 2024-05-28 PROCEDURE — 83735 ASSAY OF MAGNESIUM: CPT

## 2024-05-28 PROCEDURE — APPNB15 APP NON BILLABLE TIME 0-15 MINS: Performed by: NURSE PRACTITIONER

## 2024-05-28 PROCEDURE — 2580000003 HC RX 258: Performed by: STUDENT IN AN ORGANIZED HEALTH CARE EDUCATION/TRAINING PROGRAM

## 2024-05-28 PROCEDURE — 2500000003 HC RX 250 WO HCPCS: Performed by: STUDENT IN AN ORGANIZED HEALTH CARE EDUCATION/TRAINING PROGRAM

## 2024-05-28 PROCEDURE — 2500000003 HC RX 250 WO HCPCS: Performed by: HOSPITALIST

## 2024-05-28 PROCEDURE — 80048 BASIC METABOLIC PNL TOTAL CA: CPT

## 2024-05-28 PROCEDURE — 6360000002 HC RX W HCPCS: Performed by: SURGERY

## 2024-05-28 PROCEDURE — 94003 VENT MGMT INPAT SUBQ DAY: CPT

## 2024-05-28 RX ORDER — GLUCAGON 1 MG/ML
1 KIT INJECTION PRN
Status: DISCONTINUED | OUTPATIENT
Start: 2024-05-28 | End: 2024-05-28

## 2024-05-28 RX ORDER — DEXTROSE MONOHYDRATE 100 MG/ML
INJECTION, SOLUTION INTRAVENOUS CONTINUOUS PRN
Status: DISCONTINUED | OUTPATIENT
Start: 2024-05-28 | End: 2024-05-28

## 2024-05-28 RX ORDER — INSULIN LISPRO 100 [IU]/ML
0-8 INJECTION, SOLUTION INTRAVENOUS; SUBCUTANEOUS EVERY 4 HOURS
Status: DISCONTINUED | OUTPATIENT
Start: 2024-05-28 | End: 2024-06-01 | Stop reason: HOSPADM

## 2024-05-28 RX ORDER — FUROSEMIDE 10 MG/ML
20 INJECTION INTRAMUSCULAR; INTRAVENOUS ONCE
Status: COMPLETED | OUTPATIENT
Start: 2024-05-28 | End: 2024-05-28

## 2024-05-28 RX ORDER — CASTOR OIL AND BALSAM, PERU 788; 87 MG/G; MG/G
OINTMENT TOPICAL 2 TIMES DAILY
Status: DISCONTINUED | OUTPATIENT
Start: 2024-05-28 | End: 2024-06-01 | Stop reason: HOSPADM

## 2024-05-28 RX ORDER — CALCIUM GLUCONATE 20 MG/ML
1000 INJECTION, SOLUTION INTRAVENOUS ONCE
Status: COMPLETED | OUTPATIENT
Start: 2024-05-28 | End: 2024-05-28

## 2024-05-28 RX ADMIN — SODIUM CHLORIDE, PRESERVATIVE FREE 10 ML: 5 INJECTION INTRAVENOUS at 21:39

## 2024-05-28 RX ADMIN — DEXMEDETOMIDINE HYDROCHLORIDE 1.2 MCG/KG/HR: 4 INJECTION, SOLUTION INTRAVENOUS at 10:57

## 2024-05-28 RX ADMIN — INSULIN LISPRO 6 UNITS: 100 INJECTION, SOLUTION INTRAVENOUS; SUBCUTANEOUS at 15:19

## 2024-05-28 RX ADMIN — Medication 25 MCG/HR: at 05:45

## 2024-05-28 RX ADMIN — AMLODIPINE BESYLATE 10 MG: 10 TABLET ORAL at 10:41

## 2024-05-28 RX ADMIN — INSULIN LISPRO 6 UNITS: 100 INJECTION, SOLUTION INTRAVENOUS; SUBCUTANEOUS at 18:23

## 2024-05-28 RX ADMIN — INSULIN LISPRO 4 UNITS: 100 INJECTION, SOLUTION INTRAVENOUS; SUBCUTANEOUS at 06:44

## 2024-05-28 RX ADMIN — SODIUM CHLORIDE, PRESERVATIVE FREE 20 MG: 5 INJECTION INTRAVENOUS at 21:32

## 2024-05-28 RX ADMIN — DEXMEDETOMIDINE HYDROCHLORIDE 1 MCG/KG/HR: 4 INJECTION, SOLUTION INTRAVENOUS at 16:23

## 2024-05-28 RX ADMIN — CASTOR OIL AND BALSAM, PERU: 788; 87 OINTMENT TOPICAL at 21:32

## 2024-05-28 RX ADMIN — CALCIUM GLUCONATE 1000 MG: 20 INJECTION, SOLUTION INTRAVENOUS at 16:06

## 2024-05-28 RX ADMIN — SODIUM CHLORIDE, PRESERVATIVE FREE 10 ML: 5 INJECTION INTRAVENOUS at 09:30

## 2024-05-28 RX ADMIN — PIPERACILLIN AND TAZOBACTAM 3375 MG: 3; .375 INJECTION, POWDER, LYOPHILIZED, FOR SOLUTION INTRAVENOUS at 10:50

## 2024-05-28 RX ADMIN — DEXMEDETOMIDINE HYDROCHLORIDE 1.2 MCG/KG/HR: 4 INJECTION, SOLUTION INTRAVENOUS at 06:14

## 2024-05-28 RX ADMIN — IPRATROPIUM BROMIDE AND ALBUTEROL SULFATE 1 DOSE: 2.5; .5 SOLUTION RESPIRATORY (INHALATION) at 07:53

## 2024-05-28 RX ADMIN — CASTOR OIL AND BALSAM, PERU: 788; 87 OINTMENT TOPICAL at 16:06

## 2024-05-28 RX ADMIN — Medication 2 PUFF: at 12:11

## 2024-05-28 RX ADMIN — DEXMEDETOMIDINE HYDROCHLORIDE 1.4 MCG/KG/HR: 4 INJECTION, SOLUTION INTRAVENOUS at 01:32

## 2024-05-28 RX ADMIN — INSULIN LISPRO 4 UNITS: 100 INJECTION, SOLUTION INTRAVENOUS; SUBCUTANEOUS at 12:29

## 2024-05-28 RX ADMIN — ASCORBIC ACID, VITAMIN A PALMITATE, CHOLECALCIFEROL, THIAMINE HYDROCHLORIDE, RIBOFLAVIN-5 PHOSPHATE SODIUM, PYRIDOXINE HYDROCHLORIDE, NIACINAMIDE, DEXPANTHENOL, ALPHA-TOCOPHEROL ACETATE, VITAMIN K1, FOLIC ACID, BIOTIN, CYANOCOBALAMIN: 200; 3300; 200; 6; 3.6; 6; 40; 15; 10; 150; 600; 60; 5 INJECTION, SOLUTION INTRAVENOUS at 17:24

## 2024-05-28 RX ADMIN — Medication 2 PUFF: at 21:48

## 2024-05-28 RX ADMIN — FUROSEMIDE 20 MG: 10 INJECTION, SOLUTION INTRAMUSCULAR; INTRAVENOUS at 06:45

## 2024-05-28 RX ADMIN — IPRATROPIUM BROMIDE AND ALBUTEROL SULFATE 1 DOSE: 2.5; .5 SOLUTION RESPIRATORY (INHALATION) at 14:00

## 2024-05-28 RX ADMIN — SODIUM CHLORIDE, PRESERVATIVE FREE 10 ML: 5 INJECTION INTRAVENOUS at 09:00

## 2024-05-28 RX ADMIN — DEXMEDETOMIDINE HYDROCHLORIDE 1.2 MCG/KG/HR: 4 INJECTION, SOLUTION INTRAVENOUS at 23:09

## 2024-05-28 RX ADMIN — PIPERACILLIN AND TAZOBACTAM 3375 MG: 3; .375 INJECTION, POWDER, LYOPHILIZED, FOR SOLUTION INTRAVENOUS at 16:01

## 2024-05-28 RX ADMIN — PIPERACILLIN AND TAZOBACTAM 3375 MG: 3; .375 INJECTION, POWDER, LYOPHILIZED, FOR SOLUTION INTRAVENOUS at 00:16

## 2024-05-28 RX ADMIN — BUDESONIDE 500 MCG: 0.5 SUSPENSION RESPIRATORY (INHALATION) at 07:53

## 2024-05-28 RX ADMIN — IPRATROPIUM BROMIDE AND ALBUTEROL SULFATE 1 DOSE: 2.5; .5 SOLUTION RESPIRATORY (INHALATION) at 21:48

## 2024-05-28 ASSESSMENT — PULMONARY FUNCTION TESTS
PIF_VALUE: 20
PIF_VALUE: 17
PIF_VALUE: 20
PIF_VALUE: 20
PIF_VALUE: 16
PIF_VALUE: 17
PIF_VALUE: 18
PIF_VALUE: 17
PIF_VALUE: 17
PIF_VALUE: 18
PIF_VALUE: 17
PIF_VALUE: 21
PIF_VALUE: 17
PIF_VALUE: 18
PIF_VALUE: 16
PIF_VALUE: 17
PIF_VALUE: 18
PIF_VALUE: 19
PIF_VALUE: 22
PIF_VALUE: 17
PIF_VALUE: 21
PIF_VALUE: 18
PIF_VALUE: 18
PIF_VALUE: 19
PIF_VALUE: 19

## 2024-05-28 ASSESSMENT — PAIN SCALES - GENERAL
PAINLEVEL_OUTOF10: 0

## 2024-05-28 NOTE — PROGRESS NOTES
Hospitalist Progress Note  5/28/2024 7:25 AM    PCP: Deep Lozano MD    [phone]     Date of Admission: 5/16/2024                                                                                                                     HOSPITAL COURSE    Patient demographics:  The patient  Darren Duncan is a 68 y.o. male     Significant past medical history:   Patient Active Problem List   Diagnosis    Other hyperlipidemia    Essential hypertension    Smoker    Rash and nonspecific skin eruption    Cellulitis and abscess of toe of left foot    Peripheral arterial disease (HCC)    Claudication (HCC)    Atherosclerosis of native arteries of extremities with rest pain, left leg (HCC)    Peripheral vascular disease, unspecified (HCC)    Atherosclerosis of artery of extremity with rest pain (HCC)    Unspecified severe protein-calorie malnutrition (HCC)    Other specified disorders of carbohydrate metabolism (HCC)    Abdominal pain    Mesenteric ischemia (HCC)    Mesenteric artery stenosis (HCC)    Chronic mesenteric ischemia (HCC)    Cardiomyopathy (HCC)    ANIYA (acute kidney injury) (HCC)         Presenting symptoms:   Abdominal Pain     Diagnostic workup:      CONSULTS DURING ADMISSION :   IP CONSULT TO GI  IP CONSULT TO SPIRITUAL SERVICES  IP CONSULT TO GENERAL SURGERY  IP CONSULT TO VASCULAR SURGERY  IP CONSULT TO CARDIOLOGY  IP CONSULT TO PULMONOLOGY  IP CONSULT TO NEPHROLOGY  IP CONSULT TO DIETITIAN  IP CONSULT TO DIETITIAN  IP CONSULT TO PHARMACY  PHARMACY TO DOSE TPN      Patient was diagnosed with:  Chronic mesenteric ischemia  Status post revascularization 5/22/2024  Partial bowel resection      Treatment while inpatient:         68 years old male with medical history significant for hypertension peripheral vascular disease and history of CVA.    Patient presented to the emergency room with abdominal pain and significant weight loss over a period of 2 years.    Patient was suspected for bowel  dry, normal texture and turgor.   Lymph: No cervical LAD. No supraclavicular LAD.   M/S: / Ext. No cyanosis. No clubbing. No joint deformity.    Neuro: CN 2-12 are intact,  no neurologic deficits noted.    PT/INR:   Recent Labs     05/26/24  0500   PROTIME 15.6*   INR 1.22*     APTT: No results for input(s): \"APTT\" in the last 72 hours.    CBC:   Recent Labs     05/26/24  0850 05/28/24  0350   WBC 16.1* 11.7*   HGB 9.1* 9.7*   HCT 26.4* 28.8*   MCV 88.3 89.0   PLT 50* 52*       BMP:   Recent Labs     05/26/24  0500 05/27/24  0510 05/28/24  0350    138 139   K 3.6 3.5 3.8    102 104   CO2 21 22 24   PHOS 3.1 3.7 3.6   BUN 61* 67* 71*   CREATININE 2.4* 2.2* 1.9*       LIVER PROFILE:   Recent Labs     05/28/24  0350   ALKPHOS 75   *   ALT 8*   BILIDIR 0.4*   BILITOT 0.5       No results for input(s): \"AMYLASE\" in the last 72 hours.      No results for input(s): \"LIPASE\" in the last 72 hours.        UA:  No results for input(s): \"NITRITE\", \"LABCAST\", \"WBCUA\", \"RBCUA\", \"MUCUS\" in the last 72 hours.      TROPONIN: No results for input(s): \"CKTOTAL\", \"TROPONINI\" in the last 72 hours.    Lab Results   Component Value Date/Time    URRFLXCULT Not Indicated 05/16/2024 07:36 AM       Invalid input(s): \"TSHREFLEX\"    No components found for: \"NPQ6256\"  POC GLUCOSE:    Recent Labs     05/27/24  1845 05/27/24  1951 05/28/24  0034 05/28/24  0340 05/28/24  0643   POCGLU 96 120* 173* 203* 258*     No results for input(s): \"LABA1C\" in the last 72 hours.   Lab Results   Component Value Date/Time    LABA1C 5.7 11/09/2023 11:01 AM         ASSESSMENT AND PLAN    Acute respiratory failure  Patient remains on the ventilator  Possible extubation in a.m.  Pulmonary is following    Chronic mesenteric ischemia  Status post revascularization-5/22/2024  Partial bowel resection  Completed antibiotics  Tolerating low-dose tube feed and TPN  1 dose of Lasix today    Coagulopathy  Transfused fresh frozen plasma    Hypotension    blood pressure is stable now  Lisinopril and amlodipine on hold  Echocardiogram shows ejection fraction of 38%      Pain management  R52  Patient on Precedex and fentanyl infusions      PUD  EGD colonoscopy showed gastritis with ulceration and duodenitis  Continue on IV Pepcid      UTI N39.0  Received 5 days of fluconazole      Insomnia  Patient is sedated on the ventilator  Discontinue Paxil that was started this hospitalization            Code Status: Full Code        Dispo - cc        The patient and / or the family were informed of the results of any tests, a time was given to answer questions, a plan was proposed and they agreed with plan.    JOON ORONA MD    This note was transcribed using Dragon Dictation software. Please disregard any translational errors.

## 2024-05-28 NOTE — PROGRESS NOTES
[facility]  Phone: [phone]  Fax: [phone]  [facility]     CC: ANIYA    Darren Duncan is a 68 y.o. male whom we were asked to see for ANIYA.  He presented on 05/16 with worsening of his chronic abdominal pain.  He had unintentional weight loss over the last 2 years.  He was evaluated by GI when his abdominal pain worsened on 05/20.  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  He went to the OR on 05/22 with partial small bowel resection and cholecystectomy and was taken back on 05/23 for small bowel anastomosis and abdominal closure.      Subjective:    HPI:  Ventilated.   ml/24  ROS:  In bed.  PMFSH:  medications reviewed.    Family present.    Objective:  Blood pressure (!) 106/58, pulse 73, temperature 97.2 °F (36.2 °C), temperature source Esophageal, resp. rate 16, height 1.735 m (5' 8.31\"), weight 72.9 kg (160 lb 11.5 oz), SpO2 97 %.    Intake/Output Summary (Last 24 hours) at 5/28/2024 1255  Last data filed at 5/28/2024 1047  Gross per 24 hour   Intake 1027.67 ml   Output 855 ml   Net 172.67 ml     General:  NAD, ventilated  Chest:  coarse BS  CVS:  RRR  Abdominal:  NTND, soft, decreased BS  Extremities:  1+ edema  Skin:  no rash    Labs:  Renal panel:  Lab Results   Component Value Date/Time     05/28/2024 03:50 AM    K 3.8 05/28/2024 03:50 AM    K 4.1 05/25/2024 03:10 AM    CO2 24 05/28/2024 03:50 AM    BUN 71 (H) 05/28/2024 03:50 AM    CREATININE 1.9 (H) 05/28/2024 03:50 AM    CALCIUM 7.3 (L) 05/28/2024 03:50 AM    PHOS 3.6 05/28/2024 03:50 AM    MG 2.10 05/28/2024 03:50 AM     CBC:  Lab Results   Component Value Date/Time    WBC 11.7 (H) 05/28/2024 03:50 AM    HGB 9.7 (L) 05/28/2024 03:50 AM    HCT 28.8 (L) 05/28/2024 03:50 AM    PLT 52 (L) 05/28/2024 03:50 AM       Assessment/Plan:  Reviewed old records and labs.    1) ANIYA   - baseline 05/22 Cr 0.6              - suspect ATN secondary to hemodynamic changes during 05/22 surgery              - fernando

## 2024-05-28 NOTE — PLAN OF CARE
Problem: Discharge Planning  Goal: Discharge to home or other facility with appropriate resources  5/27/2024 2232 by Matthew Hassan RN  Outcome: Progressing  Flowsheets (Taken 5/27/2024 2000)  Discharge to home or other facility with appropriate resources:   Identify barriers to discharge with patient and caregiver   Arrange for needed discharge resources and transportation as appropriate   Identify discharge learning needs (meds, wound care, etc)  5/27/2024 0920 by Patricia Mckeon RN  Outcome: Progressing     Problem: Pain  Goal: Verbalizes/displays adequate comfort level or baseline comfort level  5/27/2024 2232 by Matthew Hassan RN  Outcome: Progressing  Flowsheets (Taken 5/27/2024 2000)  Verbalizes/displays adequate comfort level or baseline comfort level:   Assess pain using appropriate pain scale   Administer analgesics based on type and severity of pain and evaluate response   Implement non-pharmacological measures as appropriate and evaluate response  5/27/2024 0920 by Patricia Mckeon RN  Outcome: Progressing  Flowsheets (Taken 5/27/2024 0800)  Verbalizes/displays adequate comfort level or baseline comfort level:   Assess pain using appropriate pain scale   Administer analgesics based on type and severity of pain and evaluate response   Implement non-pharmacological measures as appropriate and evaluate response     Problem: Safety - Adult  Goal: Free from fall injury  5/27/2024 2232 by Matthew Hassan RN  Outcome: Progressing  5/27/2024 0920 by Patricia Mckeon RN  Outcome: Progressing  Flowsheets (Taken 5/27/2024 0800)  Free From Fall Injury: Instruct family/caregiver on patient safety     Problem: Gastrointestinal - Adult  Goal: Minimal or absence of nausea and vomiting  5/27/2024 2232 by Matthew Hassan RN  Outcome: Progressing  Flowsheets (Taken 5/27/2024 2000)  Minimal or absence of nausea and vomiting:   Advance diet as tolerated, if ordered   Nutrition consult to assist patient

## 2024-05-28 NOTE — CONSULTS
Comprehensive Nutrition Assessment    Type and Reason for Visit:  Reassess    Nutrition Recommendations/Plan:   If able to increase tube feeding, recommend Nepro towards goal rate of 50 ml/hr per general surgery  If patient to remain on trophic tube feeding recommend increasing TPN towards goal rate of 75 ml/hr (if electrolytes do not require extensive replacements)  Lipids start on Thursday if not on Propofol/pending TG level     Malnutrition Assessment:  Malnutrition Status:  At risk for malnutrition (Comment) (05/23/24 1313)    Context:  Acute Illness     Findings of the 6 clinical characteristics of malnutrition:  Energy Intake:  50% or less of estimated energy requirements for 5 or more days  Weight Loss:   (weight loss between February and May of this year per EMR, about 20 lbs, has flucutated some recently likely fluid shifts)     Body Fat Loss:  Unable to assess     Muscle Mass Loss:  Unable to assess    Fluid Accumulation:  Unable to assess     Strength:  Not Performed    Nutrition Assessment:    Follow up:  Trophic feedings ordered at 20 ml/hr on 5/27.  Dietitian consult ordered for tube feeding ordering and management.  TPN also ordered at 30 ml/hr.  Dietitian consult also ordered for TPN recommendations.    Nutrition Related Findings:    BUN/Creat elevated on 5/28 Wound Type: Surgical Incision       Current Nutrition Intake & Therapies:    Average Meal Intake: NPO  Average Supplements Intake: NPO  ADULT TUBE FEEDING; Nasogastric; Renal Formula; Continuous; 20; No; 20; Q 4 hours  PN-Adult Premix 5/20 - Standard Electrolytes - Central Line  Current Tube Feeding (TF) Orders:  Goal TF & Flush Orders Provides: Nepro increase to goal rate of 50 ml/hr providing 1800 calories, 81 grams of protein, 727 ml free water.  Water flush 60 ml every 4 hours  Current Parenteral Nutrition Orders:  Goal PN Orders Provides: Clinimix 5/20 E start at 30-40 ml/hr first bag.  If electrolytes stable after first bag, recommend

## 2024-05-28 NOTE — PLAN OF CARE
Problem: Discharge Planning  Goal: Discharge to home or other facility with appropriate resources  Outcome: Progressing     Problem: Pain  Goal: Verbalizes/displays adequate comfort level or baseline comfort level  Outcome: Progressing     Problem: Safety - Adult  Goal: Free from fall injury  Outcome: Progressing     Problem: Gastrointestinal - Adult  Goal: Minimal or absence of nausea and vomiting  Outcome: Progressing  Goal: Maintains or returns to baseline bowel function  Outcome: Progressing  Goal: Maintains adequate nutritional intake  Outcome: Progressing     Problem: Chronic Conditions and Co-morbidities  Goal: Patient's chronic conditions and co-morbidity symptoms are monitored and maintained or improved  Outcome: Progressing     Problem: Safety - Medical Restraint  Goal: Remains free of injury from restraints (Restraint for Interference with Medical Device)  Description: INTERVENTIONS:  1. Determine that other, less restrictive measures have been tried or would not be effective before applying the restraint  2. Evaluate the patient's condition at the time of restraint application  3. Inform patient/family regarding the reason for restraint  4. Q2H: Monitor safety, psychosocial status, comfort, nutrition and hydration  Outcome: Progressing     Problem: Nutrition Deficit:  Goal: Optimize nutritional status  Outcome: Progressing     Problem: Skin/Tissue Integrity  Goal: Absence of new skin breakdown  Description: 1.  Monitor for areas of redness and/or skin breakdown  2.  Assess vascular access sites hourly  3.  Every 4-6 hours minimum:  Change oxygen saturation probe site  4.  Every 4-6 hours:  If on nasal continuous positive airway pressure, respiratory therapy assess nares and determine need for appliance change or resting period.  Outcome: Progressing     Problem: ABCDS Injury Assessment  Goal: Absence of physical injury  Outcome: Progressing

## 2024-05-28 NOTE — PROGRESS NOTES
NAME:  Darren Duncan  YOB: 1955  MEDICAL RECORD NUMBER:  [id number]    Shift Summary: 1x lasix given, TPN feeds increased to 50, Dr. Grigsby wants to keep the CVC regardless, Keep intubated today. Wound care checked for DTI-add venelex.     Family updated: Yes:       Rhythm: Normal Sinus Rhythm     Most recent vitals:   Visit Vitals  BP (!) 119/56   Pulse 77   Temp 98.4 °F (36.9 °C) (Esophageal)   Resp 27   Ht 1.735 m (5' 8.31\")   Wt 72.9 kg (160 lb 11.5 oz)   SpO2 98%   BMI 24.22 kg/m²      ABP (Arterial line BP): 141/47  ABP Mean (Arterial Line Mean): (!) 44 mmHg    No data found.    Patient Vitals for the past 12 hrs:   CVP (Mean)   05/28/24 1800 14 mmHg   05/28/24 1700 15 mmHg   05/28/24 1600 12 mmHg   05/28/24 1500 11 mmHg   05/28/24 1400 12 mmHg   05/28/24 1300 11 mmHg   05/28/24 1200 22 mmHg   05/28/24 1100 10 mmHg   05/28/24 1000 10 mmHg   05/28/24 0900 10 mmHg   05/28/24 0800 10 mmHg         Respiratory support needed (if any):  - Ventilator Settings:  Vent Days     Vt (Set, mL): 0 mL  Resp Rate (Set): 7 bpm  FiO2 : 30 %    PEEP/CPAP (cmH2O): 5       Admission weight Weight - Scale: 49.6 kg (109 lb 5.6 oz) (05/16/24 0701)    Today's weight    Wt Readings from Last 1 Encounters:   05/28/24 72.9 kg (160 lb 11.5 oz)        King need assessed each shift: YES -  - continue king r/t - urinary retention  UOP >30ml/hr: YES  Last documented BM (in last 48 hrs):  Patient Vitals for the past 48 hrs:   Last BM (including prior to admit)   05/26/24 2000 05/26/24 05/27/24 0400 05/27/24 05/27/24 0800 05/27/24 05/27/24 2000 05/27/24                Restraints (in use currently or dc'd in last 12 hrs): Yes    Order current and documentation up to date? Yes    Lines/Drains reviewed @ bedside.  PICC 05/27/24 Left Basilic (Active)   Number of days: 1       CVC  05/22/24 Right Internal jugular (Active)   Number of days: 6       Arterial Line 05/22/24 Right Radial (Active)   Number of days: 6       Urinary  eSignature: Electronically signed by Karen Ardon RN on 5/28/24 at 7:17 PM EDT    **SHARE this note so that the co-signing nurse can place an eSignature**    Nurse 2 eSignature: Electronically signed by Ame Grande RN on 5/29/24 at 8:20 PM EDT

## 2024-05-28 NOTE — CARE COORDINATION
[facility] Nurse  Consult Note       NAME:  Darren Duncan  MEDICAL RECORD NUMBER:  [id number]  AGE: 68 y.o.   GENDER: male  : 1955  TODAY'S DATE:  2024    Subjective   Reason for WOCN Evaluation and Assessment: DTI      Darren Duncan is a 68 y.o. male referred by:   [] Physician  [x] Nursing  [] Other:     Wound Identification:  Wound Type: pressure  Contributing Factors: chronic pressure and decreased mobility, pressors, OR, incontinence of stool    Wound History: Pt admitted on  to [facility]. OR , transferred to CVU 1312 from OR on . LDA added  for suspected DTI to coccyx.  Current Wound Care Treatment:  n/a    Patient Goal of Care:  [x] Wound Healing  [] Odor Control  [] Palliative Care  [] Pain Control   [] Other:         PAST MEDICAL HISTORY        Diagnosis Date    Cerebral artery occlusion with cerebral infarction (HCC)     Patient said he has no residual effects    Chronic obstructive pulmonary disease (HCC) 2024    Heart attack (HCC)     3 yr ago    Hx of blood clots     right side of neck    Hyperlipidemia     Hypertension     Peripheral vascular disease, unspecified (HCC) 2020       PAST SURGICAL HISTORY    Past Surgical History:   Procedure Laterality Date    ARTERIAL CLOT SURGERY Right 2016    CHOLECYSTECTOMY N/A 2024    OPEN CHOLECYSTECTOMY AND SMALL BOWEL RESECTION performed by John Craft MD at [facility] OR    FEMORAL BYPASS Left 2020    LEFT FEMORAL ENDARTERECTOMY, LEFT FEMORAL TO POPLITEAL BYPASS GRAFT performed by Armando Grigsby MD at [facility] OR    SMALL INTESTINE SURGERY N/A 2024    SECOND LOOK LAPAROTOMY, SMALL BOWEL WEDGE RESECTION AND PRIMARY CLOSURE, SMALL BOWEL ANASTOMOSIS, ABDOMINAL CLOSURE, INCISIONAL WOUND VAC PLACEMENT performed by John Craft MD at [facility] OR    VASCULAR SURGERY N/A 2024    AORTOMESENTERIC BYPASS WITH 12X 6 GRAFT performed by Armando Grigsby MD at [facility] OR       FAMILY  Pressure Relief  [] Other:     Current Diet: PN-Adult Premix 5/20 - Standard Electrolytes - Central Line  ADULT TUBE FEEDING; Nasogastric; Renal Formula; Continuous; 20; Yes; 10; Q 8 hours; 50; 20; Q 4 hours  PN-Adult Premix 5/20 - Standard Electrolytes - Central Line  Dietician consult:  Yes    Discharge Plan:  Placement for patient upon discharge: TBD  Patient appropriate for Outpatient Wound Care Center: No    Referrals:  []   [] Home Health Care  [] Supplies  [] Other    Patient/Caregiver Teaching:  Level of patient/caregiver understanding able to:   [] Indicates understanding       [] Needs reinforcement  [] Unsuccessful      [x] Verbal Understanding  [] Demonstrated understanding       [] No evidence of learning  [] Refused teaching         [] N/A       Electronically signed by Celena Sibley RN, CWOCN on 5/28/2024 at 1:46 PM

## 2024-05-28 NOTE — PROGRESS NOTES
Pulmonary Progress Note    CC:  Follow up respiratory failure     Subjective:    Remains on minimal vent settings and IMV mode.  Seems to be synchronous with vent.  On no pressors      Intake/Output Summary (Last 24 hours) at 5/28/2024 0916  Last data filed at 5/28/2024 0700  Gross per 24 hour   Intake 1758.18 ml   Output 915 ml   Net 843.18 ml         PHYSICAL EXAM:  Blood pressure (!) 120/54, pulse 67, temperature 97.1 °F (36.2 °C), temperature source Esophageal, resp. rate 14, height 1.735 m (5' 8.31\"), weight 72.9 kg (160 lb 11.5 oz), SpO2 99 %.'  Gen: Cachectic   Eyes: PERRL. No sclera icterus. No conjunctival injection.   ENT: No discharge. Pharynx with ETT and OG  Neck: Trachea midline. No obvious mass.    Resp: No crackles. No wheezes. No rhonchi. No dullness on percussion.  CV: Regular rate. Regular rhythm. No murmur or rub.    GI: wound vac in place   Skin: Warm, dry, normal texture and turgor. No nodule on exposed extremities.   Lymph: No cervical LAD. No supraclavicular LAD.   M/S: No cyanosis. No clubbing. No joint deformity.    Neuro: Wakes up to name, nods head to name   Ext:   no edema    Medications:    Scheduled Meds:   insulin lispro  0-8 Units SubCUTAneous Q4H    lidocaine 1 % injection  5 mL IntraDERmal Once    sodium chloride flush  5-40 mL IntraVENous 2 times per day    piperacillin-tazobactam  3,375 mg IntraVENous Q8H    famotidine (PEPCID) injection  20 mg IntraVENous Nightly    ipratropium 0.5 mg-albuterol 2.5 mg  1 Dose Inhalation TID    epoetin more-epbx  10,000 Units SubCUTAneous Weekly    budesonide  0.5 mg Nebulization BID RT    sodium chloride flush  5-40 mL IntraVENous 2 times per day    amLODIPine  10 mg Oral Daily    [Held by provider] aspirin  81 mg Oral Daily    [Held by provider] atorvastatin  80 mg Oral Daily    [Held by provider] lisinopril  20 mg Oral Daily    [Held by provider] pantoprazole  40 mg Oral BID       Continuous Infusions:   sodium chloride      PN-Adult Premix  duonebs q 4 hours  Add dulera q 12 hours   DC Pulmicort nebs   Bicytopenia   Monitor.  Likely due to post-operative losses   Hypoalbuminemia  On enteral feeding and TPN  Advance per Vascular surgery recs  Elevated procal   Continue with zosyn  No lactobacillus due to recent GI surgery  Follow cultures   Cardiomyopathy (EF 45-50% with possible infiltrative cardiomyopathy)  Will need improvement in volume status to pursue SBT      GI prophylaxis  Pepcid   DVT prophylaxis  SCD      Critical care time of 31 discontinuous minutes.  Critical care time is cumulative for the day.  This does not include procedural time.  Please see procedural notes for details.    DO [facility]

## 2024-05-28 NOTE — PROGRESS NOTES
General and Vascular Surgery                                                           Daily Progress Note                                                             John Craft MD    Pt Name: Darren Ducnan  Medical Record Number: [id number]  Date of Birth 1955   Today's Date: 5/28/2024    ASSESSMENT/PLAN  S/P: 5/22:Small bowel resection, cholecystectomy  5/23: Second look laparotomy, small bowel wedge resection and primary closure, small bowel anastomosis, abdominal closure, incisional wound vac placement.  Intubated, management per ICU, wean and extubate as able  Trending labs   Monitor s/s bleeding, transfuse PRN  Pain control   Incisional wound vac to remain for 7 days   Dietary consult, recs noted  Continue tube feeds, if no indication of intolerance, can increase  Goal: Nepro 50 mL/hr  Continue TPN until tolerating PO intake or goal tube feeds  Will increase (slowly) to 50 mL/hr to minimize volume overload in setting of ANIYA  Goal 75 mL/hr  +flexiseal secondary to loose BM's  Nephro consult recommendations noted, trending UOP and Cr.   Intraabdominal abx coverage complete 5/27, defer ongoing abx to primary team    EDUCATION  Patient educated about their illness/diagnosis, stated above, and all questions answered. We discussed the importance of nutrition, medications they are taking, and healthy lifestyle.    SUBJECTIVE  Darren is stable. No pressors.  UOP improving.  Hb stable     OBJECTIVE  VITALS:  height is 1.735 m (5' 8.31\") and weight is 72.9 kg (160 lb 11.5 oz). His esophageal temperature is 97.2 °F (36.2 °C). His blood pressure is 112/54 (abnormal) and his pulse is 67. His respiration is 14 and oxygen saturation is 99%. VITALS:  BP (!) 112/54   Pulse 67   Temp 97.2 °F (36.2 °C) (Esophageal)   Resp 14   Ht 1.735 m (5' 8.31\")   Wt 72.9 kg (160 lb 11.5 oz)   SpO2 99%   BMI 24.22 kg/m²   GENERAL: No distress  LUNGS: intubated

## 2024-05-28 NOTE — PROGRESS NOTES
NAME:  Darren Duncan  YOB: 1955  MEDICAL RECORD NUMBER:  [id number]    Shift Summary: Pt remains intubated and sedated this shift. Hypertensive overnight. PRN hydralazine added for SBP >160. Pt able to follow commands and not appropriately. Abdominal site remains CD&I. No acute events overnight.     Family updated: No    Rhythm: Normal Sinus Rhythm , with periods of Sinus Bradycardia     Most recent vitals:   Visit Vitals  BP (!) 120/54   Pulse 60   Temp 97.1 °F (36.2 °C) (Esophageal)   Resp 18   Ht 1.735 m (5' 8.31\")   Wt 72.9 kg (160 lb 11.5 oz)   SpO2 99%   BMI 24.22 kg/m²      ABP (Arterial line BP): 147/44  ABP Mean (Arterial Line Mean): 71 mmHg    No data found.    Patient Vitals for the past 12 hrs:   CVP (Mean)   05/28/24 0700 6 mmHg   05/28/24 0600 9 mmHg   05/28/24 0500 9 mmHg   05/28/24 0400 5 mmHg   05/28/24 0300 2 mmHg   05/28/24 0200 3 mmHg   05/28/24 0100 7 mmHg   05/28/24 0000 10 mmHg   05/27/24 2300 6 mmHg   05/27/24 2200 4 mmHg   05/27/24 2100 3 mmHg   05/27/24 2000 4 mmHg         Respiratory support needed (if any):  - Ventilator Settings:  Vent Days 6    Vt (Set, mL): 0 mL  Resp Rate (Set): 7 bpm  FiO2 : 30 %    PEEP/CPAP (cmH2O): 5       Admission weight Weight - Scale: 49.6 kg (109 lb 5.6 oz) (05/16/24 0701)    Today's weight    Wt Readings from Last 1 Encounters:   05/28/24 72.9 kg (160 lb 11.5 oz)        King need assessed each shift: YES -  - continue king r/t - accurate monitoring of Is and Os of critically ill.   UOP >30ml/hr: YES  Last documented BM (in last 48 hrs):  Patient Vitals for the past 48 hrs:   Last BM (including prior to admit)   05/26/24 0800 05/26/24 05/26/24 1000 05/26/24 05/26/24 1200 05/26/24 05/26/24 1600 05/26/24 05/26/24 2000 05/26/24 05/27/24 0400 05/27/24 05/27/24 0800 05/27/24 05/27/24 2000 05/27/24                Restraints (in use currently or dc'd in last 12 hrs): Yes    Order current and documentation up to date?

## 2024-05-28 NOTE — PROGRESS NOTES
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 Grigsby and Dr. Craft    Subjective: Pt seen resting in bed, sedated and intubated, nursing reports agitation with decrease in sedation    Vital Signs:  Vitals:    05/28/24 0500 05/28/24 0600 05/28/24 0700 05/28/24 0800   BP: (!) 114/51 (!) 121/55 (!) 120/54    Pulse: 60 64 60 67   Resp: 12 13 18 14   Temp:       TempSrc:       SpO2: 97% 99% 99% 99%   Weight: 72.9 kg (160 lb 11.5 oz)      Height:           I/O:    Intake/Output Summary (Last 24 hours) at 5/28/2024 0959  Last data filed at 5/28/2024 0700  Gross per 24 hour   Intake 1758.18 ml   Output 915 ml   Net 843.18 ml       Physical Exam:   General: no apparent distress, intubated/sedated, afebrile  Neck: Right IJ CVC in place  Chest/Lungs: non labored breathing no tachypnea, retractions or cyanosis, intubated  Cardiac: Heart regular rate and rhythm  Abdomen: Prevena dressing in place to midline incision, abdomen soft, mildly distended, bowel sounds not appreciated to auscultation  Vascular: extremities warm and well perfused, no signs of cyanosis or ischemia    Labs:   Lab Results   Component Value Date/Time     05/28/2024 03:50 AM    K 3.8 05/28/2024 03:50 AM    K 4.1 05/25/2024 03:10 AM     05/28/2024 03:50 AM    CO2 24 05/28/2024 03:50 AM    BUN 71 05/28/2024 03:50 AM    CREATININE 1.9 05/28/2024 03:50 AM    GFRAA >60 03/01/2021 06:25 AM    LABGLOM 38 05/28/2024 03:50 AM    LABGLOM >60 11/09/2023 11:01 AM    GLUCOSE 208 05/28/2024 03:50 AM    PHOS 3.6 05/28/2024 03:50 AM    MG 2.10 05/28/2024 03:50 AM    CALCIUM 7.3 05/28/2024 03:50 AM     Lab Results   Component Value Date/Time    WBC 11.7 05/28/2024 03:50 AM    RBC 3.24 05/28/2024 03:50 AM    HGB 9.7 05/28/2024 03:50 AM    HCT 28.8 05/28/2024 03:50 AM    MCV 89.0 05/28/2024

## 2024-05-28 NOTE — CARE COORDINATION
Spoke with Leslie of [facility] to determine if he would be appropriate for LTACH:   vent, ng tube feeds, wound vac for next 7 days.  Leslie  will review and call back.  GUILLERMO Cramer     Case Management   [phone]    5/28/2024  12:49 PM

## 2024-05-29 ENCOUNTER — APPOINTMENT (OUTPATIENT)
Dept: GENERAL RADIOLOGY | Age: 69
End: 2024-05-29
Payer: MEDICARE

## 2024-05-29 PROBLEM — J15.69 GRAM-NEGATIVE PNEUMONIA (HCC): Status: ACTIVE | Noted: 2024-05-29

## 2024-05-29 PROBLEM — J90 PLEURAL EFFUSION ON RIGHT: Status: ACTIVE | Noted: 2024-05-29

## 2024-05-29 LAB
ALBUMIN SERPL-MCNC: 1.8 G/DL (ref 3.4–5)
ALP SERPL-CCNC: 73 U/L (ref 40–129)
ALT SERPL-CCNC: 6 U/L (ref 10–40)
ANION GAP SERPL CALCULATED.3IONS-SCNC: 10 MMOL/L (ref 3–16)
ANION GAP SERPL CALCULATED.3IONS-SCNC: 8 MMOL/L (ref 3–16)
APPEARANCE BRONCH: ABNORMAL
AST SERPL-CCNC: 67 U/L (ref 15–37)
BACTERIA SPEC RESP CULT: ABNORMAL
BACTERIA SPEC RESP CULT: ABNORMAL
BILIRUB DIRECT SERPL-MCNC: 0.3 MG/DL (ref 0–0.3)
BILIRUB INDIRECT SERPL-MCNC: 0.1 MG/DL (ref 0–1)
BILIRUB SERPL-MCNC: 0.4 MG/DL (ref 0–1)
BUN SERPL-MCNC: 70 MG/DL (ref 7–20)
BUN SERPL-MCNC: 70 MG/DL (ref 7–20)
CALCIUM SERPL-MCNC: 7.1 MG/DL (ref 8.3–10.6)
CALCIUM SERPL-MCNC: 7.8 MG/DL (ref 8.3–10.6)
CHLORIDE SERPL-SCNC: 106 MMOL/L (ref 99–110)
CHLORIDE SERPL-SCNC: 107 MMOL/L (ref 99–110)
CLOT SPEC QL: ABNORMAL
CO2 SERPL-SCNC: 26 MMOL/L (ref 21–32)
CO2 SERPL-SCNC: 27 MMOL/L (ref 21–32)
COLOR BRONCH: ABNORMAL
CREAT SERPL-MCNC: 1.6 MG/DL (ref 0.8–1.3)
CREAT SERPL-MCNC: 1.7 MG/DL (ref 0.8–1.3)
DEPRECATED RDW RBC AUTO: 15.6 % (ref 12.4–15.4)
GFR SERPLBLD CREATININE-BSD FMLA CKD-EPI: 43 ML/MIN/{1.73_M2}
GFR SERPLBLD CREATININE-BSD FMLA CKD-EPI: 46 ML/MIN/{1.73_M2}
GLUCOSE BLD-MCNC: 171 MG/DL (ref 70–99)
GLUCOSE BLD-MCNC: 185 MG/DL (ref 70–99)
GLUCOSE BLD-MCNC: 190 MG/DL (ref 70–99)
GLUCOSE BLD-MCNC: 191 MG/DL (ref 70–99)
GLUCOSE BLD-MCNC: 202 MG/DL (ref 70–99)
GLUCOSE BLD-MCNC: 205 MG/DL (ref 70–99)
GLUCOSE BLD-MCNC: 237 MG/DL (ref 70–99)
GLUCOSE BLD-MCNC: 248 MG/DL (ref 70–99)
GLUCOSE SERPL-MCNC: 189 MG/DL (ref 70–99)
GLUCOSE SERPL-MCNC: 217 MG/DL (ref 70–99)
GRAM STN SPEC: ABNORMAL
HCT VFR BLD AUTO: 27.1 % (ref 40.5–52.5)
HGB BLD-MCNC: 9.1 G/DL (ref 13.5–17.5)
MACROPHAGES NFR BRONCH MANUAL: 4 % (ref 90–95)
MAGNESIUM SERPL-MCNC: 1.9 MG/DL (ref 1.8–2.4)
MAGNESIUM SERPL-MCNC: 2 MG/DL (ref 1.8–2.4)
MCH RBC QN AUTO: 30.3 PG (ref 26–34)
MCHC RBC AUTO-ENTMCNC: 33.6 G/DL (ref 31–36)
MCV RBC AUTO: 90 FL (ref 80–100)
NEUTROPHILS NFR BRONCH MANUAL: 96 % (ref 5–10)
NUC CELL # BRONCH MANUAL: 1105 /CUMM
ORGANISM: ABNORMAL
PATH REV: YES
PERFORMED ON: ABNORMAL
PHOSPHATE SERPL-MCNC: 3.1 MG/DL (ref 2.5–4.9)
PLATELET # BLD AUTO: 44 K/UL (ref 135–450)
PMV BLD AUTO: 9.5 FL (ref 5–10.5)
POTASSIUM SERPL-SCNC: 3.4 MMOL/L (ref 3.5–5.1)
POTASSIUM SERPL-SCNC: 3.7 MMOL/L (ref 3.5–5.1)
PROT SERPL-MCNC: 4.2 G/DL (ref 6.4–8.2)
RBC # BLD AUTO: 3.01 M/UL (ref 4.2–5.9)
RBC # FLD MANUAL: 37 /CUMM
REPORT: NORMAL
REPORT: NORMAL
RESP PATH DNA+RNA PNL L RESP NAA+NON-PRB: ABNORMAL
RESP PATH DNA+RNA PNL NPH NAA+NON-PROBE: NORMAL
SODIUM SERPL-SCNC: 142 MMOL/L (ref 136–145)
SODIUM SERPL-SCNC: 142 MMOL/L (ref 136–145)
SPECIMEN VOL FLD: 20 ML
TOTAL CELLS COUNTED BRONCH: 100
TROPONIN, HIGH SENSITIVITY: 58 NG/L (ref 0–22)
WBC # BLD AUTO: 11.3 K/UL (ref 4–11)

## 2024-05-29 PROCEDURE — 80076 HEPATIC FUNCTION PANEL: CPT

## 2024-05-29 PROCEDURE — 6370000000 HC RX 637 (ALT 250 FOR IP): Performed by: SURGERY

## 2024-05-29 PROCEDURE — 2700000000 HC OXYGEN THERAPY PER DAY

## 2024-05-29 PROCEDURE — 2580000003 HC RX 258: Performed by: STUDENT IN AN ORGANIZED HEALTH CARE EDUCATION/TRAINING PROGRAM

## 2024-05-29 PROCEDURE — 6360000002 HC RX W HCPCS: Performed by: STUDENT IN AN ORGANIZED HEALTH CARE EDUCATION/TRAINING PROGRAM

## 2024-05-29 PROCEDURE — 2500000003 HC RX 250 WO HCPCS: Performed by: STUDENT IN AN ORGANIZED HEALTH CARE EDUCATION/TRAINING PROGRAM

## 2024-05-29 PROCEDURE — 31622 DX BRONCHOSCOPE/WASH: CPT

## 2024-05-29 PROCEDURE — 2100000000 HC CCU R&B

## 2024-05-29 PROCEDURE — APPNB15 APP NON BILLABLE TIME 0-15 MINS: Performed by: PHYSICIAN ASSISTANT

## 2024-05-29 PROCEDURE — 99024 POSTOP FOLLOW-UP VISIT: CPT | Performed by: STUDENT IN AN ORGANIZED HEALTH CARE EDUCATION/TRAINING PROGRAM

## 2024-05-29 PROCEDURE — 6360000002 HC RX W HCPCS: Performed by: SURGERY

## 2024-05-29 PROCEDURE — 89051 BODY FLUID CELL COUNT: CPT

## 2024-05-29 PROCEDURE — 31624 DX BRONCHOSCOPE/LAVAGE: CPT | Performed by: INTERNAL MEDICINE

## 2024-05-29 PROCEDURE — 0202U NFCT DS 22 TRGT SARS-COV-2: CPT

## 2024-05-29 PROCEDURE — 6370000000 HC RX 637 (ALT 250 FOR IP): Performed by: INTERNAL MEDICINE

## 2024-05-29 PROCEDURE — 84484 ASSAY OF TROPONIN QUANT: CPT

## 2024-05-29 PROCEDURE — 2500000003 HC RX 250 WO HCPCS

## 2024-05-29 PROCEDURE — 94761 N-INVAS EAR/PLS OXIMETRY MLT: CPT

## 2024-05-29 PROCEDURE — 94640 AIRWAY INHALATION TREATMENT: CPT

## 2024-05-29 PROCEDURE — 99291 CRITICAL CARE FIRST HOUR: CPT | Performed by: INTERNAL MEDICINE

## 2024-05-29 PROCEDURE — 2580000003 HC RX 258: Performed by: INTERNAL MEDICINE

## 2024-05-29 PROCEDURE — 83735 ASSAY OF MAGNESIUM: CPT

## 2024-05-29 PROCEDURE — 87205 SMEAR GRAM STAIN: CPT

## 2024-05-29 PROCEDURE — 87040 BLOOD CULTURE FOR BACTERIA: CPT

## 2024-05-29 PROCEDURE — 2500000003 HC RX 250 WO HCPCS: Performed by: SURGERY

## 2024-05-29 PROCEDURE — APPNB15 APP NON BILLABLE TIME 0-15 MINS: Performed by: NURSE PRACTITIONER

## 2024-05-29 PROCEDURE — 2500000003 HC RX 250 WO HCPCS: Performed by: INTERNAL MEDICINE

## 2024-05-29 PROCEDURE — 87633 RESP VIRUS 12-25 TARGETS: CPT

## 2024-05-29 PROCEDURE — 6360000002 HC RX W HCPCS

## 2024-05-29 PROCEDURE — 71045 X-RAY EXAM CHEST 1 VIEW: CPT

## 2024-05-29 PROCEDURE — 36415 COLL VENOUS BLD VENIPUNCTURE: CPT

## 2024-05-29 PROCEDURE — 87077 CULTURE AEROBIC IDENTIFY: CPT

## 2024-05-29 PROCEDURE — 6370000000 HC RX 637 (ALT 250 FOR IP): Performed by: STUDENT IN AN ORGANIZED HEALTH CARE EDUCATION/TRAINING PROGRAM

## 2024-05-29 PROCEDURE — APPSS15 APP SPLIT SHARED TIME 0-15 MINUTES: Performed by: PHYSICIAN ASSISTANT

## 2024-05-29 PROCEDURE — 85027 COMPLETE CBC AUTOMATED: CPT

## 2024-05-29 PROCEDURE — 80048 BASIC METABOLIC PNL TOTAL CA: CPT

## 2024-05-29 PROCEDURE — 99024 POSTOP FOLLOW-UP VISIT: CPT | Performed by: SURGERY

## 2024-05-29 PROCEDURE — 84100 ASSAY OF PHOSPHORUS: CPT

## 2024-05-29 PROCEDURE — 87070 CULTURE OTHR SPECIMN AEROBIC: CPT

## 2024-05-29 PROCEDURE — 6360000002 HC RX W HCPCS: Performed by: NURSE PRACTITIONER

## 2024-05-29 PROCEDURE — 94003 VENT MGMT INPAT SUBQ DAY: CPT

## 2024-05-29 PROCEDURE — 87184 SC STD DISK METHOD PER PLATE: CPT

## 2024-05-29 PROCEDURE — 87015 SPECIMEN INFECT AGNT CONCNTJ: CPT

## 2024-05-29 PROCEDURE — 99024 POSTOP FOLLOW-UP VISIT: CPT | Performed by: PHYSICIAN ASSISTANT

## 2024-05-29 PROCEDURE — 87186 SC STD MICRODIL/AGAR DIL: CPT

## 2024-05-29 PROCEDURE — 99152 MOD SED SAME PHYS/QHP 5/>YRS: CPT | Performed by: INTERNAL MEDICINE

## 2024-05-29 PROCEDURE — 2580000003 HC RX 258: Performed by: NURSE PRACTITIONER

## 2024-05-29 PROCEDURE — 0B9F8ZX DRAINAGE OF RIGHT LOWER LUNG LOBE, VIA NATURAL OR ARTIFICIAL OPENING ENDOSCOPIC, DIAGNOSTIC: ICD-10-PCS | Performed by: INTERNAL MEDICINE

## 2024-05-29 PROCEDURE — 93005 ELECTROCARDIOGRAM TRACING: CPT | Performed by: SURGERY

## 2024-05-29 RX ORDER — FENTANYL CITRATE 50 UG/ML
50 INJECTION, SOLUTION INTRAMUSCULAR; INTRAVENOUS ONCE
Status: COMPLETED | OUTPATIENT
Start: 2024-05-29 | End: 2024-05-29

## 2024-05-29 RX ORDER — MIDAZOLAM HYDROCHLORIDE 1 MG/ML
2 INJECTION INTRAMUSCULAR; INTRAVENOUS ONCE
Status: COMPLETED | OUTPATIENT
Start: 2024-05-29 | End: 2024-05-29

## 2024-05-29 RX ORDER — NOREPINEPHRINE BITARTRATE 0.06 MG/ML
1-100 INJECTION, SOLUTION INTRAVENOUS CONTINUOUS
Status: DISCONTINUED | OUTPATIENT
Start: 2024-05-29 | End: 2024-06-01 | Stop reason: HOSPADM

## 2024-05-29 RX ORDER — NOREPINEPHRINE BITARTRATE 0.06 MG/ML
1-100 INJECTION, SOLUTION INTRAVENOUS CONTINUOUS
Status: DISCONTINUED | OUTPATIENT
Start: 2024-05-29 | End: 2024-05-29

## 2024-05-29 RX ORDER — FUROSEMIDE 10 MG/ML
20 INJECTION INTRAMUSCULAR; INTRAVENOUS 2 TIMES DAILY
Status: DISCONTINUED | OUTPATIENT
Start: 2024-05-30 | End: 2024-05-30

## 2024-05-29 RX ORDER — FENTANYL CITRATE 50 UG/ML
INJECTION, SOLUTION INTRAMUSCULAR; INTRAVENOUS
Status: COMPLETED
Start: 2024-05-29 | End: 2024-05-29

## 2024-05-29 RX ORDER — FENTANYL CITRATE 50 UG/ML
50 INJECTION, SOLUTION INTRAMUSCULAR; INTRAVENOUS
Status: DISCONTINUED | OUTPATIENT
Start: 2024-05-29 | End: 2024-05-29

## 2024-05-29 RX ORDER — NOREPINEPHRINE BITARTRATE 0.06 MG/ML
INJECTION, SOLUTION INTRAVENOUS
Status: COMPLETED
Start: 2024-05-29 | End: 2024-05-29

## 2024-05-29 RX ORDER — FUROSEMIDE 10 MG/ML
20 INJECTION INTRAMUSCULAR; INTRAVENOUS ONCE
Status: COMPLETED | OUTPATIENT
Start: 2024-05-29 | End: 2024-05-29

## 2024-05-29 RX ORDER — CALCIUM GLUCONATE 20 MG/ML
2000 INJECTION, SOLUTION INTRAVENOUS ONCE
Status: COMPLETED | OUTPATIENT
Start: 2024-05-29 | End: 2024-05-29

## 2024-05-29 RX ORDER — MIDAZOLAM HYDROCHLORIDE 1 MG/ML
INJECTION INTRAMUSCULAR; INTRAVENOUS
Status: COMPLETED
Start: 2024-05-29 | End: 2024-05-29

## 2024-05-29 RX ORDER — ACETAMINOPHEN 325 MG/1
650 TABLET ORAL EVERY 4 HOURS PRN
Status: DISCONTINUED | OUTPATIENT
Start: 2024-05-29 | End: 2024-06-01 | Stop reason: HOSPADM

## 2024-05-29 RX ORDER — SODIUM CHLORIDE FOR INHALATION 3 %
4 VIAL, NEBULIZER (ML) INHALATION
Status: DISCONTINUED | OUTPATIENT
Start: 2024-05-29 | End: 2024-05-30

## 2024-05-29 RX ADMIN — IPRATROPIUM BROMIDE AND ALBUTEROL SULFATE 1 DOSE: 2.5; .5 SOLUTION RESPIRATORY (INHALATION) at 08:07

## 2024-05-29 RX ADMIN — SODIUM CHLORIDE, PRESERVATIVE FREE 10 ML: 5 INJECTION INTRAVENOUS at 21:48

## 2024-05-29 RX ADMIN — Medication 50 MCG/HR: at 07:05

## 2024-05-29 RX ADMIN — PIPERACILLIN AND TAZOBACTAM 3375 MG: 3; .375 INJECTION, POWDER, LYOPHILIZED, FOR SOLUTION INTRAVENOUS at 09:32

## 2024-05-29 RX ADMIN — Medication 5 MCG/MIN: at 21:46

## 2024-05-29 RX ADMIN — DEXMEDETOMIDINE HYDROCHLORIDE 0.8 MCG/KG/HR: 4 INJECTION, SOLUTION INTRAVENOUS at 17:31

## 2024-05-29 RX ADMIN — MEROPENEM 1000 MG: 1 INJECTION, POWDER, FOR SOLUTION INTRAVENOUS at 16:15

## 2024-05-29 RX ADMIN — MIDAZOLAM 2 MG: 1 INJECTION INTRAMUSCULAR; INTRAVENOUS at 08:43

## 2024-05-29 RX ADMIN — PIPERACILLIN AND TAZOBACTAM 3375 MG: 3; .375 INJECTION, POWDER, LYOPHILIZED, FOR SOLUTION INTRAVENOUS at 00:25

## 2024-05-29 RX ADMIN — POTASSIUM CHLORIDE 20 MEQ: 29.8 INJECTION, SOLUTION INTRAVENOUS at 05:55

## 2024-05-29 RX ADMIN — SODIUM CHLORIDE SOLN NEBU 3% 4 ML: 3 NEBU SOLN at 08:39

## 2024-05-29 RX ADMIN — Medication 2 PUFF: at 08:07

## 2024-05-29 RX ADMIN — POTASSIUM CHLORIDE 20 MEQ: 29.8 INJECTION, SOLUTION INTRAVENOUS at 07:03

## 2024-05-29 RX ADMIN — SODIUM CHLORIDE, PRESERVATIVE FREE 20 MG: 5 INJECTION INTRAVENOUS at 21:39

## 2024-05-29 RX ADMIN — IPRATROPIUM BROMIDE AND ALBUTEROL SULFATE 1 DOSE: 2.5; .5 SOLUTION RESPIRATORY (INHALATION) at 20:00

## 2024-05-29 RX ADMIN — Medication 2 PUFF: at 20:00

## 2024-05-29 RX ADMIN — DEXMEDETOMIDINE HYDROCHLORIDE 0.8 MCG/KG/HR: 4 INJECTION, SOLUTION INTRAVENOUS at 10:00

## 2024-05-29 RX ADMIN — ACETAMINOPHEN 325MG 650 MG: 325 TABLET ORAL at 23:45

## 2024-05-29 RX ADMIN — INSULIN LISPRO 2 UNITS: 100 INJECTION, SOLUTION INTRAVENOUS; SUBCUTANEOUS at 00:31

## 2024-05-29 RX ADMIN — CASTOR OIL AND BALSAM, PERU: 788; 87 OINTMENT TOPICAL at 21:47

## 2024-05-29 RX ADMIN — MIDAZOLAM HYDROCHLORIDE 2 MG: 1 INJECTION INTRAMUSCULAR; INTRAVENOUS at 08:43

## 2024-05-29 RX ADMIN — INSULIN LISPRO 2 UNITS: 100 INJECTION, SOLUTION INTRAVENOUS; SUBCUTANEOUS at 08:15

## 2024-05-29 RX ADMIN — FUROSEMIDE 20 MG: 10 INJECTION, SOLUTION INTRAMUSCULAR; INTRAVENOUS at 08:58

## 2024-05-29 RX ADMIN — FENTANYL CITRATE 50 MCG: 50 INJECTION, SOLUTION INTRAMUSCULAR; INTRAVENOUS at 08:41

## 2024-05-29 RX ADMIN — DEXMEDETOMIDINE HYDROCHLORIDE 1.2 MCG/KG/HR: 4 INJECTION, SOLUTION INTRAVENOUS at 04:52

## 2024-05-29 RX ADMIN — FUROSEMIDE 20 MG: 10 INJECTION, SOLUTION INTRAMUSCULAR; INTRAVENOUS at 16:13

## 2024-05-29 RX ADMIN — IPRATROPIUM BROMIDE AND ALBUTEROL SULFATE 1 DOSE: 2.5; .5 SOLUTION RESPIRATORY (INHALATION) at 12:00

## 2024-05-29 RX ADMIN — FENTANYL CITRATE 50 MCG: 50 INJECTION INTRAMUSCULAR; INTRAVENOUS at 08:41

## 2024-05-29 RX ADMIN — SODIUM CHLORIDE, PRESERVATIVE FREE 10 ML: 5 INJECTION INTRAVENOUS at 09:00

## 2024-05-29 RX ADMIN — INSULIN LISPRO 2 UNITS: 100 INJECTION, SOLUTION INTRAVENOUS; SUBCUTANEOUS at 04:42

## 2024-05-29 RX ADMIN — CASTOR OIL AND BALSAM, PERU: 788; 87 OINTMENT TOPICAL at 08:59

## 2024-05-29 RX ADMIN — SODIUM CHLORIDE SOLN NEBU 3% 4 ML: 3 NEBU SOLN at 12:00

## 2024-05-29 RX ADMIN — CALCIUM GLUCONATE 2000 MG: 20 INJECTION, SOLUTION INTRAVENOUS at 07:09

## 2024-05-29 RX ADMIN — Medication 5 MCG/MIN: at 08:18

## 2024-05-29 RX ADMIN — NOREPINEPHRINE BITARTRATE 5 MCG/MIN: 0.06 INJECTION, SOLUTION INTRAVENOUS at 08:18

## 2024-05-29 ASSESSMENT — PULMONARY FUNCTION TESTS
PIF_VALUE: 23
PIF_VALUE: 17
PIF_VALUE: 17
PIF_VALUE: 21
PIF_VALUE: 18
PIF_VALUE: 26
PIF_VALUE: 26
PIF_VALUE: 36
PIF_VALUE: 17
PIF_VALUE: 17
PIF_VALUE: 38
PIF_VALUE: 17
PIF_VALUE: 26
PIF_VALUE: 21
PIF_VALUE: 17
PIF_VALUE: 17
PIF_VALUE: 23
PIF_VALUE: 22
PIF_VALUE: 17
PIF_VALUE: 19
PIF_VALUE: 17
PIF_VALUE: 23
PIF_VALUE: 17
PIF_VALUE: 23
PIF_VALUE: 17
PIF_VALUE: 18
PIF_VALUE: 17
PIF_VALUE: 18
PIF_VALUE: 17
PIF_VALUE: 23
PIF_VALUE: 17
PIF_VALUE: 19
PIF_VALUE: 17
PIF_VALUE: 17
PIF_VALUE: 21
PIF_VALUE: 21
PIF_VALUE: 18
PIF_VALUE: 23
PIF_VALUE: 18
PIF_VALUE: 18
PIF_VALUE: 17
PIF_VALUE: 18
PIF_VALUE: 17
PIF_VALUE: 21
PIF_VALUE: 17
PIF_VALUE: 23
PIF_VALUE: 17
PIF_VALUE: 18
PIF_VALUE: 17
PIF_VALUE: 17
PIF_VALUE: 18
PIF_VALUE: 18
PIF_VALUE: 17
PIF_VALUE: 18
PIF_VALUE: 19
PIF_VALUE: 22
PIF_VALUE: 17
PIF_VALUE: 22
PIF_VALUE: 17
PIF_VALUE: 18
PIF_VALUE: 20
PIF_VALUE: 21

## 2024-05-29 ASSESSMENT — PAIN SCALES - GENERAL
PAINLEVEL_OUTOF10: 0

## 2024-05-29 NOTE — PROGRESS NOTES
VASCULAR                 POD#7     Intubated and sedated. When sedation lightened bites tube but does follow commands.  Tolerating TFs well - no residuals.     VSS afeb              Tmax 99.5       CVP 10-12 I/O -489 (UO 1935)  Heart RRR  Lungs clear  Abd flat, + BS  Wound dressing in place  Palpable B femoral pulses - no pedal pulses palpable  Feet pink     CXR - pending  Labs reviewed  WBC 11.3 Plts 44K Creat 1.7 K 3.4  LFTs essentially normalized  Sputum C&S Pseudomonas     A/P:     S/P aorto-hepatic-mesenteric. Clinically patent with signs of reperfusion.              S/P SB resection with reanatomosis - tolerating low rate TFs              ATN - slowly resolving. Nonoliguric now. Continue lasix BID.              Thrombocytopenia - consumptive. Observe. No signs of bleeding.              Decrease IVFs as low as possible inconjunction with diuresis.              Continue sedation and vent per pulmonary.   SBT per pulmonary.              TPN per Gen Surg. Did add insulin for hyperglycemic control..              Continue antibx to protect aortomesenteric graft considering bowel ischemia.   Review and adjust antibx based on sputum culture sensitivities.              Critical status.                 Rg Grigsby

## 2024-05-29 NOTE — CARE COORDINATION
Rec'd response from Dr Mendoza about planning for LTACH who responded to start the process.  Call placed to Sign other, Janet, who is listed on an Advanced Directive as the first contact.  No answer; phone just rings.  GUILLERMO Cramer     Case Management   [phone]    5/29/2024  12:37 PM

## 2024-05-29 NOTE — PROCEDURES
BRONCHOSCOPY WITH BAL WHILE IN THE INTENSIVE CARE UNIT    Indications:  Pneumonia, mucus plugging  Consent:  Signed on chart  Pre-op diagnosis:  Pneumonia     Type of sedation used: Moderate Sedation  Sedation:  Continuous Fentanyl and precedex.  Additional 50 mcg of fentanyl and 2 mg of versed  Anesthetic:  Lidocaine none    Physician/patient face-to-face sedation start time: 0815   Physician/patient face-to-face sedation stop time: 0832  Total moderate sedation time in minutes: 17 minutes    Patient was monitored continuously 1:1 throughout the entire procedure while sedation was administered        Mallampati Class:  NA    ASA Class 4 - A patient with severe systemic disease that is a constant threat to life    Narrative:  The patient was located in MICU bed 1306.  The patient was intubated.  The patient was hooked to continuous monitoring.  I entered the ETT without difficulty.     At the distal end of the ETT and proximal right main stem, there were very thick and viscus secretions noted.  I could not suction the secretions out with the bronchoscope and had to manually remove the scope and flush out the channel with saline.  This occurred several times and was a result of thick, brown secretions.  I was able to clear the secretions and then performed lung exam    The right upper lobe demonstrated normal anatomy.      The middle lobe demonstrated normal anatomy.      The right lower lobe demonstrated normal anatomy.      The left upper lobe demonstrated normal anatomy.      The left lower lobe demonstrated normal anatomy.      I then wedged my scope in the RLL to perform a BAL.  I instilled 40 mL of normal saline.  I suctioned 45 ml.  The fluid was mucoid to brown.    I then removed my scope to check for bleeding.  There was no evidence of bleeding.      Estimated blood loss:  None  Specimens:  approx 45 ml of lavage  Post-op diagnosis:  pneumonia  Complications: none    DO [facility], Sleep

## 2024-05-29 NOTE — PROGRESS NOTES
NAME:  Darren Duncan  YOB: 1955  MEDICAL RECORD NUMBER:  [id number]    Shift Summary: Bronchoscopy today, TF increased to 50 cc/hr, TPN still at 50, Aspirated TF around 6pm,     Family updated: Yes:  updated domestic partner, sister, and brother    Rhythm: Normal Sinus Rhythm     Most recent vitals:   Visit Vitals  BP (!) 96/56   Pulse 86   Temp 100 °F (37.8 °C) (Esophageal)   Resp 20   Ht 1.735 m (5' 8.31\")   Wt 76.1 kg (167 lb 12.3 oz)   SpO2 100%   BMI 25.28 kg/m²      ABP (Arterial line BP): (!) 98/92  ABP Mean (Arterial Line Mean): 96 mmHg    No data found.    Patient Vitals for the past 12 hrs:   CVP (Mean)   05/29/24 1845 11 mmHg   05/29/24 1830 10 mmHg   05/29/24 1815 14 mmHg   05/29/24 1800 9 mmHg   05/29/24 1745 10 mmHg   05/29/24 1730 9 mmHg   05/29/24 1715 9 mmHg   05/29/24 1700 11 mmHg   05/29/24 1645 16 mmHg   05/29/24 1630 23 mmHg   05/29/24 1615 15 mmHg   05/29/24 1600 15 mmHg   05/29/24 1545 14 mmHg   05/29/24 1530 15 mmHg   05/29/24 1515 14 mmHg   05/29/24 1500 14 mmHg   05/29/24 1445 13 mmHg   05/29/24 1430 14 mmHg   05/29/24 1415 14 mmHg   05/29/24 1400 15 mmHg   05/29/24 1345 14 mmHg   05/29/24 1330 14 mmHg   05/29/24 1315 15 mmHg   05/29/24 1300 15 mmHg   05/29/24 1245 14 mmHg   05/29/24 1230 13 mmHg   05/29/24 1215 15 mmHg   05/29/24 1200 14 mmHg   05/29/24 1145 16 mmHg   05/29/24 1130 14 mmHg   05/29/24 1115 13 mmHg   05/29/24 1100 13 mmHg   05/29/24 1045 15 mmHg   05/29/24 1030 8 mmHg   05/29/24 1015 12 mmHg   05/29/24 1000 11 mmHg   05/29/24 0945 10 mmHg   05/29/24 0930 8 mmHg   05/29/24 0915 8 mmHg   05/29/24 0900 11 mmHg   05/29/24 0845 13 mmHg   05/29/24 0830 13 mmHg   05/29/24 0815 13 mmHg   05/29/24 0800 16 mmHg   05/29/24 0745 12 mmHg   05/29/24 0730 13 mmHg   05/29/24 0715 12 mmHg         Respiratory support needed (if any):  - Ventilator Settings:  Vent Days     Vt (Set, mL): 0 mL  Resp Rate (Set): 7 bpm  FiO2 : 35 %    PEEP/CPAP (cmH2O): 5       Admission weight  at least one RN has performed a thorough Head to Toe Skin Assessment on the patient. ALL assessment sites listed below have been assessed.      Areas assessed by both nurses: Head, Face, Ears, Shoulders, Back, Chest, Arms, Elbows, Hands, Sacrum. Buttock, Coccyx, Ischium, Legs. Feet and Heels, and Under Medical Devices         Does the Patient have a Wound? Yes wound(s) were present on assessment. LDA wound assessment was Initiated and completed by RN    Wound Care Orders initiated by RN: Yes       Thomas Prevention initiated by RN: Yes    Pressure Injury (Stage 3,4, Unstageable, DTI, NWPT, and Complex wounds) if present, place Wound referral order by RN under : Yes    New Ostomies, if present place, Ostomy referral order under : No     Nurse 1 eSignature: Electronically signed by Karen Ardon RN on 5/29/24 at 7:15 PM EDT    **SHARE this note so that the co-signing nurse can place an eSignature**    Nurse 2 eSignature: Electronically signed by Ame Grande RN on 5/29/24 at 8:20 PM EDT

## 2024-05-29 NOTE — PROGRESS NOTES
Pt aspirating tube feeds and vomiting around the OGT. Discussed with Dr. Kern, tube feeds on hold at this time.

## 2024-05-29 NOTE — PROGRESS NOTES
General and Vascular Surgery                                                           Daily Progress Note                                                             Jordan Guerrero PA-C    Pt Name: Darren Duncan  Medical Record Number: [id number]  Date of Birth 1955   Today's Date: 5/29/2024    ASSESSMENT/PLAN  S/P: 5/22:Small bowel resection, cholecystectomy  5/23: Second look laparotomy, small bowel wedge resection and primary closure, small bowel anastomosis, abdominal closure, incisional wound vac placement.  Intubated, management per ICU, wean and extubate as able  Trending labs. WBC count 16.1 --> 11.7 --> 11.3  Monitor s/s bleeding, transfuse PRN. Hgb: 9.7 --> 9.1  Pain control. Sedated   On pressors. Wean as tolerated  Incisional wound vac to remain for 7 days   Dietary consult, recs noted  Continue tube feeds, if no indication of intolerance, can increase  Goal: Nepro 50 mL/hr. Currently running at 40 ml/hr  Continue TPN until tolerating PO intake or goal tube feeds  Will increase (slowly) to 50 mL/hr to minimize volume overload in setting of ANIYA  Goal 75 mL/hr  +flexiseal secondary to loose BM's  Bedside bronchoscopy performed this AM  Nephro consult recommendations noted, trending UOP and Cr.   Intraabdominal abx coverage complete 5/27, defer ongoing abx to primary team  Will continue to closely monitor and give further recommendations as needed.     EDUCATION  Patient educated about their illness/diagnosis, stated above, and all questions answered. We discussed the importance of nutrition, medications they are taking, and healthy lifestyle.    SUBJECTIVE  Darren is stable. On pressors this AM.  UOP improving.  Hb stable     OBJECTIVE  VITALS:  height is 1.735 m (5' 8.31\") and weight is 76.1 kg (167 lb 12.3 oz). His bladder temperature is 99.4 °F (37.4 °C). His blood pressure is 156/49 (abnormal) and his pulse is 79. His respiration is 20

## 2024-05-29 NOTE — PLAN OF CARE
Problem: Discharge Planning  Goal: Discharge to home or other facility with appropriate resources  5/29/2024 1856 by Karen Ardon RN  Outcome: Progressing  5/29/2024 0603 by Ame Grande RN  Outcome: Progressing     Problem: Pain  Goal: Verbalizes/displays adequate comfort level or baseline comfort level  5/29/2024 1856 by Karen Ardon RN  Outcome: Progressing  5/29/2024 0603 by Ame Grande RN  Outcome: Progressing  Flowsheets  Taken 5/29/2024 0000  Verbalizes/displays adequate comfort level or baseline comfort level:   Encourage patient to monitor pain and request assistance   Assess pain using appropriate pain scale   Administer analgesics based on type and severity of pain and evaluate response   Implement non-pharmacological measures as appropriate and evaluate response   Consider cultural and social influences on pain and pain management   Notify Licensed Independent Practitioner if interventions unsuccessful or patient reports new pain  Taken 5/28/2024 2000  Verbalizes/displays adequate comfort level or baseline comfort level:   Encourage patient to monitor pain and request assistance   Assess pain using appropriate pain scale   Administer analgesics based on type and severity of pain and evaluate response   Implement non-pharmacological measures as appropriate and evaluate response   Notify Licensed Independent Practitioner if interventions unsuccessful or patient reports new pain   Consider cultural and social influences on pain and pain management     Problem: Safety - Adult  Goal: Free from fall injury  5/29/2024 1856 by Karen Ardon RN  Outcome: Progressing  5/29/2024 0603 by Ame Grande RN  Outcome: Progressing     Problem: Gastrointestinal - Adult  Goal: Minimal or absence of nausea and vomiting  5/29/2024 1856 by Karen Ardon RN  Outcome: Progressing  5/29/2024 0603 by Ame Grande RN  Outcome: Progressing  Flowsheets (Taken 5/28/2024 2000)  Minimal or  that other, less restrictive measures have been tried or would not be effective before applying the restraint  2. Evaluate the patient's condition at the time of restraint application  3. Inform patient/family regarding the reason for restraint  4. Q2H: Monitor safety, psychosocial status, comfort, nutrition and hydration  5/29/2024 1856 by Karen Ardon RN  Outcome: Progressing  5/29/2024 0603 by Ame Grande RN  Outcome: Progressing  Flowsheets (Taken 5/28/2024 2000)  Remains free of injury from restraints (restraint for interference with medical device):   Determine that other, less restrictive measures have been tried or would not be effective before applying the restraint   Evaluate the patient's condition at the time of restraint application   Inform patient/family regarding the reason for restraint   Every 2 hours: Monitor safety, psychosocial status, comfort, nutrition and hydration     Problem: Nutrition Deficit:  Goal: Optimize nutritional status  5/29/2024 1856 by Karen Ardon RN  Outcome: Progressing  5/29/2024 0603 by Ame Grande RN  Outcome: Progressing     Problem: Skin/Tissue Integrity  Goal: Absence of new skin breakdown  Description: 1.  Monitor for areas of redness and/or skin breakdown  2.  Assess vascular access sites hourly  3.  Every 4-6 hours minimum:  Change oxygen saturation probe site  4.  Every 4-6 hours:  If on nasal continuous positive airway pressure, respiratory therapy assess nares and determine need for appliance change or resting period.  5/29/2024 1856 by Karen Ardon RN  Outcome: Progressing  5/29/2024 0603 by Ame Grande RN  Outcome: Progressing     Problem: ABCDS Injury Assessment  Goal: Absence of physical injury  5/29/2024 1856 by Karen Ardon RN  Outcome: Progressing  5/29/2024 0603 by Ame Grande RN  Outcome: Progressing

## 2024-05-29 NOTE — PROGRESS NOTES
Pulmonary Progress Note    CC:  Follow up respiratory failure     Subjective:    Remains on vent with FIO2 of 35%  Urine output increasing  Did receive lasix yesterday   Pseudomonas isolated from pneumonia panel     Intake/Output Summary (Last 24 hours) at 5/29/2024 0801  Last data filed at 5/29/2024 0600  Gross per 24 hour   Intake 1601.95 ml   Output 2055 ml   Net -453.05 ml           PHYSICAL EXAM:  Blood pressure (!) 109/49, pulse 77, temperature 99.4 °F (37.4 °C), temperature source Bladder, resp. rate 23, height 1.735 m (5' 8.31\"), weight 76.1 kg (167 lb 12.3 oz), SpO2 96 %.'  Gen: Cachectic   Eyes: PERRL. No sclera icterus. No conjunctival injection.   ENT: No discharge. Pharynx with ETT and OG  Neck: Trachea midline. No obvious mass.    Resp: Diminished, thick secretions in the ETT at times  CV: Regular rate. Regular rhythm. No murmur or rub.    GI: wound vac in place   Skin: Warm, dry, normal texture and turgor. No nodule on exposed extremities.   Lymph: No cervical LAD. No supraclavicular LAD.   M/S: No cyanosis. No clubbing. No joint deformity.    Neuro: More spontaneously awake   Ext:   no edema    Medications:    Scheduled Meds:   furosemide  20 mg IntraVENous Once    furosemide  20 mg IntraVENous Once    calcium gluconate  2,000 mg IntraVENous Once    insulin lispro  0-8 Units SubCUTAneous Q4H    mometasone-formoterol  2 puff Inhalation BID RT    balsum peru-castor oil   Topical BID    lidocaine 1 % injection  5 mL IntraDERmal Once    sodium chloride flush  5-40 mL IntraVENous 2 times per day    piperacillin-tazobactam  3,375 mg IntraVENous Q8H    famotidine (PEPCID) injection  20 mg IntraVENous Nightly    ipratropium 0.5 mg-albuterol 2.5 mg  1 Dose Inhalation TID    epoetin more-epbx  10,000 Units SubCUTAneous Weekly    sodium chloride flush  5-40 mL IntraVENous 2 times per day    amLODIPine  10 mg Oral Daily    [Held by provider] aspirin  81 mg Oral Daily    [Held by provider] atorvastatin  80 mg  with full vent support  Needs bronchoscopy today   Titrate oxygen for saturations greater than or equal to 90%  Gram negative pneumonia with suspect mucus plug  On zosyn which should cover this (93% based on last antibiogram)  Needs bronchoscopy today   CXR in am   Right pleural effusion, suspected to be related to volume due to rapid change on imaging   Receiving lasix today   CXR in am   Chronic Mesenteric Ischemia   Per Vascular surgery   ANIYA, improving  Lasix today per vascular surgery   COPD  Continue with duonebs q 4 hours  Dulera q 12 hours   Add saline nebs q 4 hours   Bicytopenia   Monitor.  Likely due to post-operative losses   Hypoalbuminemia  On enteral feeding and TPN  Advance enteral feeding per Vascular surgery recs  Cardiomyopathy (EF 45-50% with possible infiltrative cardiomyopathy)  Will need improvement in volume status to pursue SBT.  Possible tomorrow.  CXR will need to improve as well   Hypokalemia  Replace       GI prophylaxis  Pepcid   DVT prophylaxis  SCD      Critical care time of 31 discontinuous minutes.  Critical care time is cumulative for the day.  This does not include procedural time.  Please see procedural notes for details.    DO [facility]

## 2024-05-29 NOTE — CARE COORDINATION
Pt is LTACH appropriate.  GUILLERMO Cramer     Case Management   [phone]    5/29/2024  10:15 AM      Updated bedside RN.  Perfect served Dr Mendoza.  GUILLERMO Cramer     Case Management   [phone]    5/29/2024  10:19 AM

## 2024-05-29 NOTE — PROGRESS NOTES
Hospitalist Progress Note  5/29/2024 7:57 AM    PCP: Deep Lozano MD    [phone]     Date of Admission: 5/16/2024                                                                                                                     HOSPITAL COURSE    Patient demographics:  The patient  Darren Duncan is a 68 y.o. male     Significant past medical history:   Patient Active Problem List   Diagnosis    Other hyperlipidemia    Essential hypertension    Smoker    Rash and nonspecific skin eruption    Cellulitis and abscess of toe of left foot    Peripheral arterial disease (HCC)    Claudication (HCC)    Atherosclerosis of native arteries of extremities with rest pain, left leg (HCC)    Peripheral vascular disease, unspecified (HCC)    Atherosclerosis of artery of extremity with rest pain (HCC)    Unspecified severe protein-calorie malnutrition (HCC)    Other specified disorders of carbohydrate metabolism (HCC)    Abdominal pain    Mesenteric ischemia (HCC)    Mesenteric artery stenosis (HCC)    Chronic mesenteric ischemia (HCC)    Cardiomyopathy (HCC)    ANIYA (acute kidney injury) (HCC)    Acute respiratory failure with hypoxia (HCC)    Chronic obstructive pulmonary disease (HCC)         Presenting symptoms:   Abdominal Pain     Diagnostic workup:      CONSULTS DURING ADMISSION :   IP CONSULT TO GI  IP CONSULT TO SPIRITUAL SERVICES  IP CONSULT TO GENERAL SURGERY  IP CONSULT TO VASCULAR SURGERY  IP CONSULT TO CARDIOLOGY  IP CONSULT TO PULMONOLOGY  IP CONSULT TO NEPHROLOGY  IP CONSULT TO DIETITIAN  IP CONSULT TO DIETITIAN  IP CONSULT TO PHARMACY  PHARMACY TO DOSE TPN      Patient was diagnosed with:  Chronic mesenteric ischemia  Status post revascularization 5/22/2024  Partial bowel resection      Treatment while inpatient:         68 years old male with medical history significant for hypertension peripheral vascular disease and history of CVA.    Patient presented to the emergency room with abdominal pain    05/29/24 0100 (!) 104/47 -- -- 80 19 -- --   05/29/24 0022 -- -- -- 84 20 99 % --   05/29/24 0000 (!) 103/47 99.9 °F (37.7 °C) Bladder 80 24 100 % --        72HR INTAKE/OUTPUT:    Intake/Output Summary (Last 24 hours) at 5/29/2024 0757  Last data filed at 5/29/2024 0600  Gross per 24 hour   Intake 1685.95 ml   Output 2175 ml   Net -489.05 ml         Exam:    Gen: Patient on the ventilator  Eyes: PERRL. No sclera icterus. No conjunctival injection.   ENT: No discharge. Pharynx clear. External appearance of ears and nose normal.  Neck: Trachea midline. No obvious mass.    Resp: No accessory muscle use. No crackles. No wheezes. No rhonchi.  CV: Regular rate. Regular rhythm. No murmur or rub. No edema.   GI: Abdomen is soft diffusely tender to palpation  Skin: Warm, dry, normal texture and turgor.   Lymph: No cervical LAD. No supraclavicular LAD.   M/S: / Ext. No cyanosis. No clubbing. No joint deformity.    Neuro: CN 2-12 are intact,  no neurologic deficits noted.    PT/INR:   No results for input(s): \"PROTIME\", \"INR\" in the last 72 hours.    APTT: No results for input(s): \"APTT\" in the last 72 hours.    CBC:   Recent Labs     05/26/24  0850 05/28/24  0350 05/29/24  0435   WBC 16.1* 11.7* 11.3*   HGB 9.1* 9.7* 9.1*   HCT 26.4* 28.8* 27.1*   MCV 88.3 89.0 90.0   PLT 50* 52* 44*       BMP:   Recent Labs     05/27/24  0510 05/28/24  0350 05/29/24  0435    139 142   K 3.5 3.8 3.4*    104 107   CO2 22 24 27   PHOS 3.7 3.6 3.1   BUN 67* 71* 70*   CREATININE 2.2* 1.9* 1.7*       LIVER PROFILE:   Recent Labs     05/28/24  0350 05/29/24  0435   ALKPHOS 75 73   * 67*   ALT 8* 6*   BILIDIR 0.4* 0.3   BILITOT 0.5 0.4       No results for input(s): \"AMYLASE\" in the last 72 hours.      No results for input(s): \"LIPASE\" in the last 72 hours.        UA:  No results for input(s): \"NITRITE\", \"LABCAST\", \"WBCUA\", \"RBCUA\", \"MUCUS\" in the last 72 hours.      TROPONIN: No results for input(s): \"CKTOTAL\", \"TROPONINI\" in  the last 72 hours.    Lab Results   Component Value Date/Time    URRFLXCULT Not Indicated 05/16/2024 07:36 AM       Invalid input(s): \"TSHREFLEX\"    No components found for: \"NAN9689\"  POC GLUCOSE:    Recent Labs     05/28/24  1518 05/28/24  1815 05/28/24  2115 05/29/24  0027 05/29/24  0432   POCGLU 333* 302* 230* 248* 237*     No results for input(s): \"LABA1C\" in the last 72 hours.   Lab Results   Component Value Date/Time    LABA1C 5.7 11/09/2023 11:01 AM         ASSESSMENT AND PLAN    Acute respiratory failure  Patient remains on the ventilator  Patient underwent bronchoscopy and bronchoalveolar lavage  Pulmonary is following    Gram-negative pneumonia  With mucous plugging  Continue patient on Zosyn  WBC count is improving    Pleural effusion  Likely due to fluid overload  Started patient on IV Lasix    Chronic mesenteric ischemia  Status post revascularization-5/22/2024  Partial bowel resection  Completed antibiotics  Tolerating low-dose tube feed and TPN  1 dose of Lasix today    Coagulopathy  Transfused fresh frozen plasma    Hypotension  Patient remains on pressors  Lisinopril and amlodipine on hold  Echocardiogram shows ejection fraction of 38%      Pain management  R52  Patient on Precedex and fentanyl infusions      PUD  EGD colonoscopy showed gastritis with ulceration and duodenitis  Continue on IV Pepcid      UTI N39.0  Received 5 days of fluconazole      Insomnia  Patient is sedated on the ventilator  Discontinue Paxil that was started this hospitalization            Code Status: Full Code        Dispo - cc        The patient and / or the family were informed of the results of any tests, a time was given to answer questions, a plan was proposed and they agreed with plan.    JOON ORONA MD    This note was transcribed using Dragon Dictation software. Please disregard any translational errors.

## 2024-05-29 NOTE — CARE COORDINATION
Met with silvia Merino to discuss LTACH option.  Provided her with List of LTACH agencies for her review.  Detailed what an LTACH is.  She will review the list and call me with any questions as she is currently with someone.  The Plan for Transition of Care is related to the following treatment goals: to continue     The Patient and/or patient representative sign otherMarceSilvia  was provided with a choice of provider and agrees   with the discharge plan. [x] Yes [] No    Freedom of choice list was provided with basic dialogue that supports the patient's individualized plan of care/goals, treatment preferences and shares the quality data associated with the providers. [x] Yes [] No    GUILLERMO Cramer     Case Management   [phone]    5/29/2024  4:49 PM

## 2024-05-29 NOTE — PROGRESS NOTES
[facility]  Phone: [phone]  Fax: [phone]  [employer]     CC: ANIYA    Darren Duncan is a 68 y.o. male whom we were asked to see for ANIYA.  He presented on 05/16 with worsening of his chronic abdominal pain.  He had unintentional weight loss over the last 2 years.  He was evaluated by GI when his abdominal pain worsened on 05/20.  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  He went to the OR on 05/22 with partial small bowel resection and cholecystectomy and was taken back on 05/23 for small bowel anastomosis and abdominal closure.      Subjective:    HPI:  Ventilated.  UO 1900 ml/24  ROS:  In bed.on TF and TPN   S/P Bronchoscopy this AM  CXR noted Increasing R lung consolidation and effusion  PMFSH:  medications reviewed.   No family present.    Objective:  Blood pressure (!) 156/49, pulse 79, temperature 99.4 °F (37.4 °C), temperature source Bladder, resp. rate 21, height 1.735 m (5' 8.31\"), weight 76.1 kg (167 lb 12.3 oz), SpO2 99 %.    Intake/Output Summary (Last 24 hours) at 5/29/2024 1305  Last data filed at 5/29/2024 0600  Gross per 24 hour   Intake 1439.95 ml   Output 1355 ml   Net 84.95 ml     General:  NAD, ventilated  Chest:  coarse BS  CVS:  RRR  Abdominal:  NTND, soft, decreased BS GT in place  Extremities:  1+ edema  Skin:  no rash    Labs:  Renal panel:  Lab Results   Component Value Date/Time     05/29/2024 04:35 AM    K 3.4 (L) 05/29/2024 04:35 AM    K 4.1 05/25/2024 03:10 AM    CO2 27 05/29/2024 04:35 AM    BUN 70 (H) 05/29/2024 04:35 AM    CREATININE 1.7 (H) 05/29/2024 04:35 AM    CALCIUM 7.1 (L) 05/29/2024 04:35 AM    PHOS 3.1 05/29/2024 04:35 AM    MG 2.00 05/29/2024 04:35 AM     CBC:  Lab Results   Component Value Date/Time    WBC 11.3 (H) 05/29/2024 04:35 AM    HGB 9.1 (L) 05/29/2024 04:35 AM    HCT 27.1 (L) 05/29/2024 04:35 AM    PLT 44 (L) 05/29/2024 04:35 AM       Assessment/Plan:  Reviewed old records and labs.    1) ANIYA   - baseline 05/22

## 2024-05-29 NOTE — PROGRESS NOTES
NAME:  Darren Duncan  YOB: 1955  MEDICAL RECORD NUMBER:  [id number]    Shift Summary: VVS, BP remains soft. K replaced. Pt tolerating vent.    Family updated: Yes:  significant other/ brother     Rhythm: Normal Sinus Rhythm     Most recent vitals:   Visit Vitals  BP (!) 109/49   Pulse 70   Temp 99.4 °F (37.4 °C) (Bladder)   Resp 19   Ht 1.735 m (5' 8.31\")   Wt 76.1 kg (167 lb 12.3 oz)   SpO2 99%   BMI 25.28 kg/m²      ABP (Arterial line BP): 137/42  ABP Mean (Arterial Line Mean): (!) 47 mmHg    No data found.    Patient Vitals for the past 12 hrs:   CVP (Mean)   05/29/24 0500 14 mmHg   05/29/24 0445 13 mmHg   05/29/24 0430 13 mmHg   05/29/24 0415 16 mmHg   05/29/24 0400 16 mmHg   05/29/24 0345 12 mmHg   05/29/24 0330 14 mmHg   05/29/24 0315 4 mmHg   05/29/24 0245 5 mmHg   05/29/24 0230 5 mmHg   05/29/24 0215 12 mmHg   05/29/24 0145 12 mmHg   05/29/24 0130 19 mmHg   05/29/24 0115 12 mmHg   05/29/24 0000 13 mmHg   05/28/24 2300 13 mmHg   05/28/24 2200 10 mmHg   05/28/24 2100 12 mmHg   05/28/24 2030 12 mmHg   05/28/24 2000 12 mmHg   05/28/24 1900 12 mmHg         Respiratory support needed (if any):  - Ventilator Settings:  Vent Days 6    Vt (Set, mL): 0 mL  Resp Rate (Set): 7 bpm  FiO2 : 35 %    PEEP/CPAP (cmH2O): 5       Admission weight Weight - Scale: 49.6 kg (109 lb 5.6 oz) (05/16/24 0701)    Today's weight    Wt Readings from Last 1 Encounters:   05/29/24 76.1 kg (167 lb 12.3 oz)        King need assessed each shift: YES -  - continue king r/t - strict I and O's  UOP >30ml/hr: YES  Last documented BM (in last 48 hrs):  Patient Vitals for the past 48 hrs:   Last BM (including prior to admit)   05/27/24 0800 05/27/24 05/27/24 2000 05/27/24                Restraints (in use currently or dc'd in last 12 hrs): Yes    Order current and documentation up to date? Yes    Lines/Drains reviewed @ bedside.  PICC 05/27/24 Left Basilic (Active)   Number of days: 1       CVC  05/22/24 Right Internal jugular  (Active)   Number of days: 6       Arterial Line 05/22/24 Right Radial (Active)   Number of days: 6       Urinary Catheter 05/22/24 T[place]ley (Active)   Number of days: 6         Drip rates at handoff:    PN-Adult Premix 5/20 - Standard Electrolytes - Central Line 50 mL/hr at 05/28/24 2222    sodium chloride      dexmedeTOMIDine 1.2 mcg/kg/hr (05/29/24 0452)    nitroGLYCERIN      norepinephrine Stopped (05/26/24 0823)    fentaNYL 50 mcg/hr (05/28/24 2222)    propofol Stopped (05/24/24 0223)    dextrose         Lab Data:   CBC:   Recent Labs     05/28/24  0350 05/29/24  0435   WBC 11.7* 11.3*   HGB 9.7* 9.1*   HCT 28.8* 27.1*   MCV 89.0 90.0   PLT 52* 44*     BMP:    Recent Labs     05/28/24  0350 05/29/24  0435    142   K 3.8 3.4*   CO2 24 27   BUN 71* 70*   CREATININE 1.9* 1.7*     LIVR:   Recent Labs     05/28/24  0350 05/29/24  0435   * 67*   ALT 8* 6*     PT/INR: No results for input(s): \"PROT\", \"INR\" in the last 72 hours.  APTT: No results for input(s): \"APTT\" in the last 72 hours.  ABG:   Recent Labs     05/26/24  0934 05/27/24  0843   PHART 7.419 7.479*   [id number] 34.9* 30.9*   PO2ART 145.3* 86.4       Any consults during the shift? No    Any signed and held orders to be released?  No        4 Eyes Skin Assessment       The patient is being assessed for  Shift Handoff    I agree that at least one RN has performed a thorough Head to Toe Skin Assessment on the patient. ALL assessment sites listed below have been assessed.      Areas assessed by both nurses: Head, Face, Ears, Shoulders, Back, Chest, Arms, Elbows, Hands, Sacrum. Buttock, Coccyx, Ischium, Legs. Feet and Heels, and Under Medical Devices         Does the Patient have a Wound? Yes wound(s) were present on assessment. LDA wound assessment was Initiated and completed by RN    Wound Care Orders initiated by RN: Yes       Thomas Prevention initiated by RN: Yes    Pressure Injury (Stage 3,4, Unstageable, DTI, NWPT, and Complex wounds) if present,  place Wound referral order by RN under : Yes    New Ostomies, if present place, Ostomy referral order under : No     Nurse 1 eSignature: Electronically signed by Ame Grande RN on 5/29/24 at 6:41 AM EDT    **SHARE this note so that the co-signing nurse can place an eSignature**    Nurse 2 eSignature: Electronically signed by Karen Ardon RN on 5/29/24 at 7:00 AM EDT

## 2024-05-29 NOTE — PLAN OF CARE
Problem: Discharge Planning  Goal: Discharge to home or other facility with appropriate resources  5/29/2024 0603 by Ame Grande RN  Outcome: Progressing     Problem: Pain  Goal: Verbalizes/displays adequate comfort level or baseline comfort level  5/29/2024 0603 by Ame Grande RN  Outcome: Progressing  Flowsheets  Taken 5/29/2024 0000  Verbalizes/displays adequate comfort level or baseline comfort level:   Encourage patient to monitor pain and request assistance   Assess pain using appropriate pain scale   Administer analgesics based on type and severity of pain and evaluate response   Implement non-pharmacological measures as appropriate and evaluate response   Consider cultural and social influences on pain and pain management   Notify Licensed Independent Practitioner if interventions unsuccessful or patient reports new pain  Taken 5/28/2024 2000  Verbalizes/displays adequate comfort level or baseline comfort level:   Encourage patient to monitor pain and request assistance   Assess pain using appropriate pain scale   Administer analgesics based on type and severity of pain and evaluate response   Implement non-pharmacological measures as appropriate and evaluate response   Notify Licensed Independent Practitioner if interventions unsuccessful or patient reports new pain   Consider cultural and social influences on pain and pain management     Problem: Safety - Adult  Goal: Free from fall injury  5/29/2024 0603 by Ame Grande RN  Outcome: Progressing     Problem: Gastrointestinal - Adult  Goal: Minimal or absence of nausea and vomiting  5/29/2024 0603 by Ame Grande RN  Outcome: Progressing  Flowsheets (Taken 5/28/2024 2000)  Minimal or absence of nausea and vomiting:   Administer IV fluids as ordered to ensure adequate hydration   Maintain NPO status until nausea and vomiting are resolved   Nasogastric tube to low intermittent suction as ordered   Administer ordered antiemetic medications  as needed   Provide nonpharmacologic comfort measures as appropriate   Advance diet as tolerated, if ordered   Nutrition consult to assist patient with adequate nutrition and appropriate food choices     Problem: Gastrointestinal - Adult  Goal: Maintains or returns to baseline bowel function  5/29/2024 0603 by Ame Grande RN  Outcome: Progressing  Flowsheets (Taken 5/28/2024 2000)  Maintains or returns to baseline bowel function:   Encourage oral fluids to ensure adequate hydration   Assess bowel function   Administer IV fluids as ordered to ensure adequate hydration   Administer ordered medications as needed   Encourage mobilization and activity   Nutrition consult to assist patient with appropriate food choices     Problem: Gastrointestinal - Adult  Goal: Maintains adequate nutritional intake  5/29/2024 0603 by Ame Grande RN  Outcome: Progressing  Flowsheets (Taken 5/28/2024 2000)  Maintains adequate nutritional intake:   Monitor percentage of each meal consumed   Assist with meals as needed   Identify factors contributing to decreased intake, treat as appropriate   Monitor intake and output, weight and lab values   Obtain nutritional consult as needed     Problem: Chronic Conditions and Co-morbidities  Goal: Patient's chronic conditions and co-morbidity symptoms are monitored and maintained or improved  5/29/2024 0603 by Ame Grande RN  Outcome: Progressing     Problem: Safety - Medical Restraint  Goal: Remains free of injury from restraints (Restraint for Interference with Medical Device)  Description: INTERVENTIONS:  1. Determine that other, less restrictive measures have been tried or would not be effective before applying the restraint  2. Evaluate the patient's condition at the time of restraint application  3. Inform patient/family regarding the reason for restraint  4. Q2H: Monitor safety, psychosocial status, comfort, nutrition and hydration  5/29/2024 0603 by Ame Grande RN  Outcome:

## 2024-05-30 ENCOUNTER — APPOINTMENT (OUTPATIENT)
Dept: GENERAL RADIOLOGY | Age: 69
End: 2024-05-30
Payer: MEDICARE

## 2024-05-30 ENCOUNTER — APPOINTMENT (OUTPATIENT)
Dept: CT IMAGING | Age: 69
End: 2024-05-30
Payer: MEDICARE

## 2024-05-30 PROBLEM — A41.9 SEPTIC SHOCK (HCC): Status: ACTIVE | Noted: 2024-05-30

## 2024-05-30 PROBLEM — R65.21 SEPTIC SHOCK (HCC): Status: ACTIVE | Noted: 2024-05-30

## 2024-05-30 LAB
ABO + RH BLD: NORMAL
ANION GAP SERPL CALCULATED.3IONS-SCNC: 14 MMOL/L (ref 3–16)
ANION GAP SERPL CALCULATED.3IONS-SCNC: 19 MMOL/L (ref 3–16)
ANION GAP SERPL CALCULATED.3IONS-SCNC: 20 MMOL/L (ref 3–16)
APTT BLD: 38.4 SEC (ref 22.1–36.4)
BASE EXCESS BLDA CALC-SCNC: -1.7 MMOL/L (ref -3–3)
BASE EXCESS BLDA CALC-SCNC: -12 MMOL/L (ref -3–3)
BASOPHILS # BLD: 0 K/UL (ref 0–0.2)
BASOPHILS NFR BLD: 0 %
BLD GP AB SCN SERPL QL: NORMAL
BUN SERPL-MCNC: 68 MG/DL (ref 7–20)
BUN SERPL-MCNC: 72 MG/DL (ref 7–20)
BUN SERPL-MCNC: 80 MG/DL (ref 7–20)
CALCIUM SERPL-MCNC: 6.4 MG/DL (ref 8.3–10.6)
CALCIUM SERPL-MCNC: 7.4 MG/DL (ref 8.3–10.6)
CALCIUM SERPL-MCNC: 8 MG/DL (ref 8.3–10.6)
CHLORIDE SERPL-SCNC: 107 MMOL/L (ref 99–110)
CHLORIDE SERPL-SCNC: 109 MMOL/L (ref 99–110)
CHLORIDE SERPL-SCNC: 112 MMOL/L (ref 99–110)
CO2 BLDA-SCNC: 15 MMOL/L
CO2 BLDA-SCNC: 25.1 MMOL/L
CO2 SERPL-SCNC: 11 MMOL/L (ref 21–32)
CO2 SERPL-SCNC: 14 MMOL/L (ref 21–32)
CO2 SERPL-SCNC: 21 MMOL/L (ref 21–32)
COHGB MFR BLDA: 1.2 % (ref 0–1.5)
CREAT SERPL-MCNC: 1.9 MG/DL (ref 0.8–1.3)
CREAT SERPL-MCNC: 1.9 MG/DL (ref 0.8–1.3)
CREAT SERPL-MCNC: 2.3 MG/DL (ref 0.8–1.3)
DEPRECATED RDW RBC AUTO: 16.4 % (ref 12.4–15.4)
DEPRECATED RDW RBC AUTO: 16.7 % (ref 12.4–15.4)
EKG ATRIAL RATE: 97 BPM
EKG DIAGNOSIS: NORMAL
EKG P AXIS: 19 DEGREES
EKG P-R INTERVAL: 136 MS
EKG Q-T INTERVAL: 314 MS
EKG QRS DURATION: 82 MS
EKG QTC CALCULATION (BAZETT): 398 MS
EKG R AXIS: 2 DEGREES
EKG T AXIS: 15 DEGREES
EKG VENTRICULAR RATE: 97 BPM
EOSINOPHIL # BLD: 0 K/UL (ref 0–0.6)
EOSINOPHIL NFR BLD: 0 %
FIBRINOGEN PPP-MCNC: 431 MG/DL (ref 227–534)
GFR SERPLBLD CREATININE-BSD FMLA CKD-EPI: 30 ML/MIN/{1.73_M2}
GFR SERPLBLD CREATININE-BSD FMLA CKD-EPI: 38 ML/MIN/{1.73_M2}
GFR SERPLBLD CREATININE-BSD FMLA CKD-EPI: 38 ML/MIN/{1.73_M2}
GLUCOSE BLD-MCNC: 166 MG/DL (ref 70–99)
GLUCOSE BLD-MCNC: 192 MG/DL (ref 70–99)
GLUCOSE BLD-MCNC: 198 MG/DL (ref 70–99)
GLUCOSE BLD-MCNC: 213 MG/DL (ref 70–99)
GLUCOSE BLD-MCNC: 218 MG/DL (ref 70–99)
GLUCOSE BLD-MCNC: 75 MG/DL (ref 70–99)
GLUCOSE SERPL-MCNC: 167 MG/DL (ref 70–99)
GLUCOSE SERPL-MCNC: 188 MG/DL (ref 70–99)
GLUCOSE SERPL-MCNC: 237 MG/DL (ref 70–99)
HCO3 BLDA-SCNC: 14.5 MMOL/L (ref 21–29)
HCO3 BLDA-SCNC: 23.8 MMOL/L (ref 21–29)
HCT VFR BLD AUTO: 26.8 % (ref 40.5–52.5)
HCT VFR BLD AUTO: 31 % (ref 40.5–52.5)
HCT VFR BLD AUTO: 34.1 % (ref 40.5–52.5)
HGB BLD CALC-MCNC: 10.4 GM/DL (ref 13.5–17.5)
HGB BLD-MCNC: 11.4 G/DL (ref 13.5–17.5)
HGB BLD-MCNC: 8.6 G/DL (ref 13.5–17.5)
HGB BLDA-MCNC: 10.9 G/DL (ref 13.5–17.5)
INR PPP: 2.05 (ref 0.85–1.15)
LACTATE BLD-SCNC: 7.4 MMOL/L (ref 0.4–2)
LACTATE BLDV-SCNC: 7.8 MMOL/L (ref 0.4–2)
LYMPHOCYTES # BLD: 0.4 K/UL (ref 1–5.1)
LYMPHOCYTES NFR BLD: 2.8 %
MAGNESIUM SERPL-MCNC: 2.1 MG/DL (ref 1.8–2.4)
MCH RBC QN AUTO: 29.6 PG (ref 26–34)
MCH RBC QN AUTO: 30.5 PG (ref 26–34)
MCHC RBC AUTO-ENTMCNC: 32.1 G/DL (ref 31–36)
MCHC RBC AUTO-ENTMCNC: 33.4 G/DL (ref 31–36)
MCV RBC AUTO: 91.3 FL (ref 80–100)
MCV RBC AUTO: 92.3 FL (ref 80–100)
METHGB MFR BLDA: 0.4 %
MONOCYTES # BLD: 0.5 K/UL (ref 0–1.3)
MONOCYTES NFR BLD: 3.1 %
NEUTROPHILS # BLD: 13.9 K/UL (ref 1.7–7.7)
NEUTROPHILS NFR BLD: 94.1 %
O2 THERAPY: ABNORMAL
PATH CONSULT FLUID: NORMAL
PCO2 BLDA: 28.1 MM HG (ref 35–45)
PCO2 BLDA: 42.3 MMHG (ref 35–45)
PERFORMED ON: ABNORMAL
PERFORMED ON: NORMAL
PH BLDA: 7.32 [PH] (ref 7.35–7.45)
PH BLDA: 7.36 [PH] (ref 7.35–7.45)
PHOSPHATE SERPL-MCNC: 3.9 MG/DL (ref 2.5–4.9)
PLATELET # BLD AUTO: 53 K/UL (ref 135–450)
PLATELET # BLD AUTO: 62 K/UL (ref 135–450)
PMV BLD AUTO: 10.2 FL (ref 5–10.5)
PMV BLD AUTO: 11.1 FL (ref 5–10.5)
PO2 BLDA: 38.2 MMHG (ref 75–108)
PO2 BLDA: 84.8 MM HG (ref 75–108)
POC SAMPLE TYPE: ABNORMAL
POTASSIUM SERPL-SCNC: 4 MMOL/L (ref 3.5–5.1)
POTASSIUM SERPL-SCNC: 4.4 MMOL/L (ref 3.5–5.1)
POTASSIUM SERPL-SCNC: 4.5 MMOL/L (ref 3.5–5.1)
PROTHROMBIN TIME: 23.2 SEC (ref 11.9–14.9)
RBC # BLD AUTO: 2.9 M/UL (ref 4.2–5.9)
RBC # BLD AUTO: 3.74 M/UL (ref 4.2–5.9)
SAO2 % BLDA: 66.5 %
SAO2 % BLDA: 96 % (ref 93–100)
SODIUM SERPL-SCNC: 142 MMOL/L (ref 136–145)
SODIUM SERPL-SCNC: 142 MMOL/L (ref 136–145)
SODIUM SERPL-SCNC: 143 MMOL/L (ref 136–145)
WBC # BLD AUTO: 14.8 K/UL (ref 4–11)
WBC # BLD AUTO: 18.1 K/UL (ref 4–11)

## 2024-05-30 PROCEDURE — 2500000003 HC RX 250 WO HCPCS: Performed by: SURGERY

## 2024-05-30 PROCEDURE — 36415 COLL VENOUS BLD VENIPUNCTURE: CPT

## 2024-05-30 PROCEDURE — 94003 VENT MGMT INPAT SUBQ DAY: CPT

## 2024-05-30 PROCEDURE — 2580000003 HC RX 258: Performed by: NURSE PRACTITIONER

## 2024-05-30 PROCEDURE — 2500000003 HC RX 250 WO HCPCS: Performed by: INTERNAL MEDICINE

## 2024-05-30 PROCEDURE — 71045 X-RAY EXAM CHEST 1 VIEW: CPT

## 2024-05-30 PROCEDURE — 2580000003 HC RX 258: Performed by: STUDENT IN AN ORGANIZED HEALTH CARE EDUCATION/TRAINING PROGRAM

## 2024-05-30 PROCEDURE — 86850 RBC ANTIBODY SCREEN: CPT

## 2024-05-30 PROCEDURE — 2700000000 HC OXYGEN THERAPY PER DAY

## 2024-05-30 PROCEDURE — 94761 N-INVAS EAR/PLS OXIMETRY MLT: CPT

## 2024-05-30 PROCEDURE — 84100 ASSAY OF PHOSPHORUS: CPT

## 2024-05-30 PROCEDURE — 6360000004 HC RX CONTRAST MEDICATION: Performed by: HOSPITALIST

## 2024-05-30 PROCEDURE — 85014 HEMATOCRIT: CPT

## 2024-05-30 PROCEDURE — 82803 BLOOD GASES ANY COMBINATION: CPT

## 2024-05-30 PROCEDURE — 94640 AIRWAY INHALATION TREATMENT: CPT

## 2024-05-30 PROCEDURE — 2580000003 HC RX 258: Performed by: SURGERY

## 2024-05-30 PROCEDURE — 85027 COMPLETE CBC AUTOMATED: CPT

## 2024-05-30 PROCEDURE — 86901 BLOOD TYPING SEROLOGIC RH(D): CPT

## 2024-05-30 PROCEDURE — 74177 CT ABD & PELVIS W/CONTRAST: CPT

## 2024-05-30 PROCEDURE — 85730 THROMBOPLASTIN TIME PARTIAL: CPT

## 2024-05-30 PROCEDURE — 83735 ASSAY OF MAGNESIUM: CPT

## 2024-05-30 PROCEDURE — 80048 BASIC METABOLIC PNL TOTAL CA: CPT

## 2024-05-30 PROCEDURE — 71260 CT THORAX DX C+: CPT

## 2024-05-30 PROCEDURE — 6360000002 HC RX W HCPCS: Performed by: NURSE PRACTITIONER

## 2024-05-30 PROCEDURE — APPNB15 APP NON BILLABLE TIME 0-15 MINS: Performed by: NURSE PRACTITIONER

## 2024-05-30 PROCEDURE — 99291 CRITICAL CARE FIRST HOUR: CPT | Performed by: INTERNAL MEDICINE

## 2024-05-30 PROCEDURE — 85025 COMPLETE CBC W/AUTO DIFF WBC: CPT

## 2024-05-30 PROCEDURE — 6370000000 HC RX 637 (ALT 250 FOR IP): Performed by: INTERNAL MEDICINE

## 2024-05-30 PROCEDURE — 99024 POSTOP FOLLOW-UP VISIT: CPT | Performed by: STUDENT IN AN ORGANIZED HEALTH CARE EDUCATION/TRAINING PROGRAM

## 2024-05-30 PROCEDURE — 82947 ASSAY GLUCOSE BLOOD QUANT: CPT

## 2024-05-30 PROCEDURE — 2100000000 HC CCU R&B

## 2024-05-30 PROCEDURE — 85384 FIBRINOGEN ACTIVITY: CPT

## 2024-05-30 PROCEDURE — 74018 RADEX ABDOMEN 1 VIEW: CPT

## 2024-05-30 PROCEDURE — 2580000003 HC RX 258: Performed by: INTERNAL MEDICINE

## 2024-05-30 PROCEDURE — 86900 BLOOD TYPING SEROLOGIC ABO: CPT

## 2024-05-30 PROCEDURE — 85610 PROTHROMBIN TIME: CPT

## 2024-05-30 PROCEDURE — 36620 INSERTION CATHETER ARTERY: CPT

## 2024-05-30 PROCEDURE — 99024 POSTOP FOLLOW-UP VISIT: CPT | Performed by: SURGERY

## 2024-05-30 PROCEDURE — 83605 ASSAY OF LACTIC ACID: CPT

## 2024-05-30 PROCEDURE — 93010 ELECTROCARDIOGRAM REPORT: CPT | Performed by: INTERNAL MEDICINE

## 2024-05-30 RX ORDER — CALCIUM GLUCONATE 20 MG/ML
2000 INJECTION, SOLUTION INTRAVENOUS ONCE
Status: COMPLETED | OUTPATIENT
Start: 2024-05-30 | End: 2024-05-30

## 2024-05-30 RX ORDER — 0.9 % SODIUM CHLORIDE 0.9 %
500 INTRAVENOUS SOLUTION INTRAVENOUS ONCE
Status: COMPLETED | OUTPATIENT
Start: 2024-05-30 | End: 2024-05-30

## 2024-05-30 RX ORDER — MIDAZOLAM HYDROCHLORIDE 1 MG/ML
1-10 INJECTION, SOLUTION INTRAVENOUS CONTINUOUS
Status: DISCONTINUED | OUTPATIENT
Start: 2024-05-30 | End: 2024-06-01 | Stop reason: HOSPADM

## 2024-05-30 RX ADMIN — SODIUM CHLORIDE, PRESERVATIVE FREE 10 ML: 5 INJECTION INTRAVENOUS at 22:00

## 2024-05-30 RX ADMIN — MICAFUNGIN SODIUM 100 MG: 100 INJECTION, POWDER, LYOPHILIZED, FOR SOLUTION INTRAVENOUS at 10:20

## 2024-05-30 RX ADMIN — METRONIDAZOLE 250 MG: 500 INJECTION, SOLUTION INTRAVENOUS at 10:14

## 2024-05-30 RX ADMIN — Medication 50 MCG/HR: at 07:54

## 2024-05-30 RX ADMIN — Medication 2 PUFF: at 20:08

## 2024-05-30 RX ADMIN — DEXMEDETOMIDINE HYDROCHLORIDE 1 MCG/KG/HR: 4 INJECTION, SOLUTION INTRAVENOUS at 00:58

## 2024-05-30 RX ADMIN — Medication 2 PUFF: at 08:43

## 2024-05-30 RX ADMIN — IPRATROPIUM BROMIDE AND ALBUTEROL SULFATE 1 DOSE: 2.5; .5 SOLUTION RESPIRATORY (INHALATION) at 12:27

## 2024-05-30 RX ADMIN — METRONIDAZOLE 250 MG: 500 INJECTION, SOLUTION INTRAVENOUS at 18:13

## 2024-05-30 RX ADMIN — MEROPENEM 1000 MG: 1 INJECTION, POWDER, FOR SOLUTION INTRAVENOUS at 03:46

## 2024-05-30 RX ADMIN — SODIUM CHLORIDE 500 ML: 9 INJECTION, SOLUTION INTRAVENOUS at 07:11

## 2024-05-30 RX ADMIN — IPRATROPIUM BROMIDE AND ALBUTEROL SULFATE 1 DOSE: 2.5; .5 SOLUTION RESPIRATORY (INHALATION) at 08:44

## 2024-05-30 RX ADMIN — CASTOR OIL AND BALSAM, PERU: 788; 87 OINTMENT TOPICAL at 21:30

## 2024-05-30 RX ADMIN — SODIUM CHLORIDE 500 ML: 9 INJECTION, SOLUTION INTRAVENOUS at 05:22

## 2024-05-30 RX ADMIN — INSULIN LISPRO 0 UNITS: 100 INJECTION, SOLUTION INTRAVENOUS; SUBCUTANEOUS at 03:56

## 2024-05-30 RX ADMIN — CASTOR OIL AND BALSAM, PERU: 788; 87 OINTMENT TOPICAL at 08:00

## 2024-05-30 RX ADMIN — PHENYLEPHRINE HYDROCHLORIDE 30 MCG/MIN: 10 INJECTION INTRAVENOUS at 09:02

## 2024-05-30 RX ADMIN — CALCIUM GLUCONATE 2000 MG: 20 INJECTION, SOLUTION INTRAVENOUS at 16:01

## 2024-05-30 RX ADMIN — Medication 100 MCG/HR: at 18:24

## 2024-05-30 RX ADMIN — SODIUM BICARBONATE: 84 INJECTION, SOLUTION INTRAVENOUS at 16:30

## 2024-05-30 RX ADMIN — Medication 5 MCG/MIN: at 18:28

## 2024-05-30 RX ADMIN — VASOPRESSIN 0.04 UNITS/MIN: 20 INJECTION INTRAVENOUS at 16:36

## 2024-05-30 RX ADMIN — VASOPRESSIN 0.04 UNITS/MIN: 20 INJECTION INTRAVENOUS at 09:08

## 2024-05-30 RX ADMIN — MEROPENEM 1000 MG: 1 INJECTION, POWDER, FOR SOLUTION INTRAVENOUS at 15:58

## 2024-05-30 RX ADMIN — IOPAMIDOL 75 ML: 755 INJECTION, SOLUTION INTRAVENOUS at 09:41

## 2024-05-30 RX ADMIN — MIDAZOLAM IN SODIUM CHLORIDE 2 MG/HR: 1 INJECTION INTRAVENOUS at 09:14

## 2024-05-30 RX ADMIN — DEXMEDETOMIDINE HYDROCHLORIDE 1.4 MCG/KG/HR: 4 INJECTION, SOLUTION INTRAVENOUS at 03:39

## 2024-05-30 RX ADMIN — SODIUM CHLORIDE, PRESERVATIVE FREE 20 MG: 5 INJECTION INTRAVENOUS at 22:00

## 2024-05-30 RX ADMIN — IPRATROPIUM BROMIDE AND ALBUTEROL SULFATE 1 DOSE: 2.5; .5 SOLUTION RESPIRATORY (INHALATION) at 20:08

## 2024-05-30 ASSESSMENT — PULMONARY FUNCTION TESTS
PIF_VALUE: 31
PIF_VALUE: 31
PIF_VALUE: 18
PIF_VALUE: 31
PIF_VALUE: 27
PIF_VALUE: 17
PIF_VALUE: 31
PIF_VALUE: 31
PIF_VALUE: 23
PIF_VALUE: 31
PIF_VALUE: 18
PIF_VALUE: 26
PIF_VALUE: 31
PIF_VALUE: 24
PIF_VALUE: 31
PIF_VALUE: 30
PIF_VALUE: 31
PIF_VALUE: 31
PIF_VALUE: 18
PIF_VALUE: 17
PIF_VALUE: 18
PIF_VALUE: 31
PIF_VALUE: 30
PIF_VALUE: 31
PIF_VALUE: 24
PIF_VALUE: 31
PIF_VALUE: 31
PIF_VALUE: 17
PIF_VALUE: 22
PIF_VALUE: 17
PIF_VALUE: 31
PIF_VALUE: 24
PIF_VALUE: 31
PIF_VALUE: 31
PIF_VALUE: 30
PIF_VALUE: 17
PIF_VALUE: 31
PIF_VALUE: 26
PIF_VALUE: 30
PIF_VALUE: 31
PIF_VALUE: 32
PIF_VALUE: 34
PIF_VALUE: 30
PIF_VALUE: 31
PIF_VALUE: 30
PIF_VALUE: 26
PIF_VALUE: 31
PIF_VALUE: 31
PIF_VALUE: 18
PIF_VALUE: 18
PIF_VALUE: 31

## 2024-05-30 ASSESSMENT — PAIN SCALES - GENERAL
PAINLEVEL_OUTOF10: 0

## 2024-05-30 NOTE — PROGRESS NOTES
NAME:  Darren Duncan  YOB: 1955  MEDICAL RECORD NUMBER:  [id number]    Shift Summary: PT became tachycardic and having increased RR. Pt had 500 ml out from NG tube. MD notified of changes, new orders received. Plan for CTA to rule out problems with graft vs perforated bowel.     Family updated: No    Rhythm: Sinus Tachycardia     Most recent vitals:   Visit Vitals  BP (!) 79/62   Pulse (!) 113   Temp 99.7 °F (37.6 °C) (Bladder)   Resp 15   Ht 1.735 m (5' 8.31\")   Wt 76.1 kg (167 lb 12.3 oz)   SpO2 100%   BMI 25.28 kg/m²      ABP (Arterial line BP): (!) 98/92  ABP Mean (Arterial Line Mean): 96 mmHg    No data found.    Patient Vitals for the past 12 hrs:   CVP (Mean)   05/30/24 0600 11 mmHg   05/30/24 0500 12 mmHg   05/30/24 0445 11 mmHg   05/30/24 0400 12 mmHg   05/30/24 0300 9 mmHg   05/30/24 0217 9 mmHg   05/30/24 0200 9 mmHg   05/30/24 0145 8 mmHg   05/30/24 0115 8 mmHg   05/30/24 0100 9 mmHg   05/30/24 0045 8 mmHg   05/30/24 0030 7 mmHg   05/30/24 0015 10 mmHg   05/30/24 0000 10 mmHg   05/29/24 2345 8 mmHg   05/29/24 2330 10 mmHg   05/29/24 2315 11 mmHg   05/29/24 2300 10 mmHg   05/29/24 2200 10 mmHg   05/29/24 2100 10 mmHg         Respiratory support needed (if any):  - Ventilator Settings:  Vent Days     Vt (Set, mL): 0 mL  Resp Rate (Set): 15 bpm  FiO2 : 60 %    PEEP/CPAP (cmH2O): 8       Admission weight Weight - Scale: 49.6 kg (109 lb 5.6 oz) (05/16/24 0701)    Today's weight    Wt Readings from Last 1 Encounters:   05/29/24 76.1 kg (167 lb 12.3 oz)        King need assessed each shift: YES -  - continue king r/t - Critical PT  UOP >30ml/hr: NO -    Last documented BM (in last 48 hrs):  No data found.             Restraints (in use currently or dc'd in last 12 hrs): Yes    Order current and documentation up to date? Yes    Lines/Drains reviewed @ bedside.  PICC 05/27/24 Left Basilic (Active)   Number of days: 2       CVC  05/22/24 Right Internal jugular (Active)   Number of days: 8      initiated by RN: Yes    Pressure Injury (Stage 3,4, Unstageable, DTI, NWPT, and Complex wounds) if present, place Wound referral order by RN under : Yes    New Ostomies, if present place, Ostomy referral order under : No     Nurse 1 eSignature: Electronically signed by Ame Grande RN on 5/30/24 at 9:02 AM EDT    **SHARE this note so that the co-signing nurse can place an eSignature**    Nurse 2 eSignature: Electronically signed by Patricia Mckeon RN on 5/30/24 at 09:02 AM EDT

## 2024-05-30 NOTE — PROGRESS NOTES
VASCULAR  POD#8    Difficult last 24 hours with intermittent hypotension and tachycardia.  Vomited yesterday and TFs stopped  Now on pressor agents.  Seems to respond to questions - ?abdominal pain.    SBP (NBP 80's currently on 10 mcg/min Levophed)  -140 (ST) Tmax 100.3  I/O -1093 (UO 2595; diarrhea)  Lungs faint R sided BS  Abd distended without BS; tender diffusely  Cool extremities    Labs reviewed  WBC 18K Creat 1.9  ABG pending    A/P: S/P Aortomesenteric bypass   Clinical deterioration last 24 hours. Concern for either graft thrombosis or bowel leak/perforation   DC TFs and NGT to LCS   Pneumonia + large R pleural effusion.   Continue IVFs and pressors.   Stat CT abd with IV contrast despite creat as need to assess graft.   Critical - worsening prognosis.   Arterial line placed R groin for monitoring and ABGS     Rg Grigsby    CT reviewed personally - graft patent with diffuse gut ischemia (pneumatosis and portal gas). Suspect reperfusion injury. This is not survivable. Have discussed with Janet Tan and Dr. Craft. Recommend comfort measures and cessation of support. Family will visit and decide.

## 2024-05-30 NOTE — PROGRESS NOTES
Hospitalist Progress Note  5/30/2024 8:04 AM    PCP: Deep Lozano MD    [phone]     Date of Admission: 5/16/2024                                                                                                                     HOSPITAL COURSE    Patient demographics:  The patient  Darren Duncan is a 68 y.o. male     Significant past medical history:   Patient Active Problem List   Diagnosis    Other hyperlipidemia    Essential hypertension    Smoker    Rash and nonspecific skin eruption    Cellulitis and abscess of toe of left foot    Peripheral arterial disease (HCC)    Claudication (HCC)    Atherosclerosis of native arteries of extremities with rest pain, left leg (HCC)    Peripheral vascular disease, unspecified (HCC)    Atherosclerosis of artery of extremity with rest pain (HCC)    Unspecified severe protein-calorie malnutrition (HCC)    Other specified disorders of carbohydrate metabolism (HCC)    Abdominal pain    Mesenteric ischemia (HCC)    Mesenteric artery stenosis (HCC)    Chronic mesenteric ischemia (HCC)    Cardiomyopathy (HCC)    ANIYA (acute kidney injury) (HCC)    Acute respiratory failure with hypoxia (HCC)    Chronic obstructive pulmonary disease (HCC)    Gram-negative pneumonia (HCC)    Pleural effusion on right         Presenting symptoms:   Abdominal Pain     Diagnostic workup:      CONSULTS DURING ADMISSION :   IP CONSULT TO GI  IP CONSULT TO SPIRITUAL SERVICES  IP CONSULT TO GENERAL SURGERY  IP CONSULT TO VASCULAR SURGERY  IP CONSULT TO CARDIOLOGY  IP CONSULT TO PULMONOLOGY  IP CONSULT TO NEPHROLOGY  IP CONSULT TO DIETITIAN  IP CONSULT TO DIETITIAN  IP CONSULT TO PHARMACY  PHARMACY TO DOSE TPN      Patient was diagnosed with:  Chronic mesenteric ischemia  Status post revascularization 5/22/2024  Partial bowel resection      Treatment while inpatient:         68 years old male with medical history significant for hypertension peripheral vascular disease and history of  CVA.    Patient presented to the emergency room with abdominal pain and significant weight loss over a period of 2 years.    Patient was suspected for bowel ischemia.  Vascular surgery was consulted and patient underwent a CT angiogram of the abdomen and pelvis.    Patient also underwent arterial duplex.    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  Patient was kept on clear liquid diet initially                                                                                ----------------------------------------------------------      SUBJECTIVE COMPLAINTS-abdominal pain    Diet: PN-Adult Premix 5/20 - Standard Electrolytes - Central Line      OBJECTIVE:   Patient Active Problem List   Diagnosis    Other hyperlipidemia    Essential hypertension    Smoker    Rash and nonspecific skin eruption    Cellulitis and abscess of toe of left foot    Peripheral arterial disease (HCC)    Claudication (HCC)    Atherosclerosis of native arteries of extremities with rest pain, left leg (HCC)    Peripheral vascular disease, unspecified (HCC)    Atherosclerosis of artery of extremity with rest pain (HCC)    Unspecified severe protein-calorie malnutrition (HCC)    Other specified disorders of carbohydrate metabolism (HCC)    Abdominal pain    Mesenteric ischemia (HCC)    Mesenteric artery stenosis (HCC)    Chronic mesenteric ischemia (HCC)    Cardiomyopathy (HCC)    ANIYA (acute kidney injury) (HCC)    Acute respiratory failure with hypoxia (HCC)    Chronic obstructive pulmonary disease (HCC)    Gram-negative pneumonia (HCC)    Pleural effusion on right       Allergies  Tree nut [macadamia nut oil]    Medications    Scheduled Meds:   metroNIDAZOLE  250 mg IntraVENous Q8H    sodium chloride (Inhalant)  4 mL Nebulization TID RT    furosemide  20 mg IntraVENous BID    meropenem  1,000 mg IntraVENous Q12H    insulin lispro  0-8 Units SubCUTAneous Q4H    mometasone-formoterol  2 puff  components found for: \"[id number]\"  POC GLUCOSE:    Recent Labs     05/29/24  1922 05/29/24  2134 05/30/24  0017 05/30/24  0353 05/30/24  0652   POCGLU 171* 190* 166* 213* 218*     No results for input(s): \"LABA1C\" in the last 72 hours.   Lab Results   Component Value Date/Time    LABA1C 5.7 11/09/2023 11:01 AM         ASSESSMENT AND PLAN    Acute respiratory failure  Patient remains on the ventilator  Continue ventilator support due to multiorgan failure    Acute on chronic mesenteric ischemia  CT scan of the abdomen shows global small bowel ischemia    Septic shock  Patient on pressors  Lisinopril and amlodipine on hold  Echocardiogram shows ejection fraction of 38%      Pain management  R52  Patient on Precedex and fentanyl infusions      Multisystem organ failure  Poor prognosis  Change code status to DNR CC  Possible withdrawal of care in a.m.      PUD  EGD colonoscopy showed gastritis with ulceration and duodenitis  Continue on IV Pepcid          Code Status: Full Code        Dispo - cc        The patient and / or the family were informed of the results of any tests, a time was given to answer questions, a plan was proposed and they agreed with plan.    JOON ORONA MD    This note was transcribed using Dragon Dictation software. Please disregard any translational errors.

## 2024-05-30 NOTE — PLAN OF CARE
Problem: Pain  Goal: Verbalizes/displays adequate comfort level or baseline comfort level  Outcome: Progressing  Flowsheets (Taken 5/30/2024 0800)  Verbalizes/displays adequate comfort level or baseline comfort level:   Assess pain using appropriate pain scale   Administer analgesics based on type and severity of pain and evaluate response   Implement non-pharmacological measures as appropriate and evaluate response     Problem: Gastrointestinal - Adult  Problem: Safety - Medical Restraint  Goal: Remains free of injury from restraints (Restraint for Interference with Medical Device)  Description: INTERVENTIONS:  1. Determine that other, less restrictive measures have been tried or would not be effective before applying the restraint  2. Evaluate the patient's condition at the time of restraint application  3. Inform patient/family regarding the reason for restraint  4. Q2H: Monitor safety, psychosocial status, comfort, nutrition and hydration  Outcome: Progressing  Flowsheets (Taken 5/30/2024 0800)  Remains free of injury from restraints (restraint for interference with medical device):   Determine that other, less restrictive measures have been tried or would not be effective before applying the restraint   Evaluate the patient's condition at the time of restraint application   Inform patient/family regarding the reason for restraint   Every 2 hours: Monitor safety, psychosocial status, comfort, nutrition and hydration     Goal: Minimal or absence of nausea and vomiting  Outcome: Not Progressing  Flowsheets (Taken 5/30/2024 0900)  Minimal or absence of nausea and vomiting:   Administer IV fluids as ordered to ensure adequate hydration   Maintain NPO status until nausea and vomiting are resolved   Administer ordered antiemetic medications as needed   Provide nonpharmacologic comfort measures as appropriate

## 2024-05-30 NOTE — PROGRESS NOTES
GENERAL SURGERY  Progress Note    Patient Name: Darren Duncan  MRN: [id number]  YOB: 1955   Date of Evaluation: 5/30/2024  Primary Care Physician: Deep Lozano MD      Ileus (HCC) [K56.7]  Abdominal pain [R10.9]  Failure to thrive in adult [R62.7]  Abdominal pain, unspecified abdominal location [R10.9]    SUBJECTIVE:     Darren is doing markedly worse today.  There was a critical change in his trajectory as of yesterday afternoon.  Reportedly, he was noted to be increasingly distended, tachycardic, and hypotensive.  Bronchoscopy was performed yesterday.  He required pressor support.  He was vomiting tube feeds, thus they were held and the NG tube was returned to suction.  As of this morning, all labs have worsened.  Pressor support increasing.  CTA of the abdomen and pelvis was performed and demonstrated global hypoperfusion and pneumatosis of the alimentary tract.        REVIEW OF SYSTEMS  A comprehensive review of systems was completed and is negative except for any elements noted above.    OBJECTIVE:     BP 96/64   Pulse 85   Temp 96.9 °F (36.1 °C) (Bladder)   Resp 15   Ht 1.735 m (5' 8.31\")   Wt 76.1 kg (167 lb 12.3 oz) Comment: weight with 3 pillows  SpO2 97%   BMI 25.28 kg/m²     PHYSICAL EXAM  General/appearance: intubated and sedated  Lungs: mechanical ventilation  Cardiac: tachycardic, hypotensive  Abdomen: soft, distended, tympanic, appropriately tender to palpation at surgical sites, NG in place with bloody output  Hernia: None  Incision:  incisional wound vac in place  Extremities: Warm and well perfused, anasarca  Neuro: sedated  Skin: No rashes or wounds    LABS:     Recent Labs     05/28/24  0350 05/29/24  0435 05/29/24  1913 05/30/24  0442 05/30/24  0652 05/30/24  0830   WBC 11.7* 11.3*  --  18.1*  --  14.8*   HGB 9.7* 9.1*  --  11.4* 10.4* 8.6*   HCT 28.8* 27.1*  --  34.1*  --  26.8*   PLT 52* 44*  --  62*  --  53*    142 142 142  --  142   K 3.8 3.4* 3.7  suction  - no role for TPN, allow to run out and stop  - CTA repeated today (5/30) and personally reviewed with significant interval worsening, including a large right pleural effusion, pneumatosis of the distal esophagus, stomach, and the majority of the small intestine, suggesting global small bowel ischemia and demise  - I had a long conversation with the family at bedside (in person and video call) and explained to them that given the widespread hypoperfusion, pneumatosis, and likely devitalized stomach and small bowel, this is probably a nonsurvivable injury.  I think further surgical intervention would be futile and the amount of GI tract needing resection would be nonsurvivable.  I have very frankly explained to him that his condition is critical and that these injuries are likely not recoverable, with or without surgery and likely with or without medical management.  At this juncture, they would like to continue maximal medical therapy, with no plans for surgical intervention.  I do believe that comfort measures are appropriate and this was communicated to the family.   - case discussed with Dr. Grigsby/vascular team and Dr. Brunson  - Consult to palliative care for code status and goals of care discussion, and personally communicated to Nataly Wade RN.      ** ACS NSQIP surgical risk calculator was queried based on current clinical status and predicts a ~45% chance of surgical complication, ~20% chance of cardiac complication, and ~77% chance of death if further surgical intervention (reopening of recent laparotomy / exploratory lap / small bowel resection) were to be undertaken **        John Craft MD  General Surgery   [phone]    Electronically signed by John Craft MD on 5/30/2024 at 1:19 PM

## 2024-05-30 NOTE — PROGRESS NOTES
05/30/24 1651   Encounter Summary   Encounter Overview/Reason Spiritual/Emotional Needs   Service Provided For Patient and family together   Referral/Consult From Nurse   Support System Children;Family members;Significant other   Last Encounter  05/30/24  (pt on vent, visit and prayer w/pt's granddaughters CL)   Complexity of Encounter Moderate   Begin Time 1538   End Time  1612   Total Time Calculated 34 min   Spiritual/Emotional needs   Type Spiritual Support   Assessment/Intervention/Outcome   Assessment Tearful;Sad;Anxious   Intervention Active listening;Discussed illness injury and it’s impact;Discussed belief system/[employer] practices/vanessa;Explored/Affirmed feelings, thoughts, concerns;Discussed relationship with God;Prayer (assurance of)/[place];Read/Provided Scripture   Outcome Engaged in conversation;Less anxious, Less agitated;Expressed Gratitude

## 2024-05-30 NOTE — CARE COORDINATION
Attempted follow up for DTI to coccyx. Pt currently in critical condition. Will attempt to see patient at a later time. Continue current treatment.   Electronically signed by Celena Sibley RN CWOCN on 5/30/2024 at 11:03 AM

## 2024-05-30 NOTE — CONSULTS
[facility]  Palliative Care   Consult Note    NAME:  Darren Duncan  MEDICAL RECORD NUMBER:  [id number]  AGE: 68 y.o.   GENDER: male  : 1955  TODAY'S DATE:  2024    Subjective     Reason for Consult:  goals of care  Visit Type: Initial Consult      Darren Duncan is a 68 y.o. male referred by:   [x] Physician    PAST MEDICAL HISTORY      Diagnosis Date    Cerebral artery occlusion with cerebral infarction (HCC)     Patient said he has no residual effects    Chronic obstructive pulmonary disease (HCC) 2024    Heart attack (HCC)     3 yr ago    Hx of blood clots     right side of neck    Hyperlipidemia     Hypertension     Peripheral vascular disease, unspecified (HCC) 2020       PAST SURGICAL HISTORY  Past Surgical History:   Procedure Laterality Date    ARTERIAL CLOT SURGERY Right 2016    CHOLECYSTECTOMY N/A 2024    OPEN CHOLECYSTECTOMY AND SMALL BOWEL RESECTION performed by John Craft MD at [facility] OR    FEMORAL BYPASS Left 2020    LEFT FEMORAL ENDARTERECTOMY, LEFT FEMORAL TO POPLITEAL BYPASS GRAFT performed by Armando Grigsby MD at [facility] OR    SMALL INTESTINE SURGERY N/A 2024    SECOND LOOK LAPAROTOMY, SMALL BOWEL WEDGE RESECTION AND PRIMARY CLOSURE, SMALL BOWEL ANASTOMOSIS, ABDOMINAL CLOSURE, INCISIONAL WOUND VAC PLACEMENT performed by John Craft MD at [facility] OR    VASCULAR SURGERY N/A 2024    AORTOMESENTERIC BYPASS WITH 12X 6 GRAFT performed by Armando Grigsby MD at [facility] OR       FAMILY HISTORY  Family History   Problem Relation Age of Onset    Diabetes Mother     Heart Attack Mother     Heart Disease Mother        SOCIAL HISTORY  Social History     Tobacco Use    Smoking status: Former     Current packs/day: 0.00     Average packs/day: 1 pack/day for 20.0 years (20.0 ttl pk-yrs)     Types: Cigarettes     Start date: 8/10/2000     Quit date: 8/10/2020     Years since quitting: 3.8    Smokeless tobacco: Never   Vaping Use    Vaping Use:  present or on speaker phone.    I went to meet with family, granddaughters present, step daughter and significant other Janet Tan were present I explained purpose of palliative care. We discussed information given to them this AM. They are having trouble understanding how this could happen, I explained with circulation problems this could happen at any time. We discussed with this much damage this is not survivable. Janet agreed he would not want resuscitative efforts agreed to DNR, but is not ready to withdraw care.   I did call his sister Fatmata Salter she understood the physician this AM and realizes he will not survive, she agreed to DNR also. She plans on calling Janet and discuss with draw of care.   Dr Mendoza informed of above orders noted     I will continue to follow Mr. Duncan's care as needed.      Thank you for allowing me to participate in the care of Mr. Duncan .     Electronically signed by Sheree Wade, RN, BSN, CHPN on 5/30/2024 at 1:30 PM  Palliative Care Nurse [facility]  Office: [phone]

## 2024-05-30 NOTE — PROGRESS NOTES
NAME:  Darren Duncan  YOB: 1955  MEDICAL RECORD NUMBER:  [id number]    Shift Summary: Patient was tachycardic, hypotensive, tachypneic, appears so uncomfortable. Fent was increased, Versed was started. Damaso and Vaso started, remains on Levo. Precedex dc'd. CT abdo and chest, showed diffused bowel ischemia and large right pleural effusion. Family meeting today. TPN dc'd. NaHCO3 gtt started as per nephro.     Family updated: Yes:       Rhythm: Normal Sinus Rhythm     Most recent vitals:   Visit Vitals  /70   Pulse (!) 102   Temp 98.1 °F (36.7 °C) (Esophageal)   Resp 24   Ht 1.735 m (5' 8.31\")   Wt 76.1 kg (167 lb 12.3 oz)   SpO2 93%   BMI 25.28 kg/m²      ABP (Arterial line BP): 139/53  ABP Mean (Arterial Line Mean): 73 mmHg    No data found.    Patient Vitals for the past 12 hrs:   CVP (Mean)   05/30/24 1900 18 mmHg   05/30/24 1845 18 mmHg   05/30/24 1830 18 mmHg   05/30/24 1815 18 mmHg   05/30/24 1800 18 mmHg   05/30/24 1745 18 mmHg   05/30/24 1730 18 mmHg   05/30/24 1715 19 mmHg   05/30/24 1700 20 mmHg   05/30/24 1645 21 mmHg   05/30/24 1630 10 mmHg   05/30/24 1615 10 mmHg   05/30/24 1600 11 mmHg   05/30/24 1545 8 mmHg   05/30/24 1530 11 mmHg   05/30/24 1515 8 mmHg   05/30/24 1500 12 mmHg   05/30/24 1445 8 mmHg   05/30/24 1430 11 mmHg   05/30/24 1415 8 mmHg   05/30/24 1400 9 mmHg   05/30/24 1345 277 mmHg   05/30/24 1330 277 mmHg   05/30/24 1315 278 mmHg   05/30/24 1300 279 mmHg   05/30/24 1245 279 mmHg   05/30/24 1230 279 mmHg   05/30/24 1215 279 mmHg   05/30/24 1200 280 mmHg   05/30/24 1145 280 mmHg   05/30/24 1130 280 mmHg   05/30/24 1115 281 mmHg   05/30/24 1100 282 mmHg   05/30/24 1045 283 mmHg   05/30/24 1030 286 mmHg   05/30/24 1015 275 mmHg   05/30/24 1000 22 mmHg   05/30/24 0915 23 mmHg   05/30/24 0900 25 mmHg   05/30/24 0845 26 mmHg   05/30/24 0830 34 mmHg   05/30/24 0815 24 mmHg   05/30/24 0800 17 mmHg         Respiratory support needed (if any):  - Ventilator Settings:  Vent Days 7     Vt (Set, mL): 0 mL  Resp Rate (Set): 15 bpm  FiO2 : 90 %    PEEP/CPAP (cmH2O): 8       Admission weight Weight - Scale: 49.6 kg (109 lb 5.6 oz) (05/16/24 0701)    Today's weight    Wt Readings from Last 1 Encounters:   05/29/24 76.1 kg (167 lb 12.3 oz)        King need assessed each shift: YES -  - but king no longer needed and should be discontinued  UOP >30ml/hr: NO -    Last documented BM (in last 48 hrs):  No data found.             Restraints (in use currently or dc'd in last 12 hrs): Yes    Order current and documentation up to date? Yes    Lines/Drains reviewed @ bedside.  PICC 05/27/24 Left Basilic (Active)   Number of days: 3       CVC  05/22/24 Right Internal jugular (Active)   Number of days: 8       Arterial Line 05/30/24 Right Femoral (Active)   Number of days: 0       Urinary Catheter 05/22/24 King (Active)   Number of days: 8         Drip rates at handoff:    midazolam 2 mg/hr (05/30/24 1821)    phenylephrine (ALTAGRACIA-SYNEPHRINE) 50 mg in sodium chloride 0.9 % 250 mL infusion 60 mcg/min (05/30/24 1821)    VASOpressin 20 Units in sodium chloride 0.9 % 100 mL infusion 0.04 Units/min (05/30/24 1821)    sodium bicarbonate 150 mEq in sterile water 1,000 mL infusion 100 mL/hr at 05/30/24 1821    norepinephrine 5 mcg/min (05/30/24 1828)    sodium chloride      fentaNYL 100 mcg/hr (05/30/24 1824)    propofol Stopped (05/24/24 0223)    dextrose         Lab Data:   CBC:   Recent Labs     05/30/24  0442 05/30/24  0652 05/30/24  0830   WBC 18.1*  --  14.8*   HGB 11.4* 10.4* 8.6*   HCT 34.1*  --  26.8*   MCV 91.3  --  92.3   PLT 62*  --  53*     BMP:    Recent Labs     05/30/24  0830 05/30/24  1635    143   K 4.0 4.4   CO2 11* 14*   BUN 68* 80*   CREATININE 1.9* 2.3*     LIVR:   Recent Labs     05/28/24  0350 05/29/24  0435   * 67*   ALT 8* 6*     PT/INR:   Recent Labs     05/30/24  0830   INR 2.05*     APTT:   Recent Labs     05/30/24  0830   APTT 38.4*     ABG:   Recent Labs     05/30/24  0248

## 2024-05-30 NOTE — PROGRESS NOTES
[facility]  Phone: [phone]  Fax: [phone]  [employer]     CC: ANIYA    Darren Duncan is a 68 y.o. male whom we were asked to see for ANIYA.  He presented on 05/16 with worsening of his chronic abdominal pain.  He had unintentional weight loss over the last 2 years.  He was evaluated by GI when his abdominal pain worsened on 05/20.  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  He went to the OR on 05/22 with partial small bowel resection and cholecystectomy and was taken back on 05/23 for small bowel anastomosis and abdominal closure.      Subjective:    HPI:  events noted had vomiting, tachycardia and Hypotension CT A chest and abdomen with contrast shows Extensive small bowel pneumatosis.  Large amount of air is seen in the portal  vein and Wedge-shaped areas of hypodensity seen in the liver, spleen, kidneys suggesting infarcts  vein and intrahepatic portal veins.    ROS:  intubated on three pressors now Lactic Acid 7.8  Minimal UOP since this AM     PMFSH:  medications reviewed.    family present.    Objective:  Blood pressure 96/64, pulse 94, temperature 96.9 °F (36.1 °C), temperature source Bladder, resp. rate 16, height 1.735 m (5' 8.31\"), weight 76.1 kg (167 lb 12.3 oz), SpO2 94 %.    Intake/Output Summary (Last 24 hours) at 5/30/2024 1502  Last data filed at 5/30/2024 1428  Gross per 24 hour   Intake 2611.7 ml   Output 1905 ml   Net 706.7 ml     General:  NAD, ventilated  Chest:  coarse BS  CVS:  RRR  Abdominal:  GT in place  Extremities:  1+ edema  Skin:  no rash    Labs:  Renal panel:  Lab Results   Component Value Date/Time     05/30/2024 08:30 AM    K 4.0 05/30/2024 08:30 AM    K 4.1 05/25/2024 03:10 AM    CO2 11 (LL) 05/30/2024 08:30 AM    BUN 68 (H) 05/30/2024 08:30 AM    CREATININE 1.9 (H) 05/30/2024 08:30 AM    CALCIUM 6.4 (L) 05/30/2024 08:30 AM    PHOS 3.9 05/30/2024 04:42 AM    MG 2.10 05/30/2024 04:42 AM     CBC:  Lab Results   Component Value

## 2024-05-30 NOTE — PROGRESS NOTES
Pulmonary Progress Note    CC:  Follow up respiratory failure     Subjective:  Significant decline in the past 12 hours.  He started vomiting yesterday.  Then this am had bloody output from the NG.  Pressors requirements increasing and lactic acid rising.      Intake/Output Summary (Last 24 hours) at 5/30/2024 0816  Last data filed at 5/30/2024 0748  Gross per 24 hour   Intake 3337.39 ml   Output 2220 ml   Net 1117.39 ml           PHYSICAL EXAM:  Blood pressure 112/64, pulse (!) 162, temperature 99.7 °F (37.6 °C), temperature source Bladder, resp. rate (!) 36, height 1.735 m (5' 8.31\"), weight 76.1 kg (167 lb 12.3 oz), SpO2 98 %.'  Gen: Moderately distressed  Eyes: PERRL. No sclera icterus. No conjunctival injection.   ENT: No discharge. Pharynx with ETT and NG with bloody output   Neck: Trachea midline. No obvious mass.    Resp: Work of breathing has increased, diminished breath sounds   CV: Tachycardic  No murmur or rub.    GI: wound vac in place with more distention and tender to palpation.  Diminished bowel sounds   Skin: mottling, severe edema   Lymph: No cervical LAD. No supraclavicular LAD.   M/S: No cyanosis. No clubbing. No joint deformity.    Neuro: Awake, distressed, over-breathing vent  Ext:   no edema    Medications:    Scheduled Meds:   metroNIDAZOLE  250 mg IntraVENous Q8H    sodium chloride (Inhalant)  4 mL Nebulization TID RT    furosemide  20 mg IntraVENous BID    meropenem  1,000 mg IntraVENous Q12H    insulin lispro  0-8 Units SubCUTAneous Q4H    mometasone-formoterol  2 puff Inhalation BID RT    balsum peru-castor oil   Topical BID    sodium chloride flush  5-40 mL IntraVENous 2 times per day    famotidine (PEPCID) injection  20 mg IntraVENous Nightly    ipratropium 0.5 mg-albuterol 2.5 mg  1 Dose Inhalation TID    epoetin more-epbx  10,000 Units SubCUTAneous Weekly    [Held by provider] amLODIPine  10 mg Oral Daily    [Held by provider] aspirin  81 mg Oral Daily    [Held by provider]

## 2024-05-30 NOTE — PROGRESS NOTES
[facility] has been updated regarding the patient's change of code status to Limited Code as advised by Sheree LIANG.

## 2024-05-31 VITALS
OXYGEN SATURATION: 100 % | RESPIRATION RATE: 18 BRPM | HEIGHT: 68 IN | WEIGHT: 172.62 LBS | TEMPERATURE: 97.6 F | DIASTOLIC BLOOD PRESSURE: 67 MMHG | HEART RATE: 90 BPM | SYSTOLIC BLOOD PRESSURE: 91 MMHG | BODY MASS INDEX: 26.16 KG/M2

## 2024-05-31 LAB
ANION GAP SERPL CALCULATED.3IONS-SCNC: 18 MMOL/L (ref 3–16)
BACTERIA BLD CULT ORG #2: NORMAL
BACTERIA BLD CULT: NORMAL
BUN SERPL-MCNC: 84 MG/DL (ref 7–20)
CALCIUM SERPL-MCNC: 6.9 MG/DL (ref 8.3–10.6)
CHLORIDE SERPL-SCNC: 105 MMOL/L (ref 99–110)
CO2 SERPL-SCNC: 16 MMOL/L (ref 21–32)
CREAT SERPL-MCNC: 2.6 MG/DL (ref 0.8–1.3)
DEPRECATED RDW RBC AUTO: 16.5 % (ref 12.4–15.4)
GFR SERPLBLD CREATININE-BSD FMLA CKD-EPI: 26 ML/MIN/{1.73_M2}
GLUCOSE BLD-MCNC: 146 MG/DL (ref 70–99)
GLUCOSE BLD-MCNC: 64 MG/DL (ref 70–99)
GLUCOSE BLD-MCNC: 77 MG/DL (ref 70–99)
GLUCOSE BLD-MCNC: 81 MG/DL (ref 70–99)
GLUCOSE BLD-MCNC: 85 MG/DL (ref 70–99)
GLUCOSE SERPL-MCNC: 69 MG/DL (ref 70–99)
HCT VFR BLD AUTO: 27.5 % (ref 40.5–52.5)
HGB BLD-MCNC: 9.2 G/DL (ref 13.5–17.5)
MAGNESIUM SERPL-MCNC: 1.8 MG/DL (ref 1.8–2.4)
MCH RBC QN AUTO: 30.1 PG (ref 26–34)
MCHC RBC AUTO-ENTMCNC: 33.4 G/DL (ref 31–36)
MCV RBC AUTO: 90 FL (ref 80–100)
PERFORMED ON: ABNORMAL
PERFORMED ON: ABNORMAL
PERFORMED ON: NORMAL
PHOSPHATE SERPL-MCNC: 5.1 MG/DL (ref 2.5–4.9)
PLATELET # BLD AUTO: 57 K/UL (ref 135–450)
PMV BLD AUTO: 11.2 FL (ref 5–10.5)
POTASSIUM SERPL-SCNC: 4.4 MMOL/L (ref 3.5–5.1)
RBC # BLD AUTO: 3.06 M/UL (ref 4.2–5.9)
SODIUM SERPL-SCNC: 139 MMOL/L (ref 136–145)
WBC # BLD AUTO: 24.4 K/UL (ref 4–11)

## 2024-05-31 PROCEDURE — 6360000002 HC RX W HCPCS: Performed by: HOSPITALIST

## 2024-05-31 PROCEDURE — 99024 POSTOP FOLLOW-UP VISIT: CPT | Performed by: STUDENT IN AN ORGANIZED HEALTH CARE EDUCATION/TRAINING PROGRAM

## 2024-05-31 PROCEDURE — 99233 SBSQ HOSP IP/OBS HIGH 50: CPT | Performed by: INTERNAL MEDICINE

## 2024-05-31 PROCEDURE — 99024 POSTOP FOLLOW-UP VISIT: CPT | Performed by: SURGERY

## 2024-05-31 PROCEDURE — 94761 N-INVAS EAR/PLS OXIMETRY MLT: CPT

## 2024-05-31 PROCEDURE — 94003 VENT MGMT INPAT SUBQ DAY: CPT

## 2024-05-31 PROCEDURE — 2500000003 HC RX 250 WO HCPCS: Performed by: INTERNAL MEDICINE

## 2024-05-31 PROCEDURE — 83735 ASSAY OF MAGNESIUM: CPT

## 2024-05-31 PROCEDURE — 84100 ASSAY OF PHOSPHORUS: CPT

## 2024-05-31 PROCEDURE — 6360000002 HC RX W HCPCS: Performed by: SURGERY

## 2024-05-31 PROCEDURE — 2580000003 HC RX 258: Performed by: NURSE PRACTITIONER

## 2024-05-31 PROCEDURE — 6360000002 HC RX W HCPCS: Performed by: NURSE PRACTITIONER

## 2024-05-31 PROCEDURE — 2580000003 HC RX 258: Performed by: INTERNAL MEDICINE

## 2024-05-31 PROCEDURE — 6370000000 HC RX 637 (ALT 250 FOR IP): Performed by: INTERNAL MEDICINE

## 2024-05-31 PROCEDURE — 85027 COMPLETE CBC AUTOMATED: CPT

## 2024-05-31 PROCEDURE — 2700000000 HC OXYGEN THERAPY PER DAY

## 2024-05-31 PROCEDURE — 80048 BASIC METABOLIC PNL TOTAL CA: CPT

## 2024-05-31 PROCEDURE — 2580000003 HC RX 258: Performed by: SURGERY

## 2024-05-31 PROCEDURE — 94640 AIRWAY INHALATION TREATMENT: CPT

## 2024-05-31 PROCEDURE — 2100000000 HC CCU R&B

## 2024-05-31 PROCEDURE — 2580000003 HC RX 258: Performed by: STUDENT IN AN ORGANIZED HEALTH CARE EDUCATION/TRAINING PROGRAM

## 2024-05-31 RX ORDER — LORAZEPAM 2 MG/ML
0.5 INJECTION INTRAMUSCULAR
OUTPATIENT
Start: 2024-05-31

## 2024-05-31 RX ORDER — LORAZEPAM 2 MG/ML
1 CONCENTRATE ORAL EVERY 4 HOURS PRN
Status: DISCONTINUED | OUTPATIENT
Start: 2024-05-31 | End: 2024-06-01 | Stop reason: HOSPADM

## 2024-05-31 RX ORDER — LORAZEPAM 2 MG/ML
0.5 INJECTION INTRAMUSCULAR
Status: DISCONTINUED | OUTPATIENT
Start: 2024-05-31 | End: 2024-06-01 | Stop reason: HOSPADM

## 2024-05-31 RX ORDER — MORPHINE SULFATE 10 MG/.5ML
5 SOLUTION ORAL
Status: DISCONTINUED | OUTPATIENT
Start: 2024-05-31 | End: 2024-06-01 | Stop reason: HOSPADM

## 2024-05-31 RX ORDER — MORPHINE SULFATE 2 MG/ML
2 INJECTION, SOLUTION INTRAMUSCULAR; INTRAVENOUS
Status: DISCONTINUED | OUTPATIENT
Start: 2024-05-31 | End: 2024-06-01 | Stop reason: HOSPADM

## 2024-05-31 RX ADMIN — MICAFUNGIN SODIUM 100 MG: 100 INJECTION, POWDER, LYOPHILIZED, FOR SOLUTION INTRAVENOUS at 09:08

## 2024-05-31 RX ADMIN — LORAZEPAM 0.5 MG: 2 INJECTION INTRAMUSCULAR; INTRAVENOUS at 17:19

## 2024-05-31 RX ADMIN — SODIUM BICARBONATE: 84 INJECTION, SOLUTION INTRAVENOUS at 03:51

## 2024-05-31 RX ADMIN — METRONIDAZOLE 250 MG: 500 INJECTION, SOLUTION INTRAVENOUS at 09:27

## 2024-05-31 RX ADMIN — VASOPRESSIN 0.03 UNITS/MIN: 20 INJECTION INTRAVENOUS at 09:54

## 2024-05-31 RX ADMIN — MORPHINE SULFATE 2 MG: 2 INJECTION, SOLUTION INTRAMUSCULAR; INTRAVENOUS at 17:19

## 2024-05-31 RX ADMIN — Medication 150 MCG/HR: at 10:46

## 2024-05-31 RX ADMIN — Medication 150 MCG/HR: at 03:39

## 2024-05-31 RX ADMIN — SODIUM CHLORIDE, PRESERVATIVE FREE 10 ML: 5 INJECTION INTRAVENOUS at 09:21

## 2024-05-31 RX ADMIN — HYDRALAZINE HYDROCHLORIDE 10 MG: 20 INJECTION INTRAMUSCULAR; INTRAVENOUS at 16:58

## 2024-05-31 RX ADMIN — LORAZEPAM 0.5 MG: 2 INJECTION INTRAMUSCULAR; INTRAVENOUS at 16:58

## 2024-05-31 RX ADMIN — MIDAZOLAM IN SODIUM CHLORIDE 4 MG/HR: 1 INJECTION INTRAVENOUS at 16:21

## 2024-05-31 RX ADMIN — PHENYLEPHRINE HYDROCHLORIDE 70 MCG/MIN: 10 INJECTION INTRAVENOUS at 02:29

## 2024-05-31 RX ADMIN — CASTOR OIL AND BALSAM, PERU: 788; 87 OINTMENT TOPICAL at 09:20

## 2024-05-31 RX ADMIN — METRONIDAZOLE 250 MG: 500 INJECTION, SOLUTION INTRAVENOUS at 02:33

## 2024-05-31 RX ADMIN — VASOPRESSIN 0.04 UNITS/MIN: 20 INJECTION INTRAVENOUS at 02:30

## 2024-05-31 RX ADMIN — IPRATROPIUM BROMIDE AND ALBUTEROL SULFATE 1 DOSE: 2.5; .5 SOLUTION RESPIRATORY (INHALATION) at 08:49

## 2024-05-31 RX ADMIN — MEROPENEM 1000 MG: 1 INJECTION, POWDER, FOR SOLUTION INTRAVENOUS at 04:20

## 2024-05-31 RX ADMIN — IPRATROPIUM BROMIDE AND ALBUTEROL SULFATE 1 DOSE: 2.5; .5 SOLUTION RESPIRATORY (INHALATION) at 12:58

## 2024-05-31 RX ADMIN — Medication 2 PUFF: at 08:49

## 2024-05-31 RX ADMIN — DEXTROSE MONOHYDRATE 125 ML: 100 INJECTION, SOLUTION INTRAVENOUS at 04:42

## 2024-05-31 ASSESSMENT — PULMONARY FUNCTION TESTS
PIF_VALUE: 31
PIF_VALUE: 30
PIF_VALUE: 31
PIF_VALUE: 30
PIF_VALUE: 31
PIF_VALUE: 30
PIF_VALUE: 31
PIF_VALUE: 30
PIF_VALUE: 31
PIF_VALUE: 30
PIF_VALUE: 31
PIF_VALUE: 30

## 2024-05-31 ASSESSMENT — PAIN SCALES - GENERAL
PAINLEVEL_OUTOF10: 0

## 2024-05-31 NOTE — PROGRESS NOTES
Comprehensive Nutrition Assessment    Type and Reason for Visit:  Reassess    Nutrition Recommendations/Plan:   Pending goals of care, will monitor for any needs      Malnutrition Assessment:  Malnutrition Status:  At risk for malnutrition (Comment) (05/23/24 9606)    Context:  Acute Illness     Findings of the 6 clinical characteristics of malnutrition:  Energy Intake:  50% or less of estimated energy requirements for 5 or more days  Weight Loss:   (weight loss between February and May of this year per EMR, about 20 lbs, has flucutated some recently likely fluid shifts)     Body Fat Loss:  Unable to assess     Muscle Mass Loss:  Unable to assess    Fluid Accumulation:  Unable to assess     Strength:  Not Performed    Nutrition Assessment:    Follow up: Remains on vent support.  Tube feeding stopped due to intolerance on 5/29 late evening.  NG placed to LWS.  TPN stopped as well due to medical decline.  Palliative care consult ordered, will monitor goals of care, comfort measures and possible withdraw of care.    Nutrition Related Findings:    labs reviewed Wound Type: Surgical Incision       Current Nutrition Intake & Therapies:    Average Meal Intake: NPO  Average Supplements Intake: NPO  No diet orders on file    Anthropometric Measures:  Height: 173.5 cm (5' 8.31\")  Ideal Body Weight (IBW): 156 lbs (71 kg)       Current Body Weight: 78.3 kg (172 lb 9.9 oz),   IBW. Weight Source: Bed Scale  Current BMI (kg/m2): 26                          BMI Categories: Normal Weight (BMI 18.5-24.9)    Estimated Daily Nutrient Needs:  Energy Requirements Based On: Kcal/kg  Weight Used for Energy Requirements: Current  Energy (kcal/day): 9155-8575 (30-35 kcal/57.7 kg)  Weight Used for Protein Requirements: Current  Protein (g/day): 75-87 (1.3-1.5 g/57.7 kg)  Method Used for Fluid Requirements: 1 ml/kcal  Fluid (ml/day):      Nutrition Diagnosis:   Inadequate energy intake related to impaired respiratory function, increase  demand for energy/nutrients, lack or limited access to food as evidenced by NPO or clear liquid status due to medical condition, intubation    Nutrition Interventions:   Food and/or Nutrient Delivery: Start Tube Feeding  Nutrition Education/Counseling: Education not indicated  Coordination of Nutrition Care: Continue to monitor while inpatient       Goals:     Goals: Initiate nutrition support, Tolerate nutrition support at goal rate       Nutrition Monitoring and Evaluation:      Food/Nutrient Intake Outcomes: Enteral Nutrition Intake/Tolerance  Physical Signs/Symptoms Outcomes: Biochemical Data, Nutrition Focused Physical Findings    Discharge Planning:    Too soon to determine     DAVID CLARK RD, LD  Contact: Office: [phone]; [place]: [phone]

## 2024-05-31 NOTE — PROGRESS NOTES
Extubated pt to R/A per orders at 1704. Positive cuff leak verified prior to extubation. Suctioned above and below cuff prior to extubation. RN in room.  GARRETT MENGP

## 2024-05-31 NOTE — PROGRESS NOTES
05/31/24 1404   Encounter Summary   Encounter Overview/Reason Spiritual/Emotional Needs   Service Provided For Family   Referral/Consult From Palliative Care   Support System Significant other;Children   Last Encounter  05/31/24  (Support to family, family wants prayer when all family present for withdrwl of care )   Complexity of Encounter Moderate   Begin Time 1350   End Time  1405   Total Time Calculated 15 min   Spiritual/Emotional needs   Type Spiritual Support   Assessment/Intervention/Outcome   Assessment Anxious;Sad   Intervention Discussed relationship with God;Explored/Affirmed feelings, thoughts, concerns   Outcome Engaged in conversation

## 2024-05-31 NOTE — PROGRESS NOTES
VASCULAR  POD#9    Intubated - sedated.  Events of last 48 hours as previously summarized previously.    138/51 on 2 pressor agents 112 Tmax 101.2  No significant UO  Absent R BS  Abd distended, firm, no BS  B feet cool    Labs reviewed in total    A/P: S/P aorto-hepatic- mesenteric bypass   S/P bowel resection   Postoperative diffuse gut ischemia/infarction with patent graft - likely reperfusion injury   As outlined and discussed with Dr. Craft this is not survivable.   At this time time family has made pt DNR but not ready to withdrawal support.   No further vascular options.   Will discuss again this morning with Janet Tan.   Continue support for now with no further interventions planned.     Rg Grigsby

## 2024-05-31 NOTE — PROGRESS NOTES
A little after 12 noon went in to turn pt and his restraints were completely off. Per family in room at the time the significant other took the pt's restraints off. Dr. Mendoza notified. Per Dr. Mendoza I could keep the restraints off.

## 2024-05-31 NOTE — PROGRESS NOTES
Received verbal order from Dr. Osorio to discontinue Sodium Bicarb gtt. Dr. Osorio removed wound vac placed mepilex over wound.

## 2024-05-31 NOTE — PROGRESS NOTES
Family decided to withdraw care at 1640 Respiratory called. Pt extubated at 1702 and all drips were turned off at that time as well. Cardiac death occured at 1727, Dr. Mendoza Notified. Dr. Mendoza called Dr. Watson to come pronounce time of death. [facility] called with cardiac time of death at 1746. [facility] placed a hold on Pt Hold Number [id number]. 's office called 1755 per  not a case. Family at bedside. Janet davalos took Pt's belongings home. Dr. Watson came and pronounced time of death.

## 2024-05-31 NOTE — PROGRESS NOTES
01/27/20 1656   Discharge Assessment   Assessment Type Discharge Planning Assessment   Confirmed/corrected address and phone number on facesheet? Yes   Assessment information obtained from? Caregiver   Prior to hospitilization cognitive status: Alert/Oriented   Prior to hospitalization functional status: Independent   Current cognitive status: Unable to Assess   Current Functional Status: Partially Dependent   Facility Arrived From: HOME   Lives With spouse   Able to Return to Prior Arrangements yes   Is patient able to care for self after discharge? Yes   Who are your caregiver(s) and their phone number(s)? MEGHANN FOY  [phone]   Patient's perception of discharge disposition home health   Readmission Within the Last 30 Days no previous admission in last 30 days   Patient currently being followed by outpatient case management? No   Patient currently receives any other outside agency services? Yes   How many hours a day does the patient receive services? 1   Name and contact number of agency or person providing outside services [facility] [phone]   Is it the patient/care giver preference to resume care with the current outside agency? Yes   Equipment Currently Used at Home walker, rolling;wheelchair   Do you have any problems affording any of your prescribed medications? No   Is the patient taking medications as prescribed? yes   Does the patient have transportation home? Yes   Transportation Anticipated family or friend will provide   Dialysis Name and Scheduled days N/A   Does the patient receive services at the [facility]? No   Discharge Plan A Home with family;Home Health   DME Needed Upon Discharge  none   Patient/Family in Agreement with Plan yes   PT LIVES IN [place] WITH HER  WHO SAYS HE DOES MOST EVERYTHING FOR HER.SHE BROKE HER ANKLE RECENTLY AND PRIOR TO THAT SHE BROKE BOTH SHOULDERS, ONE AT A TIME. SHE WAS SEPTIC 2 YEARS AGO ALSO. SHE NEEDS SURGERY ON HER PATELLA BUT    GENERAL SURGERY  Progress Note    Patient Name: Darren Duncan  MRN: [id number]  YOB: 1955   Date of Evaluation: 5/31/2024  Primary Care Physician: Deep Lozano MD      Ileus (HCC) [K56.7]  Abdominal pain [R10.9]  Failure to thrive in adult [R62.7]  Abdominal pain, unspecified abdominal location [R10.9]    SUBJECTIVE:     Darren is stable and critical condition.  He is requiring ventilatory and pressor support.  His urine output has decreased.  He is minimally responsive/reactive.  His pupils are pinpoint.      REVIEW OF SYSTEMS  A comprehensive review of systems was not able to be completed due to patient condition.    OBJECTIVE:     /68   Pulse (!) 107   Temp (!) 101 °F (38.3 °C) (Esophageal)   Resp 15   Ht 1.735 m (5' 8.31\")   Wt 78.3 kg (172 lb 9.9 oz)   SpO2 100%   BMI 26.01 kg/m²     PHYSICAL EXAM  General/appearance: intubated and sedated  Lungs: mechanical ventilation  Cardiac: tachycardic, hypotensive  Abdomen: soft, distended, tympanic, appropriately tender to palpation at surgical sites, NG in place with bloody output  Hernia: None  Incision: Purulent sanguinous drainage from inferior aspect of the midline incision  Extremities: Warm and well perfused, anasarca  Neuro: sedated  Skin: No rashes or wounds    LABS:     Recent Labs     05/29/24  0435 05/29/24  1913 05/30/24  0442 05/30/24  0652 05/30/24  0830 05/30/24  1635 05/31/24  0425 05/31/24  0429   WBC 11.3*  --  18.1*  --  14.8*  --   --  24.4*   HGB 9.1*  --  11.4* 10.4* 8.6*  --   --  9.2*   HCT 27.1*  --  34.1*  --  26.8*  --   --  27.5*   PLT 44*  --  62*  --  53*  --   --  57*    142 142  --  142 143 139  --    K 3.4* 3.7 4.5  --  4.0 4.4 4.4  --     106 107  --  112* 109 105  --    CO2 27 26 21  --  11* 14* 16*  --    BUN 70* 70* 72*  --  68* 80* 84*  --    CREATININE 1.7* 1.6* 1.9*  --  1.9* 2.3* 2.6*  --    MG 2.00 1.90 2.10  --   --   --  1.80  --    PHOS 3.1  --  3.9  --   --   --  5.1*   GASTROENTEROLOGIST RECOMMENDED AGAINT THAT UNTIL HER RECENT PROBLEM WITH CONSTIPATION IS BETTER. PT IS FOLLOWED BY [facility] WITH  AN RN AND AIDE SHE HAS DME AT HOME. SW WILL FOLLOW PT AND ASSIST AS NEEDED.    consolidation of the right lung likely, reflecting a combination of atelectasis and pulmonary edema, though underlying aspiration or pneumonia are also considerations. 3. Stable small left pleural effusion and left basilar atelectasis.     ASSESSMENT & PLAN:     Darren Duncan is a 68 y.o. year-old male.  He has a significant vascular history, including CVA, PAD, right CEA, and left femoral to above-knee popliteal bypass (2020).  He presents with acute on chronic abdominal pain.  This is been ongoing for approximately 2 years.  Overall, his presentation is consistent with chronic mesenteric ischemia.  CT reviewed, as above, no evidence of small bowel thickening or pneumatosis, no free fluid or free air, paucity of intra-abdominal fat limits evaluation, stenosis of the celiac and SMA, 90% and 50 %, respectively.  Abdominal duplex confirmed the above     He underwent aortomesenteric bypass (Dr. Grigsby) with small bowel resection, cholecystectomy, temporary abdominal closure on 5/22, followed by return to the OR for second look laparotomy, small bowel anastomosis and restoration of GI continuity on 5/23.      Significant clinical decline 5/29-30 with worsening hypotension, tachycardia, worsening labs, tube feed intolerance and bloody nasogastric drainage.  CTA with evidence of global hypoperfusion and ischemia, as well as large right pleural effusion, mesenteric bypass graft appears largely patent     Acute on Chronic mesenteric ischemia, small bowel and cholecystic ischemia s/p above operations 5/22 and 23  Pneumatosis intestinalis   Septic Shock  Multisystem organ failure  - labs reviewed, globally worsening including WBC, lactic and Cr.  - Sedation and ventilation per ICU team  - pressors to maintain normotension  - antibiotics ongoing   - trend UOP  - stop tube feeds, return NG to suction  - no role for TPN, allow to run out and stop  - CTA repeated (5/30) and personally reviewed with significant interval worsening,

## 2024-05-31 NOTE — PROGRESS NOTES
[facility]  Palliative Care   Progress Note    NAME:  Darren Duncan  MEDICAL RECORD NUMBER:  [id number]  AGE: 68 y.o.   GENDER: male  : 1955  TODAY'S DATE:  2024    Subjective: Patient remains on vent, pressors, color poor, pupils pinpoint       Objective:    Vitals:    24 0851   BP:    Pulse: (!) 107   Resp: 15   Temp:    SpO2:      Lab Results   Component Value Date    WBC 24.4 (H) 2024    HGB 9.2 (L) 2024    HCT 27.5 (L) 2024    MCV 90.0 2024    PLT 57 (L) 2024     Lab Results   Component Value Date    CREATININE 2.6 (H) 2024    BUN 84 (HH) 2024     2024    K 4.4 2024     2024    CO2 16 (L) 2024     Lab Results   Component Value Date    ALT 6 (L) 2024    AST 67 (H) 2024    ALKPHOS 73 2024    BILITOT 0.4 2024       Plan: I have called and spoke with his HPOA/significant other Janet Tan about his current state continued decline. I spoke with her and was on speaker phone per family his sister also discussing lack of improvement, pain he is in and this is not a survivable event. They will be here around noon most of family understands he is suffering and agrees with withdraw of care, Janet is understandably very distraught and family is helping her come to terms with his continued decline.   1330 I spoke with Janet at bedside she agrees they will withdraw care today once rest of family comes in to see him. Dr Mendoza informed of above       Code Status: Limited  Discharge Environment:  [] Hospice Consult Agency:  [] Inpatient Hospice    [] Home with Hospice Care   [] ECF with Hospice  [] ECF skilled care with Hospice to follow   [] Other:    Teaching Time:  1hour    I will continue to follow Mr. Duncan's care as needed.      Thank you for allowing me to participate in the care of Mr. Duncan .     Electronically signed by Sheree Wade, RN, BSN,CHPN on  5/31/2024 at 10:19 AM  Palliative Care Nurse [facility]  Office: [phone]

## 2024-05-31 NOTE — PROGRESS NOTES
[facility]  Phone: [phone]  Fax: [phone]  [address]     CC: ANIYA    Darren Duncan is a 68 y.o. male whom we were asked to see for ANIYA.  He presented on 05/16 with worsening of his chronic abdominal pain.  He had unintentional weight loss over the last 2 years.  He was evaluated by GI when his abdominal pain worsened on 05/20.  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  He went to the OR on 05/22 with partial small bowel resection and cholecystectomy and was taken back on 05/23 for small bowel anastomosis and abdominal closure.      Subjective:    HPI:  events noted had vomiting, tachycardia and Hypotension CT A chest and abdomen with contrast shows Extensive small bowel pneumatosis.  Large amount of air is seen in the portal  vein and Wedge-shaped areas of hypodensity seen in the liver, spleen, kidneys suggesting infarcts  vein and intrahepatic portal veins.    ROS:  intubated on  pressors  UOP has been minimal Cr increasing as expected  Off of HCO3 gtt  Febrile   Surgery note reviewed    PMFSH:  medications reviewed.   Multiple family members present     Objective:  Blood pressure 96/64, pulse (!) 103, temperature (!) 101 °F (38.3 °C), temperature source Esophageal, resp. rate 15, height 1.735 m (5' 8.31\"), weight 78.3 kg (172 lb 9.9 oz), SpO2 100 %.    Intake/Output Summary (Last 24 hours) at 5/31/2024 1424  Last data filed at 5/31/2024 1049  Gross per 24 hour   Intake 3463.86 ml   Output 1920 ml   Net 1543.86 ml     General:  NAD, ventilated  Chest:  coarse BS  CVS:  RRR  Abdominal:  GT in place  Extremities:  1+ edema  Skin:  no rash    Labs:  Renal panel:  Lab Results   Component Value Date/Time     05/31/2024 04:25 AM    K 4.4 05/31/2024 04:25 AM    K 4.1 05/25/2024 03:10 AM    CO2 16 (L) 05/31/2024 04:25 AM    BUN 84 (HH) 05/31/2024 04:25 AM    CREATININE 2.6 (H) 05/31/2024 04:25 AM    CALCIUM 6.9 (L) 05/31/2024 04:25 AM    PHOS 5.1 (H) 05/31/2024 04:25  AM    MG 1.80 05/31/2024 04:25 AM     CBC:  Lab Results   Component Value Date/Time    WBC 24.4 (H) 05/31/2024 04:29 AM    HGB 9.2 (L) 05/31/2024 04:29 AM    HCT 27.5 (L) 05/31/2024 04:29 AM    PLT 57 (L) 05/31/2024 04:29 AM       Assessment/Plan:  Reviewed old records and labs.    1) ANIYA   - baseline 05/22 Cr 0.6              - suspect ATN secondary to hemodynamic changes during 05/22 surgery and additional insult  today with global bowel ischemia and renal infarcts Oliguric ,Renal function worsening as expected  Not a candidate for RRT due to bowel ischemia not survivable per surgery. Prognosis  very poor D/W sister Fatmata Salter Explained no benefit of RRT at this time   D/W SALBAD[place] vega today   Family wanting to withdraw care after all family members are here           2) s/p partial small bowel resection on 05/22              - global bowel ischemia on CTA surgery      3) ventilated              - per pulmonary     4) shock on three pressors wbc 24 k                   5) anemia                -  HGB 9.2 gm       6) thrombocytopenia                          - HIT Ab negative PLT  57 K today     7) FEN              - metabolic acidosis                          - worsening due to bowel ischemia Lactic Acid 7.8 IVF's were stopped        Prognosis grim

## 2024-05-31 NOTE — SIGNIFICANT EVENT
Called in to pronounce patient    On exam, pt with absent respirations/absent HR, no pulses  Pupils dilated and fixed, not reacting to light  Pt pronounced dead on 5/31/24 at 17:27 pm    Electronically signed by Jo-Ann Watson MD on 5/31/2024 at 6:09 PM

## 2024-05-31 NOTE — PROGRESS NOTES
Hospitalist Progress Note  5/31/2024 8:52 AM    PCP: Deep Lozano MD    [id number]     Date of Admission: 5/16/2024                                                                                                                     HOSPITAL COURSE    Patient demographics:  The patient  Darren Duncan is a 68 y.o. male     Significant past medical history:   Patient Active Problem List   Diagnosis    Other hyperlipidemia    Essential hypertension    Smoker    Rash and nonspecific skin eruption    Cellulitis and abscess of toe of left foot    Peripheral arterial disease (HCC)    Claudication (HCC)    Atherosclerosis of native arteries of extremities with rest pain, left leg (HCC)    Peripheral vascular disease, unspecified (HCC)    Atherosclerosis of artery of extremity with rest pain (HCC)    Unspecified severe protein-calorie malnutrition (HCC)    Other specified disorders of carbohydrate metabolism (HCC)    Abdominal pain    Mesenteric ischemia (HCC)    Mesenteric artery stenosis (HCC)    Chronic mesenteric ischemia (HCC)    Cardiomyopathy (HCC)    ANIYA (acute kidney injury) (HCC)    Acute respiratory failure with hypoxia (HCC)    Chronic obstructive pulmonary disease (HCC)    Gram-negative pneumonia (HCC)    Pleural effusion on right    Septic shock (HCC)         Presenting symptoms:   Abdominal Pain     Diagnostic workup:      CONSULTS DURING ADMISSION :   IP CONSULT TO GI  IP CONSULT TO SPIRITUAL SERVICES  IP CONSULT TO GENERAL SURGERY  IP CONSULT TO VASCULAR SURGERY  IP CONSULT TO CARDIOLOGY  IP CONSULT TO PULMONOLOGY  IP CONSULT TO NEPHROLOGY  IP CONSULT TO DIETITIAN  IP CONSULT TO DIETITIAN  IP CONSULT TO PHARMACY  PHARMACY TO DOSE TPN  IP CONSULT TO PALLIATIVE CARE      Patient was diagnosed with:  Chronic mesenteric ischemia  Status post revascularization 5/22/2024  Partial bowel resection      Treatment while inpatient:         68 years old male with medical history significant for  05/31/24  0425      < > 142 142 143 139   K 3.4*   < > 4.5 4.0 4.4 4.4      < > 107 112* 109 105   CO2 27   < > 21 11* 14* 16*   PHOS 3.1  --  3.9  --   --  5.1*   BUN 70*   < > 72* 68* 80* 84*   CREATININE 1.7*   < > 1.9* 1.9* 2.3* 2.6*    < > = values in this interval not displayed.       LIVER PROFILE:   Recent Labs     05/29/24  0435   ALKPHOS 73   AST 67*   ALT 6*   BILIDIR 0.3   BILITOT 0.4       No results for input(s): \"AMYLASE\" in the last 72 hours.      No results for input(s): \"LIPASE\" in the last 72 hours.        UA:  No results for input(s): \"NITRITE\", \"LABCAST\", \"WBCUA\", \"RBCUA\", \"MUCUS\" in the last 72 hours.      TROPONIN: No results for input(s): \"CKTOTAL\", \"TROPONINI\" in the last 72 hours.    Lab Results   Component Value Date/Time    URRFLXCULT Not Indicated 05/16/2024 07:36 AM       Invalid input(s): \"TSHREFLEX\"    No components found for: \"NIZ9637\"  POC GLUCOSE:    Recent Labs     05/30/24  1634 05/30/24  2106 05/31/24  0007 05/31/24  0425 05/31/24  0511   POCGLU 198* 75 77 64* 146*     No results for input(s): \"LABA1C\" in the last 72 hours.   Lab Results   Component Value Date/Time    LABA1C 5.7 11/09/2023 11:01 AM         ASSESSMENT AND PLAN      Multiple  medical problems  Poor prognosis  Change code status to DNR CC  start comfort medicines  discontinue all other treatments  Ventilator will be withdrawn once whole family is here                Code Status: Limited        Dispo - cc        The patient and / or the family were informed of the results of any tests, a time was given to answer questions, a plan was proposed and they agreed with plan.    JOON ORONA MD    This note was transcribed using Dragon Dictation software. Please disregard any translational errors.

## 2024-05-31 NOTE — PROGRESS NOTES
Pulmonary Progress Note    CC:  Follow up respiratory failure     Subjective:  CT Ab with diffuse ischemia (non-operable and not survivable)  Remains on pressors  Renal function worsening   Oxygen requirements increasing   Fevers       Intake/Output Summary (Last 24 hours) at 5/31/2024 0722  Last data filed at 5/31/2024 0555  Gross per 24 hour   Intake 4916.98 ml   Output 2310 ml   Net 2606.98 ml           PHYSICAL EXAM:  Blood pressure 128/68, pulse (!) 112, temperature (!) 101.4 °F (38.6 °C), temperature source Esophageal, resp. rate 15, height 1.735 m (5' 8.31\"), weight 78.3 kg (172 lb 9.9 oz), SpO2 100 %.'  Gen: Comatose, anasarca   Eyes: Pupils non-reactive   ENT: No discharge. Pharynx with ETT and NG with bloody output   Neck: Trachea midline. No obvious mass.    Resp: Work of breathing has increased, diminished breath sounds   CV: Tachycardic  No murmur or rub.    GI: wound vac in place, slight distention, + BS  Skin: mottling, severe edema   Lymph: No cervical LAD. No supraclavicular LAD.   M/S: No cyanosis. No clubbing. No joint deformity.    Neuro: Comatose   Ext:   no edema    Medications:    Scheduled Meds:   metroNIDAZOLE  250 mg IntraVENous Q8H    micafungin  100 mg IntraVENous Daily    meropenem  1,000 mg IntraVENous Q12H    insulin lispro  0-8 Units SubCUTAneous Q4H    mometasone-formoterol  2 puff Inhalation BID RT    balsum peru-castor oil   Topical BID    sodium chloride flush  5-40 mL IntraVENous 2 times per day    famotidine (PEPCID) injection  20 mg IntraVENous Nightly    ipratropium 0.5 mg-albuterol 2.5 mg  1 Dose Inhalation TID    epoetin more-epbx  10,000 Units SubCUTAneous Weekly    [Held by provider] amLODIPine  10 mg Oral Daily    [Held by provider] aspirin  81 mg Oral Daily    [Held by provider] atorvastatin  80 mg Oral Daily    [Held by provider] lisinopril  20 mg Oral Daily    [Held by provider] pantoprazole  40 mg Oral BID       Continuous Infusions:   midazolam 3 mg/hr (05/31/24

## 2024-06-01 LAB
BACTERIA SPEC RESP CULT: ABNORMAL
GRAM STN SPEC: ABNORMAL
ORGANISM: ABNORMAL
ORGANISM: ABNORMAL

## 2024-06-03 LAB
BACTERIA BLD CULT ORG #2: NORMAL
BACTERIA BLD CULT: NORMAL

## 2024-06-11 ASSESSMENT — ENCOUNTER SYMPTOMS: VOMITING: 1

## 2024-06-23 NOTE — DISCHARGE SUMMARY
Hospital Medicine Discharge Summary and death note      Patient ID: Darren Duncan , [id number]     Patient's PCP: Deep Lozano MD    Admit Date: 5/16/2024     Discharge Date: 5/31/2024      Admitting Physician: Rayne Mendoza MD    Discharge Physician: RAYNE MENDOZA MD     Discharge Diagnoses:     Active Hospital Problems    Diagnosis Date Noted    Septic shock (HCC) [A41.9, R65.21] 05/30/2024    Gram-negative pneumonia (HCC) [J15.69] 05/29/2024    Pleural effusion on right [J90] 05/29/2024    Acute respiratory failure with hypoxia (HCC) [J96.01] 05/28/2024    Chronic obstructive pulmonary disease (HCC) [J44.9] 05/28/2024    Cardiomyopathy (HCC) [I42.9] 05/25/2024    ANIYA (acute kidney injury) (HCC) [N17.9] 05/25/2024    Chronic mesenteric ischemia (HCC) [K55.1] 05/22/2024    Mesenteric ischemia (HCC) [K55.9] 05/21/2024    Mesenteric artery stenosis (HCC) [K55.1] 05/21/2024    Abdominal pain [R10.9] 05/16/2024         The patient was seen and examined on the day of discharge and this discharge summary is in conjunction with any daily progress note from day of discharge.          HOSPITAL COURSE    Patient demographics:  The patient  Darren Duncan is a 68 y.o. male     Significant past medical history:   Patient Active Problem List   Diagnosis    Other hyperlipidemia    Essential hypertension    Smoker    Rash and nonspecific skin eruption    Cellulitis and abscess of toe of left foot    Peripheral arterial disease (HCC)    Claudication (HCC)    Atherosclerosis of native arteries of extremities with rest pain, left leg (HCC)    Peripheral vascular disease, unspecified (HCC)    Atherosclerosis of artery of extremity with rest pain (HCC)    Unspecified severe protein-calorie malnutrition (HCC)    Other specified disorders of carbohydrate metabolism (HCC)    Abdominal pain    Mesenteric ischemia (HCC)    Mesenteric artery stenosis (HCC)    Chronic mesenteric ischemia (HCC)

## (undated) DEVICE — SUTURE PERMAHAND SZ 4-0 L12X30IN NONABSORBABLE BLK SILK A303H

## (undated) DEVICE — TUBING, SUCTION, 1/4" X 12', STRAIGHT: Brand: MEDLINE

## (undated) DEVICE — APPLIER CLP L L13IN TI MULT RNG HNDL 20 CLP STR LIGACLP

## (undated) DEVICE — CANISTER NEG PRSS 1000ML W/ GEL INFOVAC

## (undated) DEVICE — SUTURE PROL SZ 3-0 L36IN NONABSORBABLE BLU L26MM SH 1/2 CIR 8522H

## (undated) DEVICE — SET EXTN IV L30IN TBNG DIA0.1IN PRIMING 4ML MACBOR FEM ADPT

## (undated) DEVICE — SIZER GRAFT VASCULAR VASCUTEK DISP

## (undated) DEVICE — LOOP VES W1.3MM THK0.9MM MINI YEL SIL FLD REPELLENT

## (undated) DEVICE — SUTURE PERMA-HAND SZ 2-0 L30IN NONABSORBABLE BLK L26MM SH K833H

## (undated) DEVICE — PREMIUM DRY TRAY LF: Brand: MEDLINE INDUSTRIES, INC.

## (undated) DEVICE — SUTURE NONABSORBABLE MONOFILAMENT 5-0 C-1 1X24 IN PROLENE 8725H

## (undated) DEVICE — SUTURE PROL SZ 5-0 L18IN NONABSORBABLE BLU C-1 L13MM 3/8 8717H

## (undated) DEVICE — DRAPE,MINOR PROC,6X6 FEN, STER: Brand: MEDLINE

## (undated) DEVICE — STAPLER SKIN H3.9MM WIRE DIA0.58MM CRWN 6.9MM 35 STPL ROT

## (undated) DEVICE — SHEET,DRAPE,53X77,STERILE: Brand: MEDLINE

## (undated) DEVICE — FLOSEAL WITH RECOTHROM - 10ML.: Brand: FLOSEAL HEMOSTATIC MATRIX

## (undated) DEVICE — GOWN,SIRUS,POLYRNF,BRTHSLV,XL,30/CS: Brand: MEDLINE

## (undated) DEVICE — CATHETER URETH 16FR L16IN RED RUB INTMIT ROB MOD BARDX

## (undated) DEVICE — RETAINER VISCERA GLASSMAN FISH DISP ST

## (undated) DEVICE — PLEDGET SURG W0.375XL0.062IN THK1.5MM WHT SFT PTFE RECT

## (undated) DEVICE — SUTURE PERMAHAND SZ 2-0 L12X18IN NONABSORBABLE BLK SILK A185H

## (undated) DEVICE — SPONGE LAP W18XL18IN WHT COT 4 PLY FLD STRUNG RADPQ DISP ST 2 PER PACK

## (undated) DEVICE — SEALER ENDOSCP NANO COAT OPN DIV CRV L JAW LIGASURE IMPACT

## (undated) DEVICE — APPLIER LIG CLP M L11IN TI STR RNG HNDL FOR 20 CLP DISP

## (undated) DEVICE — NEPTUNE E-SEP SMOKE EVACUATION PENCIL, COATED, 70MM BLADE, PUSH BUTTON SWITCH: Brand: NEPTUNE E-SEP

## (undated) DEVICE — CLEANER,CAUTERY TIP,2X2",STERILE: Brand: MEDLINE

## (undated) DEVICE — COUNTER NDL 40 COUNT HLD 70 NUM FOAM BLK SGL MAG W BLDE REMV

## (undated) DEVICE — SUTURE BOOT: Brand: DEROYAL

## (undated) DEVICE — SOLUTION IRRIG 1000ML 0.9% SOD CHL USP POUR PLAS BTL

## (undated) DEVICE — PACK,ORTOHMAX/CVMAX,UNIVERSAL,5/CS: Brand: MEDLINE

## (undated) DEVICE — SPONGE GZ W4XL4IN COT 12 PLY TYP VII WVN C FLD DSGN STERILE

## (undated) DEVICE — CAP PROTCT ORIG SET ZONE SPEC

## (undated) DEVICE — BLANKET WRM W29.9XL79.1IN UP BODY FORC AIR MISTRAL-AIR

## (undated) DEVICE — STOCKING COMPR 2 REG

## (undated) DEVICE — VALVULOTOME ANGIOSCOPIC W/ CATH INTRO CUT HD TRUINCISE TIVK2030] TRUE INCISE]

## (undated) DEVICE — SHEET, T, LAPAROTOMY, STERILE: Brand: MEDLINE

## (undated) DEVICE — FOGARTY SPRING CLIPS 6MM: Brand: FOGARTY SOFTJAW

## (undated) DEVICE — GARMENT COMPR STD FOR 17IN CALF UNIF THER FLOTRN

## (undated) DEVICE — TOWEL,OR,DSP,ST,BLUE,STD,4/PK,20PK/CS: Brand: MEDLINE

## (undated) DEVICE — COVER,MAYO STAND,STERILE: Brand: MEDLINE

## (undated) DEVICE — 1LYRTR 16FR10ML100%SIL UMS SNP: Brand: MEDLINE INDUSTRIES, INC.

## (undated) DEVICE — STAPLER INT L60MM REG TISS BLU B FRM 8 FIRING 2 ROW AUTO

## (undated) DEVICE — SUTURE VICRYL + SZ 3-0 L36IN ABSRB UD L36MM CT-1 1/2 CIR VCP944H

## (undated) DEVICE — Device: Brand: MEDEX

## (undated) DEVICE — GLOVE ORANGE PI 7   MSG9070

## (undated) DEVICE — AGENT HEMOSTATIC SURG ORIGINAL ABS 4X8IN LOOSE KNIT 12/CA

## (undated) DEVICE — SOLUTION PREP PAINT POV IOD FOR SKIN MUCOUS MEM

## (undated) DEVICE — 1010 S-DRAPE TOWEL DRAPE 10/BX: Brand: STERI-DRAPE™

## (undated) DEVICE — SUTURE VCRL SZ 3-0 L27IN ABSRB UD L36MM CT-1 1/2 CIR J258H

## (undated) DEVICE — STRL PENROSE DRAIN 18" X 1/2": Brand: CARDINAL HEALTH

## (undated) DEVICE — CANISTER, RIGID, 1200CC: Brand: MEDLINE INDUSTRIES, INC.

## (undated) DEVICE — APPLIER CLP L9.38IN M LIG TI DISP STR RNG HNDL LIGACLP

## (undated) DEVICE — SOLUTION IV 1000ML 0.9% SOD CHL PH 5 INJ USP VIAFLX PLAS

## (undated) DEVICE — SYRINGE MED 10ML LUERLOCK TIP W/O SFTY DISP

## (undated) DEVICE — SOLUTION IV IRRIG POUR BRL 0.9% SODIUM CHL 2F7124

## (undated) DEVICE — GAUZE,SPONGE,4"X4",16PLY,XRAY,STRL,LF: Brand: MEDLINE

## (undated) DEVICE — ELECTRODE BLDE L6.5IN CAUT EXT DISP

## (undated) DEVICE — BANDAGE COMPR W4INXL15FT BGE E SGL LAYERED CLP CLSR

## (undated) DEVICE — PREVENA PLUS INCISION MANAGEMENT SYSTEM: Brand: PREVENA PLUS™

## (undated) DEVICE — CHLORAPREP 26ML ORANGE

## (undated) DEVICE — 3M™ STERI-DRAPE™ ISOLATION BAG, 10 PER CARTON / 4 CARTONS PER CASE, 1003: Brand: 3M™ STERI-DRAPE™

## (undated) DEVICE — COTTON UNDERCAST PADDING,CRIMPED FINISH: Brand: WEBRIL

## (undated) DEVICE — AGENT HEMSTAT 3GM OXIDIZED REGENERATED CELOS ABSRB FOR CONT (ORDER MULTIPLES OF 5EA)

## (undated) DEVICE — RECTANGULAR PATELLAR TENDOR GRAFT TEMPLATE, 12 MM: Brand: CONMED

## (undated) DEVICE — ADHESIVE SKIN CLSR 0.7ML TOP DERMBND ADV

## (undated) DEVICE — COVER LT HNDL BLU PLAS

## (undated) DEVICE — ELECTRODE PT RET AD L9FT HI MOIST COND ADH HYDRGEL CORDED

## (undated) DEVICE — 4-PORT MANIFOLD: Brand: NEPTUNE 2

## (undated) DEVICE — PICO 7 10CM X 20CM: Brand: PICO™ 7

## (undated) DEVICE — DECANTER BTL 6IN: Brand: MEDLINE INDUSTRIES, INC.

## (undated) DEVICE — FOGARTY - HYDRAGRIP SURGICAL - CLAMP INSERTS: Brand: FOGARTY SOFTJAW

## (undated) DEVICE — SUTURE VICRYL + SZ 3-0 L27IN ABSRB UD L26MM SH 1/2 CIR VCP416H

## (undated) DEVICE — DECANTER BAG 9": Brand: MEDLINE INDUSTRIES, INC.

## (undated) DEVICE — SUTURE PERMAHAND SZ 2-0 L30IN NONABSORBABLE BLK L17MM BB K883H

## (undated) DEVICE — GLOVE,SURG,SENSICARE SLT,LF,PF,7: Brand: MEDLINE

## (undated) DEVICE — SUTURE PERMAHAND SZ 3-0 L18IN NONABSORBABLE BLK L26MM SH C013D

## (undated) DEVICE — SUTURE ABSORBABLE MONOFILAMENT 3-0 SH 27 IN VIO PDS + PDP316H

## (undated) DEVICE — TOTAL TRAY, 16FR 10ML SIL FOLEY, URN: Brand: MEDLINE

## (undated) DEVICE — GLOVE ORANGE PI 7 1/2   MSG9075

## (undated) DEVICE — CLIP LIG M TI 6 SIL HNDL FOR OPN AND ENDOSCP SGL APPL

## (undated) DEVICE — BANDAGE COMPR W6INXL10YD ST M E WHITE/BEIGE

## (undated) DEVICE — SUTURE ABSORBABLE MONOFILAMENT 0 CTX 60 IN VIO PDS + PDP990G

## (undated) DEVICE — TAPE MED W1/8XL30IN WHT POLY

## (undated) DEVICE — DRAPE,INSTRUMENT,MAGNETIC,10X16: Brand: MEDLINE

## (undated) DEVICE — 3M™ IOBAN™ 2 ANTIMICROBIAL INCISE DRAPE 6650EZ: Brand: IOBAN™ 2

## (undated) DEVICE — SPONGE,DISSECTOR,K,XRAY,9/16"X1/4",STRL: Brand: MEDLINE

## (undated) DEVICE — SYRINGE 20ML LL S/C 50

## (undated) DEVICE — SET BLD COLL 21GA TB L12IN NDL L0.75IN WNG GRN SIMP EZ TO

## (undated) DEVICE — SUTURE PROL SZ 3-0 L48IN NONABSORBABLE BLU SH L26MM 1/2 CIR 8534H

## (undated) DEVICE — APPLIER CLP L9.375IN APER 2.1MM CLS L3.8MM 20 SM TI CLP

## (undated) DEVICE — SUTURE VCRL SZ 4-0 L27IN ABSRB UD L19MM FS-2 3/8 CIR REV J422H

## (undated) DEVICE — STAPLER INT L75MM CUT LN L73MM STPL LN L77MM BLU B FRM 8

## (undated) DEVICE — SOLUTION SCRB 4OZ 7.5% POVIDONE IOD ANTIMIC BTL

## (undated) DEVICE — SUTURE PERMAHAND SZ 4-0 L18IN NONABSORBABLE BLK SILK BRAID A183H

## (undated) DEVICE — KIT DSG ABTHERA SENSATRAC

## (undated) DEVICE — YANKAUER,BULB TIP,W/O VENT,RIGID,STERILE: Brand: MEDLINE

## (undated) DEVICE — AGENT HEMSTAT W4XL8IN OXIDIZED REGENERATED CELOS ABSRB

## (undated) DEVICE — MERCY HEALTH WEST TURNOVER: Brand: MEDLINE INDUSTRIES, INC.

## (undated) DEVICE — MAJOR SET UP: Brand: MEDLINE INDUSTRIES, INC.

## (undated) DEVICE — ELECTRODE ELECSURG NDL 2.8 INX7.2 CM COAT INSUL EDGE

## (undated) DEVICE — SUTURE PROL SZ 6-0 L24IN NONABSORBABLE BLU L13MM C-1 3/8 8726H

## (undated) DEVICE — MAJOR SET UP PK

## (undated) DEVICE — SPONGE LAP W18XL18IN WHT COT 4 PLY FLD STRUNG RADPQ DISP ST

## (undated) DEVICE — SUTURE NONABSORBABLE MONOFILAMENT 4-0 RB-1 36 IN BLU PROLENE 8557H

## (undated) DEVICE — SURGICAL SUCTION CONNECTING TUBE WITH MALE CONNECTOR AND SUCTION CLAMP, 2 FT. LONG (.6 M), 5 MM I.D.: Brand: CONMED

## (undated) DEVICE — SURGIFOAM SPNG SZ 100

## (undated) DEVICE — GLOVE SURG SZ 7 CRM LTX FREE POLYISOPRENE POLYMER BEAD ANTI

## (undated) DEVICE — INTENDED FOR TISSUE SEPARATION, AND OTHER PROCEDURES THAT REQUIRE A SHARP SURGICAL BLADE TO PUNCTURE OR CUT.: Brand: BARD-PARKER ® STAINLESS STEEL BLADES

## (undated) DEVICE — RELOAD STPL L75MM OPN H3.8MM CLS 1.5MM WIRE DIA0.2MM REG

## (undated) DEVICE — SUTURE PERMAHAND SZ 2-0 L30IN NONABSORBABLE BLK SILK W/O A305H

## (undated) DEVICE — COVER,TABLE,77X90,STERILE: Brand: MEDLINE

## (undated) DEVICE — SUTURE PROL SZ 7-0 L18IN NONABSORBABLE BLU L9.3MM BV-1 3/8 8701H

## (undated) DEVICE — GOWN SIRUS NONREIN XL W/TWL: Brand: MEDLINE INDUSTRIES, INC.

## (undated) DEVICE — MAJOR VASCULAR: Brand: MEDLINE INDUSTRIES, INC.